# Patient Record
Sex: FEMALE | Race: WHITE | NOT HISPANIC OR LATINO | Employment: OTHER | URBAN - METROPOLITAN AREA
[De-identification: names, ages, dates, MRNs, and addresses within clinical notes are randomized per-mention and may not be internally consistent; named-entity substitution may affect disease eponyms.]

---

## 2017-02-23 ENCOUNTER — HOSPITAL ENCOUNTER (OUTPATIENT)
Dept: LAB | Facility: MEDICAL CENTER | Age: 82
End: 2017-02-23
Attending: NURSE PRACTITIONER
Payer: MEDICARE

## 2017-02-23 LAB
ALBUMIN SERPL BCP-MCNC: 4 G/DL (ref 3.2–4.9)
ALBUMIN/GLOB SERPL: 1.3 G/DL
ALP SERPL-CCNC: 83 U/L (ref 30–99)
ALT SERPL-CCNC: 11 U/L (ref 2–50)
ANION GAP SERPL CALC-SCNC: 8 MMOL/L (ref 0–11.9)
APPEARANCE UR: ABNORMAL
AST SERPL-CCNC: 15 U/L (ref 12–45)
BACTERIA #/AREA URNS HPF: ABNORMAL /HPF
BASOPHILS # BLD AUTO: 0.05 K/UL (ref 0–0.12)
BASOPHILS NFR BLD AUTO: 0.7 % (ref 0–1.8)
BILIRUB SERPL-MCNC: 0.7 MG/DL (ref 0.1–1.5)
BILIRUB UR QL STRIP.AUTO: NEGATIVE
BUN SERPL-MCNC: 31 MG/DL (ref 8–22)
CALCIUM SERPL-MCNC: 10.1 MG/DL (ref 8.5–10.5)
CHLORIDE SERPL-SCNC: 104 MMOL/L (ref 96–112)
CHOLEST SERPL-MCNC: 222 MG/DL (ref 100–199)
CO2 SERPL-SCNC: 28 MMOL/L (ref 20–33)
COLOR UR AUTO: ABNORMAL
CREAT SERPL-MCNC: 1.26 MG/DL (ref 0.5–1.4)
CULTURE IF INDICATED INDCX: YES UA CULTURE
EOSINOPHIL # BLD: 0.29 K/UL (ref 0–0.51)
EOSINOPHIL NFR BLD AUTO: 4.2 % (ref 0–6.9)
EPITHELIAL CELLS 1715: ABNORMAL /HPF
ERYTHROCYTE [DISTWIDTH] IN BLOOD BY AUTOMATED COUNT: 47.5 FL (ref 35.9–50)
GLOBULIN SER CALC-MCNC: 3 G/DL (ref 1.9–3.5)
GLUCOSE SERPL-MCNC: 104 MG/DL (ref 65–99)
GLUCOSE UR STRIP.AUTO-MCNC: NEGATIVE MG/DL
HCT VFR BLD AUTO: 41.2 % (ref 37–47)
HDLC SERPL-MCNC: 37 MG/DL
HGB BLD-MCNC: 13.2 G/DL (ref 12–16)
IMM GRANULOCYTES # BLD AUTO: 0.01 K/UL (ref 0–0.11)
IMM GRANULOCYTES NFR BLD AUTO: 0.1 % (ref 0–0.9)
KETONES UR STRIP.AUTO-MCNC: NEGATIVE MG/DL
LDLC SERPL CALC-MCNC: 133 MG/DL
LEUKOCYTE ESTERASE UR QL STRIP.AUTO: ABNORMAL
LYMPHOCYTES # BLD: 2.51 K/UL (ref 1–4.8)
LYMPHOCYTES NFR BLD AUTO: 36 % (ref 22–41)
MCH RBC QN AUTO: 30.3 PG (ref 27–33)
MCHC RBC AUTO-ENTMCNC: 32 G/DL (ref 33.6–35)
MCV RBC AUTO: 94.5 FL (ref 81.4–97.8)
MICRO URNS: ABNORMAL
MONOCYTES # BLD: 0.69 K/UL (ref 0–0.85)
MONOCYTES NFR BLD AUTO: 9.9 % (ref 0–13.4)
NEUTROPHILS # BLD: 3.42 K/UL (ref 2–7.15)
NEUTROPHILS NFR BLD AUTO: 49.1 % (ref 44–72)
NITRITE UR QL STRIP.AUTO: POSITIVE
NRBC # BLD AUTO: 0 K/UL
NRBC BLD-RTO: 0 /100 WBC
PH UR: 6 [PH]
PLATELET # BLD AUTO: 168 K/UL (ref 164–446)
PMV BLD AUTO: 10.7 FL (ref 9–12.9)
POTASSIUM SERPL-SCNC: 3.9 MMOL/L (ref 3.6–5.5)
PROT SERPL-MCNC: 7 G/DL (ref 6–8.2)
PROT UR QL STRIP: NEGATIVE MG/DL
RBC # BLD AUTO: 4.36 M/UL (ref 4.2–5.4)
RBC #/AREA URNS HPF: ABNORMAL /HPF
RBC UR QL AUTO: ABNORMAL
SODIUM SERPL-SCNC: 140 MMOL/L (ref 135–145)
SP GR UR STRIP.AUTO: 1.01
TRIGL SERPL-MCNC: 261 MG/DL (ref 0–149)
TSH SERPL DL<=0.005 MIU/L-ACNC: 1.93 UIU/ML (ref 0.3–3.7)
WBC # BLD AUTO: 7 K/UL (ref 4.8–10.8)
WBC #/AREA URNS HPF: >150 /HPF

## 2017-02-23 PROCEDURE — 87086 URINE CULTURE/COLONY COUNT: CPT

## 2017-02-23 PROCEDURE — 36415 COLL VENOUS BLD VENIPUNCTURE: CPT

## 2017-02-23 PROCEDURE — 81001 URINALYSIS AUTO W/SCOPE: CPT

## 2017-02-23 PROCEDURE — 87077 CULTURE AEROBIC IDENTIFY: CPT

## 2017-02-23 PROCEDURE — 87186 SC STD MICRODIL/AGAR DIL: CPT

## 2017-02-23 PROCEDURE — 84443 ASSAY THYROID STIM HORMONE: CPT

## 2017-02-23 PROCEDURE — 80061 LIPID PANEL: CPT

## 2017-02-23 PROCEDURE — 80053 COMPREHEN METABOLIC PANEL: CPT

## 2017-02-23 PROCEDURE — 85025 COMPLETE CBC W/AUTO DIFF WBC: CPT

## 2017-02-25 LAB
BACTERIA UR CULT: ABNORMAL
SIGNIFICANT IND 70042: ABNORMAL
SOURCE SOURCE: ABNORMAL

## 2017-06-23 ENCOUNTER — HOSPITAL ENCOUNTER (OUTPATIENT)
Dept: LAB | Facility: MEDICAL CENTER | Age: 82
End: 2017-06-23
Attending: NURSE PRACTITIONER
Payer: MEDICARE

## 2017-06-23 LAB
ALBUMIN SERPL BCP-MCNC: 4 G/DL (ref 3.2–4.9)
ALBUMIN/GLOB SERPL: 1.5 G/DL
ALP SERPL-CCNC: 85 U/L (ref 30–99)
ALT SERPL-CCNC: 17 U/L (ref 2–50)
ANION GAP SERPL CALC-SCNC: 8 MMOL/L (ref 0–11.9)
APPEARANCE UR: CLEAR
AST SERPL-CCNC: 19 U/L (ref 12–45)
BACTERIA #/AREA URNS HPF: ABNORMAL /HPF
BASOPHILS # BLD AUTO: 0.8 % (ref 0–1.8)
BASOPHILS # BLD: 0.06 K/UL (ref 0–0.12)
BILIRUB SERPL-MCNC: 0.6 MG/DL (ref 0.1–1.5)
BILIRUB UR QL STRIP.AUTO: NEGATIVE
BUN SERPL-MCNC: 44 MG/DL (ref 8–22)
CALCIUM SERPL-MCNC: 10.5 MG/DL (ref 8.5–10.5)
CHLORIDE SERPL-SCNC: 107 MMOL/L (ref 96–112)
CHOLEST SERPL-MCNC: 183 MG/DL (ref 100–199)
CO2 SERPL-SCNC: 23 MMOL/L (ref 20–33)
COLOR UR: ABNORMAL
CREAT SERPL-MCNC: 1.22 MG/DL (ref 0.5–1.4)
CULTURE IF INDICATED INDCX: YES UA CULTURE
EOSINOPHIL # BLD AUTO: 0.48 K/UL (ref 0–0.51)
EOSINOPHIL NFR BLD: 6.1 % (ref 0–6.9)
EPI CELLS #/AREA URNS HPF: ABNORMAL /HPF
ERYTHROCYTE [DISTWIDTH] IN BLOOD BY AUTOMATED COUNT: 45.7 FL (ref 35.9–50)
GFR SERPL CREATININE-BSD FRML MDRD: 42 ML/MIN/1.73 M 2
GLOBULIN SER CALC-MCNC: 2.7 G/DL (ref 1.9–3.5)
GLUCOSE SERPL-MCNC: 105 MG/DL (ref 65–99)
GLUCOSE UR STRIP.AUTO-MCNC: NEGATIVE MG/DL
HCT VFR BLD AUTO: 40 % (ref 37–47)
HDLC SERPL-MCNC: 37 MG/DL
HGB BLD-MCNC: 13.1 G/DL (ref 12–16)
IMM GRANULOCYTES # BLD AUTO: 0.02 K/UL (ref 0–0.11)
IMM GRANULOCYTES NFR BLD AUTO: 0.3 % (ref 0–0.9)
KETONES UR STRIP.AUTO-MCNC: NEGATIVE MG/DL
LDLC SERPL CALC-MCNC: 111 MG/DL
LEUKOCYTE ESTERASE UR QL STRIP.AUTO: ABNORMAL
LYMPHOCYTES # BLD AUTO: 2.99 K/UL (ref 1–4.8)
LYMPHOCYTES NFR BLD: 37.9 % (ref 22–41)
MCH RBC QN AUTO: 30.9 PG (ref 27–33)
MCHC RBC AUTO-ENTMCNC: 32.8 G/DL (ref 33.6–35)
MCV RBC AUTO: 94.3 FL (ref 81.4–97.8)
MICRO URNS: ABNORMAL
MONOCYTES # BLD AUTO: 0.91 K/UL (ref 0–0.85)
MONOCYTES NFR BLD AUTO: 11.5 % (ref 0–13.4)
NEUTROPHILS # BLD AUTO: 3.43 K/UL (ref 2–7.15)
NEUTROPHILS NFR BLD: 43.4 % (ref 44–72)
NITRITE UR QL STRIP.AUTO: POSITIVE
NRBC # BLD AUTO: 0 K/UL
NRBC BLD AUTO-RTO: 0 /100 WBC
PH UR STRIP.AUTO: 5.5 [PH]
PLATELET # BLD AUTO: 187 K/UL (ref 164–446)
PMV BLD AUTO: 11.6 FL (ref 9–12.9)
POTASSIUM SERPL-SCNC: 3.4 MMOL/L (ref 3.6–5.5)
PROT SERPL-MCNC: 6.7 G/DL (ref 6–8.2)
PROT UR QL STRIP: NEGATIVE MG/DL
RBC # BLD AUTO: 4.24 M/UL (ref 4.2–5.4)
RBC # URNS HPF: ABNORMAL /HPF
RBC UR QL AUTO: NEGATIVE
SODIUM SERPL-SCNC: 138 MMOL/L (ref 135–145)
SP GR UR STRIP.AUTO: 1.01
TRIGL SERPL-MCNC: 174 MG/DL (ref 0–149)
TSH SERPL DL<=0.005 MIU/L-ACNC: 2 UIU/ML (ref 0.3–3.7)
WBC # BLD AUTO: 7.9 K/UL (ref 4.8–10.8)
WBC #/AREA URNS HPF: ABNORMAL /HPF

## 2017-06-23 PROCEDURE — 36415 COLL VENOUS BLD VENIPUNCTURE: CPT

## 2017-06-23 PROCEDURE — 85025 COMPLETE CBC W/AUTO DIFF WBC: CPT

## 2017-06-23 PROCEDURE — 87186 SC STD MICRODIL/AGAR DIL: CPT

## 2017-06-23 PROCEDURE — 87077 CULTURE AEROBIC IDENTIFY: CPT

## 2017-06-23 PROCEDURE — 87086 URINE CULTURE/COLONY COUNT: CPT

## 2017-06-23 PROCEDURE — 81001 URINALYSIS AUTO W/SCOPE: CPT

## 2017-06-23 PROCEDURE — 80053 COMPREHEN METABOLIC PANEL: CPT

## 2017-06-23 PROCEDURE — 80061 LIPID PANEL: CPT

## 2017-06-23 PROCEDURE — 84443 ASSAY THYROID STIM HORMONE: CPT

## 2017-06-25 LAB
BACTERIA UR CULT: ABNORMAL
SIGNIFICANT IND 70042: ABNORMAL
SOURCE SOURCE: ABNORMAL

## 2017-06-30 ENCOUNTER — HOSPITAL ENCOUNTER (OUTPATIENT)
Dept: LAB | Facility: MEDICAL CENTER | Age: 82
End: 2017-06-30
Attending: DERMATOLOGY
Payer: MEDICARE

## 2017-06-30 LAB
ERYTHROCYTE [SEDIMENTATION RATE] IN BLOOD BY WESTERGREN METHOD: 9 MM/HOUR (ref 0–30)
RHEUMATOID FACT SER IA-ACNC: <10 IU/ML (ref 0–14)

## 2017-06-30 PROCEDURE — 86431 RHEUMATOID FACTOR QUANT: CPT

## 2017-06-30 PROCEDURE — 85652 RBC SED RATE AUTOMATED: CPT

## 2017-06-30 PROCEDURE — 86038 ANTINUCLEAR ANTIBODIES: CPT

## 2017-06-30 PROCEDURE — 36415 COLL VENOUS BLD VENIPUNCTURE: CPT

## 2017-07-02 LAB — NUCLEAR IGG SER QL IA: NORMAL

## 2017-09-05 ENCOUNTER — APPOINTMENT (OUTPATIENT)
Dept: SOCIAL WORK | Facility: CLINIC | Age: 82
End: 2017-09-05
Payer: MEDICARE

## 2017-09-05 PROCEDURE — G0008 ADMIN INFLUENZA VIRUS VAC: HCPCS | Performed by: REGISTERED NURSE

## 2017-09-05 PROCEDURE — 90662 IIV NO PRSV INCREASED AG IM: CPT | Performed by: REGISTERED NURSE

## 2017-12-28 ENCOUNTER — APPOINTMENT (OUTPATIENT)
Dept: RADIOLOGY | Facility: MEDICAL CENTER | Age: 82
DRG: 301 | End: 2017-12-28
Attending: INTERNAL MEDICINE
Payer: MEDICARE

## 2017-12-28 ENCOUNTER — RESOLUTE PROFESSIONAL BILLING HOSPITAL PROF FEE (OUTPATIENT)
Dept: HOSPITALIST | Facility: MEDICAL CENTER | Age: 82
End: 2017-12-28
Payer: MEDICARE

## 2017-12-28 ENCOUNTER — HOSPITAL ENCOUNTER (INPATIENT)
Facility: MEDICAL CENTER | Age: 82
LOS: 1 days | DRG: 301 | End: 2017-12-29
Attending: EMERGENCY MEDICINE | Admitting: INTERNAL MEDICINE
Payer: MEDICARE

## 2017-12-28 DIAGNOSIS — I82.411 ACUTE DEEP VEIN THROMBOSIS (DVT) OF FEMORAL VEIN OF RIGHT LOWER EXTREMITY (HCC): ICD-10-CM

## 2017-12-28 PROBLEM — I82.409 DVT (DEEP VENOUS THROMBOSIS) (HCC): Status: ACTIVE | Noted: 2017-12-28

## 2017-12-28 LAB
ANION GAP SERPL CALC-SCNC: 9 MMOL/L (ref 0–11.9)
BASOPHILS # BLD AUTO: 0.6 % (ref 0–1.8)
BASOPHILS # BLD: 0.05 K/UL (ref 0–0.12)
BUN SERPL-MCNC: 23 MG/DL (ref 8–22)
CALCIUM SERPL-MCNC: 10.6 MG/DL (ref 8.5–10.5)
CHLORIDE SERPL-SCNC: 106 MMOL/L (ref 96–112)
CO2 SERPL-SCNC: 26 MMOL/L (ref 20–33)
CREAT SERPL-MCNC: 1.02 MG/DL (ref 0.5–1.4)
CRP SERPL HS-MCNC: 0.23 MG/DL (ref 0–0.75)
EOSINOPHIL # BLD AUTO: 0.23 K/UL (ref 0–0.51)
EOSINOPHIL NFR BLD: 2.7 % (ref 0–6.9)
ERYTHROCYTE [DISTWIDTH] IN BLOOD BY AUTOMATED COUNT: 44.5 FL (ref 35.9–50)
ERYTHROCYTE [SEDIMENTATION RATE] IN BLOOD BY WESTERGREN METHOD: 14 MM/HOUR (ref 0–30)
GFR SERPL CREATININE-BSD FRML MDRD: 51 ML/MIN/1.73 M 2
GLUCOSE SERPL-MCNC: 97 MG/DL (ref 65–99)
HCT VFR BLD AUTO: 36.8 % (ref 37–47)
HGB BLD-MCNC: 12.5 G/DL (ref 12–16)
IMM GRANULOCYTES # BLD AUTO: 0.03 K/UL (ref 0–0.11)
IMM GRANULOCYTES NFR BLD AUTO: 0.4 % (ref 0–0.9)
LYMPHOCYTES # BLD AUTO: 3.43 K/UL (ref 1–4.8)
LYMPHOCYTES NFR BLD: 40.8 % (ref 22–41)
MCH RBC QN AUTO: 32.4 PG (ref 27–33)
MCHC RBC AUTO-ENTMCNC: 34 G/DL (ref 33.6–35)
MCV RBC AUTO: 95.3 FL (ref 81.4–97.8)
MONOCYTES # BLD AUTO: 0.88 K/UL (ref 0–0.85)
MONOCYTES NFR BLD AUTO: 10.5 % (ref 0–13.4)
NEUTROPHILS # BLD AUTO: 3.78 K/UL (ref 2–7.15)
NEUTROPHILS NFR BLD: 45 % (ref 44–72)
NRBC # BLD AUTO: 0 K/UL
NRBC BLD-RTO: 0 /100 WBC
PLATELET # BLD AUTO: 218 K/UL (ref 164–446)
PMV BLD AUTO: 10.2 FL (ref 9–12.9)
POTASSIUM SERPL-SCNC: 3.5 MMOL/L (ref 3.6–5.5)
RBC # BLD AUTO: 3.86 M/UL (ref 4.2–5.4)
SODIUM SERPL-SCNC: 141 MMOL/L (ref 135–145)
WBC # BLD AUTO: 8.4 K/UL (ref 4.8–10.8)

## 2017-12-28 PROCEDURE — 96375 TX/PRO/DX INJ NEW DRUG ADDON: CPT

## 2017-12-28 PROCEDURE — 96365 THER/PROPH/DIAG IV INF INIT: CPT

## 2017-12-28 PROCEDURE — 86140 C-REACTIVE PROTEIN: CPT

## 2017-12-28 PROCEDURE — 85652 RBC SED RATE AUTOMATED: CPT

## 2017-12-28 PROCEDURE — 71010 DX-CHEST-LIMITED (1 VIEW): CPT

## 2017-12-28 PROCEDURE — 85610 PROTHROMBIN TIME: CPT

## 2017-12-28 PROCEDURE — 94760 N-INVAS EAR/PLS OXIMETRY 1: CPT

## 2017-12-28 PROCEDURE — 99223 1ST HOSP IP/OBS HIGH 75: CPT | Mod: AI | Performed by: INTERNAL MEDICINE

## 2017-12-28 PROCEDURE — 700111 HCHG RX REV CODE 636 W/ 250 OVERRIDE (IP): Performed by: PEDIATRICS

## 2017-12-28 PROCEDURE — 99285 EMERGENCY DEPT VISIT HI MDM: CPT

## 2017-12-28 PROCEDURE — 85730 THROMBOPLASTIN TIME PARTIAL: CPT

## 2017-12-28 PROCEDURE — 770006 HCHG ROOM/CARE - MED/SURG/GYN SEMI*

## 2017-12-28 PROCEDURE — 93971 EXTREMITY STUDY: CPT

## 2017-12-28 PROCEDURE — 85025 COMPLETE CBC W/AUTO DIFF WBC: CPT

## 2017-12-28 PROCEDURE — 80048 BASIC METABOLIC PNL TOTAL CA: CPT

## 2017-12-28 RX ORDER — LEVOTHYROXINE SODIUM 0.03 MG/1
25 TABLET ORAL
Status: DISCONTINUED | OUTPATIENT
Start: 2017-12-29 | End: 2017-12-29 | Stop reason: HOSPADM

## 2017-12-28 RX ORDER — CARVEDILOL 3.12 MG/1
3.12 TABLET ORAL 2 TIMES DAILY WITH MEALS
Status: DISCONTINUED | OUTPATIENT
Start: 2017-12-29 | End: 2017-12-29 | Stop reason: HOSPADM

## 2017-12-28 RX ORDER — POLYETHYLENE GLYCOL 3350 17 G/17G
1 POWDER, FOR SOLUTION ORAL
Status: DISCONTINUED | OUTPATIENT
Start: 2017-12-28 | End: 2017-12-29 | Stop reason: HOSPADM

## 2017-12-28 RX ORDER — AMOXICILLIN 250 MG
2 CAPSULE ORAL 2 TIMES DAILY
Status: DISCONTINUED | OUTPATIENT
Start: 2017-12-28 | End: 2017-12-29 | Stop reason: HOSPADM

## 2017-12-28 RX ORDER — HEPARIN SODIUM 1000 [USP'U]/ML
2200 INJECTION, SOLUTION INTRAVENOUS; SUBCUTANEOUS PRN
Status: DISCONTINUED | OUTPATIENT
Start: 2017-12-28 | End: 2017-12-29

## 2017-12-28 RX ORDER — SODIUM CHLORIDE 9 MG/ML
INJECTION, SOLUTION INTRAVENOUS CONTINUOUS
Status: DISCONTINUED | OUTPATIENT
Start: 2017-12-28 | End: 2017-12-29

## 2017-12-28 RX ORDER — ACETAMINOPHEN 325 MG/1
650 TABLET ORAL EVERY 6 HOURS PRN
Status: DISCONTINUED | OUTPATIENT
Start: 2017-12-28 | End: 2017-12-29 | Stop reason: HOSPADM

## 2017-12-28 RX ORDER — BISACODYL 10 MG
10 SUPPOSITORY, RECTAL RECTAL
Status: DISCONTINUED | OUTPATIENT
Start: 2017-12-28 | End: 2017-12-29 | Stop reason: HOSPADM

## 2017-12-28 RX ORDER — HEPARIN SODIUM 1000 [USP'U]/ML
4000 INJECTION, SOLUTION INTRAVENOUS; SUBCUTANEOUS ONCE
Status: COMPLETED | OUTPATIENT
Start: 2017-12-28 | End: 2017-12-28

## 2017-12-28 RX ADMIN — HEPARIN SODIUM 4000 UNITS: 1000 INJECTION, SOLUTION INTRAVENOUS; SUBCUTANEOUS at 23:07

## 2017-12-28 RX ADMIN — HEPARIN SODIUM 900 UNITS/HR: 5000 INJECTION, SOLUTION INTRAVENOUS at 23:06

## 2017-12-28 ASSESSMENT — PAIN SCALES - GENERAL: PAINLEVEL_OUTOF10: 0

## 2017-12-28 NOTE — ED NOTES
Chief Complaint   Patient presents with   • Leg Swelling     right leg x1 week, reports calf soreness several days prior to noticing swelling.     Ambulatory to triage using cane for above. Hypertensive otherwise VSS. Explained triage process, to waiting room. Asked to inform RN if questions or concerns arise.

## 2017-12-29 ENCOUNTER — APPOINTMENT (OUTPATIENT)
Dept: RADIOLOGY | Facility: MEDICAL CENTER | Age: 82
DRG: 301 | End: 2017-12-29
Attending: INTERNAL MEDICINE
Payer: MEDICARE

## 2017-12-29 VITALS
SYSTOLIC BLOOD PRESSURE: 165 MMHG | TEMPERATURE: 97.1 F | HEART RATE: 87 BPM | DIASTOLIC BLOOD PRESSURE: 70 MMHG | WEIGHT: 137.13 LBS | OXYGEN SATURATION: 95 % | BODY MASS INDEX: 27.64 KG/M2 | HEIGHT: 59 IN | RESPIRATION RATE: 18 BRPM

## 2017-12-29 PROBLEM — E83.52 HYPERCALCEMIA: Status: ACTIVE | Noted: 2017-12-29

## 2017-12-29 PROBLEM — E21.3 HYPERPARATHYROIDISM (HCC): Status: ACTIVE | Noted: 2017-12-29

## 2017-12-29 LAB
APTT PPP: 115.1 SEC (ref 24.7–36)
APTT PPP: 124.5 SEC (ref 24.7–36)
APTT PPP: 29.6 SEC (ref 24.7–36)
CA-I SERPL-SCNC: 1.3 MMOL/L (ref 1.1–1.3)
INR PPP: 1.01 (ref 0.87–1.13)
PROTHROMBIN TIME: 13 SEC (ref 12–14.6)
PTH-INTACT SERPL-MCNC: 90.2 PG/ML (ref 14–72)

## 2017-12-29 PROCEDURE — 83970 ASSAY OF PARATHORMONE: CPT

## 2017-12-29 PROCEDURE — 700105 HCHG RX REV CODE 258: Performed by: INTERNAL MEDICINE

## 2017-12-29 PROCEDURE — 700102 HCHG RX REV CODE 250 W/ 637 OVERRIDE(OP): Performed by: INTERNAL MEDICINE

## 2017-12-29 PROCEDURE — 700111 HCHG RX REV CODE 636 W/ 250 OVERRIDE (IP): Performed by: PEDIATRICS

## 2017-12-29 PROCEDURE — 700117 HCHG RX CONTRAST REV CODE 255: Performed by: HOSPITALIST

## 2017-12-29 PROCEDURE — A9270 NON-COVERED ITEM OR SERVICE: HCPCS | Performed by: INTERNAL MEDICINE

## 2017-12-29 PROCEDURE — 36415 COLL VENOUS BLD VENIPUNCTURE: CPT

## 2017-12-29 PROCEDURE — 71260 CT THORAX DX C+: CPT

## 2017-12-29 PROCEDURE — 85730 THROMBOPLASTIN TIME PARTIAL: CPT | Mod: 91

## 2017-12-29 PROCEDURE — 82330 ASSAY OF CALCIUM: CPT

## 2017-12-29 PROCEDURE — 700102 HCHG RX REV CODE 250 W/ 637 OVERRIDE(OP): Performed by: HOSPITALIST

## 2017-12-29 PROCEDURE — A9270 NON-COVERED ITEM OR SERVICE: HCPCS | Performed by: HOSPITALIST

## 2017-12-29 PROCEDURE — 99239 HOSP IP/OBS DSCHRG MGMT >30: CPT | Performed by: HOSPITALIST

## 2017-12-29 RX ORDER — POTASSIUM CHLORIDE 20 MEQ/1
20 TABLET, EXTENDED RELEASE ORAL DAILY
Status: DISCONTINUED | OUTPATIENT
Start: 2017-12-29 | End: 2017-12-29 | Stop reason: HOSPADM

## 2017-12-29 RX ADMIN — STANDARDIZED SENNA CONCENTRATE AND DOCUSATE SODIUM 2 TABLET: 8.6; 5 TABLET, FILM COATED ORAL at 01:07

## 2017-12-29 RX ADMIN — CARVEDILOL 3.12 MG: 3.12 TABLET, FILM COATED ORAL at 07:59

## 2017-12-29 RX ADMIN — POTASSIUM CHLORIDE 20 MEQ: 1500 TABLET, EXTENDED RELEASE ORAL at 09:30

## 2017-12-29 RX ADMIN — CARVEDILOL 3.12 MG: 3.12 TABLET, FILM COATED ORAL at 16:41

## 2017-12-29 RX ADMIN — IOHEXOL 80 ML: 350 INJECTION, SOLUTION INTRAVENOUS at 15:53

## 2017-12-29 RX ADMIN — SODIUM CHLORIDE: 9 INJECTION, SOLUTION INTRAVENOUS at 01:07

## 2017-12-29 RX ADMIN — LEVOTHYROXINE SODIUM 25 MCG: 25 TABLET ORAL at 05:34

## 2017-12-29 RX ADMIN — RIVAROXABAN 15 MG: 15 TABLET, FILM COATED ORAL at 16:41

## 2017-12-29 RX ADMIN — HEPARIN SODIUM 800 UNITS/HR: 5000 INJECTION, SOLUTION INTRAVENOUS at 12:14

## 2017-12-29 ASSESSMENT — PATIENT HEALTH QUESTIONNAIRE - PHQ9
SUM OF ALL RESPONSES TO PHQ QUESTIONS 1-9: 0
2. FEELING DOWN, DEPRESSED, IRRITABLE, OR HOPELESS: NOT AT ALL
SUM OF ALL RESPONSES TO PHQ9 QUESTIONS 1 AND 2: 0
1. LITTLE INTEREST OR PLEASURE IN DOING THINGS: NOT AT ALL

## 2017-12-29 ASSESSMENT — PAIN SCALES - GENERAL
PAINLEVEL_OUTOF10: 0
PAINLEVEL_OUTOF10: 0

## 2017-12-29 ASSESSMENT — LIFESTYLE VARIABLES
DO YOU DRINK ALCOHOL: NO
EVER_SMOKED: NEVER
ALCOHOL_USE: NO

## 2017-12-29 NOTE — ED NOTES
"Pt ambulated from [facility] using a cane. Pt aox4, breathing even and unlabored, c/o right leg pain x 2 weeks. Swelling to right leg noted, +2 pitting edema to right LE , skin is warm and dry to touch. Pedal and post tib pulses +2 bilaterally. Pt denies weakness or sob since onset of right leg swelling. Pt reports episodes of \"a little bit of dizziness\".   "

## 2017-12-29 NOTE — CARE PLAN
Problem: Safety  Goal: Will remain free from injury    Intervention: Provide assistance with mobility  Fall precaution for high fall risk activated.      Problem: Knowledge Deficit  Goal: Knowledge of disease process/condition, treatment plan, diagnostic tests, and medications will improve    Intervention: Assess knowledge level of disease process/condition, treatment plan, diagnostic tests, and medications  Health teaching given regarding Heparin drip per protocol.

## 2017-12-29 NOTE — DISCHARGE PLANNING
Care Transition Team Assessment    IHD met with pt at bedside. Pt currently lives alone at home, but states that she does not have any local community or family support to rely on. She states that her closest support is her daughter who lives about 30 minutes away from her. She uses a cane and home O2 provided by [employer]. Pt does not have HHC at this time. She will have her daughter provide transportation upon d/c.     Information Source  Orientation : Oriented x 4  Information Given By: Patient  Informant's Name: Regine  Who is responsible for making decisions for patient? : Patient         Elopement Risk  Legal Hold: No  Ambulatory or Self Mobile in Wheelchair: Yes  Disoriented: No  Psychiatric Symptoms: None  History of Wandering: No  Elopement this Admit: No  Vocalizing Wanting to Leave: No  Displays Behaviors, Body Language Wanting to Leave: No-Not at Risk for Elopement    Interdisciplinary Discharge Planning  Does Admitting Nurse Feel This Could be a Complex Discharge?: No  Primary Care Physician: PETE Velazquez  Lives with - Patient's Self Care Capacity: Alone and Able to Care For Self  Support Systems: None  Housing / Facility: [address]  Do You Take your Prescribed Medications Regularly: Yes  Able to Return to Previous ADL's: Yes  Mobility Issues: Yes  Prior Services: None  Patient Expects to be Discharged to:: Home  Assistance Needed: No  Durable Medical Equipment: Not Applicable    Discharge Preparedness  What is your plan after discharge?: Uncertain - pending medical team collaboration  What are your discharge supports?: Child  Prior Functional Level: Ambulatory, Drives Self, Independent with Activities of Daily Living, Independent with Medication Management, Uses Cane  Difficulity with ADLs: Walking  Difficulty with ADLs Comment: uses cane  Difficulity with IADLs: None    Functional Assesment  Prior Functional Level: Ambulatory, Drives Self, Independent with Activities of Daily Living, Independent  with Medication Management, Uses Cane    Finances  Financial Barriers to Discharge: No  Prescription Coverage: Yes ([facility] on [address] )    Vision / Hearing Impairment  Vision Impairment : Yes  Right Eye Vision: Wears Glasses  Left Eye Vision: Wears Glasses  Hearing Impairment : No              Domestic Abuse  Have you ever been the victim of abuse or violence?: No    Psychological Assessment  History of Substance Abuse: None  History of Psychiatric Problems: No  Non-compliant with Treatment: No    Discharge Risks or Barriers  Discharge risks or barriers?: No    Anticipated Discharge Information  Anticipated discharge disposition: Discharge needs currently unknown  Discharge Address: unknown at this time  Discharge Contact Phone Number: [phone]

## 2017-12-29 NOTE — PROGRESS NOTES
Marquita Butcher Fall Risk Assessment:     Last Known Fall: No falls  Mobility: Use of assistive device/requires assist of two people  Medications: Cardiovascular or central nervous system meds  Mental Status/LOC/Awareness: Awake, alert, and oriented to date, place, and person  Toileting Needs: No needs  Volume/Electrolyte Status: Use of IV fluids/tube feeds  Communication/Sensory: Visual (Glasses)/hearing deficit  Behavior: Appropriate behavior  Marquita Butcher Fall Risk Total: 10  Fall Risk Level: LOW RISK    Universal Fall Precautions:  call light/belongings in reach, bed in low position and locked, wheelchairs and assistive devices out of sight, siderails up x 2, use non-slip footwear, adequate lighting, clutter free and spill free environment, educate on level of risk, educate to call for assistance    Fall Risk Level Interventions:   TRIAL (TELE 8, NEURO, MED CHERRY 5) Low Fall Risk Interventions  Place yellow fall risk ID band on patient: completed  Provide patient/family education based on risk assessment: completed  Educate patient/family to call staff for assistance when getting out of bed: completed  Place fall precaution signage outside patient door: completed      Patient Specific Interventions:     Medication: review medications with patient and family  Mental Status/LOC/Awareness: utilize bed/chair fall alarm and reinforce the use of call light  Toileting: instruct patient/family on the use of grab bars  Volume/Electrolyte Status: ensure patient remains hydrated, advance diet as tolerated, monitor abnormal lab values and ensure IV fluids are appropriate  Communication/Sensory: update plan of care on whiteboard  Behavioral: administer medication as ordered  Mobility: dangle prior to standing and provide appropriate assistive device

## 2017-12-29 NOTE — DISCHARGE PLANNING
Update Discharge Planning:  Discussed with IDT :  MD to dc today.   I called Rosana at the [facility] @ [place].   Rajiv is covered and copay is $ 31.50  IMM letter given. Explained to pt, pt signed, copy to pt and to chart.

## 2017-12-29 NOTE — ED PROVIDER NOTES
ED Provider Note    Scribed for Crispin Lacy M.D. by Yves Ragsdale. 12/28/2017, 9:17 PM.    Primary care provider: Silvino Guallpa D.O.  Means of arrival: walk-in  History obtained from: Patient  History limited by: none    CHIEF COMPLAINT  Chief Complaint   Patient presents with   • Leg Swelling     right leg x1 week, reports calf soreness several days prior to noticing swelling.       HPI  Regine Bryant is a 88 y.o. female who presents to the Emergency Department for evaluation of right leg swelling onset 1 month ago. Patient's daughter reports the patient's right leg has been swelling intermittently since onset. Patient explains she first noticed pain to her right calf several days prior to noticing the swelling. Her calf-pain is non-radiating. Patient reports she has taken Advil at home with some relief to her symptoms. She does not note any exacerbating factors. Patient does note that she is easily fatigued upon physical exertion, and explains she has to take frequent breaks when doing work around her house. The patient states she has never experienced leg swelling before. Patient notes she has a toe nail fungus in her right foot, and states that may be attributed to her leg swelling. She confirms a history of hypertension. Patient adds she uses supplemental oxygen at home when she sleeps. She denies recent travel or any history of blood clots. She also denies shortness of breath, chest pain, fevers, nausea, vomiting.     REVIEW OF SYSTEMS  See HPI for further details. All other systems are negative.     C.      PAST MEDICAL HISTORY   has a past medical history of Hypercholesterolemia; Hypertension; Hypertension; and Thyroid disease.    SURGICAL HISTORY   has a past surgical history that includes gyn surgery.    SOCIAL HISTORY  Social History   Substance Use Topics   • Smoking status: Never Smoker   • Smokeless tobacco: Never Used      History   Drug use: Unknown       FAMILY HISTORY  History reviewed.  "No pertinent family history.    CURRENT MEDICATIONS  Reviewed.  See Encounter Summary.     ALLERGIES  No Known Allergies    PHYSICAL EXAM  VITAL SIGNS: BP (!) 190/59   Pulse 89   Temp 37.4 °C (99.4 °F)   Resp 18   Ht 1.499 m (4' 11\")   Wt 62.2 kg (137 lb 2 oz)   SpO2 93%   BMI 27.70 kg/m²   Constitutional: Alert in no apparent distress.  HENT: No signs of trauma, Bilateral external ears normal, Nose normal.   Eyes: Pupils are equal and reactive, Conjunctiva normal, Non-icteric.   Neck: Normal range of motion, No tenderness, Supple, No stridor.   Cardiovascular: Regular rate and rhythm, no murmurs.   Thorax & Lungs: Normal breath sounds, No respiratory distress, No wheezing, No chest tenderness.   Abdomen: Bowel sounds normal, Soft, No tenderness, No masses, No pulsatile masses. No peritoneal signs.  Skin: Warm, Dry, No erythema, No rash.   Extremities: Intact distal pulses, No cyanosis. Unilateral edema to the right leg, greatest to the distal leg and ankle. Overlying erythema and increased warmth. Tender to right posterior calf, again greatest distally. No medial thigh tenderness. Full range of motion of all joints. Distally neurovascularly intact.   Neurologic: Alert , Normal motor function, Normal sensory function, No focal deficits noted.   Psychiatric: Affect normal, Judgment normal, Mood normal.       DIAGNOSTIC STUDIES / PROCEDURES     LABS  Results for orders placed or performed during the hospital encounter of 12/28/17   CBC WITH DIFFERENTIAL   Result Value Ref Range    WBC 8.4 4.8 - 10.8 K/uL    RBC 3.86 (L) 4.20 - 5.40 M/uL    Hemoglobin 12.5 12.0 - 16.0 g/dL    Hematocrit 36.8 (L) 37.0 - 47.0 %    MCV 95.3 81.4 - 97.8 fL    MCH 32.4 27.0 - 33.0 pg    MCHC 34.0 33.6 - 35.0 g/dL    RDW 44.5 35.9 - 50.0 fL    Platelet Count 218 164 - 446 K/uL    MPV 10.2 9.0 - 12.9 fL    Neutrophils-Polys 45.00 44.00 - 72.00 %    Lymphocytes 40.80 22.00 - 41.00 %    Monocytes 10.50 0.00 - 13.40 %    Eosinophils 2.70 " 0.00 - 6.90 %    Basophils 0.60 0.00 - 1.80 %    Immature Granulocytes 0.40 0.00 - 0.90 %    Nucleated RBC 0.00 /100 WBC    Neutrophils (Absolute) 3.78 2.00 - 7.15 K/uL    Lymphs (Absolute) 3.43 1.00 - 4.80 K/uL    Monos (Absolute) 0.88 (H) 0.00 - 0.85 K/uL    Eos (Absolute) 0.23 0.00 - 0.51 K/uL    Baso (Absolute) 0.05 0.00 - 0.12 K/uL    Immature Granulocytes (abs) 0.03 0.00 - 0.11 K/uL    NRBC (Absolute) 0.00 K/uL   BASIC METABOLIC PANEL   Result Value Ref Range    Sodium 141 135 - 145 mmol/L    Potassium 3.5 (L) 3.6 - 5.5 mmol/L    Chloride 106 96 - 112 mmol/L    Co2 26 20 - 33 mmol/L    Glucose 97 65 - 99 mg/dL    Bun 23 (H) 8 - 22 mg/dL    Creatinine 1.02 0.50 - 1.40 mg/dL    Calcium 10.6 (H) 8.5 - 10.5 mg/dL    Anion Gap 9.0 0.0 - 11.9   CRP QUANTITIVE (NON-CARDIAC)   Result Value Ref Range    Stat C-Reactive Protein 0.23 0.00 - 0.75 mg/dL   ESTIMATED GFR   Result Value Ref Range    GFR If African American >60 >60 mL/min/1.73 m 2    GFR If Non  51 (A) >60 mL/min/1.73 m 2      All labs were reviewed by me.      RADIOLOGY  LE VENOUS DUPLEX (Specify in Comments Left, Right Or Bilateral)           The radiologist's interpretation of all radiological studies and images have been reviewed by me.    COURSE & MEDICAL DECISION MAKING  Pertinent Labs & Imaging studies reviewed. (See chart for details)      9:17 PM - Patient seen and examined at bedside. Ordered LE venous duplex, westergren sed rate, CRP, CBC with differential, BMP to evaluate her symptoms. I discussed the plan as above with the patient. She understood and verbalized agreement.      10:22 PM - I discussed the patient's case and the above findings with Dr. Earl (Hospitlaist) who agrees to admit the patient.       10:27 PM - I reevaluated the patient at bedside. The patient is resting comfortably. I updated the patient on her lab and imaging results as above. I also informed the patient of admission plans. She understood and verbalized  agreement.     Decision Making:  This is a 88 y.o. year old female who presents with Right leg pain, swelling, redness and increased warmth. Primary differentials including DVT versus cellulitis. Ultrasound imaging of her choice shows a significant clot burden to the entire right lower extremity. Given the patient's age and need for anticoagulation and do believe that it will be beneficial for the patient to come in under observation for further DVT management and decision making. The patient is understanding today's evaluation, findings and plan.    DISPOSITION:  Patient will be admitted to Dr. Earl in guarded condition.     FINAL IMPRESSION  1. Acute deep vein thrombosis (DVT) of femoral vein of right lower extremity (CMS-[facility])          Yves VILLALBA (Scribe), am scribing for, and in the presence of, Crispin Lacy M.D..    Electronically signed by: Yves Ragsdale (Scribe), 12/28/2017    Crispin VILLALBA M.D. personally performed the services described in this documentation, as scribed by Yves Ragsdale in my presence, and it is both accurate and complete.    The note accurately reflects work and decisions made by me.  Crispin Lacy  12/30/2017  10:44 PM

## 2017-12-29 NOTE — PROGRESS NOTES
"Assumed care of patient at 0700 . Received report from RN. Patient is AOX4 . Assessment complete. Labs reviewed.Patient and RN discussed plan of care. Patient questions answered. Patient needs are met at this time. Bed in lowest and locked position. Upper bed rails up.  Call light is within reach. Hourly rounding in place.  BP (!) 165/70 Comment: RN notified  Pulse 87   Temp 36.2 °C (97.1 °F)   Resp 18   Ht 1.499 m (4' 11\")   Wt 62.2 kg (137 lb 2 oz)   SpO2 95%   Breastfeeding? No   BMI 27.70 kg/m²     "

## 2017-12-29 NOTE — PROGRESS NOTES
New admit from er per sheila, alert and oriented x 4, v/s stable, due med given as ordered, Heparin drip at 900 units/hr at 18ml/hr verified by 2 RN, assisted using the restroom with one person and cane, offered snack, on 02 at 2l/nc at HS per pt, skin checked by 2 RN, call light within reach, bed alarm in placed, needs attended, will continue to monitor.

## 2017-12-29 NOTE — H&P
Hospital Medicine History and Physical    Date of Service  12/28/2017    Chief Complaint  Chief Complaint   Patient presents with   • Leg Swelling     right leg x1 week, reports calf soreness several days prior to noticing swelling.       History of Presenting Illness  88 y.o. female with a past medical history ofHypertension, lung nodule, presented 12/28/2017 with complaint of lower extremity swelling on the right side. Apparently patient said he she noticed right lower extremity pain and started getting swelling for the past week. Patient's pain is local, 4-6/10, intermittent and does not radiate to other location, sharp and with some tingling. Can be controlled by pain meds. In the ER patient was noticed to have confirmed ultrasound DVT study. Patient said likely etiology due to prolonged sitting. However review chart note this patient had lung nodule history. Patient has not been followed up on this issue. Patient said she has colonoscopy a couple years ago. She denies recent travel or any history of blood clots. She also denies shortness of breath, chest pain, fevers, nausea, vomiting. Patient is recommended to be admitted to the hospital for DVT treatment.      Primary Care Physician  Silvino Guallpa D.O.    Consultants  none    Code Status  Code: partial code, patient does not want chest compression, intubation but wants chemical code    Review of Systems  ROS    per HPI otherwise 14 points reviewed 12 systems neg per AMA/CMS criteria.           Past Medical History  Past Medical History:   Diagnosis Date   • Hypercholesterolemia    • Hypertension    • Hypertension    • Thyroid disease        Surgical History  Past Surgical History:   Procedure Laterality Date   • GYN SURGERY      hyst and bladder suspension       Medications  No current facility-administered medications on file prior to encounter.      Current Outpatient Prescriptions on File Prior to Encounter   Medication Sig Dispense Refill   •  "levothyroxine (SYNTHROID) 25 MCG Tab Take 25 mcg by mouth Every morning on an empty stomach.     • carvedilol (COREG) 3.125 MG TABS Take 3.125 mg by mouth 2 times a day, with meals.     • hydrochlorothiazide (HYDRODIURIL) 25 MG TABS Take 25 mg by mouth every day.     • cholecalciferol (VITAMIN D) 400 UNIT CAPS Take 800 Units by mouth every day.     • Calcium Carbonate-Vit D-Min (CALCIUM 1200 PO) Take 1 Tab by mouth every day.     • Glucosamine-Chondroitin (GLUCOSAMINE CHONDR COMPLEX PO) Take 1 Tab by mouth every day.         Family History  History reviewed. No pertinent family history.  reviewed and felt non pertinent to this encounter     Social History  Social History   Substance Use Topics   • Smoking status: Never Smoker   • Smokeless tobacco: Never Used   • Alcohol use Not on file       Allergies  No Known Allergies     Physical Exam  Laboratory   Vitals/ General Appearance:   Weight/BMI: Body mass index is 27.7 kg/m².  Blood pressure (!) 190/59, pulse 89, temperature 37.4 °C (99.4 °F), resp. rate 18, height 1.499 m (4' 11\"), weight 62.2 kg (137 lb 2 oz), SpO2 93 %.   Vitals:    12/28/17 1506 12/28/17 1514   BP: (!) 190/59    Pulse: 89    Resp: 18    Temp: 37.4 °C (99.4 °F)    SpO2: 93%    Weight:  62.2 kg (137 lb 2 oz)   Height: 1.499 m (4' 11\")     Oxygen Therapy:  Pulse Oximetry: 93 %, O2 Delivery: None (Room Air)    Constitutional:  well developed, well nourished, non-toxic, no acute distress  HENMT: Normocephalic, atraumatic, b/l ears normal, nose normal  Eyes:  EOMI, conjunctiva normal, no discharge  Neck: no tracheal deviation, supple  Cardiovascular: normal heart rate, normal rhythm, no murmurs, no rubs or gallops; no cyanosis, clubbing or edema  Lungs: Respiratory effort is normal, normal breath sounds, breath sounds clear to auscultation b/l, no rales, rhonchi or wheezing  Abdomen: soft, non-tender, no guarding or rebound, active BS, no mass  Skin: warm, dry, no erythema, no rash  Neurologic: Alert " and oriented, strength 5/5, no focal deficits, CN II-XII normal  Psychiatric: No anxiety or depression  Lymph node: No lymphadenopathy appreciated in the neck groin and axillary area.   Extremities: R LE edema, pulses b/l         Assessment/Plan  * DVT (deep venous thrombosis) (CMS-HCC)- (present on admission)   Assessment & Plan    New dx  ? Etiology  ? Lung cancer, previous lung mass  R LE DVT confirmed  Iv heparin  Pt prefer NOAC in the future  Need to check with insurance  Repeat CT chest w co  CXR no changes        Lung mass- (present on admission)   Assessment & Plan    No follow up  Repeat CT chest w co  Per chart review patient previously refused biopsy.        Anemia- (present on admission)   Assessment & Plan    Stable  No bleeding        HTN (hypertension)- (present on admission)   Assessment & Plan    Blood pressure stable  Continue home medication              I anticipate this patient will require at least two midnights for appropriate medical management, necessitating inpatient admission.    Prophylaxis: heparin drip    Recent Labs      12/28/17 2122   WBC  8.4   RBC  3.86*   HEMOGLOBIN  12.5   HEMATOCRIT  36.8*   MCV  95.3   MCH  32.4   MCHC  34.0   RDW  44.5   PLATELETCT  218   MPV  10.2     Recent Labs      12/28/17 2122   SODIUM  141   POTASSIUM  3.5*   CHLORIDE  106   CO2  26   GLUCOSE  97   BUN  23*   CREATININE  1.02   CALCIUM  10.6*     Recent Labs      12/28/17 2122   GLUCOSE  97                 Lab Results   Component Value Date    TROPONINI <0.01 09/19/2016       Imaging  DX-CHEST-LIMITED (1 VIEW)   Final Result      1.  No acute cardiopulmonary disease.   2.  No significant change from prior exam.      LE VENOUS DUPLEX (Specify in Comments Left, Right Or Bilateral)   Final Result      CT-CHEST (THORAX) WITH    (Results Pending)          I have discussed patient admission status with  in the ER.    I spent 76 minutes evaluating the patient, reviewing the chart, vitals,  labs and imaging, discussing the case with ED physician, medication reconciliation, placing orders and enacting the plan above.    This dictation was created using voice recognition software. The accuracy of the dictation is limited to the abilities of the software. Although every efforts have been used to decrease the error, I expect there may be some errors of grammar and possibly content.

## 2017-12-29 NOTE — ASSESSMENT & PLAN NOTE
New dx  ? Etiology  ? Lung cancer, previous lung mass  R LE DVT confirmed  Iv heparin  Pt prefer NOAC in the future  Need to check with insurance  Repeat CT chest w co  CXR no changes

## 2017-12-30 NOTE — DISCHARGE SUMMARY
CHIEF COMPLAINT ON ADMISSION  Chief Complaint   Patient presents with   • Leg Swelling     right leg x1 week, reports calf soreness several days prior to noticing swelling.       CODE STATUS  Partial Code    HPI & HOSPITAL COURSE  This is a 88 y.o. female admitted 12/28/17 with right lower leg swelling x 2 weeks found to have extensive DVT on u/s.  She had no respiratory symptoms of SOB or cough.  She is very sedentary and knows that she needs to ambulate on daily basis to prevent future blood clots.  The patient is very much alert with good mentation and decided to proceed with xarelto for DVT treatment as opposed to heparin/coumadin that she was admitted on.  She had a repeat CT thorax showing no change to a 9mm lung nodule.  Her calcium was mildly elevated, PTH elevated at 90.  I have discontinued the patient's calcium and vitamin D supplements.  A repeat calcium and PTH should be checked in 1-2 months.  She was ambulating well with her cane, lives alone, no fall risk based on nursing and my assessment and felt safe for dc to home.  The patient recovered quicker than expected with no change in pulmonary nodule on CT chest.  Home with xarelto.  Therefore, she is discharged in good and stable condition with close outpatient follow-up.    SPECIFIC OUTPATIENT FOLLOW-UP  Follow up [facility] clinic.  Follow up PCP, repeat calcium and PTH levels off calcium and vit D supplements in 2 months.    DISCHARGE PROBLEM LIST  Principal Problem:    DVT (deep venous thrombosis) (CMS-[facility]) POA: Yes  Active Problems:    Lung mass POA: Yes    HTN (hypertension) POA: Yes    Anemia POA: Yes    Hyperparathyroidism (CMS-[facility]) POA: Yes    Hypercalcemia POA: Yes  Resolved Problems:    * No resolved hospital problems. *      FOLLOW UP  No future appointments.  Silvino Guallpa D.O.  [address] [address]  [phone]          [facility], Emergency Dept  [address] [address] [phone]    As  needed, If symptoms worsen      MEDICATIONS ON DISCHARGE   Regine Caceres R   Home Medication Instructions SANDRA:[id number]    Printed on:12/29/17 6222   Medication Information                      carvedilol (COREG) 3.125 MG TABS  Take 3.125 mg by mouth 2 times a day, with meals.             Glucosamine-Chondroitin (GLUCOSAMINE CHONDR COMPLEX PO)  Take 1 Tab by mouth every day.             hydrochlorothiazide (HYDRODIURIL) 25 MG TABS  Take 25 mg by mouth every day.             levothyroxine (SYNTHROID) 25 MCG Tab  Take 25 mcg by mouth Every morning on an empty stomach.             rivaroxaban (XARELTO) 15 MG Tab tablet  Take 1 Tab by mouth 2 Times a Day.             rivaroxaban (XARELTO) 20 MG Tab tablet  Take 1 Tab by mouth every day.                 DIET  Orders Placed This Encounter   Procedures   • Diet Order     Standing Status:   Standing     Number of Occurrences:   1     Order Specific Question:   Diet:     Answer:   Cardiac [6]   • DISCONTINUE DIET TRAY     Standing Status:   Standing     Number of Occurrences:   1       ACTIVITY  As tolerated.  Weight bearing as tolerated      CONSULTATIONS  none    PROCEDURES       Vascular Laboratory   CONCLUSIONS   Extensive RIGHT lower extremity DVT.     Dr. Lacy is aware of the results.     REGINE CACERES     Exam Date:     12/28/2017 21:54    CT chest with contrast:      1. Unchanged 9 mm nodule in the superior left lower lobe since September 2016.    2. No new or suspicious pulmonary nodule.    3. Multiple small enhancing lesions in the liver, incompletely evaluated but grossly similar to prior exam in 2010. Given the long stability, this could relate to flash filling hemangiomas. Correlate with prior abdominal CT or MRI study.   Reading Provider Reading Date   Chacorta Rosado M.D. Dec 29, 2017       LABORATORY  Lab Results   Component Value Date/Time    SODIUM 141 12/28/2017 09:22 PM    POTASSIUM 3.5 (L) 12/28/2017 09:22 PM    CHLORIDE 106 12/28/2017 09:22  PM    CO2 26 12/28/2017 09:22 PM    GLUCOSE 97 12/28/2017 09:22 PM    BUN 23 (H) 12/28/2017 09:22 PM    CREATININE 1.02 12/28/2017 09:22 PM        Lab Results   Component Value Date/Time    WBC 8.4 12/28/2017 09:22 PM    HEMOGLOBIN 12.5 12/28/2017 09:22 PM    HEMATOCRIT 36.8 (L) 12/28/2017 09:22 PM    PLATELETCT 218 12/28/2017 09:22 PM        Total time of the discharge process exceeds 45 minutes

## 2017-12-30 NOTE — DISCHARGE INSTRUCTIONS
Discharge Instructions    Discharged to home by car with relative. Discharged via wheelchair, hospital escort: Yes.  Special equipment needed: Not Applicable    Be sure to schedule a follow-up appointment with your primary care doctor or any specialists as instructed.     Discharge Plan:   Diet Plan: Discussed  Activity Level: Discussed  Confirmed Follow up Appointment: Patient to Call and Schedule Appointment  Confirmed Symptoms Management: Discussed  Medication Reconciliation Updated: Yes  Influenza Vaccine Indication: Not indicated: Previously immunized this influenza season and > 8 years of age    I understand that a diet low in cholesterol, fat, and sodium is recommended for good health. Unless I have been given specific instructions below for another diet, I accept this instruction as my diet prescription.   Other diet: Regular Diet    Special Instructions:   Deep Vein Thrombosis Discharge Instructions    A deep vein thrombosis (DVT) is a blood clot (thrombus) that develops in a deep vein. A DVT is a clot in the deep, larger veins of the leg, arm, or pelvis. These are more dangerous than clots that might form in veins on the surface of the body. Deep vein thrombosis can lead to complications if the clot breaks off and travels in the bloodstream to the lungs.     CAUSES  Blood clots form in a vein for different reasons. Usually several things cause blood clots. They include:   · The flow of blood slows down.   · The inside of the vein is damaged in some way.   · The person has a condition that makes blood clot more easily. These conditions may include:  · Older age (especially over 75 years old).  · Having a history of blood clots.  · Having major or lengthy surgery. Hip surgery is particularly high-risk.   · Breaking a hip or leg.  · Sitting or lying still for a long time.  · Cancer or cancer treatment.  · Having a long, thin tube (catheter) placed inside a vein during a medical procedure.   · Being overweight  (obese).  · Pregnancy and childbirth.  · Medicines with estrogen.  · Smoking.  · Other circulation or heart problems.     SYMPTOMS  When a clot forms, it can either partially or totally block the blood flow in that vein. Symptoms of a DVT can include:  · Swelling of the leg or arm, especially if one side is much worse.  · Warmth and redness of the leg or arm, especially if one side is much worse.   · Pain in an arm or leg. If the clot is in the leg, symptoms may be more noticeable or worse when standing or walking.  If the blood clot travels to the lung, it may cause:  · Shortness of breath.  · Chest pain. The pain may be worsened by deep breaths.   · Coughing up thick mucus (phlegm), possibly flecked with blood.   Anyone with these symptoms should get emergency medical treatment right away. Call your local emergency  Services (911 in [place] if you have these symptoms.     DIAGNOSIS  If a DVT is suspected, your caregiver will take a full medical history. He or she will also perform a physical exam. Tests that also may be required include:   · Studies of the clotting properties of the blood.   · An ultrasound scan.   · X-rays to show the flow of blood when special dye is injected into the veins (venography).   · Studies of your lungs if you have any chest symptoms.     PREVENTION  · Exercise the legs regularly. Take a brisk 30 minute walk every day.   · Maintain a weight that is appropriate for your height.  · Avoid sitting or lying in bed for long periods of time without moving your legs.   · Women, particularly those over the age of 35, should consider the risks and benefits of taking estrogen medicine, including birth control pills.   · Do not smoke, especially if you take estrogen medicines.   · Long-distance travel can increase your risk. You should exercise your legs by walking or pumping the muscles every hour.   · In hospital prevention: Prevention may include medical and non medical measures.      TREATMENT  · The most common treatment for DVT is blood thinning (anticoagulant) medicine, which reduces the blood's tendency to clot. Anticoagulants can stop new blood clots from forming and old ones from growing. They cannot dissolve existing clots. Your body does this by itself over time. Anticoagulants can be given by mouth, by intravenous (IV) access, or by injection. Your caregiver will determine the best program for you.   · Less commonly, clot-dissolving drugs (thrombolytics) are used to dissolve a DVT. They carry a high risk of bleeding, so they are used mainly in severe cases.   · Very rarely, a blood clot in the leg needs to be removed surgically.   · If you are unable to take anticoagulants, your caregiver may arrange for you to have a filter placed in a main vein in your belly (abdomen). This filter prevents clots from traveling to your lungs.     HOME CARE INSTRUCTIONS  Take all medicines prescribed by your caregiver. Follow the directions carefully.   · You will most likely continue taking anticoagulants after you leave the hospital. Your caregiver will advise you on the length of treatment (usually 3 to 5 months, sometimes for life).   · Taking too much or too little of an anticoagulant is dangerous. While taking this type of medicine, you will need to have regular blood tests to be sure the dose is correct. The dose can change for many reasons. It is critically important that you take this medicine exactly as prescribed, and that you have blood tests exactly as directed.   · Many foods can interfere with anticoagulants. These include foods high in vitamin K, such as spinach, kale, broccoli, cabbage, nicol[place] and turnip greens, [place] sprouts, peas, cauliflower, seaweed, parsley, beef and pork liver, green tea, and soybean oil. Your caregiver should discuss limits on these foods with you or you should arrange a visit with a dietician to answer your questions.   · Many medicines can interfere  with anticoagulants. You must tell your caregiver about any and all medicines you take. This includes all vitamins and supplements. Be especially cautious with aspirin and anti-inflammatory medicines. Ask your caregiver before taking these.   · Anticoagulants can have side effects, mostly excessive bruising or bleeding. You will need to hold pressure over cuts for longer than usual. Avoid alcoholic drinks or consume only very small amounts while taking this medicine.    If you are taking an anticoagulant:  · Wear a medical alert bracelet.  · Notify your dentist or other caregivers before procedures.  · Avoid contact sports.    · Ask your caregiver how soon you can go back to normal activities. Not being active can lead to new clots. Ask for a list of what you should and should not do.   · Exercise your lower leg muscles. This is important while traveling.   · You may need to wear compression stockings. These are tight elastic stockings that apply pressure to the lower legs. This can help keep the blood in the legs from clotting.   · If you are a smoker, you should quit.   · Learn as much as you can about DVT.     SEEK MEDICAL CARE IF:  · You have unusual bruising or any bleeding problems.  · The swelling or pain in your affected arm or leg is not gradually improving.   · You anticipate surgery or long-distance travel. You should get specific advice on DVT prevention.   · You discover other family members with blood clots. This may require further testing for inherited diseases or conditions.     SEEK IMMEDIATE MEDICAL CARE IF:  · You develop chest pain.  · You develop severe shortness of breath.  · You begin to cough up bloody mucus or phlegm (sputum).  · You feel dizzy or faint.   · You develop swelling or pain in the leg.  · You have breathing problems after traveling.    MAKE SURE YOU:  · Understand these instructions.  · Will watch your condition.  · Will get help right away if you are not doing well or get  worse.       · Is patient discharged on Warfarin / Coumadin?   No     · Is patient Post Blood Transfusion?  No    Depression / Suicide Risk    As you are discharged from this [facility] facility, it is important to learn how to keep safe from harming yourself.    Recognize the warning signs:  · Abrupt changes in personality, positive or negative- including increase in energy   · Giving away possessions  · Change in eating patterns- significant weight changes-  positive or negative  · Change in sleeping patterns- unable to sleep or sleeping all the time   · Unwillingness or inability to communicate  · Depression  · Unusual sadness, discouragement and loneliness  · Talk of wanting to die  · Neglect of personal appearance   · Rebelliousness- reckless behavior  · Withdrawal from people/activities they love  · Confusion- inability to concentrate     If you or a loved one observes any of these behaviors or has concerns about self-harm, here's what you can do:  · Talk about it- your feelings and reasons for harming yourself  · Remove any means that you might use to hurt yourself (examples: pills, rope, extension cords, firearm)  · Get professional help from the community (Mental Health, Substance Abuse, psychological counseling)  · Do not be alone:Call your Safe Contact- someone whom you trust who will be there for you.  · Call your local CRISIS HOTLINE 045-3121 or 594-512-4145  · Call your local Children's Mobile Crisis Response Team [place] (973) 301-9023 or www.[place]  · Call the toll free National Suicide Prevention Hotlines   · National Suicide Prevention Lifeline 273-504-FWDT (6573)  · National Hope Line Network 800-SUICIDE (973-6102)    Stop vitamin D and calcium supplements since PTH and calcium levels elevated

## 2018-01-02 ENCOUNTER — PATIENT OUTREACH (OUTPATIENT)
Dept: HEALTH INFORMATION MANAGEMENT | Facility: OTHER | Age: 83
End: 2018-01-02

## 2018-01-10 ENCOUNTER — ANTICOAGULATION VISIT (OUTPATIENT)
Dept: VASCULAR LAB | Facility: MEDICAL CENTER | Age: 83
End: 2018-01-10
Attending: FAMILY MEDICINE
Payer: MEDICARE

## 2018-01-10 ENCOUNTER — TELEPHONE (OUTPATIENT)
Dept: VASCULAR LAB | Facility: MEDICAL CENTER | Age: 83
End: 2018-01-10

## 2018-01-10 VITALS — DIASTOLIC BLOOD PRESSURE: 61 MMHG | SYSTOLIC BLOOD PRESSURE: 150 MMHG | HEART RATE: 86 BPM

## 2018-01-10 DIAGNOSIS — I82.4Y9 ACUTE DEEP VEIN THROMBOSIS (DVT) OF PROXIMAL VEIN OF LOWER EXTREMITY, UNSPECIFIED LATERALITY (HCC): ICD-10-CM

## 2018-01-10 LAB — INR PPP: 2.1 (ref 2–3.5)

## 2018-01-10 PROCEDURE — 99213 OFFICE O/P EST LOW 20 MIN: CPT | Performed by: NURSE PRACTITIONER

## 2018-01-10 PROCEDURE — 85610 PROTHROMBIN TIME: CPT

## 2018-01-10 NOTE — PROGRESS NOTES
"Diagnosis: RLE DVT  Drug: Xarelto 15 mg BID  Duration: At least 3 months then reassess    Health Status Since Last Assessment  87 y/o femail with RLE pain & swelling x 2 weeks. US showed \"extensive RIGHT lower extremity DVT.\" Denies ever having blood clots in the past. No recent surgeries, injuries or extended travel. No FH. No HRT use. She has never smoked. Also noted to have 9mm lung nodule. Calcium and vit d supplements on hold. Pt to f/u with PCP regarding this. No known prior malignancies. Does have a somewhat sedentary lifestyle.    She was started on Xarelto 15 mg by mouth twice a day n 12/29/17. She is taking this medication exactly as instructed. Education given to patient and her daughter regarding instructions for taking Xarelto, importance of strict compliance with this medication, drug/drug interactions and signs/symptoms of bleeding or thrombosis. She understands he is not to stop taking this medication without first talking with his doctor. Anticipate patient will transition to Xarelto 20 mg on 1/19/18. She can afford her copay. Already has rx for 20 mg tablets.        Adherence with DOAC Therapy  None  BLEEDING RISK ASSESSMENT NB:    Bleeding Risk Assessmen  None of the following reported:  Severe epistaxis Hemoptysis   Excessive or unusual bruising / hematomas   GIB / melena / BRBPR / hematemesis   Hematuria? Abnormal vaginal bleeding   Concerning daily headache or subdural hematoma symptoms   Decreasing hemoglobin or new anemia   Latest hemoglobin:     Lab Results   Component Value Date/Time    WBC 8.4 12/28/2017 09:22 PM    RBC 3.86 (L) 12/28/2017 09:22 PM    HEMOGLOBIN 12.5 12/28/2017 09:22 PM    HEMATOCRIT 36.8 (L) 12/28/2017 09:22 PM    MCV 95.3 12/28/2017 09:22 PM    MCH 32.4 12/28/2017 09:22 PM    MCHC 34.0 12/28/2017 09:22 PM    MPV 10.2 12/28/2017 09:22 PM    NEUTSPOLYS 45.00 12/28/2017 09:22 PM    LYMPHOCYTES 40.80 12/28/2017 09:22 PM    MONOCYTES 10.50 12/28/2017 09:22 PM    EOSINOPHILS " 2.70 12/28/2017 09:22 PM    BASOPHILS 0.60 12/28/2017 09:22 PM        EtOH overuse - no  Falls, presyncope, syncope, or seizures - no   Uncontrolled hypertension - no  CREATININE CLEARANCE /    Creatinine Clearance/Renal Function  Latest eGFR / creatinine:  Lab Results   Component Value Date/Time    SODIUM 141 12/28/2017 09:22 PM    POTASSIUM 3.5 (L) 12/28/2017 09:22 PM    CHLORIDE 106 12/28/2017 09:22 PM    CO2 26 12/28/2017 09:22 PM    GLUCOSE 97 12/28/2017 09:22 PM    BUN 23 (H) 12/28/2017 09:22 PM    CREATININE 1.02 12/28/2017 09:22 PM      • Is eGFR less than 50ml/min - yes  Actual cr cl 37.4 ml/min  If YES, calculate CrCl (see back)  Any recent dehydrating illness or medications added/changed? i.e. diuretics    Drug Interactions  ASA / other antiplatelets - no  NSAID - no  Other drug interactions (Review med list / OTCs;)  Current Outpatient Prescriptions on File Prior to Visit   Medication Sig Dispense Refill   • rivaroxaban (XARELTO) 15 MG Tab tablet Take 1 Tab by mouth 2 Times a Day. 42 Tab 0   • [START ON 1/20/2018] rivaroxaban (XARELTO) 20 MG Tab tablet Take 1 Tab by mouth every day. 30 Tab 5   • levothyroxine (SYNTHROID) 25 MCG Tab Take 25 mcg by mouth Every morning on an empty stomach.     • carvedilol (COREG) 3.125 MG TABS Take 3.125 mg by mouth 2 times a day, with meals.     • hydrochlorothiazide (HYDRODIURIL) 25 MG TABS Take 25 mg by mouth every day.     • Glucosamine-Chondroitin (GLUCOSAMINE CHONDR COMPLEX PO) Take 1 Tab by mouth every day.       No current facility-administered medications on file prior to visit.        Examination  Blood pressure:150/61  • Elevated BP - sBP not greater than 160 mmHg)  • Symptomatic hypotension - no  Significant gait impairment / imbalance / high risk for falls - no    Final Assessment and Recommendations:  Patient appears stable from the anticoagulation standpoint  Benefits of continued DOAC therapy outweigh risks for this patient  Recommend continue current DOAC  at same dose    - continue taking Xarelto 15 mg by mouth twice daily with food  - do not miss doses  - do not stop this medication unless you receive permission from your doctor  - f/u with PCP regarding lung nodule  - seek medical attention for s/sx of prolonged bleeding or for worsening swelling/pain/redness to any extremity or for SOB  - will need to repeat labs in near future to reassess cr cl  - avoid HRTs    Other Actions:   The rationale for continued DOAC therapy  The potential for minor, major or life-threatening bleeding  Dosing instructions, adherence, risks of non-adherence, handling missed doses  Avoiding OTC ASA & NSAIDs & minimizing EtOH to reduce bleeding risks  Dosing around upcoming procedure / surgery if applicable (see back)    Follow up:  Will follow up with patient in 2 weeks to transition to 20 mg.    Next Appointment: Thursday, January 18, 2018 at 10:15 am.    Lucille GASPAR

## 2018-01-11 LAB — INR BLD: 2.1 (ref 0.9–1.2)

## 2018-01-11 NOTE — TELEPHONE ENCOUNTER
Initial anticoagulation clinic note and most recent d/c summary reviewed.    Patient diagnosed with DVT, first episode, appears unprovoked    Pending further recommendations from PCP, we will continue with 3 months of oral anticoagulation after which time we can reevaluate risks and benefits of extended anticoagulation and/or any further workup with PCP    Given unprovoked nature of clot, if patient tolerates anti-correlation well, ACCP guidelines have a IIb indication for extended anticoagulation    Will defer any indicated, age-appropriate screening for occult malignancy to PCP    Will defer any indicated follow-up for pulmonary nodule to PCP as well    Michael J. Bloch, MD  [facility]    CC:  ASHELY Guallpa

## 2018-01-18 ENCOUNTER — ANTICOAGULATION VISIT (OUTPATIENT)
Dept: VASCULAR LAB | Facility: MEDICAL CENTER | Age: 83
End: 2018-01-18
Attending: FAMILY MEDICINE
Payer: MEDICARE

## 2018-01-18 VITALS — SYSTOLIC BLOOD PRESSURE: 158 MMHG | HEART RATE: 75 BPM | DIASTOLIC BLOOD PRESSURE: 66 MMHG

## 2018-01-18 DIAGNOSIS — I82.401 ACUTE DEEP VEIN THROMBOSIS (DVT) OF RIGHT LOWER EXTREMITY, UNSPECIFIED VEIN (HCC): ICD-10-CM

## 2018-01-18 LAB — INR PPP: 1.5 (ref 2–3.5)

## 2018-01-18 PROCEDURE — 85610 PROTHROMBIN TIME: CPT

## 2018-01-18 PROCEDURE — 99211 OFF/OP EST MAY X REQ PHY/QHP: CPT

## 2018-01-18 NOTE — PROGRESS NOTES
Target end date:tbd     Indication: DVT     Drug: Xarelto         Health Status Since Last Assessment   Patient denies any new relevant medical problems, ED visits or hospitalizations   Patient denies any embolic events (stroke/tia/systemic embolism)    Adherence with DOAC Therapy   Pt has zero missed any doses in the average week    Bleeding Risk Assessment     none Epistaxis   Pt denies any excessive or unusual bleeding/hematomas.  Pt denies any GI bleeds or hematemesis.  Pt denies any concerning daily headache or sub dural hematoma symptoms.     Pt denies any hematuria or abnormal vaginal bleeding.   Latest Hemoglobin 12.5   ETOH overuse never     Creatinine Clearance/Renal Function     Latest ClCr >60ml/min     Drug Interactions   ASA/other antiplatelets none   NSAID none   Other drug interactions none    Examination   Blood Pressure 172/73   Symptomatic hypotension none   Significant gait impairment/imbalance/high risk for falls? Pt uses a cane. Last fall around a year ago.    Final Assessment and Recommendations:   Patient appears stable from the anticoagulation staindpoint.     Benefits of continued DOAC therapy outweigh risks for this patient   Recommend pt continue with current DOAC therapy   (with dose adjustment)     Other Actions:    Follow up:   Will follow up with patient via telephone in 3 months.  I have added this patient to my list for follow up.         Dorothea Alvarado, PharmD

## 2018-01-23 ENCOUNTER — HOSPITAL ENCOUNTER (OUTPATIENT)
Dept: LAB | Facility: MEDICAL CENTER | Age: 83
End: 2018-01-23
Attending: NURSE PRACTITIONER
Payer: MEDICARE

## 2018-01-23 LAB
ALBUMIN SERPL BCP-MCNC: 4 G/DL (ref 3.2–4.9)
ALBUMIN/GLOB SERPL: 1.3 G/DL
ALP SERPL-CCNC: 81 U/L (ref 30–99)
ALT SERPL-CCNC: 16 U/L (ref 2–50)
ANION GAP SERPL CALC-SCNC: 9 MMOL/L (ref 0–11.9)
APPEARANCE UR: CLEAR
AST SERPL-CCNC: 18 U/L (ref 12–45)
BACTERIA #/AREA URNS HPF: ABNORMAL /HPF
BASOPHILS # BLD AUTO: 1 % (ref 0–1.8)
BASOPHILS # BLD: 0.08 K/UL (ref 0–0.12)
BILIRUB SERPL-MCNC: 0.5 MG/DL (ref 0.1–1.5)
BILIRUB UR QL STRIP.AUTO: NEGATIVE
BUN SERPL-MCNC: 37 MG/DL (ref 8–22)
CALCIUM SERPL-MCNC: 10.6 MG/DL (ref 8.5–10.5)
CHLORIDE SERPL-SCNC: 103 MMOL/L (ref 96–112)
CO2 SERPL-SCNC: 28 MMOL/L (ref 20–33)
COLOR UR: YELLOW
CREAT SERPL-MCNC: 1.15 MG/DL (ref 0.5–1.4)
CULTURE IF INDICATED INDCX: YES UA CULTURE
EOSINOPHIL # BLD AUTO: 0.2 K/UL (ref 0–0.51)
EOSINOPHIL NFR BLD: 2.4 % (ref 0–6.9)
EPI CELLS #/AREA URNS HPF: NEGATIVE /HPF
ERYTHROCYTE [DISTWIDTH] IN BLOOD BY AUTOMATED COUNT: 48.5 FL (ref 35.9–50)
GLOBULIN SER CALC-MCNC: 3.2 G/DL (ref 1.9–3.5)
GLUCOSE SERPL-MCNC: 91 MG/DL (ref 65–99)
GLUCOSE UR STRIP.AUTO-MCNC: NEGATIVE MG/DL
HCT VFR BLD AUTO: 41.7 % (ref 37–47)
HGB BLD-MCNC: 13.4 G/DL (ref 12–16)
HYALINE CASTS #/AREA URNS LPF: ABNORMAL /LPF
IMM GRANULOCYTES # BLD AUTO: 0.02 K/UL (ref 0–0.11)
IMM GRANULOCYTES NFR BLD AUTO: 0.2 % (ref 0–0.9)
KETONES UR STRIP.AUTO-MCNC: NEGATIVE MG/DL
LEUKOCYTE ESTERASE UR QL STRIP.AUTO: ABNORMAL
LYMPHOCYTES # BLD AUTO: 3.08 K/UL (ref 1–4.8)
LYMPHOCYTES NFR BLD: 37.2 % (ref 22–41)
MCH RBC QN AUTO: 31.3 PG (ref 27–33)
MCHC RBC AUTO-ENTMCNC: 32.1 G/DL (ref 33.6–35)
MCV RBC AUTO: 97.4 FL (ref 81.4–97.8)
MICRO URNS: ABNORMAL
MONOCYTES # BLD AUTO: 0.86 K/UL (ref 0–0.85)
MONOCYTES NFR BLD AUTO: 10.4 % (ref 0–13.4)
NEUTROPHILS # BLD AUTO: 4.03 K/UL (ref 2–7.15)
NEUTROPHILS NFR BLD: 48.8 % (ref 44–72)
NITRITE UR QL STRIP.AUTO: NEGATIVE
NRBC # BLD AUTO: 0 K/UL
NRBC BLD-RTO: 0 /100 WBC
PH UR STRIP.AUTO: 6.5 [PH]
PLATELET # BLD AUTO: 188 K/UL (ref 164–446)
PMV BLD AUTO: 11 FL (ref 9–12.9)
POTASSIUM SERPL-SCNC: 3.7 MMOL/L (ref 3.6–5.5)
PROT SERPL-MCNC: 7.2 G/DL (ref 6–8.2)
PROT UR QL STRIP: NEGATIVE MG/DL
RBC # BLD AUTO: 4.28 M/UL (ref 4.2–5.4)
RBC # URNS HPF: ABNORMAL /HPF
RBC UR QL AUTO: NEGATIVE
SODIUM SERPL-SCNC: 140 MMOL/L (ref 135–145)
SP GR UR STRIP.AUTO: 1.01
T4 FREE SERPL-MCNC: 0.9 NG/DL (ref 0.53–1.43)
TSH SERPL DL<=0.005 MIU/L-ACNC: 1.4 UIU/ML (ref 0.38–5.33)
UROBILINOGEN UR STRIP.AUTO-MCNC: 0.2 MG/DL
WBC # BLD AUTO: 8.3 K/UL (ref 4.8–10.8)
WBC #/AREA URNS HPF: ABNORMAL /HPF

## 2018-01-23 PROCEDURE — 87186 SC STD MICRODIL/AGAR DIL: CPT

## 2018-01-23 PROCEDURE — 80053 COMPREHEN METABOLIC PANEL: CPT

## 2018-01-23 PROCEDURE — 87077 CULTURE AEROBIC IDENTIFY: CPT

## 2018-01-23 PROCEDURE — 87086 URINE CULTURE/COLONY COUNT: CPT

## 2018-01-23 PROCEDURE — 81001 URINALYSIS AUTO W/SCOPE: CPT

## 2018-01-23 PROCEDURE — 85025 COMPLETE CBC W/AUTO DIFF WBC: CPT

## 2018-01-23 PROCEDURE — 36415 COLL VENOUS BLD VENIPUNCTURE: CPT

## 2018-01-23 PROCEDURE — 84443 ASSAY THYROID STIM HORMONE: CPT

## 2018-01-23 PROCEDURE — 84439 ASSAY OF FREE THYROXINE: CPT

## 2018-01-25 LAB
BACTERIA UR CULT: ABNORMAL
BACTERIA UR CULT: ABNORMAL
SIGNIFICANT IND 70042: ABNORMAL
SITE SITE: ABNORMAL
SOURCE SOURCE: ABNORMAL

## 2018-02-02 ENCOUNTER — PATIENT OUTREACH (OUTPATIENT)
Dept: HEALTH INFORMATION MANAGEMENT | Facility: OTHER | Age: 83
End: 2018-02-02

## 2018-02-02 NOTE — PROGRESS NOTES
Regine Bryant was discharged on 12/29/2017. [facility] patient advocate assisted with multiple discharge needs including 4 appointments, 1 primary care physician appointment, 2 coumadin appointments, 1 laboratory appointment. Of the 4 appointments the patient kept 4. Patient is also scheduled for 1 follow up appointment with coumadin on 4/19/18. [facility] Patient Advocate did not conduct a PPS Screening as the LACE + was at a 22.

## 2018-03-27 ENCOUNTER — HOSPITAL ENCOUNTER (OUTPATIENT)
Dept: LAB | Facility: MEDICAL CENTER | Age: 83
End: 2018-03-27
Attending: NURSE PRACTITIONER
Payer: MEDICARE

## 2018-03-27 LAB
25(OH)D3 SERPL-MCNC: 35 NG/ML (ref 30–100)
ALBUMIN SERPL BCP-MCNC: 4.1 G/DL (ref 3.2–4.9)
ALBUMIN/GLOB SERPL: 1.5 G/DL
ALP SERPL-CCNC: 85 U/L (ref 30–99)
ALT SERPL-CCNC: 17 U/L (ref 2–50)
ANION GAP SERPL CALC-SCNC: 7 MMOL/L (ref 0–11.9)
APPEARANCE UR: CLEAR
AST SERPL-CCNC: 19 U/L (ref 12–45)
BACTERIA #/AREA URNS HPF: NEGATIVE /HPF
BASOPHILS # BLD AUTO: 1.4 % (ref 0–1.8)
BASOPHILS # BLD: 0.09 K/UL (ref 0–0.12)
BILIRUB SERPL-MCNC: 0.7 MG/DL (ref 0.1–1.5)
BILIRUB UR QL STRIP.AUTO: NEGATIVE
BUN SERPL-MCNC: 24 MG/DL (ref 8–22)
CALCIUM SERPL-MCNC: 10.2 MG/DL (ref 8.5–10.5)
CHLORIDE SERPL-SCNC: 104 MMOL/L (ref 96–112)
CHOLEST SERPL-MCNC: 211 MG/DL (ref 100–199)
CO2 SERPL-SCNC: 27 MMOL/L (ref 20–33)
COLOR UR: YELLOW
CREAT SERPL-MCNC: 1.09 MG/DL (ref 0.5–1.4)
CULTURE IF INDICATED INDCX: YES UA CULTURE
EOSINOPHIL # BLD AUTO: 0.19 K/UL (ref 0–0.51)
EOSINOPHIL NFR BLD: 2.9 % (ref 0–6.9)
EPI CELLS #/AREA URNS HPF: NORMAL /HPF
ERYTHROCYTE [DISTWIDTH] IN BLOOD BY AUTOMATED COUNT: 48.5 FL (ref 35.9–50)
GLOBULIN SER CALC-MCNC: 2.7 G/DL (ref 1.9–3.5)
GLUCOSE SERPL-MCNC: 106 MG/DL (ref 65–99)
GLUCOSE UR STRIP.AUTO-MCNC: NEGATIVE MG/DL
HCT VFR BLD AUTO: 40.4 % (ref 37–47)
HDLC SERPL-MCNC: 33 MG/DL
HGB BLD-MCNC: 12.9 G/DL (ref 12–16)
HYALINE CASTS #/AREA URNS LPF: NORMAL /LPF
IMM GRANULOCYTES # BLD AUTO: 0.02 K/UL (ref 0–0.11)
IMM GRANULOCYTES NFR BLD AUTO: 0.3 % (ref 0–0.9)
KETONES UR STRIP.AUTO-MCNC: NEGATIVE MG/DL
LDLC SERPL CALC-MCNC: 124 MG/DL
LEUKOCYTE ESTERASE UR QL STRIP.AUTO: ABNORMAL
LYMPHOCYTES # BLD AUTO: 2.85 K/UL (ref 1–4.8)
LYMPHOCYTES NFR BLD: 42.9 % (ref 22–41)
MCH RBC QN AUTO: 31 PG (ref 27–33)
MCHC RBC AUTO-ENTMCNC: 31.9 G/DL (ref 33.6–35)
MCV RBC AUTO: 97.1 FL (ref 81.4–97.8)
MICRO URNS: ABNORMAL
MONOCYTES # BLD AUTO: 0.74 K/UL (ref 0–0.85)
MONOCYTES NFR BLD AUTO: 11.1 % (ref 0–13.4)
NEUTROPHILS # BLD AUTO: 2.76 K/UL (ref 2–7.15)
NEUTROPHILS NFR BLD: 41.4 % (ref 44–72)
NITRITE UR QL STRIP.AUTO: NEGATIVE
NRBC # BLD AUTO: 0 K/UL
NRBC BLD-RTO: 0 /100 WBC
PH UR STRIP.AUTO: 6.5 [PH]
PLATELET # BLD AUTO: 173 K/UL (ref 164–446)
PMV BLD AUTO: 11.4 FL (ref 9–12.9)
POTASSIUM SERPL-SCNC: 3.9 MMOL/L (ref 3.6–5.5)
PROT SERPL-MCNC: 6.8 G/DL (ref 6–8.2)
PROT UR QL STRIP: NEGATIVE MG/DL
RBC # BLD AUTO: 4.16 M/UL (ref 4.2–5.4)
RBC # URNS HPF: NORMAL /HPF
RBC UR QL AUTO: NEGATIVE
SODIUM SERPL-SCNC: 138 MMOL/L (ref 135–145)
SP GR UR STRIP.AUTO: 1.01
T4 FREE SERPL-MCNC: 0.87 NG/DL (ref 0.53–1.43)
TRIGL SERPL-MCNC: 270 MG/DL (ref 0–149)
TSH SERPL DL<=0.005 MIU/L-ACNC: 1.56 UIU/ML (ref 0.38–5.33)
UROBILINOGEN UR STRIP.AUTO-MCNC: 0.2 MG/DL
WBC # BLD AUTO: 6.7 K/UL (ref 4.8–10.8)
WBC #/AREA URNS HPF: NORMAL /HPF

## 2018-03-27 PROCEDURE — 85025 COMPLETE CBC W/AUTO DIFF WBC: CPT

## 2018-03-27 PROCEDURE — 87086 URINE CULTURE/COLONY COUNT: CPT

## 2018-03-27 PROCEDURE — 84443 ASSAY THYROID STIM HORMONE: CPT

## 2018-03-27 PROCEDURE — 84439 ASSAY OF FREE THYROXINE: CPT

## 2018-03-27 PROCEDURE — 82306 VITAMIN D 25 HYDROXY: CPT

## 2018-03-27 PROCEDURE — 80061 LIPID PANEL: CPT

## 2018-03-27 PROCEDURE — 81001 URINALYSIS AUTO W/SCOPE: CPT

## 2018-03-27 PROCEDURE — 36415 COLL VENOUS BLD VENIPUNCTURE: CPT

## 2018-03-27 PROCEDURE — 80053 COMPREHEN METABOLIC PANEL: CPT

## 2018-03-29 LAB
BACTERIA UR CULT: NORMAL
SIGNIFICANT IND 70042: NORMAL
SITE SITE: NORMAL
SOURCE SOURCE: NORMAL

## 2018-04-19 ENCOUNTER — ANTICOAGULATION VISIT (OUTPATIENT)
Dept: VASCULAR LAB | Facility: MEDICAL CENTER | Age: 83
End: 2018-04-19
Attending: INTERNAL MEDICINE
Payer: MEDICARE

## 2018-04-19 DIAGNOSIS — Z86.718 HISTORY OF DVT (DEEP VEIN THROMBOSIS): ICD-10-CM

## 2018-04-19 PROCEDURE — 99212 OFFICE O/P EST SF 10 MIN: CPT

## 2018-04-19 NOTE — PROGRESS NOTES
Target end date:tbd     Indication: DVT     Drug: Xarelto 20         Health Status Since Last Assessment   Patient denies any new relevant medical problems, ED visits or hospitalizations   Patient denies any embolic events (stroke/tia/systemic embolism)    Adherence with DOAC Therapy   Pt has zero missed any doses in the average week    Bleeding Risk Assessment     none Epistaxis   Pt denies any excessive or unusual bleeding/hematomas.  Pt denies any GI bleeds or hematemesis.  Pt denies any concerning daily headache or sub dural hematoma symptoms.     Pt denies any hematuria or abnormal vaginal bleeding.   Latest Hemoglobin 12.9   ETOH overuse never     Creatinine Clearance/Renal Function     Latest ClCr 55-60 ml/min     Drug Interactions   Platelets: 173   ASA/other antiplatelets none   NSAID none   Other drug interactions none   Verified no anticonvulsant or azole therapy, education provided for future use.     Examination   Blood Pressure 136/68 HR 82   Symptomatic hypotension none   Significant gait impairment/imbalance/high risk for falls? none    Final Assessment and Recommendations:   Patient appears stable from the anticoagulation staindpoint.     Benefits of continued DOAC therapy outweigh risks for this patient   Recommend pt continue with current DOAC therapy     Other Actions: cmp/ cbc hemogram ordered prior to next visit    Follow up:   Will follow up with patient via telephone in 6 months.  I have added this patient to my list for follow up.       Dorothea Alvarado, PharmD

## 2018-04-25 ENCOUNTER — TELEPHONE (OUTPATIENT)
Dept: VASCULAR LAB | Facility: MEDICAL CENTER | Age: 83
End: 2018-04-25

## 2018-04-25 ENCOUNTER — TELEPHONE (OUTPATIENT)
Dept: VASCULAR LAB | Facility: MEDICAL CENTER | Age: 83
End: 2018-04-25
Payer: MEDICARE

## 2018-04-25 NOTE — TELEPHONE ENCOUNTER
Received a call from Faviola Olson's office stating they are uncomfortable with determining LOT. Discussed this with Dorothea and she requested I scheduled a LOT appt w/ Dr. Ramos.

## 2018-04-25 NOTE — TELEPHONE ENCOUNTER
Left voicemail message with Dr. Guallpa's office that this patient has completed 3 months of anticoagulation and is at her decision date.  I offered appointments with our vascular providers, if he is uncomfortable making this LOT decision.  Dorothea Alvarado, JavierD

## 2018-05-04 ENCOUNTER — HOSPITAL ENCOUNTER (EMERGENCY)
Facility: MEDICAL CENTER | Age: 83
End: 2018-05-04
Attending: EMERGENCY MEDICINE
Payer: MEDICARE

## 2018-05-04 ENCOUNTER — APPOINTMENT (OUTPATIENT)
Dept: RADIOLOGY | Facility: MEDICAL CENTER | Age: 83
End: 2018-05-04
Attending: EMERGENCY MEDICINE
Payer: MEDICARE

## 2018-05-04 VITALS
SYSTOLIC BLOOD PRESSURE: 158 MMHG | HEIGHT: 60 IN | WEIGHT: 134.48 LBS | TEMPERATURE: 98.8 F | DIASTOLIC BLOOD PRESSURE: 72 MMHG | OXYGEN SATURATION: 95 % | BODY MASS INDEX: 26.4 KG/M2 | HEART RATE: 80 BPM | RESPIRATION RATE: 18 BRPM

## 2018-05-04 DIAGNOSIS — S09.90XA INJURY OF HEAD, INITIAL ENCOUNTER: ICD-10-CM

## 2018-05-04 PROCEDURE — 99284 EMERGENCY DEPT VISIT MOD MDM: CPT

## 2018-05-04 PROCEDURE — 70450 CT HEAD/BRAIN W/O DYE: CPT

## 2018-05-04 ASSESSMENT — PAIN SCALES - GENERAL
PAINLEVEL_OUTOF10: 4
PAINLEVEL_OUTOF10: 2

## 2018-05-04 NOTE — ED NOTES
The Medication Reconciliation process has been completed by interviewing the patient    Allergies have been reviewed  Antibiotic use in 30 days - none    Home Pharmacy:  [facility] [place]

## 2018-05-04 NOTE — ED NOTES
Pt is accompanied by her daughter.  She presents with a Hx of DVT on Xarelto therapy.  At approximately 0900 AM this morning she experienced a GLF hitting her head without LOC.

## 2018-05-04 NOTE — ED PROVIDER NOTES
ED Provider Note    CHIEF COMPLAINT  Chief Complaint   Patient presents with   • T-5000 FALL   • Head Injury       HPI  Regine Bryant is a 89 y.o. female who presents to the ED secondary to head injury. The patient is on Xarelto, tripped and fell backwards hitting her head on a refrigerator today. She did not pass out, she does have a hematoma on the back of her head, no headache, nausea vomiting, happened at 9:00 this morning. The patient denies any numbness, tingling, weakness, chest pain, shortness of breath, abdominal pains, nausea vomiting.    REVIEW OF SYSTEMS  See HPI for further details. All other systems are negative.     PAST MEDICAL HISTORY  Past Medical History:   Diagnosis Date   • Hypercholesterolemia    • Hypertension    • Hypertension    • Thyroid disease        FAMILY HISTORY  History reviewed. No pertinent family history.    SOCIAL HISTORY  Social History     Social History   • Marital status:      Spouse name: N/A   • Number of children: N/A   • Years of education: N/A     Social History Main Topics   • Smoking status: Never Smoker   • Smokeless tobacco: Never Used   • Alcohol use No   • Drug use: No   • Sexual activity: Not on file     Other Topics Concern   • Not on file     Social History Narrative   • No narrative on file       SURGICAL HISTORY  Past Surgical History:   Procedure Laterality Date   • GYN SURGERY      hyst and bladder suspension       CURRENT MEDICATIONS  Home Medications     Reviewed by Rj Aceves (Pharmacy Tech) on 05/04/18 at 1215  Med List Status: Complete   Medication Last Dose Status   carvedilol (COREG) 3.125 MG TABS 5/4/2018 Active   hydrochlorothiazide (HYDRODIURIL) 25 MG TABS 5/4/2018 Active   levothyroxine (SYNTHROID) 25 MCG Tab 5/4/2018 Active   rivaroxaban (XARELTO) 20 MG Tab tablet 5/3/2018 Active                ALLERGIES  No Known Allergies    PHYSICAL EXAM  VITAL SIGNS: BP (!) 170/69   Pulse 81   Temp 37.1 °C (98.8 °F)   Resp 18   Ht  1.524 m (5') Comment: Stated  Wt 61 kg (134 lb 7.7 oz)   SpO2 94%   BMI 26.26 kg/m²   Constitutional:  Well developed, Well nourished, mild distress, Non-toxic appearance.   HENT: Moderate hematoma occipital area, no laceration, does not boggy  Eyes: PERRLA, EOMI, Conjunctiva normal, No discharge.   Neck: Normal range of motion, No tenderness, Supple, No stridor. No midline C-spine tenderness  Lymphatic: No lymphadenopathy noted.   Cardiovascular: Normal heart rate, Normal rhythm.   Thorax & Lungs: Normal breath sounds, No respiratory distress, No wheezing, No chest tenderness.   Skin: Warm, Dry, No erythema, No rash.   Extremities: Intact distal pulses, No edema, No tenderness.   Neurologic: Alert & oriented x 3, Normal motor function, Normal sensory function, No focal deficits noted.   Psychiatric: Affect normal, Judgment normal, Mood normal.     RADIOLOGY/PROCEDURES  CT-HEAD W/O   Final Result      No noncontrast CT evidence of acute intracranial hemorrhage.      Moderate white matter hypodensity is present.  This is a nonspecific finding which usually is found to represent chronic microvascular disease in patient's of this demographic.  Demyelination, age indeterminant ischemia and gliosis are also common    possibilities.      Age-appropriate cerebral volume loss.            COURSE & MEDICAL DECISION MAKING  Pertinent Labs & Imaging studies reviewed. (See chart for details)  Patient is coming in standard head injury, she is on Xarelto, CT scan is negative, discussed with them return precautions, discussed with them there is a small rebleed rate and so if the patient gets any headache, nausea vomiting to return immediately also if they are concerned to get rechecked tomorrow morning.    FINAL IMPRESSION  1. Injury of head, initial encounter        Patient referred to primary care provider for blood pressure management     This dictation was created using voice recognition software. The accuracy of the  dictation is limited to the abilities of the software. I expect there may be some errors of grammar and possibly content. The nursing notes were reviewed and certain aspects of this information were incorporated into this note.    Electronically signed by: Med Lombardi, 5/4/2018 12:50 PM

## 2018-05-31 ENCOUNTER — OFFICE VISIT (OUTPATIENT)
Dept: VASCULAR LAB | Facility: MEDICAL CENTER | Age: 83
End: 2018-05-31
Attending: FAMILY MEDICINE
Payer: MEDICARE

## 2018-05-31 VITALS
SYSTOLIC BLOOD PRESSURE: 134 MMHG | HEART RATE: 80 BPM | BODY MASS INDEX: 26.9 KG/M2 | DIASTOLIC BLOOD PRESSURE: 50 MMHG | WEIGHT: 137 LBS | HEIGHT: 60 IN

## 2018-05-31 DIAGNOSIS — D69.6 THROMBOCYTOPENIA (HCC): ICD-10-CM

## 2018-05-31 DIAGNOSIS — R91.8 LUNG MASS: ICD-10-CM

## 2018-05-31 DIAGNOSIS — E43 PROTEIN-CALORIE MALNUTRITION, SEVERE (HCC): ICD-10-CM

## 2018-05-31 DIAGNOSIS — I82.401 ACUTE DEEP VEIN THROMBOSIS (DVT) OF RIGHT LOWER EXTREMITY, UNSPECIFIED VEIN (HCC): ICD-10-CM

## 2018-05-31 PROCEDURE — 99203 OFFICE O/P NEW LOW 30 MIN: CPT | Performed by: FAMILY MEDICINE

## 2018-05-31 PROCEDURE — 99212 OFFICE O/P EST SF 10 MIN: CPT | Performed by: FAMILY MEDICINE

## 2018-05-31 ASSESSMENT — ENCOUNTER SYMPTOMS
WEAKNESS: 0
DIARRHEA: 0
DIZZINESS: 0
PALPITATIONS: 0
CHILLS: 0
HEMOPTYSIS: 0
VOMITING: 0
ABDOMINAL PAIN: 0
SHORTNESS OF BREATH: 0
NAUSEA: 0
WHEEZING: 0
TREMORS: 0
FEVER: 0
COUGH: 0
HEADACHES: 0
FOCAL WEAKNESS: 0
SEIZURES: 0
BRUISES/BLEEDS EASILY: 1
MYALGIAS: 0

## 2018-05-31 NOTE — PATIENT INSTRUCTIONS
1) continue xarelto 20mg for 30 days, then stop     2) start aspirin 81mg  - take 2 tablets daily with food and water when you stop the xarelto     3) 1 month after starting aspirin we will arrange for a repeat leg ultrasound and a blood test to determine your risk of recurrent blood clots in the legs.  Late July.     4) Iza, my nurse practitioner, will contact you to set up the appointments.     5) follow-up with me in early August to review everything

## 2018-05-31 NOTE — PROGRESS NOTES
INITIAL VASCULAR ANTI-COAGULATION VISIT  Subjective:   Regine Bryant is a 89 y.o. female who presents today 5/31/2018 for   Chief Complaint   Patient presents with   • New Patient     length of therapy (Xarelto), extensive RLE DVT       HPI:  Referred by Dr. Guallpa for eval for LOT on anticoag after 1st episode of unprovoked DVT.      Admitted 12/28/17 for leg swelling and found to have RLE DVT.  Had preceding 2 weeks with unilateral swelling that progressed.      Reports currently tolerating xarelto w/o major bleeding or minor bleeding.  Reports no calf pain or swelling.  Notices increased RLE varicosities more than in the past.  Mild swelling BLE at night after activity.      Any personal VTE hx? None, no prior CVA/TIA  Any family VTE hx? None reported     UNPROVOKED VS PROVOKED:  Recent surgery ? None   Recent trauma ? occassional hits legs on recliner, no major injuries   Smoker? no  Extended travel? None   Cancer screenings up to date? No cancer. No family hx of breast CA.  Last colonoscopy was 4 years ago and normal.  No other issues with cancer in the past.     Reports prolonged sitting >3-4h while reading.  Remains active and attends to household work.  Manages ADLs and majority of IADLs.  Dtr is living with her.      WOMEN: Any HRT or birth control? None     Recent ED visit on 5/4/18 due to fall and head injury.   Fell over dog and hit head on refrigerator.  Did not hit ground.  No LOC.  Denies weakness.   Had a negative CT scan.  No major HA or feeling dizz.       Past Medical History:   Diagnosis Date   • Hypercholesterolemia    • Hypertension    • Hypertension    • Thyroid disease      Patient Active Problem List    Diagnosis Date Noted   • Lung mass 09/20/2016     Priority: High   • Hypoxia 09/19/2016     Priority: High   • UTI (urinary tract infection) 09/20/2016     Priority: Medium   • Hyperparathyroidism (HCC) 12/29/2017   • Hypercalcemia 12/29/2017   • DVT (deep venous thrombosis) (HCC)  12/28/2017   • HTN (hypertension) 09/20/2016   • Hypothyroid 09/20/2016   • Anemia 09/20/2016   • Thrombocytopenia (HCC) 09/20/2016   • Protein-calorie malnutrition, severe (HCC) 09/20/2016   • DNR (do not resuscitate) 09/20/2016     Past Surgical History:   Procedure Laterality Date   • GYN SURGERY      hyst and bladder suspension     Current Outpatient Prescriptions on File Prior to Visit   Medication Sig Dispense Refill   • rivaroxaban (XARELTO) 20 MG Tab tablet Take 1 Tab by mouth every day. 30 Tab 5   • levothyroxine (SYNTHROID) 25 MCG Tab Take 25 mcg by mouth Every morning on an empty stomach.     • carvedilol (COREG) 3.125 MG TABS Take 3.125 mg by mouth 2 times a day, with meals.     • hydrochlorothiazide (HYDRODIURIL) 25 MG TABS Take 25 mg by mouth every day.       No current facility-administered medications on file prior to visit.      No family history on file.    Social History   Substance Use Topics   • Smoking status: Never Smoker   • Smokeless tobacco: Never Used   • Alcohol use No     DIET AND EXERCISE:  Weight Change: none   Diet: common adult  Exercise: minimal exercise     Review of Systems   Constitutional: Negative for chills, fever and malaise/fatigue.   Respiratory: Negative for cough, hemoptysis, shortness of breath and wheezing.    Cardiovascular: Negative for chest pain, palpitations and leg swelling.   Gastrointestinal: Negative for abdominal pain, diarrhea, nausea and vomiting.   Musculoskeletal: Negative for joint pain and myalgias.   Skin: Negative for itching and rash.   Neurological: Negative for dizziness, tremors, focal weakness, seizures, weakness and headaches.   Endo/Heme/Allergies: Bruises/bleeds easily.      Objective:     Vitals:    05/31/18 1416 05/31/18 1423   BP: 154/48 134/50   Pulse: 81 80   Weight: 62.1 kg (137 lb) 62.1 kg (137 lb)   Height: 1.524 m (5') 1.524 m (5')     BP Readings from Last 4 Encounters:   05/31/18 134/50   05/04/18 158/72   01/18/18 158/66   01/10/18  150/61     Body mass index is 26.76 kg/m².   BMI Readings from Last 4 Encounters:   05/31/18 26.76 kg/m²   05/04/18 26.26 kg/m²   12/28/17 27.70 kg/m²   09/20/16 24.70 kg/m²     Physical Exam   Constitutional: She is oriented to person, place, and time. She appears well-developed and well-nourished. No distress.   HENT:   Head: Normocephalic and atraumatic.   Neck: Normal range of motion. Neck supple.   Cardiovascular: Normal rate, regular rhythm, normal heart sounds and intact distal pulses.  Exam reveals no gallop and no friction rub.    No murmur heard.  Pulmonary/Chest: Effort normal and breath sounds normal.   Musculoskeletal: Normal range of motion. She exhibits no edema or tenderness.   Neurological: She is alert and oriented to person, place, and time.   Skin: Skin is warm and dry. She is not diaphoretic.   Psychiatric: She has a normal mood and affect.     Lab Results   Component Value Date    CHOLSTRLTOT 211 (H) 03/27/2018     (H) 03/27/2018    HDL 33 (A) 03/27/2018    TRIGLYCERIDE 270 (H) 03/27/2018      Lab Results   Component Value Date    PROTHROMBTM 13.0 12/28/2017    INR 1.5 01/18/2018       Lab Results   Component Value Date    HBA1C 5.9 (H) 06/23/2016      Lab Results   Component Value Date    SODIUM 138 03/27/2018    POTASSIUM 3.9 03/27/2018    CHLORIDE 104 03/27/2018    CO2 27 03/27/2018    GLUCOSE 106 (H) 03/27/2018    BUN 24 (H) 03/27/2018    CREATININE 1.09 03/27/2018        Lab Results   Component Value Date    WBC 6.7 03/27/2018    RBC 4.16 (L) 03/27/2018    HEMOGLOBIN 12.9 03/27/2018    HEMATOCRIT 40.4 03/27/2018    MCV 97.1 03/27/2018    MCH 31.0 03/27/2018    MCHC 31.9 (L) 03/27/2018    MPV 11.4 03/27/2018      VASCULAR IMAGING REVIEW:     CTA PE 9/2016  1.  No pulmonary embolus appreciated.  2.  Left lower lobe nodule with possible fine spiculations, appearance raises concern for neoplastic nodule. Referral for FNA biopsy recommended. Alternatively could be further evaluated with  PET CT scan.  3.  Left hilar adenopathy, given pulmonary nodule concerning for neoplasia appearance cannot exclude regional lymph node disease.  4.  Atherosclerosis and atherosclerotic coronary artery disease.    CT THORAX 12/2017  1. Unchanged 9 mm nodule in the superior left lower lobe since September 2016.    2. No new or suspicious pulmonary nodule.    3. Multiple small enhancing lesions in the liver, incompletely evaluated but grossly similar to prior exam in 2010. Given the long stability, this could relate to flash filling hemangiomas. Correlate with prior abdominal CT or MRI study.    CT HEAD 12/2017  No noncontrast CT evidence of acute intracranial hemorrhage.    Moderate white matter hypodensity is present.  This is a nonspecific finding which usually is found to represent chronic microvascular disease in patient's of this demographic.  Demyelination, age indeterminant ischemia and gliosis are also common   possibilities.    Age-appropriate cerebral volume loss.    RLE DUPLEX 12/2017   FINDINGS:   Right lower extremity -   Common femoral vein is noncompressible and this extends distally through  the mid peroneal vein and includes the proximal gastrocs. Mixed echogenic  material is within the vein that may represent acute to subacute  thrombosis.  Some flow visualized. Interstitial fluid consistent with edema is observed below the knee.      Flow was evaluated in the contralateral common femoral vein and normal  venous flow dynamics including spontaneous flow, respiratory phasic  variation and augmentation were demonstrated.     Medical Decision Making:  Today's Assessment / Status / Plan:     1. Acute deep vein thrombosis (DVT) of right lower extremity, unspecified vein (HCC)     2. Thrombocytopenia (HCC)     3. Protein-calorie malnutrition, severe (HCC)     4. Lung mass       Indication for anticoagulation: extensive RLE DVT   Anti-Platelet/Anti-Coagulant Tx: yes    Anti-Coagulation Plan:  Hx of  unprovoked extensive RLE DVT.  Though high risk for recurrence off anticoag, pt is considered very high bleed risk in light of hx of falls and recent head trauma.  Recommended to complete total of 6mo xarelto, then transition to ASA 162mg daily.  She is aware of the inherent risk of recurrent DVT with this plan but prefers to have decrease bleed risk based upon her hx.    - stop xarelto in 1 month, start ASA 162mg daily   - check D-dimer and repeat RLE duplex at 1mo after cessation of xarelto for risk strat evaluations to help with further decision-making    Smoking:continued complete avoidance of all tobacco products      Physical Activity: walking as tolerated with support device, avoid high impact activities and falls    Weight Management and Nutrition:exercise counseling and nutrition counseling    Instructed to follow-up with PCP for remainder of adult medical needs: yes  We will partner with other provider in the management of established vascular disease and cardiometabolic risk factors    Studies to Be Obtained:  RLE duplex prior to next appt   Labs to Be Obtained: D-dimer prior to next appt     Follow up in: 2 months    Booker Ramos M.D.    CC:   Silvino Guallpa D.O.  Pcp Pt States None

## 2018-06-04 ENCOUNTER — TELEPHONE (OUTPATIENT)
Dept: VASCULAR LAB | Facility: MEDICAL CENTER | Age: 83
End: 2018-06-04

## 2018-06-04 DIAGNOSIS — I82.401 DEEP VEIN THROMBOSIS (DVT) OF RIGHT LOWER EXTREMITY, UNSPECIFIED CHRONICITY, UNSPECIFIED VEIN (HCC): ICD-10-CM

## 2018-07-25 ENCOUNTER — HOSPITAL ENCOUNTER (OUTPATIENT)
Dept: LAB | Facility: MEDICAL CENTER | Age: 83
End: 2018-07-25
Attending: NURSE PRACTITIONER
Payer: MEDICARE

## 2018-07-25 DIAGNOSIS — I82.401 DEEP VEIN THROMBOSIS (DVT) OF RIGHT LOWER EXTREMITY, UNSPECIFIED CHRONICITY, UNSPECIFIED VEIN (HCC): ICD-10-CM

## 2018-07-25 LAB — DEPRECATED D DIMER PPP IA-ACNC: 287 NG/ML(D-DU)

## 2018-07-25 PROCEDURE — 85379 FIBRIN DEGRADATION QUANT: CPT

## 2018-07-25 PROCEDURE — 36415 COLL VENOUS BLD VENIPUNCTURE: CPT

## 2018-07-30 ENCOUNTER — HOSPITAL ENCOUNTER (OUTPATIENT)
Dept: RADIOLOGY | Facility: MEDICAL CENTER | Age: 83
End: 2018-07-30
Attending: NURSE PRACTITIONER
Payer: MEDICARE

## 2018-07-30 DIAGNOSIS — I82.401 DEEP VEIN THROMBOSIS (DVT) OF RIGHT LOWER EXTREMITY, UNSPECIFIED CHRONICITY, UNSPECIFIED VEIN (HCC): ICD-10-CM

## 2018-07-30 PROCEDURE — 93971 EXTREMITY STUDY: CPT

## 2018-08-03 ENCOUNTER — OFFICE VISIT (OUTPATIENT)
Dept: VASCULAR LAB | Facility: MEDICAL CENTER | Age: 83
End: 2018-08-03
Attending: INTERNAL MEDICINE
Payer: MEDICARE

## 2018-08-03 VITALS
BODY MASS INDEX: 26.9 KG/M2 | HEIGHT: 60 IN | WEIGHT: 137 LBS | HEART RATE: 76 BPM | DIASTOLIC BLOOD PRESSURE: 50 MMHG | SYSTOLIC BLOOD PRESSURE: 122 MMHG

## 2018-08-03 DIAGNOSIS — I10 ESSENTIAL HYPERTENSION: ICD-10-CM

## 2018-08-03 DIAGNOSIS — D69.6 THROMBOCYTOPENIA (HCC): ICD-10-CM

## 2018-08-03 DIAGNOSIS — I82.401 ACUTE DEEP VEIN THROMBOSIS (DVT) OF RIGHT LOWER EXTREMITY, UNSPECIFIED VEIN (HCC): ICD-10-CM

## 2018-08-03 PROCEDURE — 99214 OFFICE O/P EST MOD 30 MIN: CPT | Performed by: FAMILY MEDICINE

## 2018-08-03 PROCEDURE — 99212 OFFICE O/P EST SF 10 MIN: CPT | Performed by: FAMILY MEDICINE

## 2018-08-03 RX ORDER — ASPIRIN 81 MG/1
81 TABLET, CHEWABLE ORAL DAILY
Status: ON HOLD | COMMUNITY
End: 2024-01-01

## 2018-08-03 ASSESSMENT — ENCOUNTER SYMPTOMS
NAUSEA: 0
TREMORS: 0
CHILLS: 0
FEVER: 0
POLYDIPSIA: 1
ABDOMINAL PAIN: 0
FOCAL WEAKNESS: 0
VOMITING: 0
WEAKNESS: 0
HEMOPTYSIS: 0
PALPITATIONS: 0
HEADACHES: 0
COUGH: 0
SEIZURES: 0
DIZZINESS: 0
BRUISES/BLEEDS EASILY: 1
SHORTNESS OF BREATH: 0
DIARRHEA: 0
WHEEZING: 0
MYALGIAS: 0

## 2018-08-03 NOTE — PATIENT INSTRUCTIONS
1) your ultrasound was normal - no signs of clot     2) continue aspirin 162mg daily (2 tablets)     3) seek prompt medical attention if you notice swelling in the leg or acute pain, as this could indicate another blood clot     4) follow-up witj your PCP for your ongoing care, we are available for further consultation - [phone]  (Dr. Ramos)

## 2018-08-03 NOTE — PROGRESS NOTES
FOLLOW-UP VASCULAR ANTI-COAGULATION VISIT  Subjective:   Regine Bryant is a 89 y.o. female who presents today 8/3/18 for   Chief Complaint   Patient presents with   • Follow-Up     Acute deep vein thrombosis (DVT) of right lower extremity, unspecified vein (HCC)   Referred by Dr. Guallpa for eval for LOT on anticoag after 1st episode of unprovoked DVT.      HPI:    After d/c of Xarelto, started ASA 162mg daily.      Reports currently tolerating xarelto w/o major bleeding or minor bleeding.  Reports no calf pain or swelling.  Notices increased RLE varicosities more than in the past.  Mild swelling BLE at night after activity.   Had repeat RLE duplex completed.     HTN:  Stable, concerns, tolerating meds    Low plats:  Continue to monitor and having intermittent easy bleeding/bruising.  Last lab = 173, appears to be stable and resolved over time.   Tolerating ASA without issues.     Pertinent pmhx:   Admitted 12/28/17 for leg swelling and found to have RLE DVT.  Had preceding 2 weeks with unilateral swelling that progressed.      Any personal VTE hx? None, no prior CVA/TIA  Any family VTE hx? None reported     UNPROVOKED VS PROVOKED:  Recent surgery ? None   Recent trauma ? occassional hits legs on recliner, no major injuries   Smoker? no  Extended travel? None   Cancer screenings up to date? No cancer. No family hx of breast CA.  Last colonoscopy was 4 years ago and normal.  No other issues with cancer in the past.     Reports prolonged sitting >3-4h while reading.  Remains active and attends to household work.  Manages ADLs and majority of IADLs.  Dtr is living with her.      WOMEN: Any HRT or birth control? None     Recent ED visit on 5/4/18 due to fall and head injury.   Fell over dog and hit head on refrigerator.  Did not hit ground.  No LOC.  Denies weakness.   Had a negative CT scan.  No major HA or feeling dizz.     Current Outpatient Prescriptions on File Prior to Visit   Medication Sig Dispense Refill    • levothyroxine (SYNTHROID) 25 MCG Tab Take 25 mcg by mouth Every morning on an empty stomach.     • carvedilol (COREG) 3.125 MG TABS Take 3.125 mg by mouth 2 times a day, with meals.     • hydrochlorothiazide (HYDRODIURIL) 25 MG TABS Take 25 mg by mouth every day.     • rivaroxaban (XARELTO) 20 MG Tab tablet Take 1 Tab by mouth every day. 30 Tab 5     No current facility-administered medications on file prior to visit.        Social History   Substance Use Topics   • Smoking status: Never Smoker   • Smokeless tobacco: Never Used   • Alcohol use No     DIET AND EXERCISE:  Weight Change: none   Diet: common adult  Exercise: minimal exercise     Review of Systems   Constitutional: Negative for chills, fever and malaise/fatigue.   Respiratory: Negative for cough, hemoptysis, shortness of breath and wheezing.    Cardiovascular: Negative for chest pain, palpitations and leg swelling.   Gastrointestinal: Negative for abdominal pain, diarrhea, nausea and vomiting.   Musculoskeletal: Negative for joint pain and myalgias.   Skin: Negative for itching and rash.   Neurological: Negative for dizziness, tremors, focal weakness, seizures, weakness and headaches.   Endo/Heme/Allergies: Positive for polydipsia. Bruises/bleeds easily.      Objective:     Vitals:    08/03/18 1350 08/03/18 1359   BP: 141/52 122/50   Pulse: 78 76   Weight: 62.1 kg (137 lb) 62.1 kg (137 lb)   Height: 1.524 m (5') 1.524 m (5')     BP Readings from Last 4 Encounters:   08/03/18 122/50   05/31/18 134/50   05/04/18 158/72   01/18/18 158/66     Body mass index is 26.76 kg/m².   BMI Readings from Last 4 Encounters:   08/03/18 26.76 kg/m²   05/31/18 26.76 kg/m²   05/04/18 26.26 kg/m²   12/28/17 27.70 kg/m²     Physical Exam   Constitutional: She is oriented to person, place, and time. She appears well-developed and well-nourished. No distress.   HENT:   Head: Normocephalic and atraumatic.   Neck: Normal range of motion. Neck supple.   Cardiovascular: Normal  rate, regular rhythm, normal heart sounds and intact distal pulses.  Exam reveals no gallop and no friction rub.    No murmur heard.  Pulmonary/Chest: Effort normal and breath sounds normal.   Musculoskeletal: Normal range of motion. She exhibits no edema or tenderness.   Neurological: She is alert and oriented to person, place, and time.   Skin: Skin is warm and dry. She is not diaphoretic.   Psychiatric: She has a normal mood and affect.     Lab Results   Component Value Date    CHOLSTRLTOT 211 (H) 03/27/2018     (H) 03/27/2018    HDL 33 (A) 03/27/2018    TRIGLYCERIDE 270 (H) 03/27/2018      Lab Results   Component Value Date    PROTHROMBTM 13.0 12/28/2017    INR 1.5 01/18/2018           Lab Results   Component Value Date    SODIUM 138 03/27/2018    POTASSIUM 3.9 03/27/2018    CHLORIDE 104 03/27/2018    CO2 27 03/27/2018    GLUCOSE 106 (H) 03/27/2018    BUN 24 (H) 03/27/2018    CREATININE 1.09 03/27/2018        Lab Results   Component Value Date    WBC 6.7 03/27/2018    RBC 4.16 (L) 03/27/2018    HEMOGLOBIN 12.9 03/27/2018    HEMATOCRIT 40.4 03/27/2018    MCV 97.1 03/27/2018    MCH 31.0 03/27/2018    MCHC 31.9 (L) 03/27/2018    MPV 11.4 03/27/2018 7/25/18:  D-dimer mild high = 278,     VASCULAR IMAGING REVIEW:     CTA PE 9/2016  1.  No pulmonary embolus appreciated.  2.  Left lower lobe nodule with possible fine spiculations, appearance raises concern for neoplastic nodule. Referral for FNA biopsy recommended. Alternatively could be further evaluated with PET CT scan.  3.  Left hilar adenopathy, given pulmonary nodule concerning for neoplasia appearance cannot exclude regional lymph node disease.  4.  Atherosclerosis and atherosclerotic coronary artery disease.    CT THORAX 12/2017  1. Unchanged 9 mm nodule in the superior left lower lobe since September 2016.  2. No new or suspicious pulmonary nodule.  3. Multiple small enhancing lesions in the liver, incompletely evaluated but grossly similar to  prior exam in 2010. Given the long stability, this could relate to flash filling hemangiomas. Correlate with prior abdominal CT or MRI study.    CT HEAD 12/2017  No noncontrast CT evidence of acute intracranial hemorrhage.    Moderate white matter hypodensity is present.  This is a nonspecific finding which usually is found to represent chronic microvascular disease in patient's of this demographic.  Demyelination, age indeterminant ischemia and gliosis are also common   possibilities.    Age-appropriate cerebral volume loss.    RLE DUPLEX 12/2017   FINDINGS:   Right lower extremity -   Common femoral vein is noncompressible and this extends distally through  the mid peroneal vein and includes the proximal gastrocs. Mixed echogenic  material is within the vein that may represent acute to subacute  thrombosis.  Some flow visualized. Interstitial fluid consistent with edema is observed below the knee.      Flow was evaluated in the contralateral common femoral vein and normal  venous flow dynamics including spontaneous flow, respiratory phasic  variation and augmentation were demonstrated.     RLE DUPLEX 7/30/18  Normal right lower extremity superficial and deep venous examination.     Medical Decision Making:  Today's Assessment / Status / Plan:     1. Acute deep vein thrombosis (DVT) of right lower extremity, unspecified vein (HCC)      resolved    2. Essential hypertension     3. Thrombocytopenia (HCC)       Indication for anticoagulation: extensive RLE DVT   Anti-Platelet/Anti-Coagulant Tx: yes    Anti-Coagulation Plan:  Hx of unprovoked extensive RLE DVT.  Though high risk for recurrence off anticoag, pt is considered very high bleed risk in light of hx of falls and recent head trauma.  Recommended to complete total of 6mo xarelto, then transition to ASA 162mg daily.  She is aware of the inherent risk of recurrent DVT with this plan but prefers to have decrease bleed risk based upon her hx.  Repeat duplex of  anticoag is normal with very mildly elevated D-dimer.  Pt understands that off xarelto she has a mild risk of recurrence of DVT and has been counseled on signs/symptoms of DVT.    - continue ASA 162mg daily indefinitely    Smoking:continued complete avoidance of all tobacco products      Physical Activity: walking as tolerated with support device, avoid high impact activities and falls    Weight Management and Nutrition:exercise counseling and nutrition counseling    Instructed to follow-up with PCP for remainder of adult medical needs: yes  We will partner with other provider in the management of established vascular disease and cardiometabolic risk factors    Studies to Be Obtained:  none  Labs to Be Obtained: none    Follow up in: prn, defer ongoing care to PCP, we will be available for ongoing consultation as needed     Booker Ramos M.D.    CC:   Silvino Guallpa D.O.  Pcp Pt States None

## 2018-09-04 PROCEDURE — 90662 IIV NO PRSV INCREASED AG IM: CPT | Performed by: REGISTERED NURSE

## 2018-09-04 PROCEDURE — G0008 ADMIN INFLUENZA VIRUS VAC: HCPCS | Performed by: REGISTERED NURSE

## 2018-09-10 ENCOUNTER — IMMUNIZATION (OUTPATIENT)
Dept: SOCIAL WORK | Facility: CLINIC | Age: 83
End: 2018-09-10
Payer: MEDICARE

## 2018-09-10 DIAGNOSIS — Z23 NEED FOR VACCINATION: ICD-10-CM

## 2018-09-20 ENCOUNTER — TELEPHONE (OUTPATIENT)
Dept: VASCULAR LAB | Facility: MEDICAL CENTER | Age: 83
End: 2018-09-20

## 2018-09-20 DIAGNOSIS — I82.409 DEEP VEIN THROMBOSIS (DVT) OF LOWER EXTREMITY, UNSPECIFIED CHRONICITY, UNSPECIFIED LATERALITY, UNSPECIFIED VEIN (HCC): ICD-10-CM

## 2018-09-25 ENCOUNTER — TELEPHONE (OUTPATIENT)
Dept: VASCULAR LAB | Facility: MEDICAL CENTER | Age: 83
End: 2018-09-25

## 2018-10-11 ENCOUNTER — HOSPITAL ENCOUNTER (OUTPATIENT)
Dept: LAB | Facility: MEDICAL CENTER | Age: 83
End: 2018-10-11
Attending: NURSE PRACTITIONER
Payer: MEDICARE

## 2018-10-11 DIAGNOSIS — I82.409 DEEP VEIN THROMBOSIS (DVT) OF LOWER EXTREMITY, UNSPECIFIED CHRONICITY, UNSPECIFIED LATERALITY, UNSPECIFIED VEIN (HCC): ICD-10-CM

## 2018-10-11 LAB
ANION GAP SERPL CALC-SCNC: 9 MMOL/L (ref 0–11.9)
BASOPHILS # BLD AUTO: 0.9 % (ref 0–1.8)
BASOPHILS # BLD: 0.07 K/UL (ref 0–0.12)
BUN SERPL-MCNC: 32 MG/DL (ref 8–22)
CALCIUM SERPL-MCNC: 10.6 MG/DL (ref 8.5–10.5)
CHLORIDE SERPL-SCNC: 104 MMOL/L (ref 96–112)
CO2 SERPL-SCNC: 28 MMOL/L (ref 20–33)
CREAT SERPL-MCNC: 1.11 MG/DL (ref 0.5–1.4)
EOSINOPHIL # BLD AUTO: 0.22 K/UL (ref 0–0.51)
EOSINOPHIL NFR BLD: 2.7 % (ref 0–6.9)
ERYTHROCYTE [DISTWIDTH] IN BLOOD BY AUTOMATED COUNT: 49.7 FL (ref 35.9–50)
GLUCOSE SERPL-MCNC: 116 MG/DL (ref 65–99)
HCT VFR BLD AUTO: 40.3 % (ref 37–47)
HGB BLD-MCNC: 12.7 G/DL (ref 12–16)
IMM GRANULOCYTES # BLD AUTO: 0.03 K/UL (ref 0–0.11)
IMM GRANULOCYTES NFR BLD AUTO: 0.4 % (ref 0–0.9)
LYMPHOCYTES # BLD AUTO: 2.97 K/UL (ref 1–4.8)
LYMPHOCYTES NFR BLD: 36.8 % (ref 22–41)
MCH RBC QN AUTO: 30.4 PG (ref 27–33)
MCHC RBC AUTO-ENTMCNC: 31.5 G/DL (ref 33.6–35)
MCV RBC AUTO: 96.4 FL (ref 81.4–97.8)
MONOCYTES # BLD AUTO: 0.95 K/UL (ref 0–0.85)
MONOCYTES NFR BLD AUTO: 11.8 % (ref 0–13.4)
NEUTROPHILS # BLD AUTO: 3.82 K/UL (ref 2–7.15)
NEUTROPHILS NFR BLD: 47.4 % (ref 44–72)
NRBC # BLD AUTO: 0 K/UL
NRBC BLD-RTO: 0 /100 WBC
POTASSIUM SERPL-SCNC: 3.4 MMOL/L (ref 3.6–5.5)
RBC # BLD AUTO: 4.18 M/UL (ref 4.2–5.4)
SODIUM SERPL-SCNC: 141 MMOL/L (ref 135–145)
WBC # BLD AUTO: 8.1 K/UL (ref 4.8–10.8)

## 2018-10-11 PROCEDURE — 80048 BASIC METABOLIC PNL TOTAL CA: CPT

## 2018-10-11 PROCEDURE — 85048 AUTOMATED LEUKOCYTE COUNT: CPT

## 2018-10-11 PROCEDURE — 85041 AUTOMATED RBC COUNT: CPT

## 2018-10-11 PROCEDURE — 85014 HEMATOCRIT: CPT

## 2018-10-11 PROCEDURE — 85018 HEMOGLOBIN: CPT

## 2018-10-11 PROCEDURE — 36415 COLL VENOUS BLD VENIPUNCTURE: CPT

## 2018-10-18 ENCOUNTER — HOSPITAL ENCOUNTER (OUTPATIENT)
Dept: LAB | Facility: MEDICAL CENTER | Age: 83
End: 2018-10-18
Attending: NURSE PRACTITIONER
Payer: MEDICARE

## 2018-10-18 LAB
25(OH)D3 SERPL-MCNC: 18 NG/ML (ref 30–100)
ALBUMIN SERPL BCP-MCNC: 4.2 G/DL (ref 3.2–4.9)
ALBUMIN/GLOB SERPL: 1.4 G/DL
ALP SERPL-CCNC: 96 U/L (ref 30–99)
ALT SERPL-CCNC: 16 U/L (ref 2–50)
ANION GAP SERPL CALC-SCNC: 8 MMOL/L (ref 0–11.9)
APPEARANCE UR: CLEAR
AST SERPL-CCNC: 20 U/L (ref 12–45)
BACTERIA #/AREA URNS HPF: ABNORMAL /HPF
BASOPHILS # BLD AUTO: 0.7 % (ref 0–1.8)
BASOPHILS # BLD: 0.05 K/UL (ref 0–0.12)
BILIRUB SERPL-MCNC: 0.6 MG/DL (ref 0.1–1.5)
BILIRUB UR QL STRIP.AUTO: NEGATIVE
BUN SERPL-MCNC: 31 MG/DL (ref 8–22)
CALCIUM SERPL-MCNC: 10.9 MG/DL (ref 8.5–10.5)
CHLORIDE SERPL-SCNC: 106 MMOL/L (ref 96–112)
CHOLEST SERPL-MCNC: 219 MG/DL (ref 100–199)
CO2 SERPL-SCNC: 27 MMOL/L (ref 20–33)
COLOR UR: YELLOW
CREAT SERPL-MCNC: 1.08 MG/DL (ref 0.5–1.4)
EOSINOPHIL # BLD AUTO: 0.26 K/UL (ref 0–0.51)
EOSINOPHIL NFR BLD: 3.5 % (ref 0–6.9)
EPI CELLS #/AREA URNS HPF: ABNORMAL /HPF
ERYTHROCYTE [DISTWIDTH] IN BLOOD BY AUTOMATED COUNT: 50 FL (ref 35.9–50)
FASTING STATUS PATIENT QL REPORTED: NORMAL
GLOBULIN SER CALC-MCNC: 2.9 G/DL (ref 1.9–3.5)
GLUCOSE SERPL-MCNC: 107 MG/DL (ref 65–99)
GLUCOSE UR STRIP.AUTO-MCNC: NEGATIVE MG/DL
HCT VFR BLD AUTO: 42.1 % (ref 37–47)
HDLC SERPL-MCNC: 40 MG/DL
HGB BLD-MCNC: 13.3 G/DL (ref 12–16)
HYALINE CASTS #/AREA URNS LPF: ABNORMAL /LPF
IMM GRANULOCYTES # BLD AUTO: 0.02 K/UL (ref 0–0.11)
IMM GRANULOCYTES NFR BLD AUTO: 0.3 % (ref 0–0.9)
KETONES UR STRIP.AUTO-MCNC: NEGATIVE MG/DL
LDLC SERPL CALC-MCNC: 135 MG/DL
LEUKOCYTE ESTERASE UR QL STRIP.AUTO: ABNORMAL
LYMPHOCYTES # BLD AUTO: 3.29 K/UL (ref 1–4.8)
LYMPHOCYTES NFR BLD: 44.5 % (ref 22–41)
MCH RBC QN AUTO: 31 PG (ref 27–33)
MCHC RBC AUTO-ENTMCNC: 31.6 G/DL (ref 33.6–35)
MCV RBC AUTO: 98.1 FL (ref 81.4–97.8)
MICRO URNS: ABNORMAL
MONOCYTES # BLD AUTO: 0.73 K/UL (ref 0–0.85)
MONOCYTES NFR BLD AUTO: 9.9 % (ref 0–13.4)
NEUTROPHILS # BLD AUTO: 3.05 K/UL (ref 2–7.15)
NEUTROPHILS NFR BLD: 41.1 % (ref 44–72)
NITRITE UR QL STRIP.AUTO: NEGATIVE
NRBC # BLD AUTO: 0 K/UL
NRBC BLD-RTO: 0 /100 WBC
PH UR STRIP.AUTO: 6.5 [PH]
PLATELET # BLD AUTO: 178 K/UL (ref 164–446)
PMV BLD AUTO: 11.6 FL (ref 9–12.9)
POTASSIUM SERPL-SCNC: 4.2 MMOL/L (ref 3.6–5.5)
PROT SERPL-MCNC: 7.1 G/DL (ref 6–8.2)
PROT UR QL STRIP: NEGATIVE MG/DL
RBC # BLD AUTO: 4.29 M/UL (ref 4.2–5.4)
RBC # URNS HPF: ABNORMAL /HPF
RBC UR QL AUTO: NEGATIVE
SODIUM SERPL-SCNC: 141 MMOL/L (ref 135–145)
SP GR UR STRIP.AUTO: 1.02
TRIGL SERPL-MCNC: 221 MG/DL (ref 0–149)
TSH SERPL DL<=0.005 MIU/L-ACNC: 2.06 UIU/ML (ref 0.38–5.33)
UROBILINOGEN UR STRIP.AUTO-MCNC: 0.2 MG/DL
WBC # BLD AUTO: 7.4 K/UL (ref 4.8–10.8)
WBC #/AREA URNS HPF: ABNORMAL /HPF

## 2018-10-18 PROCEDURE — 87086 URINE CULTURE/COLONY COUNT: CPT

## 2018-10-18 PROCEDURE — 81001 URINALYSIS AUTO W/SCOPE: CPT

## 2018-10-18 PROCEDURE — 85025 COMPLETE CBC W/AUTO DIFF WBC: CPT

## 2018-10-18 PROCEDURE — 36415 COLL VENOUS BLD VENIPUNCTURE: CPT

## 2018-10-18 PROCEDURE — 80053 COMPREHEN METABOLIC PANEL: CPT

## 2018-10-18 PROCEDURE — 84443 ASSAY THYROID STIM HORMONE: CPT

## 2018-10-18 PROCEDURE — 80061 LIPID PANEL: CPT

## 2018-10-18 PROCEDURE — 82306 VITAMIN D 25 HYDROXY: CPT

## 2018-10-20 LAB
BACTERIA UR CULT: NORMAL
SIGNIFICANT IND 70042: NORMAL
SITE SITE: NORMAL
SOURCE SOURCE: NORMAL

## 2018-10-24 ENCOUNTER — ANTICOAGULATION VISIT (OUTPATIENT)
Dept: VASCULAR LAB | Facility: MEDICAL CENTER | Age: 83
End: 2018-10-24
Attending: INTERNAL MEDICINE
Payer: MEDICARE

## 2018-10-24 VITALS — HEART RATE: 70 BPM | SYSTOLIC BLOOD PRESSURE: 140 MMHG | DIASTOLIC BLOOD PRESSURE: 67 MMHG

## 2018-10-24 DIAGNOSIS — Z86.718 HISTORY OF DVT (DEEP VEIN THROMBOSIS): ICD-10-CM

## 2018-10-24 PROCEDURE — 99212 OFFICE O/P EST SF 10 MIN: CPT

## 2018-10-24 NOTE — PROGRESS NOTES
Target end date:Pt is now on ASA with monitoring for DVT.      Indication: PMH of DVT, unprovoked.      Drug:  mg daily     CHADsVASC = na    Health Status Since Last Assessment   Patient denies any new relevant medical problems, ED visits or hospitalizations   Patient denies any embolic events (stroke/tia/systemic embolism)    Adherence with DOAC Therapy   Pt has NOT missed any doses in the average week    Bleeding Risk Assessment     Denies Epistaxis   Pt denies any excessive or unusual bleeding/hematomas.  Pt denies any GI bleeds or hematemesis.  Pt denies any concerning daily headache or sub dural hematoma symptoms.     Pt denies any hematuria or abnormal vaginal bleeding.   Latest Hemoglobin 13.3   ETOH overuse no mention.      Creatinine Clearance/Renal Function     Latest ClCr n/a     Drug Interactions   Platelets: 178   ASA/other antiplatelets none   NSAID none   Other drug interactions no       Examination   Blood Pressure see vitals.    Symptomatic hypotension no mention.   Significant gait impairment/imbalance/high risk for falls? Age, uses a cane.     Final Assessment and Recommendations:   Patient appears stable from the anticoagulation staindpoint.     Continue 162 mg ASA.      Other Actions: continue to have careful surveillance of DVT or VTE.     Follow up:   Will follow up with patient via clinic in 6 months.     Booker Willson, PharmD  CC Dr Ramos

## 2018-10-24 NOTE — Clinical Note
Tanvi Ramos,   I saw this pt of yours who is now off of OAC and only on 162 mg ASA.  Would you like our clinic to keep having appointments with her or would you approve to discharge her from our clinic?    Thanks  Booker

## 2018-10-25 ENCOUNTER — ANTICOAGULATION MONITORING (OUTPATIENT)
Dept: VASCULAR LAB | Facility: MEDICAL CENTER | Age: 83
End: 2018-10-25

## 2018-10-26 NOTE — PROGRESS NOTES
[facility]    Pt is no longer on OAC.  Counseled pt on how to perform active surveillance for DVT and to alert the clinic for any new symptoms or procedures in the future.  Discharged from [facility]     Booker Willson, JavierD

## 2019-01-14 ENCOUNTER — HOSPITAL ENCOUNTER (OUTPATIENT)
Dept: LAB | Facility: MEDICAL CENTER | Age: 84
End: 2019-01-14
Attending: NURSE PRACTITIONER
Payer: MEDICARE

## 2019-01-14 LAB
ALBUMIN SERPL BCP-MCNC: 4.4 G/DL (ref 3.2–4.9)
ALBUMIN/GLOB SERPL: 1.5 G/DL
ALP SERPL-CCNC: 104 U/L (ref 30–99)
ALT SERPL-CCNC: 23 U/L (ref 2–50)
ANION GAP SERPL CALC-SCNC: 6 MMOL/L (ref 0–11.9)
AST SERPL-CCNC: 22 U/L (ref 12–45)
BILIRUB SERPL-MCNC: 0.4 MG/DL (ref 0.1–1.5)
BUN SERPL-MCNC: 39 MG/DL (ref 8–22)
CALCIUM SERPL-MCNC: 10.7 MG/DL (ref 8.5–10.5)
CHLORIDE SERPL-SCNC: 103 MMOL/L (ref 96–112)
CO2 SERPL-SCNC: 28 MMOL/L (ref 20–33)
CREAT SERPL-MCNC: 1.11 MG/DL (ref 0.5–1.4)
FASTING STATUS PATIENT QL REPORTED: NORMAL
GLOBULIN SER CALC-MCNC: 3 G/DL (ref 1.9–3.5)
GLUCOSE SERPL-MCNC: 113 MG/DL (ref 65–99)
POTASSIUM SERPL-SCNC: 3.9 MMOL/L (ref 3.6–5.5)
PROT SERPL-MCNC: 7.4 G/DL (ref 6–8.2)
SODIUM SERPL-SCNC: 137 MMOL/L (ref 135–145)

## 2019-01-14 PROCEDURE — 36415 COLL VENOUS BLD VENIPUNCTURE: CPT

## 2019-01-14 PROCEDURE — 80053 COMPREHEN METABOLIC PANEL: CPT

## 2019-09-03 ENCOUNTER — IMMUNIZATION (OUTPATIENT)
Dept: SOCIAL WORK | Facility: CLINIC | Age: 84
End: 2019-09-03
Payer: MEDICARE

## 2019-09-03 DIAGNOSIS — Z23 NEED FOR VACCINATION: ICD-10-CM

## 2019-09-03 PROCEDURE — G0008 ADMIN INFLUENZA VIRUS VAC: HCPCS | Performed by: REGISTERED NURSE

## 2019-09-03 PROCEDURE — 90662 IIV NO PRSV INCREASED AG IM: CPT | Performed by: REGISTERED NURSE

## 2019-09-24 ENCOUNTER — HOSPITAL ENCOUNTER (OUTPATIENT)
Dept: LAB | Facility: MEDICAL CENTER | Age: 84
End: 2019-09-24
Attending: NURSE PRACTITIONER
Payer: MEDICARE

## 2019-09-24 LAB
25(OH)D3 SERPL-MCNC: 38 NG/ML (ref 30–100)
ALBUMIN SERPL BCP-MCNC: 4.2 G/DL (ref 3.2–4.9)
ALBUMIN/GLOB SERPL: 1.6 G/DL
ALP SERPL-CCNC: 84 U/L (ref 30–99)
ALT SERPL-CCNC: 26 U/L (ref 2–50)
ANION GAP SERPL CALC-SCNC: 7 MMOL/L (ref 0–11.9)
APPEARANCE UR: ABNORMAL
AST SERPL-CCNC: 26 U/L (ref 12–45)
BACTERIA #/AREA URNS HPF: ABNORMAL /HPF
BASOPHILS # BLD AUTO: 0.8 % (ref 0–1.8)
BASOPHILS # BLD: 0.06 K/UL (ref 0–0.12)
BILIRUB SERPL-MCNC: 0.6 MG/DL (ref 0.1–1.5)
BILIRUB UR QL STRIP.AUTO: NEGATIVE
BUN SERPL-MCNC: 39 MG/DL (ref 8–22)
CALCIUM SERPL-MCNC: 10.6 MG/DL (ref 8.5–10.5)
CHLORIDE SERPL-SCNC: 106 MMOL/L (ref 96–112)
CHOLEST SERPL-MCNC: 202 MG/DL (ref 100–199)
CO2 SERPL-SCNC: 27 MMOL/L (ref 20–33)
COLOR UR: YELLOW
CREAT SERPL-MCNC: 1.09 MG/DL (ref 0.5–1.4)
CREAT UR-MCNC: 50.8 MG/DL
EOSINOPHIL # BLD AUTO: 0.34 K/UL (ref 0–0.51)
EOSINOPHIL NFR BLD: 4.4 % (ref 0–6.9)
EPI CELLS #/AREA URNS HPF: ABNORMAL /HPF
ERYTHROCYTE [DISTWIDTH] IN BLOOD BY AUTOMATED COUNT: 50.1 FL (ref 35.9–50)
EST. AVERAGE GLUCOSE BLD GHB EST-MCNC: 126 MG/DL
GLOBULIN SER CALC-MCNC: 2.6 G/DL (ref 1.9–3.5)
GLUCOSE SERPL-MCNC: 107 MG/DL (ref 65–99)
GLUCOSE UR STRIP.AUTO-MCNC: NEGATIVE MG/DL
HBA1C MFR BLD: 6 % (ref 0–5.6)
HCT VFR BLD AUTO: 40 % (ref 37–47)
HDLC SERPL-MCNC: 37 MG/DL
HGB BLD-MCNC: 12.7 G/DL (ref 12–16)
HYALINE CASTS #/AREA URNS LPF: ABNORMAL /LPF
IMM GRANULOCYTES # BLD AUTO: 0.02 K/UL (ref 0–0.11)
IMM GRANULOCYTES NFR BLD AUTO: 0.3 % (ref 0–0.9)
KETONES UR STRIP.AUTO-MCNC: NEGATIVE MG/DL
LDLC SERPL CALC-MCNC: 110 MG/DL
LEUKOCYTE ESTERASE UR QL STRIP.AUTO: ABNORMAL
LYMPHOCYTES # BLD AUTO: 3.41 K/UL (ref 1–4.8)
LYMPHOCYTES NFR BLD: 43.7 % (ref 22–41)
MCH RBC QN AUTO: 31.8 PG (ref 27–33)
MCHC RBC AUTO-ENTMCNC: 31.8 G/DL (ref 33.6–35)
MCV RBC AUTO: 100 FL (ref 81.4–97.8)
MICRO URNS: ABNORMAL
MICROALBUMIN UR-MCNC: 1.4 MG/DL
MICROALBUMIN/CREAT UR: 28 MG/G (ref 0–30)
MONOCYTES # BLD AUTO: 0.79 K/UL (ref 0–0.85)
MONOCYTES NFR BLD AUTO: 10.1 % (ref 0–13.4)
NEUTROPHILS # BLD AUTO: 3.18 K/UL (ref 2–7.15)
NEUTROPHILS NFR BLD: 40.7 % (ref 44–72)
NITRITE UR QL STRIP.AUTO: NEGATIVE
NRBC # BLD AUTO: 0 K/UL
NRBC BLD-RTO: 0 /100 WBC
PH UR STRIP.AUTO: 5.5 [PH] (ref 5–8)
PLATELET # BLD AUTO: 171 K/UL (ref 164–446)
PMV BLD AUTO: 11.7 FL (ref 9–12.9)
POTASSIUM SERPL-SCNC: 3.7 MMOL/L (ref 3.6–5.5)
PROT SERPL-MCNC: 6.8 G/DL (ref 6–8.2)
PROT UR QL STRIP: NEGATIVE MG/DL
RBC # BLD AUTO: 4 M/UL (ref 4.2–5.4)
RBC # URNS HPF: ABNORMAL /HPF
RBC UR QL AUTO: NEGATIVE
SODIUM SERPL-SCNC: 140 MMOL/L (ref 135–145)
SP GR UR STRIP.AUTO: 1.01
TRIGL SERPL-MCNC: 277 MG/DL (ref 0–149)
TSH SERPL DL<=0.005 MIU/L-ACNC: 2 UIU/ML (ref 0.38–5.33)
UROBILINOGEN UR STRIP.AUTO-MCNC: 0.2 MG/DL
WBC # BLD AUTO: 7.8 K/UL (ref 4.8–10.8)
WBC #/AREA URNS HPF: ABNORMAL /HPF

## 2019-09-24 PROCEDURE — 87086 URINE CULTURE/COLONY COUNT: CPT

## 2019-09-24 PROCEDURE — 80061 LIPID PANEL: CPT

## 2019-09-24 PROCEDURE — 84443 ASSAY THYROID STIM HORMONE: CPT

## 2019-09-24 PROCEDURE — 36415 COLL VENOUS BLD VENIPUNCTURE: CPT

## 2019-09-24 PROCEDURE — 80053 COMPREHEN METABOLIC PANEL: CPT

## 2019-09-24 PROCEDURE — 81001 URINALYSIS AUTO W/SCOPE: CPT

## 2019-09-24 PROCEDURE — 85025 COMPLETE CBC W/AUTO DIFF WBC: CPT

## 2019-09-24 PROCEDURE — 82306 VITAMIN D 25 HYDROXY: CPT

## 2019-09-24 PROCEDURE — 82043 UR ALBUMIN QUANTITATIVE: CPT

## 2019-09-24 PROCEDURE — 83036 HEMOGLOBIN GLYCOSYLATED A1C: CPT

## 2019-09-24 PROCEDURE — 82570 ASSAY OF URINE CREATININE: CPT

## 2020-01-21 ENCOUNTER — HOSPITAL ENCOUNTER (OUTPATIENT)
Facility: MEDICAL CENTER | Age: 85
End: 2020-01-21
Attending: NURSE PRACTITIONER
Payer: MEDICARE

## 2020-01-21 LAB
APPEARANCE UR: CLEAR
BACTERIA #/AREA URNS HPF: ABNORMAL /HPF
BILIRUB UR QL STRIP.AUTO: NEGATIVE
COLOR UR: YELLOW
EPI CELLS #/AREA URNS HPF: ABNORMAL /HPF
GLUCOSE UR STRIP.AUTO-MCNC: NEGATIVE MG/DL
HYALINE CASTS #/AREA URNS LPF: ABNORMAL /LPF
KETONES UR STRIP.AUTO-MCNC: NEGATIVE MG/DL
LEUKOCYTE ESTERASE UR QL STRIP.AUTO: ABNORMAL
MICRO URNS: ABNORMAL
NITRITE UR QL STRIP.AUTO: NEGATIVE
PH UR STRIP.AUTO: 6 [PH] (ref 5–8)
PROT UR QL STRIP: NEGATIVE MG/DL
RBC # URNS HPF: ABNORMAL /HPF
RBC UR QL AUTO: NEGATIVE
SP GR UR STRIP.AUTO: 1.01
UROBILINOGEN UR STRIP.AUTO-MCNC: 0.2 MG/DL
WBC #/AREA URNS HPF: ABNORMAL /HPF

## 2020-01-21 PROCEDURE — 81001 URINALYSIS AUTO W/SCOPE: CPT

## 2020-01-21 PROCEDURE — 87086 URINE CULTURE/COLONY COUNT: CPT

## 2020-02-13 ENCOUNTER — HOSPITAL ENCOUNTER (OUTPATIENT)
Facility: MEDICAL CENTER | Age: 85
End: 2020-02-13
Attending: NURSE PRACTITIONER
Payer: MEDICARE

## 2020-02-13 LAB
APPEARANCE UR: CLEAR
BACTERIA #/AREA URNS HPF: ABNORMAL /HPF
BILIRUB UR QL STRIP.AUTO: NEGATIVE
COLOR UR: YELLOW
EPI CELLS #/AREA URNS HPF: ABNORMAL /HPF
GLUCOSE UR STRIP.AUTO-MCNC: NEGATIVE MG/DL
HYALINE CASTS #/AREA URNS LPF: ABNORMAL /LPF
KETONES UR STRIP.AUTO-MCNC: NEGATIVE MG/DL
LEUKOCYTE ESTERASE UR QL STRIP.AUTO: ABNORMAL
MICRO URNS: ABNORMAL
NITRITE UR QL STRIP.AUTO: NEGATIVE
PH UR STRIP.AUTO: 5.5 [PH] (ref 5–8)
PROT UR QL STRIP: NEGATIVE MG/DL
RBC # URNS HPF: ABNORMAL /HPF
RBC UR QL AUTO: NEGATIVE
SP GR UR STRIP.AUTO: 1.01
UROBILINOGEN UR STRIP.AUTO-MCNC: 0.2 MG/DL
WBC #/AREA URNS HPF: ABNORMAL /HPF

## 2020-02-13 PROCEDURE — 81001 URINALYSIS AUTO W/SCOPE: CPT

## 2021-01-06 ENCOUNTER — HOSPITAL ENCOUNTER (OUTPATIENT)
Dept: LAB | Facility: MEDICAL CENTER | Age: 86
End: 2021-01-06
Attending: FAMILY MEDICINE
Payer: MEDICARE

## 2021-01-06 LAB
25(OH)D3 SERPL-MCNC: 40 NG/ML (ref 30–100)
ALBUMIN SERPL BCP-MCNC: 4.4 G/DL (ref 3.2–4.9)
ALBUMIN/GLOB SERPL: 1.5 G/DL
ALP SERPL-CCNC: 99 U/L (ref 30–99)
ALT SERPL-CCNC: 22 U/L (ref 2–50)
ANION GAP SERPL CALC-SCNC: 11 MMOL/L (ref 7–16)
APPEARANCE UR: CLEAR
AST SERPL-CCNC: 24 U/L (ref 12–45)
BACTERIA #/AREA URNS HPF: NEGATIVE /HPF
BASOPHILS # BLD AUTO: 0.7 % (ref 0–1.8)
BASOPHILS # BLD: 0.06 K/UL (ref 0–0.12)
BILIRUB SERPL-MCNC: 0.5 MG/DL (ref 0.1–1.5)
BILIRUB UR QL STRIP.AUTO: NEGATIVE
BUN SERPL-MCNC: 32 MG/DL (ref 8–22)
CALCIUM SERPL-MCNC: 10.9 MG/DL (ref 8.5–10.5)
CHLORIDE SERPL-SCNC: 103 MMOL/L (ref 96–112)
CHOLEST SERPL-MCNC: 242 MG/DL (ref 100–199)
CO2 SERPL-SCNC: 24 MMOL/L (ref 20–33)
COLOR UR: YELLOW
CREAT SERPL-MCNC: 1.03 MG/DL (ref 0.5–1.4)
EOSINOPHIL # BLD AUTO: 0.31 K/UL (ref 0–0.51)
EOSINOPHIL NFR BLD: 3.5 % (ref 0–6.9)
EPI CELLS #/AREA URNS HPF: NEGATIVE /HPF
ERYTHROCYTE [DISTWIDTH] IN BLOOD BY AUTOMATED COUNT: 48.5 FL (ref 35.9–50)
FASTING STATUS PATIENT QL REPORTED: NORMAL
GLOBULIN SER CALC-MCNC: 2.9 G/DL (ref 1.9–3.5)
GLUCOSE SERPL-MCNC: 105 MG/DL (ref 65–99)
GLUCOSE UR STRIP.AUTO-MCNC: NEGATIVE MG/DL
HCT VFR BLD AUTO: 42.7 % (ref 37–47)
HDLC SERPL-MCNC: 43 MG/DL
HGB BLD-MCNC: 13.3 G/DL (ref 12–16)
HYALINE CASTS #/AREA URNS LPF: NORMAL /LPF
IMM GRANULOCYTES # BLD AUTO: 0.03 K/UL (ref 0–0.11)
IMM GRANULOCYTES NFR BLD AUTO: 0.3 % (ref 0–0.9)
KETONES UR STRIP.AUTO-MCNC: NEGATIVE MG/DL
LDLC SERPL CALC-MCNC: 154 MG/DL
LEUKOCYTE ESTERASE UR QL STRIP.AUTO: ABNORMAL
LYMPHOCYTES # BLD AUTO: 3.35 K/UL (ref 1–4.8)
LYMPHOCYTES NFR BLD: 37.5 % (ref 22–41)
MCH RBC QN AUTO: 30.6 PG (ref 27–33)
MCHC RBC AUTO-ENTMCNC: 31.1 G/DL (ref 33.6–35)
MCV RBC AUTO: 98.2 FL (ref 81.4–97.8)
MICRO URNS: ABNORMAL
MONOCYTES # BLD AUTO: 0.81 K/UL (ref 0–0.85)
MONOCYTES NFR BLD AUTO: 9.1 % (ref 0–13.4)
NEUTROPHILS # BLD AUTO: 4.37 K/UL (ref 2–7.15)
NEUTROPHILS NFR BLD: 48.9 % (ref 44–72)
NITRITE UR QL STRIP.AUTO: NEGATIVE
NRBC # BLD AUTO: 0 K/UL
NRBC BLD-RTO: 0 /100 WBC
PH UR STRIP.AUTO: 5.5 [PH] (ref 5–8)
PLATELET # BLD AUTO: 177 K/UL (ref 164–446)
PMV BLD AUTO: 11.6 FL (ref 9–12.9)
POTASSIUM SERPL-SCNC: 4.1 MMOL/L (ref 3.6–5.5)
PROT SERPL-MCNC: 7.3 G/DL (ref 6–8.2)
PROT UR QL STRIP: NEGATIVE MG/DL
RBC # BLD AUTO: 4.35 M/UL (ref 4.2–5.4)
RBC # URNS HPF: NORMAL /HPF
RBC UR QL AUTO: NEGATIVE
SODIUM SERPL-SCNC: 138 MMOL/L (ref 135–145)
SP GR UR STRIP.AUTO: 1.01
TRIGL SERPL-MCNC: 225 MG/DL (ref 0–149)
TSH SERPL DL<=0.005 MIU/L-ACNC: 1.97 UIU/ML (ref 0.38–5.33)
UROBILINOGEN UR STRIP.AUTO-MCNC: 0.2 MG/DL
WBC # BLD AUTO: 8.9 K/UL (ref 4.8–10.8)
WBC #/AREA URNS HPF: NORMAL /HPF

## 2021-01-06 PROCEDURE — 36415 COLL VENOUS BLD VENIPUNCTURE: CPT

## 2021-01-06 PROCEDURE — 85025 COMPLETE CBC W/AUTO DIFF WBC: CPT

## 2021-01-06 PROCEDURE — 82306 VITAMIN D 25 HYDROXY: CPT

## 2021-01-06 PROCEDURE — 84443 ASSAY THYROID STIM HORMONE: CPT

## 2021-01-06 PROCEDURE — 83036 HEMOGLOBIN GLYCOSYLATED A1C: CPT

## 2021-01-06 PROCEDURE — 80053 COMPREHEN METABOLIC PANEL: CPT

## 2021-01-06 PROCEDURE — 81001 URINALYSIS AUTO W/SCOPE: CPT

## 2021-01-06 PROCEDURE — 80061 LIPID PANEL: CPT

## 2021-01-07 LAB
EST. AVERAGE GLUCOSE BLD GHB EST-MCNC: 123 MG/DL
HBA1C MFR BLD: 5.9 % (ref 0–5.6)

## 2021-01-08 DIAGNOSIS — Z23 NEED FOR VACCINATION: ICD-10-CM

## 2021-01-22 ENCOUNTER — IMMUNIZATION (OUTPATIENT)
Dept: FAMILY PLANNING/WOMEN'S HEALTH CLINIC | Facility: IMMUNIZATION CENTER | Age: 86
End: 2021-01-22
Attending: INTERNAL MEDICINE
Payer: MEDICARE

## 2021-01-22 DIAGNOSIS — Z23 ENCOUNTER FOR VACCINATION: Primary | ICD-10-CM

## 2021-01-22 DIAGNOSIS — Z23 NEED FOR VACCINATION: ICD-10-CM

## 2021-01-23 PROCEDURE — 0001A PFIZER SARS-COV-2 VACCINE: CPT

## 2021-01-23 PROCEDURE — 91300 PFIZER SARS-COV-2 VACCINE: CPT

## 2021-02-13 ENCOUNTER — IMMUNIZATION (OUTPATIENT)
Dept: FAMILY PLANNING/WOMEN'S HEALTH CLINIC | Facility: IMMUNIZATION CENTER | Age: 86
End: 2021-02-13
Attending: INTERNAL MEDICINE
Payer: MEDICARE

## 2021-02-13 DIAGNOSIS — Z23 ENCOUNTER FOR VACCINATION: Primary | ICD-10-CM

## 2021-02-13 PROCEDURE — 91300 PFIZER SARS-COV-2 VACCINE: CPT

## 2021-02-13 PROCEDURE — 0002A PFIZER SARS-COV-2 VACCINE: CPT

## 2021-06-17 ENCOUNTER — PATIENT MESSAGE (OUTPATIENT)
Dept: HEALTH INFORMATION MANAGEMENT | Facility: OTHER | Age: 86
End: 2021-06-17

## 2021-08-27 ENCOUNTER — TELEPHONE (OUTPATIENT)
Dept: SCHEDULING | Facility: IMAGING CENTER | Age: 86
End: 2021-08-27

## 2021-08-30 PROBLEM — J96.10 CHRONIC RESPIRATORY FAILURE (HCC): Status: ACTIVE | Noted: 2021-08-30

## 2021-09-01 PROBLEM — E78.5 DYSLIPIDEMIA: Status: ACTIVE | Noted: 2021-09-01

## 2021-09-01 PROBLEM — N18.2 CHRONIC KIDNEY DISEASE, STAGE II (MILD): Status: ACTIVE | Noted: 2021-09-01

## 2021-09-07 PROBLEM — E88.810 METABOLIC SYNDROME: Status: ACTIVE | Noted: 2021-09-07

## 2021-09-22 ENCOUNTER — TELEPHONE (OUTPATIENT)
Dept: MEDICAL GROUP | Facility: PHYSICIAN GROUP | Age: 86
End: 2021-09-22

## 2021-09-22 NOTE — PROGRESS NOTES
NEW PATIENT VISIT PRE-VISIT PLANNING    1.  EpicCare Patient is checked in Patient Demographics?Yes    2.  Immunizations were updated in Epic using Reconcile Outside Information activity? Yes         3.  Is this appointment scheduled as a Hospital Follow-Up? No    4.  Patient is due for the following Health Maintenance Topics:   Health Maintenance Due   Topic Date Due   • COLORECTAL CANCER SCREENING  Never done   • IMM DTaP/Tdap/Td Vaccine (1 - Tdap) Never done   • PAP SMEAR  Never done   • IMM ZOSTER VACCINES (1 of 2) Never done   • MAMMOGRAM  03/08/2017   • IMM PNEUMOCOCCAL VACCINE: 65+ Years (2 of 2 - PPSV23) 09/06/2017   • BONE DENSITY  03/08/2021   • IMM INFLUENZA (1) 09/01/2021         5.  Reviewed/Updated the following with patient:       •   Preferred Pharmacy? Yes       •   Preferred Lab? Yes       •   Preferred Communication? Yes       •   Allergies? Yes       •   Medications? YES. Was Abstract Encounter opened and chart updated? YES Pt will bring a list of her medications       •   Social History? No       •   Family History (document living status of immediate family members and if + hx of  cancer, diabetes, hypertension, hyperlipidemia, heart attack, stroke) No    6.  Updated Care Team?       •   DME Company (gait device, O2, CPAP, etc.) NO       •   Other Specialists (eye doctor, derm, GYN, cardiology, endo, etc): NO    7.  AHA (Puls8) form printed for Provider? Yes

## 2021-09-22 NOTE — TELEPHONE ENCOUNTER
NEW PATIENT VISIT PRE-VISIT PLANNING    1.  EpicCare Patient is checked in Patient Demographics?Yes    2.  Immunizations were updated in Epic using Reconcile Outside Information activity? Yes         3.  Is this appointment scheduled as a Hospital Follow-Up? No    4.  Patient is due for the following Health Maintenance Topics:   Health Maintenance Due   Topic Date Due   • COLORECTAL CANCER SCREENING  Never done   • IMM DTaP/Tdap/Td Vaccine (1 - Tdap) Never done   • PAP SMEAR  Never done   • IMM ZOSTER VACCINES (1 of 2) Never done   • MAMMOGRAM  03/08/2017   • IMM PNEUMOCOCCAL VACCINE: 65+ Years (2 of 2 - PPSV23) 09/06/2017   • BONE DENSITY  03/08/2021   • IMM INFLUENZA (1) 09/01/2021         5.  Reviewed/Updated the following with patient:       •   Preferred Pharmacy? Yes       •   Preferred Lab? Yes       •   Preferred Communication? Yes       •   Allergies? Yes       •   Medications? YES. Was Abstract Encounter opened and chart updated? YES       •   Social History? No       •   Family History (document living status of immediate family members and if + hx of  cancer, diabetes, hypertension, hyperlipidemia, heart attack, stroke) No    6.  Updated Care Team?       •   DME Company (gait device, O2, CPAP, etc.) NO       •   Other Specialists (eye doctor, derm, GYN, cardiology, endo, etc): NO    7.  AHA (Puls8) form printed for Provider? Yes

## 2021-09-23 ENCOUNTER — OFFICE VISIT (OUTPATIENT)
Dept: MEDICAL GROUP | Facility: PHYSICIAN GROUP | Age: 86
End: 2021-09-23
Payer: MEDICARE

## 2021-09-23 ENCOUNTER — HOSPITAL ENCOUNTER (OUTPATIENT)
Facility: MEDICAL CENTER | Age: 86
End: 2021-09-23
Attending: FAMILY MEDICINE
Payer: MEDICARE

## 2021-09-23 VITALS
DIASTOLIC BLOOD PRESSURE: 64 MMHG | RESPIRATION RATE: 18 BRPM | TEMPERATURE: 98.3 F | SYSTOLIC BLOOD PRESSURE: 110 MMHG | BODY MASS INDEX: 29.1 KG/M2 | HEIGHT: 57 IN | OXYGEN SATURATION: 96 % | WEIGHT: 134.9 LBS | HEART RATE: 81 BPM

## 2021-09-23 DIAGNOSIS — N18.31 STAGE 3A CHRONIC KIDNEY DISEASE: ICD-10-CM

## 2021-09-23 DIAGNOSIS — E83.52 HYPERCALCEMIA: ICD-10-CM

## 2021-09-23 DIAGNOSIS — Z66 DO NOT RESUSCITATE STATUS: ICD-10-CM

## 2021-09-23 DIAGNOSIS — Z78.0 ASYMPTOMATIC MENOPAUSAL STATE: ICD-10-CM

## 2021-09-23 DIAGNOSIS — Z91.81 HISTORY OF FALL: ICD-10-CM

## 2021-09-23 DIAGNOSIS — K76.9 LIVER LESION: ICD-10-CM

## 2021-09-23 DIAGNOSIS — R73.03 PREDIABETES: ICD-10-CM

## 2021-09-23 DIAGNOSIS — Z86.718 HISTORY OF DVT (DEEP VEIN THROMBOSIS): ICD-10-CM

## 2021-09-23 DIAGNOSIS — E03.9 HYPOTHYROIDISM, UNSPECIFIED TYPE: ICD-10-CM

## 2021-09-23 DIAGNOSIS — S69.90XA HAND INJURY: ICD-10-CM

## 2021-09-23 DIAGNOSIS — R26.9 GAIT DISORDER: ICD-10-CM

## 2021-09-23 DIAGNOSIS — Z23 NEED FOR VACCINATION: ICD-10-CM

## 2021-09-23 PROBLEM — E21.3 HYPERPARATHYROIDISM (HCC): Status: RESOLVED | Noted: 2017-12-29 | Resolved: 2021-09-23

## 2021-09-23 PROBLEM — J96.10 CHRONIC RESPIRATORY FAILURE (HCC): Status: RESOLVED | Noted: 2021-08-30 | Resolved: 2021-09-23

## 2021-09-23 PROBLEM — I82.409 DVT (DEEP VENOUS THROMBOSIS) (HCC): Status: RESOLVED | Noted: 2017-12-28 | Resolved: 2021-09-23

## 2021-09-23 PROBLEM — N18.2 CHRONIC KIDNEY DISEASE, STAGE II (MILD): Status: RESOLVED | Noted: 2021-09-01 | Resolved: 2021-09-23

## 2021-09-23 PROCEDURE — 84443 ASSAY THYROID STIM HORMONE: CPT

## 2021-09-23 PROCEDURE — 90472 IMMUNIZATION ADMIN EACH ADD: CPT | Performed by: FAMILY MEDICINE

## 2021-09-23 PROCEDURE — 80053 COMPREHEN METABOLIC PANEL: CPT

## 2021-09-23 PROCEDURE — 90715 TDAP VACCINE 7 YRS/> IM: CPT | Performed by: FAMILY MEDICINE

## 2021-09-23 PROCEDURE — 82306 VITAMIN D 25 HYDROXY: CPT

## 2021-09-23 PROCEDURE — 83036 HEMOGLOBIN GLYCOSYLATED A1C: CPT

## 2021-09-23 PROCEDURE — G0009 ADMIN PNEUMOCOCCAL VACCINE: HCPCS | Performed by: FAMILY MEDICINE

## 2021-09-23 PROCEDURE — G0008 ADMIN INFLUENZA VIRUS VAC: HCPCS | Performed by: FAMILY MEDICINE

## 2021-09-23 PROCEDURE — 85025 COMPLETE CBC W/AUTO DIFF WBC: CPT

## 2021-09-23 PROCEDURE — 90732 PPSV23 VACC 2 YRS+ SUBQ/IM: CPT | Performed by: FAMILY MEDICINE

## 2021-09-23 PROCEDURE — 99203 OFFICE O/P NEW LOW 30 MIN: CPT | Mod: 25 | Performed by: FAMILY MEDICINE

## 2021-09-23 PROCEDURE — 90662 IIV NO PRSV INCREASED AG IM: CPT | Performed by: FAMILY MEDICINE

## 2021-09-23 RX ORDER — HYDROCHLOROTHIAZIDE 25 MG/1
25 TABLET ORAL DAILY
Qty: 100 TABLET | Refills: 3 | Status: SHIPPED | OUTPATIENT
Start: 2021-09-23 | End: 2022-10-17

## 2021-09-23 RX ORDER — CARVEDILOL 3.12 MG/1
3.12 TABLET ORAL 2 TIMES DAILY WITH MEALS
Qty: 200 TABLET | Refills: 3 | Status: SHIPPED | OUTPATIENT
Start: 2021-09-23 | End: 2022-11-04

## 2021-09-23 RX ORDER — LEVOTHYROXINE SODIUM 0.03 MG/1
25 TABLET ORAL
Qty: 100 TABLET | Refills: 3 | Status: SHIPPED | OUTPATIENT
Start: 2021-09-23 | End: 2022-12-08 | Stop reason: SDUPTHER

## 2021-09-23 ASSESSMENT — FIBROSIS 4 INDEX: FIB4 SCORE: 2.66

## 2021-09-23 NOTE — PROGRESS NOTES
Pt was seated, confirmed pt name and , procedure explained to pt, venipuncture performed in LAC using aseptic technique, 2 gold, 2 lav. tubes collected, gauze placed with pressure on venipuncture site until hemostasis observed, site clean and dry, no redness or swelling observed, bandage placed, pt tolerated well and voiced no concerns.

## 2021-09-23 NOTE — PROGRESS NOTES
Subjective:     CC:    Chief Complaint   Patient presents with   • Establish Care     [facility] previous PCP,        HISTORY OF THE PRESENT ILLNESS: Patient is a 89 y.o. female. This pleasant patient is here today to establish care. Her prior PCP was not at [facility], last seen around 1/21, at [facility] (that was her 1st appt and she didn't like him).  Lives alone, independent in all IADLs - even brings in her own groceries.  GFR 50 1/21 1/21 a1c 5.9, fbs 105.   1/21 , hdl 43, tc 242, trig 225 - has red meat at least 3x/wk.   Has children in [place], 70yo son and 67yo dtr.  Uses oxygen at night bc of WEST  Problem   History of Fall    Last fall in 7/21 when she was pulling down a vine in her yard.     Liver Lesion    CT chest '17: 3. Multiple small enhancing lesions in the liver, incompletely evaluated but grossly similar to prior exam in 2010. Given the long stability, this could relate to flash filling hemangiomas. Correlate with prior abdominal CT or MRI study.  09/23/21 we agreed to not follow those lesions as she has no GI c/o.      History of Dvt (Deep Vein Thrombosis)    Took xarelto, stays on 162mg asa since then     Gait Disorder    Uses cane     Stage 3a Chronic Kidney Disease (Hcc)    Stable, continue current plan of care       DNR (do not resuscitate)    D/w me 09/23/21 - needs POLST form signed. Will do.     Chronic Kidney Disease, Stage II (Mild) (Resolved)   Bmi 28.0-28.9,Adult (Resolved)   Chronic Respiratory Failure (Hcc) (Resolved)   Hyperparathyroidism (HCC) (Resolved)   Dvt (Deep Venous Thrombosis) (Hcc) (Resolved)   Uti (Urinary Tract Infection) (Resolved)   Thrombocytopenia (HCC) (Resolved)   Protein-calorie malnutrition, severe (HCC) (Resolved)   Hypoxia (Resolved)       Allergies: Patient has no known allergies.    Current Outpatient Medications Ordered in Epic   Medication Sig Dispense Refill   • levothyroxine (SYNTHROID) 25 MCG Tab Take 1 Tablet by mouth every  "morning on an empty stomach. 100 Tablet 3   • carvedilol (COREG) 3.125 MG Tab Take 1 Tablet by mouth 2 times a day with meals. 200 Tablet 3   • hydroCHLOROthiazide (HYDRODIURIL) 25 MG Tab Take 1 Tablet by mouth every day. 100 Tablet 3   • Ascorbic Acid (VITAMIN C) 1000 MG Tab Take 1,000 mg by mouth every day.     • Cyanocobalamin (VITAMIN B-12) 1000 MCG Tab Take 1,000 mcg by mouth every day.     • Misc Natural Products (GLUCOSAMINE CHOND CMP ADVANCED PO) Take 1 Tablet by mouth every day.     • Multiple Vitamins-Minerals (CENTRUM SILVER PO) Take 1 Tablet by mouth every day.     • aspirin (ASA) 81 MG Chew Tab chewable tablet Take 162 mg by mouth every day.     • Home Care Oxygen Inhale 2 L/min at bedtime as needed for Shortness of Breath.       No current [facility]-ordered facility-administered medications on file.       Past Medical History:   Diagnosis Date   • Hypercholesterolemia    • Hypertension    • Hypertension    • Thyroid disease        Past Surgical History:   Procedure Laterality Date   • ABDOMINAL HYSTERECTOMY TOTAL     • GYN SURGERY      hyst and bladder suspension       Social History     Tobacco Use   • Smoking status: Never Smoker   • Smokeless tobacco: Never Used   Vaping Use   • Vaping Use: Never used   Substance Use Topics   • Alcohol use: No   • Drug use: No       Social History     Social History Narrative   • Not on file       Family History   Problem Relation Age of Onset   • Hypertension Brother        Health Maintenance: Completed    ROS:   See HPI      Objective:     Exam:   /64 (BP Location: Left arm, Patient Position: Sitting, BP Cuff Size: Adult)   Pulse 81   Temp 36.8 °C (98.3 °F) (Temporal)   Resp 18   Ht 1.448 m (4' 9\")   Wt 61.2 kg (134 lb 14.4 oz)   SpO2 96%   BMI 29.19 kg/m²   Body mass index is 29.19 kg/m².  Wt Readings from Last 4 Encounters:   09/23/21 61.2 kg (134 lb 14.4 oz)   08/30/21 61.5 kg (135 lb 8 oz)   08/03/18 62.1 kg (137 lb)   05/31/18 62.1 kg (137 lb) "     General: Normal appearing. No distress. Appropriately groomed.  HEENT: Normocephalic. Eyes conjunctiva clear lids without ptosis, pupils equal   Neck: Supple without JVD or bruit. Thyroid is not enlarged.   Pulmonary: Clear to ausculation.  Normal effort. No rales, ronchi, or wheezing.  Cardiovascular: Regular rate and rhythm without murmur. Carotid and radial pulses are intact and equal bilaterally.  Abdomen: Soft, nontender, nondistended. Normal bowel sounds. Liver and spleen are not palpable  Neurologic: Grossly nonfocal  Skin: Warm and dry.  No obvious lesions.  Musculoskeletal: uses cane. No extremity cyanosis, clubbing, or edema.  Psych: Normal mood and affect. Alert and oriented x3. Judgment and insight is normal.  Uses cane    Assessment & Plan:   89 y.o. female with the following -    1. Hand injury  - carvedilol (COREG) 3.125 MG Tab; Take 1 Tablet by mouth 2 times a day with meals.  Dispense: 200 Tablet; Refill: 3  - DS-BONE DENSITY STUDY (DEXA); Future    2. Asymptomatic menopausal state  - DS-BONE DENSITY STUDY (DEXA); Future    3. History of fall  - VITAMIN D,25 HYDROXY; Future    4. Liver lesion    5. DNR (do not resuscitate)    6. History of DVT (deep vein thrombosis)    7. Hypercalcemia  - CBC WITH DIFFERENTIAL; Future  - Comp Metabolic Panel; Future  - PTH INTACT (PTH ONLY); Future    8. Need for vaccination  - INFLUENZA VACCINE, HIGH DOSE (65+ ONLY)  - TDAP VACCINE =>6YO IM  - Pneumoccocal Polysaccharide Vaccine 23-Valent =>3yo SQ/IM    9. Prediabetes  - HEMOGLOBIN A1C; Future    10. Hypothyroidism, unspecified type  - TSH; Future    11. Asymptomatic menopausal state   - DS-BONE DENSITY STUDY (DEXA); Future    12. Gait disorder    13. Stage 3a chronic kidney disease (HCC)    Other orders  - levothyroxine (SYNTHROID) 25 MCG Tab; Take 1 Tablet by mouth every morning on an empty stomach.  Dispense: 100 Tablet; Refill: 3  - hydroCHLOROthiazide (HYDRODIURIL) 25 MG Tab; Take 1 Tablet by mouth every  day.  Dispense: 100 Tablet; Refill: 3       Return in about 2 weeks (around 10/7/2021) for labs.    Please note that this dictation was created using voice recognition software. I have made every reasonable attempt to correct obvious errors, but I expect that there are errors of grammar and possibly content that I did not discover before finalizing the note.

## 2021-09-23 NOTE — PROGRESS NOTES
Annual Health Assessment Questions:    1.  Are you currently engaging in any exercise or physical activity? No    2.  How would you describe your mood or emotional well-being today? good    3.  Have you had any falls in the last year? Yes 7/2021    4.  Have you noticed any problems with your balance or had difficulty walking? Yes    5.  In the last six months have you experienced any leakage of urine? Yes    6. DPA/Advanced Directive: Patient has Advanced Directive on file.

## 2021-09-23 NOTE — LETTER
[facility]  Lolly Ball M.D.  [address]  [place] [address]  Fax: [phone]   Authorization for Release/Disclosure of   Protected Health Information   Name: SHAHZAD CACERES : 3/20/1929 SSN: [id number]   Address: [address] [place] [address]  [place] [address] Phone:    [phone] (home)    I authorize the entity listed below to release/disclose the PHI below to:   [facility]/Lolly Ball M.D. and Lolly Ball M.D.   Provider or Entity Name:  [facility]   Address   [facility]  [address] [place] [address] [address]  [place] [address] Phone:  [phone]    Fax:  [phone]   Reason for request: continuity of care   Information to be released:    [  ] LAST COLONOSCOPY,  including any PATH REPORT and follow-up  [  ] LAST FIT/COLOGUARD RESULT [  ] LAST DEXA  [  ] LAST MAMMOGRAM  [  ] LAST PAP  [  ] LAST LABS [  ] RETINA EXAM REPORT  [  ] IMMUNIZATION RECORDS  [  ] Release all info      [  ] Check here and initial the line next to each item to release ALL health information INCLUDING  _____ Care and treatment for drug and / or alcohol abuse  _____ HIV testing, infection status, or AIDS  _____ Genetic Testing    DATES OF SERVICE OR TIME PERIOD TO BE DISCLOSED: _____________  I understand and acknowledge that:  * This Authorization may be revoked at any time by you in writing, except if your health information has already been used or disclosed.  * Your health information that will be used or disclosed as a result of you signing this authorization could be re-disclosed by the recipient. If this occurs, your re-disclosed health information may no longer be protected by State or Federal laws.  * You may refuse to sign this Authorization. Your refusal will not affect your ability to obtain treatment.  * This Authorization becomes effective upon signing and will  on (date) __________.      If no date is indicated, this Authorization will  one (1) year from the signature  date.    Name: Regine VELAZQUEZ Bryant    Signature:   Date:     9/23/2021       PLEASE FAX REQUESTED RECORDS BACK TO: [phone]

## 2021-09-24 DIAGNOSIS — R73.03 PREDIABETES: ICD-10-CM

## 2021-09-24 LAB
25(OH)D3 SERPL-MCNC: 36 NG/ML (ref 30–100)
ALBUMIN SERPL BCP-MCNC: 4.2 G/DL (ref 3.2–4.9)
ALBUMIN/GLOB SERPL: 1.6 G/DL
ALP SERPL-CCNC: 109 U/L (ref 30–99)
ALT SERPL-CCNC: 16 U/L (ref 2–50)
ANION GAP SERPL CALC-SCNC: 14 MMOL/L (ref 7–16)
AST SERPL-CCNC: 19 U/L (ref 12–45)
BASOPHILS # BLD AUTO: 1.1 % (ref 0–1.8)
BASOPHILS # BLD: 0.08 K/UL (ref 0–0.12)
BILIRUB SERPL-MCNC: 0.3 MG/DL (ref 0.1–1.5)
BUN SERPL-MCNC: 45 MG/DL (ref 8–22)
CALCIUM SERPL-MCNC: 10.9 MG/DL (ref 8.5–10.5)
CHLORIDE SERPL-SCNC: 105 MMOL/L (ref 96–112)
CO2 SERPL-SCNC: 23 MMOL/L (ref 20–33)
CREAT SERPL-MCNC: 1.04 MG/DL (ref 0.5–1.4)
EOSINOPHIL # BLD AUTO: 0.2 K/UL (ref 0–0.51)
EOSINOPHIL NFR BLD: 2.6 % (ref 0–6.9)
ERYTHROCYTE [DISTWIDTH] IN BLOOD BY AUTOMATED COUNT: 52.3 FL (ref 35.9–50)
EST. AVERAGE GLUCOSE BLD GHB EST-MCNC: 123 MG/DL
GLOBULIN SER CALC-MCNC: 2.7 G/DL (ref 1.9–3.5)
GLUCOSE SERPL-MCNC: 109 MG/DL (ref 65–99)
HBA1C MFR BLD: 5.9 % (ref 4–5.6)
HCT VFR BLD AUTO: 42.4 % (ref 37–47)
HGB BLD-MCNC: 13.2 G/DL (ref 12–16)
IMM GRANULOCYTES # BLD AUTO: 0.03 K/UL (ref 0–0.11)
IMM GRANULOCYTES NFR BLD AUTO: 0.4 % (ref 0–0.9)
LYMPHOCYTES # BLD AUTO: 3.05 K/UL (ref 1–4.8)
LYMPHOCYTES NFR BLD: 40.4 % (ref 22–41)
MCH RBC QN AUTO: 31.7 PG (ref 27–33)
MCHC RBC AUTO-ENTMCNC: 31.1 G/DL (ref 33.6–35)
MCV RBC AUTO: 101.9 FL (ref 81.4–97.8)
MONOCYTES # BLD AUTO: 0.73 K/UL (ref 0–0.85)
MONOCYTES NFR BLD AUTO: 9.7 % (ref 0–13.4)
NEUTROPHILS # BLD AUTO: 3.46 K/UL (ref 2–7.15)
NEUTROPHILS NFR BLD: 45.8 % (ref 44–72)
NRBC # BLD AUTO: 0 K/UL
NRBC BLD-RTO: 0 /100 WBC
PLATELET # BLD AUTO: 175 K/UL (ref 164–446)
PMV BLD AUTO: 11.3 FL (ref 9–12.9)
POTASSIUM SERPL-SCNC: 4.2 MMOL/L (ref 3.6–5.5)
PROT SERPL-MCNC: 6.9 G/DL (ref 6–8.2)
RBC # BLD AUTO: 4.16 M/UL (ref 4.2–5.4)
SODIUM SERPL-SCNC: 142 MMOL/L (ref 135–145)
TSH SERPL DL<=0.005 MIU/L-ACNC: 1.78 UIU/ML (ref 0.38–5.33)
WBC # BLD AUTO: 7.6 K/UL (ref 4.8–10.8)

## 2021-10-20 PROBLEM — N18.31 CHRONIC KIDNEY DISEASE, STAGE 3A: Status: RESOLVED | Noted: 2021-09-01 | Resolved: 2021-09-23

## 2021-11-03 ENCOUNTER — HOME HEALTH ADMISSION (OUTPATIENT)
Dept: HOME HEALTH SERVICES | Facility: HOME HEALTHCARE | Age: 86
End: 2021-11-03
Payer: MEDICARE

## 2021-11-03 ENCOUNTER — OFFICE VISIT (OUTPATIENT)
Dept: MEDICAL GROUP | Facility: PHYSICIAN GROUP | Age: 86
End: 2021-11-03
Payer: MEDICARE

## 2021-11-03 ENCOUNTER — NON-PROVIDER VISIT (OUTPATIENT)
Dept: MEDICAL GROUP | Facility: PHYSICIAN GROUP | Age: 86
End: 2021-11-03
Payer: MEDICARE

## 2021-11-03 ENCOUNTER — APPOINTMENT (OUTPATIENT)
Dept: MEDICAL GROUP | Facility: PHYSICIAN GROUP | Age: 86
End: 2021-11-03
Payer: MEDICARE

## 2021-11-03 ENCOUNTER — HOSPITAL ENCOUNTER (OUTPATIENT)
Facility: MEDICAL CENTER | Age: 86
End: 2021-11-03
Attending: FAMILY MEDICINE
Payer: MEDICARE

## 2021-11-03 VITALS
OXYGEN SATURATION: 95 % | HEART RATE: 72 BPM | RESPIRATION RATE: 16 BRPM | HEIGHT: 57 IN | BODY MASS INDEX: 28.89 KG/M2 | TEMPERATURE: 97.6 F | WEIGHT: 133.9 LBS | SYSTOLIC BLOOD PRESSURE: 138 MMHG | DIASTOLIC BLOOD PRESSURE: 62 MMHG

## 2021-11-03 DIAGNOSIS — E83.52 HYPERCALCEMIA: ICD-10-CM

## 2021-11-03 DIAGNOSIS — R54 FRAILTY: ICD-10-CM

## 2021-11-03 DIAGNOSIS — R91.8 LUNG MASS: ICD-10-CM

## 2021-11-03 DIAGNOSIS — R26.9 GAIT DISORDER: ICD-10-CM

## 2021-11-03 DIAGNOSIS — Z86.718 HISTORY OF DVT (DEEP VEIN THROMBOSIS): ICD-10-CM

## 2021-11-03 DIAGNOSIS — N18.31 STAGE 3A CHRONIC KIDNEY DISEASE: ICD-10-CM

## 2021-11-03 PROBLEM — G62.9 PERIPHERAL NEUROPATHY: Status: ACTIVE | Noted: 2021-11-03

## 2021-11-03 PROCEDURE — 99213 OFFICE O/P EST LOW 20 MIN: CPT | Performed by: FAMILY MEDICINE

## 2021-11-03 PROCEDURE — 36415 COLL VENOUS BLD VENIPUNCTURE: CPT | Performed by: FAMILY MEDICINE

## 2021-11-03 PROCEDURE — 83970 ASSAY OF PARATHORMONE: CPT

## 2021-11-03 ASSESSMENT — FIBROSIS 4 INDEX: FIB4 SCORE: 2.5

## 2021-11-03 NOTE — PROGRESS NOTES
Subjective:     CC:   Chief Complaint   Patient presents with   • Nerve Pain     2 years          HPI:   Regine presents today with f/u labs, she first established with me 2 w ago. Her routine labs showed a slightly elevated calcium, I obtained a PTH/Ca today. She does not take calcium supplements.   Has children in [place], 70yo son and 67yo dtr.  Uses oxygen at night bc of WEST  Last did PT 2-3y ago - 8/30/21 + frailty on MAWV with [facility]. She does drive some but would prefer to try  PT.   Problem   Peripheral Neuropathy    occn gets leg cramps for the past few years. She also feels a different sensation under her toes like she has a sock between her toes and foot. It is constant, she hasn't tried anything for it.     History of Dvt (Deep Vein Thrombosis)    Took xarelto, stays on 162mg asa since then.     Stage 3a Chronic Kidney Disease (Hcc)    Stable, continue current plan of care, 9/21 GFR 50, avoids NSAIDS.        Lung Mass    The patient states that 4 years ago when she was hospitalized for a UTI she was noted to have a lesion in her left lung.  She has not had any follow-up.  Her records were reviewed and the patient did have a CT scan of her lungs with contrast. She has a 9 mm nodule in her left upper lung and a 3 mm nodule in her right lower lung, It was done 12/29/17.There was no change noted from prior exams in 2016. 11/03/21 we discussed her options and she agreed to no further f/u.          Current Outpatient Medications Ordered in Epic   Medication Sig Dispense Refill   • levothyroxine (SYNTHROID) 25 MCG Tab Take 1 Tablet by mouth every morning on an empty stomach. 100 Tablet 3   • carvedilol (COREG) 3.125 MG Tab Take 1 Tablet by mouth 2 times a day with meals. 200 Tablet 3   • hydroCHLOROthiazide (HYDRODIURIL) 25 MG Tab Take 1 Tablet by mouth every day. 100 Tablet 3   • Home Care Oxygen Inhale 2 L/min at bedtime as needed for Shortness of Breath.     • Ascorbic Acid (VITAMIN C) 1000 MG Tab Take 1,000 mg  "by mouth every day.     • Cyanocobalamin (VITAMIN B-12) 1000 MCG Tab Take 1,000 mcg by mouth every day.     • Misc Natural Products (GLUCOSAMINE CHOND CMP ADVANCED PO) Take 1 Tablet by mouth every day.     • Multiple Vitamins-Minerals (CENTRUM SILVER PO) Take 1 Tablet by mouth every day.     • aspirin (ASA) 81 MG Chew Tab chewable tablet Take 162 mg by mouth every day.       No current [facility]-ordered facility-administered medications on file.       Past Medical History:   Diagnosis Date   • Hypercholesterolemia    • Hypertension    • Hypertension    • Thyroid disease        Social History     Tobacco Use   • Smoking status: Never Smoker   • Smokeless tobacco: Never Used   Vaping Use   • Vaping Use: Never used   Substance Use Topics   • Alcohol use: No   • Drug use: No       Allergies:  Patient has no known allergies.    Health Maintenance: Completed    ROS:  Per HPI      Objective:       Exam:  /62 (BP Location: Left arm, Patient Position: Sitting, BP Cuff Size: Adult)   Pulse 72   Temp 36.4 °C (97.6 °F) (Temporal)   Resp 16   Ht 1.448 m (4' 9\")   Wt 60.7 kg (133 lb 14.4 oz)   SpO2 95%   BMI 28.98 kg/m²   Body mass index is 28.98 kg/m².  Wt Readings from Last 4 Encounters:   11/03/21 60.7 kg (133 lb 14.4 oz)   09/23/21 61.2 kg (134 lb 14.4 oz)   08/30/21 61.5 kg (135 lb 8 oz)   08/03/18 62.1 kg (137 lb)       Gen: Alert and oriented, No apparent distress. Appropriately groomed.  Neck: Neck is supple without lymphadenopathy.No thyromegaly.   Lungs: Normal effort  Ext: No clubbing, cyanosis, edema.  Skin: No rash noted.  Thickened toenails, 1+ bilat pedal pulses, no venous insufficiency.  Monofilament testing with a 10 gram force: sensation intact: intact bilaterally  Visual Inspection: Feet without maceration, ulcers, fissures.  Pedal pulses: intact bilaterally        Labs:   Results for orders placed or performed during the hospital encounter of 09/23/21   TSH   Result Value Ref Range    TSH 1.780 0.380 - " 5.330 uIU/mL   VITAMIN D,25 HYDROXY   Result Value Ref Range    25-Hydroxy   Vitamin D 25 36 30 - 100 ng/mL   Comp Metabolic Panel   Result Value Ref Range    Sodium 142 135 - 145 mmol/L    Potassium 4.2 3.6 - 5.5 mmol/L    Chloride 105 96 - 112 mmol/L    Co2 23 20 - 33 mmol/L    Anion Gap 14.0 7.0 - 16.0    Glucose 109 (H) 65 - 99 mg/dL    Bun 45 (H) 8 - 22 mg/dL    Creatinine 1.04 0.50 - 1.40 mg/dL    Calcium 10.9 (H) 8.5 - 10.5 mg/dL    AST(SGOT) 19 12 - 45 U/L    ALT(SGPT) 16 2 - 50 U/L    Alkaline Phosphatase 109 (H) 30 - 99 U/L    Total Bilirubin 0.3 0.1 - 1.5 mg/dL    Albumin 4.2 3.2 - 4.9 g/dL    Total Protein 6.9 6.0 - 8.2 g/dL    Globulin 2.7 1.9 - 3.5 g/dL    A-G Ratio 1.6 g/dL   CBC WITH DIFFERENTIAL   Result Value Ref Range    WBC 7.6 4.8 - 10.8 K/uL    RBC 4.16 (L) 4.20 - 5.40 M/uL    Hemoglobin 13.2 12.0 - 16.0 g/dL    Hematocrit 42.4 37.0 - 47.0 %    .9 (H) 81.4 - 97.8 fL    MCH 31.7 27.0 - 33.0 pg    MCHC 31.1 (L) 33.6 - 35.0 g/dL    RDW 52.3 (H) 35.9 - 50.0 fL    Platelet Count 175 164 - 446 K/uL    MPV 11.3 9.0 - 12.9 fL    Neutrophils-Polys 45.80 44.00 - 72.00 %    Lymphocytes 40.40 22.00 - 41.00 %    Monocytes 9.70 0.00 - 13.40 %    Eosinophils 2.60 0.00 - 6.90 %    Basophils 1.10 0.00 - 1.80 %    Immature Granulocytes 0.40 0.00 - 0.90 %    Nucleated RBC 0.00 /100 WBC    Neutrophils (Absolute) 3.46 2.00 - 7.15 K/uL    Lymphs (Absolute) 3.05 1.00 - 4.80 K/uL    Monos (Absolute) 0.73 0.00 - 0.85 K/uL    Eos (Absolute) 0.20 0.00 - 0.51 K/uL    Baso (Absolute) 0.08 0.00 - 0.12 K/uL    Immature Granulocytes (abs) 0.03 0.00 - 0.11 K/uL    NRBC (Absolute) 0.00 K/uL   ESTIMATED GFR   Result Value Ref Range    GFR If African American 60 >60 mL/min/1.73 m 2    GFR If Non African American 50 (A) >60 mL/min/1.73 m 2         Assessment & Plan:     89 y.o. female with the following -     1. Hypercalcemia    2. History of DVT (deep vein thrombosis)    3. Stage 3a chronic kidney disease (HCC)    4. Lung  mass    5. Gait disorder  - Referral to Home Health    6. Frailty  - Referral to Home Health      Return in about 6 months (around 5/3/2022) for med review.    Please note that this dictation was created using voice recognition software. I have made every reasonable attempt to correct obvious errors, but I expect that there are errors of grammar and possibly content that I did not discover before finalizing the note.

## 2021-11-03 NOTE — PATIENT INSTRUCTIONS
You could add OTC vitamin D 1000 IU daily from now on.    Your kidney function is just a little low, be careful to avoid regular use of OTC anti-inflammatories like advil.    You can take OTC magnesium (400mg/d) for leg cramps and a B complex vitamin for the toe symptoms. You can also take tylenol 500mg twice daily and try OTC topical capsaicin cream.

## 2021-11-04 LAB — PTH-INTACT SERPL-MCNC: 81.6 PG/ML (ref 14–72)

## 2021-11-05 ENCOUNTER — HOME CARE VISIT (OUTPATIENT)
Dept: HOME HEALTH SERVICES | Facility: HOME HEALTHCARE | Age: 86
End: 2021-11-05
Payer: MEDICARE

## 2021-11-05 ENCOUNTER — DOCUMENTATION (OUTPATIENT)
Dept: MEDICAL GROUP | Facility: PHYSICIAN GROUP | Age: 86
End: 2021-11-05

## 2021-11-05 VITALS
RESPIRATION RATE: 18 BRPM | OXYGEN SATURATION: 93 % | SYSTOLIC BLOOD PRESSURE: 158 MMHG | WEIGHT: 137.25 LBS | DIASTOLIC BLOOD PRESSURE: 62 MMHG | HEART RATE: 68 BPM | TEMPERATURE: 97.6 F | BODY MASS INDEX: 29.7 KG/M2

## 2021-11-05 PROCEDURE — G0151 HHCP-SERV OF PT,EA 15 MIN: HCPCS

## 2021-11-05 PROCEDURE — 665001 SOC-HOME HEALTH

## 2021-11-05 SDOH — ECONOMIC STABILITY: HOUSING INSECURITY
HOME SAFETY: ON EACH LEVEL OF THE HOME. PATIENT DOES HAVE A FIRE ESCAPE PLAN DEVELOPED. PATIENT DOES NOT HAVE FLAMMABLE MATERIALS PRESENT IN THE HOME PRESENTING A FIRE HAZARD. NO EVIDENCE FOUND OF SMOKING MATERIALS PRESENT IN THE HOME.  OXYGEN DOSE RANGE: 2 L/MIN

## 2021-11-05 SDOH — ECONOMIC STABILITY: HOUSING INSECURITY: EVIDENCE OF SMOKING MATERIAL: 0

## 2021-11-05 SDOH — ECONOMIC STABILITY: HOUSING INSECURITY: HOME SAFETY: AT NIGHT  RESPIRATORY ROUTE VIA: NASAL CANNULA   OXYGEN SUPPLIER: PREFERRED HOMECARE

## 2021-11-05 SDOH — ECONOMIC STABILITY: FOOD INSECURITY: SNACKS PER DAY: 1

## 2021-11-05 SDOH — ECONOMIC STABILITY: HOUSING INSECURITY
HOME SAFETY: OXYGEN SAFETY RISK ASSESSMENT PERFORMED. PATIENT PT WAS GIVEN A NO SMOKING SIGN AND PROVIDED EDUCATION ABOUT WHY IT IS IMPORTANT TO PLACE ONE. PATIENT DOES HAVE A WORKING FIRE EXTINGUISHER PRESENT IN THE HOME. SMOKE ALARMS ARE PRESENT AND FUNCTIONAL

## 2021-11-05 SDOH — ECONOMIC STABILITY: FOOD INSECURITY: MEALS PER DAY: 3

## 2021-11-05 ASSESSMENT — ACTIVITIES OF DAILY LIVING (ADL)
PHYSICAL TRANSFERS ASSESSED: 1
AMBULATION ASSISTANCE ON FLAT SURFACES: 1
AMBULATION ASSISTANCE: 1
CURRENT_FUNCTION: SUPERVISION
CURRENT_FUNCTION: STAND BY ASSIST
OASIS_M1830: 05
TRANSPORTATION COMMENTS: PT REQUIRES ASSIST AND ASSISTIVE DEVICE TO LEAVE HOME; FALL RISK
AMBULATION ASSISTANCE: STAND BY ASSIST

## 2021-11-05 ASSESSMENT — ENCOUNTER SYMPTOMS
PERSON REPORTING PAIN: PATIENT
HIGHEST PAIN SEVERITY IN PAST 24 HOURS: 7/10
SUBJECTIVE PAIN PROGRESSION: UNCHANGED
LOWEST PAIN SEVERITY IN PAST 24 HOURS: 0/10
STOOL FREQUENCY: LESS THAN DAILY
CONSTIPATION: 1
CHANGE IN APPETITE: UNCHANGED
PAIN: 1
SHORTNESS OF BREATH: T
APPETITE LEVEL: GOOD
LAST BOWEL MOVEMENT: 66052
NAUSEA: DENIES
VOMITING: DENIES
DEBILITATING PAIN: 1
PAIN SEVERITY GOAL: 0/10
HYPERTENSION: 1

## 2021-11-05 ASSESSMENT — PATIENT HEALTH QUESTIONNAIRE - PHQ9
CLINICAL INTERPRETATION OF PHQ2 SCORE: 0
1. LITTLE INTEREST OR PLEASURE IN DOING THINGS: 00
2. FEELING DOWN, DEPRESSED, IRRITABLE, OR HOPELESS: 00

## 2021-11-05 ASSESSMENT — FIBROSIS 4 INDEX: FIB4 SCORE: 2.5

## 2021-11-05 NOTE — PROGRESS NOTES
Medication chart review for [facility] services    PCP:  Lolly Ball M.D.  [address] [facility] [address]  [place] [address]  Fax: [phone]    Current medication list     Current Outpatient Medications:   •  acetaminophen, 500-1,000 mg, Oral, Q6HRS PRN  •  levothyroxine, 25 mcg, Oral, AM ES  •  carvedilol, 3.125 mg, Oral, BID WITH MEALS  •  hydroCHLOROthiazide, 25 mg, Oral, DAILY  •  Home Care Oxygen, 2 L/min, Inhalation, HS PRN  •  Vitamin C, 1,000 mg, Oral, DAILY  •  vitamin B-12, 1,000 mcg, Oral, DAILY  •  Misc Natural Products (GLUCOSAMINE CHOND CMP ADVANCED PO), 1 Tablet, Oral, DAILY  •  Multiple Vitamins-Minerals (CENTRUM SILVER PO), 1 Tablet, Oral, DAILY  •  aspirin, 162 mg, Oral, DAILY    No Known Allergies    Labs     Lab Results   Component Value Date/Time    SODIUM 142 09/23/2021 11:30 AM    POTASSIUM 4.2 09/23/2021 11:30 AM    CHLORIDE 105 09/23/2021 11:30 AM    CO2 23 09/23/2021 11:30 AM    GLUCOSE 109 (H) 09/23/2021 11:30 AM    BUN 45 (H) 09/23/2021 11:30 AM    CREATININE 1.04 09/23/2021 11:30 AM     Lab Results   Component Value Date/Time    ALKPHOSPHAT 109 (H) 09/23/2021 11:30 AM    ASTSGOT 19 09/23/2021 11:30 AM    ALTSGPT 16 09/23/2021 11:30 AM    TBILIRUBIN 0.3 09/23/2021 11:30 AM    INR 1.5 01/18/2018 12:00 AM    ALBUMIN 4.2 09/23/2021 11:30 AM        Assessment and Plan:   • Received referral from [facility]. Medications reviewed.       Benitez Chisholm, PharmD, MS, BCACP, LCC  [facility]  Phone [phone] fax [phone]    This note was created using voice recognition software (Dragon). The accuracy of the dictation is limited by the abilities of the software. I have reviewed the note prior to signing, however some errors in grammar and context are still possible. If you have any questions related to this note please do not hesitate to contact our office.

## 2021-11-05 NOTE — Clinical Note
Primary dx/Skilled need: Gait disorder, Htn, history of DVT, peripheral neuropathy, Stage 3a CKD  PT: 1w1, 2w4  Zip code: [address]  Disciplines ordered: physical therapy; adding medical social worker  Insurance & authorization: [facility]  Certification period: 11/5/2021 - 1/3/2022  Special considerations: none

## 2021-11-08 ENCOUNTER — HOME CARE VISIT (OUTPATIENT)
Dept: HOME HEALTH SERVICES | Facility: HOME HEALTHCARE | Age: 86
End: 2021-11-08
Payer: MEDICARE

## 2021-11-08 VITALS
DIASTOLIC BLOOD PRESSURE: 52 MMHG | OXYGEN SATURATION: 94 % | RESPIRATION RATE: 18 BRPM | HEART RATE: 79 BPM | TEMPERATURE: 98.4 F | SYSTOLIC BLOOD PRESSURE: 138 MMHG

## 2021-11-08 PROCEDURE — G0157 HHC PT ASSISTANT EA 15: HCPCS | Mod: CQ

## 2021-11-08 ASSESSMENT — ENCOUNTER SYMPTOMS
PAIN: 1
SUBJECTIVE PAIN PROGRESSION: UNCHANGED
PERSON REPORTING PAIN: PATIENT
LOWEST PAIN SEVERITY IN PAST 24 HOURS: 0/10
HIGHEST PAIN SEVERITY IN PAST 24 HOURS: 6/10
PAIN LOCATION: GENERALIZED
PAIN SEVERITY GOAL: 0/10

## 2021-11-08 NOTE — CASE COMMUNICATION
Medications reviewed with patient. The patient has received instruction on special precautions for all high risk medications and has been instructed on how and when to report problems that may occur.

## 2021-11-09 ENCOUNTER — HOME CARE VISIT (OUTPATIENT)
Dept: HOME HEALTH SERVICES | Facility: HOME HEALTHCARE | Age: 86
End: 2021-11-09
Payer: MEDICARE

## 2021-11-09 VITALS
SYSTOLIC BLOOD PRESSURE: 140 MMHG | TEMPERATURE: 97.6 F | RESPIRATION RATE: 18 BRPM | DIASTOLIC BLOOD PRESSURE: 60 MMHG | HEART RATE: 69 BPM | OXYGEN SATURATION: 93 %

## 2021-11-09 PROCEDURE — G0157 HHC PT ASSISTANT EA 15: HCPCS | Mod: CQ

## 2021-11-09 ASSESSMENT — ENCOUNTER SYMPTOMS
PAIN SEVERITY GOAL: 1/10
PERSON REPORTING PAIN: PATIENT
SUBJECTIVE PAIN PROGRESSION: GRADUALLY IMPROVING
HIGHEST PAIN SEVERITY IN PAST 24 HOURS: 6/10
PAIN: 1
PAIN LOCATION: BACK
LOWEST PAIN SEVERITY IN PAST 24 HOURS: 4/10

## 2021-11-09 NOTE — CASE COMMUNICATION
Regine is very slow pace with amb but very eager to do what she can. She fatigues with each 1 min task and requires freq rest break. She has harder tme unloading for weight shifting on L side. Will monitor for soreness tomorrow after today's treatment Will cont with POC to increase her funcitonal mob and ind to prevent further decline in IND. S/B James Dotson PT

## 2021-11-10 ENCOUNTER — HOME CARE VISIT (OUTPATIENT)
Dept: HOME HEALTH SERVICES | Facility: HOME HEALTHCARE | Age: 86
End: 2021-11-10
Payer: MEDICARE

## 2021-11-10 NOTE — CASE COMMUNICATION
noted  ----- Message -----  From: Mervat Fishman, PT  Sent: 11/5/2021  12:55 PM PST  To: Meera Giles R.N.      Primary dx/Skilled need: Gait disorder, Htn, history of DVT, peripheral neuropathy, Stage 3a CKD  PT: 1w1, 2w4  Zip code: [address]  Disciplines ordered: physical therapy; adding medical social worker  Insurance & authorization: intermediate Plus  Certification period: 11/5/2021 - 1/3/2022  Special considerations: none

## 2021-11-10 NOTE — CASE COMMUNICATION
I agree with changes.  ----- Message -----  From: Evelyn Christy R.N.  Sent: 11/10/2021  11:58 AM PST  To: Mervat Fishman PT      Quality Review for 11.5.21 SOC OASIS performed on by FLORECITA Christy RN on 11.10, 2021:      Edits completed by FLORECITA Christy RN:  1. Changed  to 3 per pain assessment form reporting chronic pain and pain affecting physical activity and pain 7/10 in the last 24 hrs  2. Added fall risk to safety measures  3. Condon ed  to 4, pt wears 02 at NOC   4.  Updated F2F data

## 2021-11-10 NOTE — CASE COMMUNICATION
Regine tends to muscle guard with amb and tends to be slower pace She reports that she had no noted soreness with last therapy session. Progressed to HEP with handout given She expresses understanding K taped to L quadratus and gluteals for pain control Will monitor for progress. Will cont with POC to increase her functional mob and ind to prevent further decline in IND. S/B James Dotson PT

## 2021-11-10 NOTE — CASE COMMUNICATION
Quality Review for 11.5.21 SOC OASIS performed on by FLORECITA Christy RN on 11.10, 2021:      Edits completed by FLORECITA Christy RN:  1. Changed  to 3 per pain assessment form reporting chronic pain and pain affecting physical activity and pain 7/10 in the last 24 hrs  2. Added fall risk to safety measures  3. Changed  to 4, pt wears 02 at NOC   4.  Updated F2F data

## 2021-11-11 PROCEDURE — G0180 MD CERTIFICATION HHA PATIENT: HCPCS | Performed by: FAMILY MEDICINE

## 2021-11-15 ENCOUNTER — HOME CARE VISIT (OUTPATIENT)
Dept: HOME HEALTH SERVICES | Facility: HOME HEALTHCARE | Age: 86
End: 2021-11-15
Payer: MEDICARE

## 2021-11-15 VITALS
OXYGEN SATURATION: 92 % | SYSTOLIC BLOOD PRESSURE: 110 MMHG | TEMPERATURE: 97.3 F | RESPIRATION RATE: 18 BRPM | DIASTOLIC BLOOD PRESSURE: 56 MMHG | HEART RATE: 78 BPM

## 2021-11-15 PROCEDURE — G0157 HHC PT ASSISTANT EA 15: HCPCS | Mod: CQ

## 2021-11-15 PROCEDURE — G0155 HHCP-SVS OF CSW,EA 15 MIN: HCPCS

## 2021-11-15 ASSESSMENT — ENCOUNTER SYMPTOMS
HIGHEST PAIN SEVERITY IN PAST 24 HOURS: 3/10
PAIN: 1
SUBJECTIVE PAIN PROGRESSION: GRADUALLY IMPROVING
PAIN LOCATION: BACK
PERSON REPORTING PAIN: PATIENT
LOWEST PAIN SEVERITY IN PAST 24 HOURS: 3/10
PAIN SEVERITY GOAL: 1/10

## 2021-11-16 NOTE — CASE COMMUNICATION
Regine did very well after last session with K taping to her back and reports decrease pain. Cont with K tape usage today. She is cont to progress with BLE strength but cont to have noted weakness to LLE more with weight shifting. She is reporting a carry over with HEP Will cont with POC to increase her functional mob and ind to prevent further decline in IND. S/B James Dotson PT

## 2021-11-17 ENCOUNTER — HOME CARE VISIT (OUTPATIENT)
Dept: HOME HEALTH SERVICES | Facility: HOME HEALTHCARE | Age: 86
End: 2021-11-17
Payer: MEDICARE

## 2021-11-17 VITALS
OXYGEN SATURATION: 96 % | SYSTOLIC BLOOD PRESSURE: 138 MMHG | DIASTOLIC BLOOD PRESSURE: 60 MMHG | HEART RATE: 82 BPM | TEMPERATURE: 97.4 F | RESPIRATION RATE: 18 BRPM

## 2021-11-17 PROCEDURE — G0157 HHC PT ASSISTANT EA 15: HCPCS | Mod: CQ

## 2021-11-18 ASSESSMENT — ENCOUNTER SYMPTOMS
PAIN SEVERITY GOAL: 3/10
PERSON REPORTING PAIN: PATIENT
LOWEST PAIN SEVERITY IN PAST 24 HOURS: 4/10
PAIN LOCATION: BACK
HIGHEST PAIN SEVERITY IN PAST 24 HOURS: 5/10
PAIN: 1
SUBJECTIVE PAIN PROGRESSION: GRADUALLY IMPROVING

## 2021-11-18 NOTE — CASE COMMUNICATION
Regine is cont to progress and it appears that her stride is improving as she doing better with weight shifting on LLE as she is slowly increasing with strength He pain is better as she is responding well to K taping for pain Control Will cont with POC to increase her functional mob and ind to prevent further decline in IND. S/B James Dotson PT

## 2021-11-19 ENCOUNTER — HOME CARE VISIT (OUTPATIENT)
Dept: HOME HEALTH SERVICES | Facility: HOME HEALTHCARE | Age: 86
End: 2021-11-19
Payer: MEDICARE

## 2021-11-22 ENCOUNTER — HOME CARE VISIT (OUTPATIENT)
Dept: HOME HEALTH SERVICES | Facility: HOME HEALTHCARE | Age: 86
End: 2021-11-22
Payer: MEDICARE

## 2021-11-22 VITALS
RESPIRATION RATE: 18 BRPM | DIASTOLIC BLOOD PRESSURE: 60 MMHG | OXYGEN SATURATION: 93 % | TEMPERATURE: 98.3 F | SYSTOLIC BLOOD PRESSURE: 130 MMHG | HEART RATE: 77 BPM

## 2021-11-22 PROCEDURE — G0157 HHC PT ASSISTANT EA 15: HCPCS | Mod: CQ

## 2021-11-22 ASSESSMENT — ENCOUNTER SYMPTOMS
HIGHEST PAIN SEVERITY IN PAST 24 HOURS: 5/10
LOWEST PAIN SEVERITY IN PAST 24 HOURS: 3/10
SUBJECTIVE PAIN PROGRESSION: GRADUALLY IMPROVING
PAIN LOCATION: BACK
PAIN: 1
PERSON REPORTING PAIN: PATIENT
PAIN SEVERITY GOAL: 1/10

## 2021-11-23 ENCOUNTER — HOME CARE VISIT (OUTPATIENT)
Dept: HOME HEALTH SERVICES | Facility: HOME HEALTHCARE | Age: 86
End: 2021-11-23
Payer: MEDICARE

## 2021-11-23 VITALS
DIASTOLIC BLOOD PRESSURE: 60 MMHG | RESPIRATION RATE: 18 BRPM | OXYGEN SATURATION: 94 % | HEART RATE: 76 BPM | SYSTOLIC BLOOD PRESSURE: 138 MMHG | TEMPERATURE: 97.8 F

## 2021-11-23 PROCEDURE — G0155 HHCP-SVS OF CSW,EA 15 MIN: HCPCS

## 2021-11-23 PROCEDURE — G0157 HHC PT ASSISTANT EA 15: HCPCS | Mod: CQ

## 2021-11-23 ASSESSMENT — ENCOUNTER SYMPTOMS
SUBJECTIVE PAIN PROGRESSION: UNCHANGED
PAIN: 1
PAIN SEVERITY GOAL: 0/10
HIGHEST PAIN SEVERITY IN PAST 24 HOURS: 5/10
PAIN LOCATION: BACK
LOWEST PAIN SEVERITY IN PAST 24 HOURS: 3/10
PERSON REPORTING PAIN: PATIENT

## 2021-11-23 NOTE — CASE COMMUNICATION
Regine had some increase noted fatigue today She stokes report that she hasn't had a carry over with HEP this week but did have to go to the store and it was alot for her. She did very well with all task today and did have increase stride with forward/backward amb, Educated her again on HEP Will cont with POC to increase her funcional mob and ind to prevent further decline in IND S/B James Dotson PT

## 2021-11-24 NOTE — CASE COMMUNICATION
Regine is cont to progress and has increased with her amb. She cont to have noted noted trunk tighntess. Cont to K tape to L quadratus and gluteal for pain. Educated her with OP PT services as she does report that she is able to drive. She would benefit from OP PT services for increase her moblity and address her pain needs. She has been able to improve with her stride and if she cont with BLE she will cont to progress with bal PT to f /u on next visit Will cont with POC to increase her functional mob and ind to prevent further decline in IND. S/B James Dotson PT

## 2021-11-30 ENCOUNTER — HOME CARE VISIT (OUTPATIENT)
Dept: HOME HEALTH SERVICES | Facility: HOME HEALTHCARE | Age: 86
End: 2021-11-30
Payer: MEDICARE

## 2021-11-30 VITALS
HEART RATE: 81 BPM | DIASTOLIC BLOOD PRESSURE: 81 MMHG | SYSTOLIC BLOOD PRESSURE: 136 MMHG | TEMPERATURE: 97.7 F | OXYGEN SATURATION: 95 % | RESPIRATION RATE: 16 BRPM

## 2021-11-30 PROCEDURE — G0151 HHCP-SERV OF PT,EA 15 MIN: HCPCS

## 2021-11-30 ASSESSMENT — ENCOUNTER SYMPTOMS
LOWEST PAIN SEVERITY IN PAST 24 HOURS: 4/10
PAIN SEVERITY GOAL: 2/10
PERSON REPORTING PAIN: PATIENT
PAIN: 1
HIGHEST PAIN SEVERITY IN PAST 24 HOURS: 5/10
SUBJECTIVE PAIN PROGRESSION: UNCHANGED
PAIN LOCATION: BACK

## 2021-12-02 ENCOUNTER — HOME CARE VISIT (OUTPATIENT)
Dept: HOME HEALTH SERVICES | Facility: HOME HEALTHCARE | Age: 86
End: 2021-12-02
Payer: MEDICARE

## 2021-12-02 VITALS
DIASTOLIC BLOOD PRESSURE: 60 MMHG | HEART RATE: 67 BPM | TEMPERATURE: 97.6 F | SYSTOLIC BLOOD PRESSURE: 138 MMHG | OXYGEN SATURATION: 93 % | RESPIRATION RATE: 16 BRPM

## 2021-12-02 PROCEDURE — G0151 HHCP-SERV OF PT,EA 15 MIN: HCPCS

## 2021-12-02 ASSESSMENT — ENCOUNTER SYMPTOMS
PAIN: 1
PAIN LOCATION - EXACERBATING FACTORS: INACTIVITY
LOWEST PAIN SEVERITY IN PAST 24 HOURS: 5/10
PAIN LOCATION - PAIN DURATION: VARIES
PAIN LOCATION - PAIN FREQUENCY: CONSTANT
PAIN LOCATION - PAIN QUALITY: ACHY
PAIN LOCATION: BACK
PERSON REPORTING PAIN: PATIENT
SUBJECTIVE PAIN PROGRESSION: UNCHANGED
PAIN LOCATION - PAIN SEVERITY: 5/10
PAIN SEVERITY GOAL: 2/10
PAIN LOCATION - RELIEVING FACTORS: TRUNK FLEXION
HIGHEST PAIN SEVERITY IN PAST 24 HOURS: 5/10

## 2021-12-02 ASSESSMENT — PATIENT HEALTH QUESTIONNAIRE - PHQ9: CLINICAL INTERPRETATION OF PHQ2 SCORE: 0

## 2021-12-02 ASSESSMENT — ACTIVITIES OF DAILY LIVING (ADL)
AMBULATION_DISTANCE/DURATION_TOLERATED: >300 FEET
HOME_HEALTH_OASIS: 00
AMBULATION ASSISTANCE ON FLAT SURFACES: 1
OASIS_M1830: 00

## 2021-12-02 NOTE — CASE COMMUNICATION
Quality Review for 12/2/21 OASIS by WILMAN Cotton, VINCE on  December 2, 2021    Edits completed by WILMAN Cotton RN:  1.  C, E, F are NA per the care plan

## 2021-12-02 NOTE — CASE COMMUNICATION
I agree with these changes.    ----- Message -----  From: Danisha Cotton R.N.  Sent: 12/2/2021  12:27 PM PST  To: James Dotson, PT         Quality Review for 12/2/21 OASIS by WILMAN Cotton, RN on  December 2, 2021    Edits completed by WILMAN Cotton RN:  1.  C, E, F are NA per the care plan

## 2021-12-02 NOTE — Clinical Note
OASIS dc complete 12/2/21 with home health goals met. Pt will be transitioning to outpatient PT at [facility] next week.

## 2021-12-03 NOTE — CASE COMMUNICATION
CM noted. Thank you.   ----- Message -----  From: James Dotson, PT  Sent: 12/2/2021  12:00 PM PST  To: MAYRA Crabtree dc complete 12/2/21 with home health goals met. Pt will be transitioning to outpatient PT at [facility] next week.

## 2022-05-04 ENCOUNTER — OFFICE VISIT (OUTPATIENT)
Dept: MEDICAL GROUP | Facility: PHYSICIAN GROUP | Age: 87
End: 2022-05-04
Payer: MEDICARE

## 2022-05-04 ENCOUNTER — HOME HEALTH ADMISSION (OUTPATIENT)
Dept: HOME HEALTH SERVICES | Facility: HOME HEALTHCARE | Age: 87
End: 2022-05-04
Payer: MEDICARE

## 2022-05-04 VITALS
WEIGHT: 133.7 LBS | OXYGEN SATURATION: 92 % | TEMPERATURE: 97.3 F | HEIGHT: 57 IN | RESPIRATION RATE: 16 BRPM | SYSTOLIC BLOOD PRESSURE: 122 MMHG | BODY MASS INDEX: 28.85 KG/M2 | DIASTOLIC BLOOD PRESSURE: 60 MMHG | HEART RATE: 82 BPM

## 2022-05-04 DIAGNOSIS — G89.29 CHRONIC RIGHT SHOULDER PAIN: ICD-10-CM

## 2022-05-04 DIAGNOSIS — M25.511 CHRONIC RIGHT SHOULDER PAIN: ICD-10-CM

## 2022-05-04 DIAGNOSIS — R91.8 LUNG MASS: ICD-10-CM

## 2022-05-04 DIAGNOSIS — Z86.718 HISTORY OF DVT (DEEP VEIN THROMBOSIS): ICD-10-CM

## 2022-05-04 DIAGNOSIS — R26.9 GAIT DISORDER: ICD-10-CM

## 2022-05-04 DIAGNOSIS — E21.3 HYPERPARATHYROIDISM (HCC): ICD-10-CM

## 2022-05-04 DIAGNOSIS — E83.52 HYPERCALCEMIA: ICD-10-CM

## 2022-05-04 DIAGNOSIS — R73.03 PREDIABETES: ICD-10-CM

## 2022-05-04 DIAGNOSIS — K76.9 LIVER LESION: ICD-10-CM

## 2022-05-04 DIAGNOSIS — E03.9 HYPOTHYROIDISM, UNSPECIFIED TYPE: ICD-10-CM

## 2022-05-04 DIAGNOSIS — R79.9 ABNORMAL FINDING OF BLOOD CHEMISTRY, UNSPECIFIED: ICD-10-CM

## 2022-05-04 DIAGNOSIS — N18.31 STAGE 3A CHRONIC KIDNEY DISEASE: ICD-10-CM

## 2022-05-04 PROCEDURE — 99213 OFFICE O/P EST LOW 20 MIN: CPT | Performed by: FAMILY MEDICINE

## 2022-05-04 RX ORDER — PREDNISOLONE ACETATE 10 MG/ML
SUSPENSION/ DROPS OPHTHALMIC
COMMUNITY
Start: 2022-02-28 | End: 2022-05-04

## 2022-05-04 RX ORDER — CIPROFLOXACIN HYDROCHLORIDE 3.5 MG/ML
SOLUTION/ DROPS TOPICAL
COMMUNITY
Start: 2022-02-28 | End: 2022-05-04

## 2022-05-04 ASSESSMENT — FIBROSIS 4 INDEX: FIB4 SCORE: 2.52

## 2022-05-04 ASSESSMENT — PATIENT HEALTH QUESTIONNAIRE - PHQ9: CLINICAL INTERPRETATION OF PHQ2 SCORE: 0

## 2022-05-04 NOTE — PROGRESS NOTES
"Subjective:     CC:   Chief Complaint   Patient presents with   • Follow-Up   • Medication Management   • Fall     A couple months ago, fell on side, no imaging, did not go to ER    • Shoulder Pain     R Shoulder, limited ROM, hurts to lift, feels \"out of joint\"         HPI:   Regine presents today with routine follow-up from her last appointment in November, at that time I referred her to home health for physical therapy to address her gait disorder and frailty.  She lives alone and has 2 adult children who live nearby. She doesn't have a fall alert. Has looked into a fall alert but is $35/m and can't afford that.   Several months ago she fell on her R shoulder in her kitchen, since then has had mild-mod pain in her R shoulder most days  The fall was prompted by turning her head and moving in a new direction, felt a little dizzy and fell.   Her daughters have told her she doesn't drink enough water as well.     Problem   Liver Lesion    CT chest '17: 3. Multiple small enhancing lesions in the liver, incompletely evaluated but grossly similar to prior exam in 2010. Given the long stability, this could relate to flash filling hemangiomas. Correlate with prior abdominal CT or MRI study.  09/23/21 we agreed to not follow those lesions as she has no GI c/o     History of Dvt (Deep Vein Thrombosis)    Took xarelto, stays on 162mg asa since then     Gait Disorder    Uses cane.     Stage 3a Chronic Kidney Disease (Hcc)    Stable, continue current plan of care, 9/21 GFR 50, avoids NSAIDS       Hyperparathyroidism (Hcc)    She has had borderline PTH (81.6, 90.2), continue to follow     Lung Mass    The patient states that 4 years ago when she was hospitalized for a UTI she was noted to have a lesion in her left lung.  She has not had any follow-up.  Her records were reviewed and the patient did have a CT scan of her lungs with contrast. She has a 9 mm nodule in her left upper lung and a 3 mm nodule in her right lower lung, " "It was done 12/29/17.There was no change noted from prior exams in 2016. 11/03/21 we discussed her options and she agreed to no further f/u          Current Outpatient Medications Ordered in Epic   Medication Sig Dispense Refill   • acetaminophen (TYLENOL) 500 MG Tab Take 500-1,000 mg by mouth every 6 hours as needed for Mild Pain or Moderate Pain.     • levothyroxine (SYNTHROID) 25 MCG Tab Take 1 Tablet by mouth every morning on an empty stomach. 100 Tablet 3   • carvedilol (COREG) 3.125 MG Tab Take 1 Tablet by mouth 2 times a day with meals. 200 Tablet 3   • hydroCHLOROthiazide (HYDRODIURIL) 25 MG Tab Take 1 Tablet by mouth every day. 100 Tablet 3   • Home Care Oxygen Inhale 2 L/min at bedtime. Oxygen dose range: 2 L/min  Respiratory route via: Nasal Cannula   Oxygen supplier: [facility]         • Ascorbic Acid (VITAMIN C) 1000 MG Tab Take 1,000 mg by mouth every day.     • Cyanocobalamin (VITAMIN B-12) 1000 MCG Tab Take 1,000 mcg by mouth every day.     • Misc Natural Products (GLUCOSAMINE CHOND CMP ADVANCED PO) Take 1 Tablet by mouth every day.     • Multiple Vitamins-Minerals (CENTRUM SILVER PO) Take 1 Tablet by mouth every day.     • aspirin (ASA) 81 MG Chew Tab chewable tablet Take 162 mg by mouth every day.       No current [facility]-ordered facility-administered medications on file.       Past Medical History:   Diagnosis Date   • Hypercholesterolemia    • Hypertension    • Hypertension    • Thyroid disease        Social History     Tobacco Use   • Smoking status: Never Smoker   • Smokeless tobacco: Never Used   Vaping Use   • Vaping Use: Never used   Substance Use Topics   • Alcohol use: No   • Drug use: No       Allergies:  Patient has no known allergies.    Health Maintenance: Completed    ROS:  Per HPI      Objective:       Exam:  /60 (BP Location: Right arm, Patient Position: Sitting, BP Cuff Size: Adult)   Pulse 82   Temp 36.3 °C (97.3 °F) (Temporal)   Resp 16   Ht 1.448 m (4' 9\")   Wt " 60.6 kg (133 lb 11.2 oz)   SpO2 92%   BMI 28.93 kg/m²   Body mass index is 28.93 kg/m².  Wt Readings from Last 4 Encounters:   05/04/22 60.6 kg (133 lb 11.2 oz)   11/05/21 62.3 kg (137 lb 4 oz)   11/03/21 60.7 kg (133 lb 14.4 oz)   09/23/21 61.2 kg (134 lb 14.4 oz)       Gen: Alert and oriented, No apparent distress. Appropriately groomed.  Neck: Neck is supple without lymphadenopathy.No thyromegaly.   Lungs: Normal effort, CTA bilaterally, no wheezes, rhonchi, or rales.  CV: Regular rate and rhythm. No murmurs, rubs, or gallops.  ABD:  Soft, nontender, nondistended, NABSx4, no HSM or RBT or guarding or masses.  Ext: No clubbing, cyanosis, edema.  Skin: No rash noted.  L shoulder wnl, R shoulder wnl sensation and , FROM but pain at AC joint with ext and int rotation    Labs: due in fall    Assessment & Plan:     89 y.o. female with the following -   Try PT, see ortho for injn shoulder  Labs in fall and f/u then  Better hydration, keep phone in hip bag at all times  Needs 2nd booster COVID  1. Chronic right shoulder pain  - Referral to Orthopedics  - Referral to Home Health    2. History of DVT (deep vein thrombosis)    3. Gait disorder    4. Lung mass    5. Liver lesion    6. Prediabetes  - CBC WITH DIFFERENTIAL; Future  - Comp Metabolic Panel; Future  - Lipid Profile; Future  - HEMOGLOBIN A1C; Future    7. Hypothyroidism, unspecified type  - TSH; Future    8. Abnormal finding of blood chemistry, unspecified   - Lipid Profile; Future    9. Stage 3a chronic kidney disease (HCC)    10. Hypercalcemia  - VITAMIN D,25 HYDROXY; Future  - PTH INTACT (PTH ONLY); Future    11. Hyperparathyroidism (HCC)          Return in about 5 months (around 10/4/2022) for labs.    Please note that this dictation was created using voice recognition software. I have made every reasonable attempt to correct obvious errors, but I expect that there are errors of grammar and possibly content that I did not discover before finalizing the  note.

## 2022-05-04 NOTE — PATIENT INSTRUCTIONS
Set out a jug of water and drink it before dinner    Keep your phone on you at all times. Find your hip bag and wear it    See ortho, hopefully you can get a shoulder injection. And try home PT.

## 2022-05-10 ENCOUNTER — HOME CARE VISIT (OUTPATIENT)
Dept: HOME HEALTH SERVICES | Facility: HOME HEALTHCARE | Age: 87
End: 2022-05-10
Payer: MEDICARE

## 2022-05-10 VITALS
RESPIRATION RATE: 16 BRPM | HEART RATE: 71 BPM | TEMPERATURE: 98.3 F | OXYGEN SATURATION: 93 % | DIASTOLIC BLOOD PRESSURE: 60 MMHG | SYSTOLIC BLOOD PRESSURE: 128 MMHG

## 2022-05-10 PROCEDURE — 665001 SOC-HOME HEALTH

## 2022-05-10 PROCEDURE — G0493 RN CARE EA 15 MIN HH/HOSPICE: HCPCS

## 2022-05-10 ASSESSMENT — PATIENT HEALTH QUESTIONNAIRE - PHQ9
1. LITTLE INTEREST OR PLEASURE IN DOING THINGS: 00
2. FEELING DOWN, DEPRESSED, IRRITABLE, OR HOPELESS: 00
CLINICAL INTERPRETATION OF PHQ2 SCORE: 0

## 2022-05-10 ASSESSMENT — ENCOUNTER SYMPTOMS
PAIN LOCATION - PAIN SEVERITY: 6/10
HIGHEST PAIN SEVERITY IN PAST 24 HOURS: 6/10
PAIN LOCATION - EXACERBATING FACTORS: MOVEMENT
NAUSEA: DENIES
PAIN SEVERITY GOAL: 3/10
LOWEST PAIN SEVERITY IN PAST 24 HOURS: 6/10
PAIN LOCATION - PAIN FREQUENCY: CONSTANT
VOMITING: DENIES
SHORTNESS OF BREATH: 1
DYSPNEA ACTIVITY LEVEL: AFTER AMBULATING 10 - 20 FT
PAIN LOCATION - PAIN DURATION: DAILY
PAIN LOCATION: RIGHT SHOULDER
SUBJECTIVE PAIN PROGRESSION: UNCHANGED
PAIN LOCATION - PAIN QUALITY: ACHY
PAIN: 1
PAIN LOCATION - RELIEVING FACTORS: HEAT, TYLENOL

## 2022-05-12 ENCOUNTER — HOME CARE VISIT (OUTPATIENT)
Dept: HOME HEALTH SERVICES | Facility: HOME HEALTHCARE | Age: 87
End: 2022-05-12
Payer: MEDICARE

## 2022-05-12 ENCOUNTER — DOCUMENTATION (OUTPATIENT)
Dept: MEDICAL GROUP | Facility: PHYSICIAN GROUP | Age: 87
End: 2022-05-12
Payer: MEDICARE

## 2022-05-12 VITALS
OXYGEN SATURATION: 96 % | TEMPERATURE: 98.6 F | HEART RATE: 84 BPM | DIASTOLIC BLOOD PRESSURE: 52 MMHG | SYSTOLIC BLOOD PRESSURE: 118 MMHG

## 2022-05-12 PROCEDURE — G0151 HHCP-SERV OF PT,EA 15 MIN: HCPCS

## 2022-05-12 ASSESSMENT — ENCOUNTER SYMPTOMS
PAIN LOCATION - EXACERBATING FACTORS: REACHING
PAIN LOCATION - PAIN DURATION: 3 MONTHS
PAIN LOCATION - PAIN QUALITY: DULL
HIGHEST PAIN SEVERITY IN PAST 24 HOURS: 6/10
PAIN LOCATION - PAIN FREQUENCY: FREQUENT
PAIN LOCATION: RIGHT SHOULDER
LOWEST PAIN SEVERITY IN PAST 24 HOURS: 3/10
PAIN LOCATION - PAIN SEVERITY: 6/10

## 2022-05-12 NOTE — PROGRESS NOTES
Medication chart review for [facility] services        Current medication list     Current Outpatient Medications:   •  Magnesium, 1 Each, Oral, DAILY AT 1800  •  acetaminophen, 500-1,000 mg, Oral, Q6HRS PRN  •  levothyroxine, 25 mcg, Oral, AM ES  •  carvedilol, 3.125 mg, Oral, BID WITH MEALS  •  hydroCHLOROthiazide, 25 mg, Oral, DAILY  •  Home Care Oxygen, 2 L/min, Inhalation, QHS  •  Vitamin C, 1,000 mg, Oral, DAILY  •  vitamin B-12, 1,000 mcg, Oral, DAILY  •  Misc Natural Products (GLUCOSAMINE CHOND CMP ADVANCED PO), 1 Tablet, Oral, DAILY  •  Multiple Vitamins-Minerals (CENTRUM SILVER PO), 1 Tablet, Oral, DAILY  •  aspirin, 162 mg, Oral, DAILY    Pt recently discharged from:   no        Labs     Lab Results   Component Value Date/Time    SODIUM 142 09/23/2021 11:30 AM    POTASSIUM 4.2 09/23/2021 11:30 AM    CHLORIDE 105 09/23/2021 11:30 AM    CO2 23 09/23/2021 11:30 AM    GLUCOSE 109 (H) 09/23/2021 11:30 AM    BUN 45 (H) 09/23/2021 11:30 AM    CREATININE 1.04 09/23/2021 11:30 AM     Lab Results   Component Value Date/Time    ALKPHOSPHAT 109 (H) 09/23/2021 11:30 AM    ASTSGOT 19 09/23/2021 11:30 AM    ALTSGPT 16 09/23/2021 11:30 AM    TBILIRUBIN 0.3 09/23/2021 11:30 AM    INR 1.5 01/18/2018 12:00 AM    ALBUMIN 4.2 09/23/2021 11:30 AM        Assessment and Plan:   • Received referral from [facility]. Medications reviewed, and compared with discharge summary if available.        CC   Lolly Ball M.D.  [address]  [place] [address]  Fax: [phone]    Benitez Chisholm, PharmD, MS, BCACP, [facility]  Phone [phone] fax [phone]    This note was created using voice recognition software (Dragon). The accuracy of the dictation is limited by the abilities of the software. I have reviewed the note prior to signing, however some errors in grammar and context are still possible. If you have any questions related to this note please do not hesitate to  contact our office.

## 2022-05-13 ENCOUNTER — HOME CARE VISIT (OUTPATIENT)
Dept: HOME HEALTH SERVICES | Facility: HOME HEALTHCARE | Age: 87
End: 2022-05-13
Payer: MEDICARE

## 2022-05-13 SDOH — ECONOMIC STABILITY: HOUSING INSECURITY
HOME SAFETY: PATIENT HAS STEPS WITHIN HOME; SUNKEN ROOMS; WHEN WALKING INTO BEDROOM, PATIENT WOULD BENEFIT FROM GRAB BAR FOR ONE STEP INTO BEDROOM  P.T. ALSO RECOMMENDED GRAB BARS IN SHOWER

## 2022-05-13 ASSESSMENT — ENCOUNTER SYMPTOMS
MUSCLE WEAKNESS: 1
LIMITED RANGE OF MOTION: 1
DEBILITATING PAIN: 1
PAIN SEVERITY GOAL: 0/10
SUBJECTIVE PAIN PROGRESSION: WAXING AND WANING

## 2022-05-13 ASSESSMENT — ACTIVITIES OF DAILY LIVING (ADL)
AMBULATION ASSISTANCE: 1
AMBULATION ASSISTANCE: STAND BY ASSIST
AMBULATION ASSISTANCE ON FLAT SURFACES: 1
AMBULATION ASSISTANCE: CONTACT GUARD ASSIST
PHYSICAL TRANSFERS ASSESSED: 1
AMBULATION_DISTANCE/DURATION_TOLERATED: 60 FT X 2
CURRENT_FUNCTION: STAND BY ASSIST

## 2022-05-17 ENCOUNTER — HOME CARE VISIT (OUTPATIENT)
Dept: HOME HEALTH SERVICES | Facility: HOME HEALTHCARE | Age: 87
End: 2022-05-17
Payer: MEDICARE

## 2022-05-17 PROCEDURE — G0152 HHCP-SERV OF OT,EA 15 MIN: HCPCS

## 2022-05-17 PROCEDURE — G0151 HHCP-SERV OF PT,EA 15 MIN: HCPCS

## 2022-05-17 ASSESSMENT — ENCOUNTER SYMPTOMS
SUBJECTIVE PAIN PROGRESSION: WAXING AND WANING
PAIN LOCATION - PAIN FREQUENCY: FREQUENT
PERSON REPORTING PAIN: PATIENT
LOWEST PAIN SEVERITY IN PAST 24 HOURS: 3/10
PAIN: 1
PAIN LOCATION - RELIEVING FACTORS: REST
PAIN LOCATION - EXACERBATING FACTORS: MOVEMENT
HIGHEST PAIN SEVERITY IN PAST 24 HOURS: 6/10
PAIN LOCATION - PAIN DURATION: CHRONIC
PAIN LOCATION - PAIN QUALITY: DULL, ACHY
PAIN LOCATION - PAIN SEVERITY: 5/10
PAIN LOCATION: RIGHT SHOULDER
PAIN SEVERITY GOAL: 0/10

## 2022-05-17 NOTE — Clinical Note
Noted.  ----- Message -----  From: Mervat Fishman, PT  Sent: 5/17/2022   2:20 PM PDT  To: Thomas Wilson MS,OTR/L, Lolly Ball M.D., *      Dr. Ball,  Patient called [facility] regarding referral and they are stating that they have not received referral.      Thank you,  CHAR VicenteT

## 2022-05-17 NOTE — Clinical Note
Dr. Ball,  Patient called [facility] regarding referral and they are stating that they have not received referral.      Thank you,  CHAR VicenteT

## 2022-05-18 ENCOUNTER — HOME CARE VISIT (OUTPATIENT)
Dept: HOME HEALTH SERVICES | Facility: HOME HEALTHCARE | Age: 87
End: 2022-05-18
Payer: MEDICARE

## 2022-05-18 ASSESSMENT — ACTIVITIES OF DAILY LIVING (ADL)
TRANSPORTATION COMMENTS: PT REQUIRES ASSIST AND ASSISTIVE DEVICE TO LEAVE HOME; FALL RISK
OASIS_M1830: 05
AMBULATION_DISTANCE/DURATION_TOLERATED: 30 FT X 2
AMBULATION ASSISTANCE ON FLAT SURFACES: 1

## 2022-05-18 ASSESSMENT — ENCOUNTER SYMPTOMS: DEBILITATING PAIN: 1

## 2022-05-18 NOTE — CASE COMMUNICATION
Quality Review for 5.10.22 SOC OASIS performed on by FLORECITA Christy RN on 5.18.2022:    Edits completed by FLORECITA Christy RN:  1. Sent clinical summary as an order  2. Changed  to 4 per Cg note  3. Changed  to 4 per resp tab pt wears 02 only at NOC  5. Changed  to 3 per narrative pt needs mod assist to ambulate. Changed  to 5, pt would need showering DME to safely perform. Changed  to 2 per [id number] Response of 4  6. Change d  to 10  7. Added fall risk to safety measures  8. Updated F2F data  9. Added 02 to the care plan

## 2022-05-19 VITALS
SYSTOLIC BLOOD PRESSURE: 122 MMHG | DIASTOLIC BLOOD PRESSURE: 60 MMHG | HEART RATE: 74 BPM | TEMPERATURE: 98.1 F | OXYGEN SATURATION: 93 % | RESPIRATION RATE: 16 BRPM

## 2022-05-19 ASSESSMENT — ACTIVITIES OF DAILY LIVING (ADL)
TOILETING: INDEPENDENT
DRESSING_LB_CURRENT_FUNCTION: INDEPENDENT
ORAL_CARE_CURRENT_FUNCTION: INDEPENDENT
DRESSING_UB_CURRENT_FUNCTION: INDEPENDENT
PHYSICAL TRANSFERS ASSESSED: 1
BATHING ASSESSED: 1
ORAL_CARE_ASSESSED: 1
BATHING_CURRENT_FUNCTION: INDEPENDENT
FEEDING ASSESSED: 1
CURRENT_FUNCTION: SUPERVISION
GROOMING ASSESSED: 1
GROOMING_CURRENT_FUNCTION: INDEPENDENT
TOILETING: 1
FEEDING: INDEPENDENT

## 2022-05-19 ASSESSMENT — ENCOUNTER SYMPTOMS
PAIN LOCATION - PAIN FREQUENCY: FREQUENT
LOWEST PAIN SEVERITY IN PAST 24 HOURS: 0/10
PAIN LOCATION - PAIN SEVERITY: 5/10
HIGHEST PAIN SEVERITY IN PAST 24 HOURS: 7/10
PAIN SEVERITY GOAL: 0/10
DEBILITATING PAIN: 1
PERSON REPORTING PAIN: PATIENT
PAIN LOCATION: RIGHT SHOULDER
SUBJECTIVE PAIN PROGRESSION: UNCHANGED
PAIN: 1

## 2022-05-19 NOTE — CASE COMMUNICATION
I agree with this  ----- Message -----  From: Evelyn Christy R.N.  Sent: 5/18/2022   3:11 PM PDT  To: Belkys Segal R.N.      Quality Review for 5.10.22 SOC OASIS performed on by FLORECITA Christy RN on 5.18.2022:    Edits completed by FLORECITA Christy RN:  1. Sent clinical summary as an order  2. Changed  to 4 per Cg note  3. Changed  to 4 per resp tab pt wears 02 only at NOC  5. Changed  to 3 per narrative pt needs mod assist  to ambulate. Changed  to 5, pt would need showering DME to safely perform. Changed  to 2 per [id number] Response of 4  6. Changed  to 10  7. Added fall risk to safety measures  8. Updated F2F data  9. Added 02 to the care plan

## 2022-05-19 NOTE — CASE COMMUNICATION
Regine called back today and informed me that she has scheduled her appointment for Monday on the 23rd

## 2022-05-20 ENCOUNTER — HOME CARE VISIT (OUTPATIENT)
Dept: HOME HEALTH SERVICES | Facility: HOME HEALTHCARE | Age: 87
End: 2022-05-20
Payer: MEDICARE

## 2022-05-20 PROCEDURE — G0180 MD CERTIFICATION HHA PATIENT: HCPCS | Performed by: FAMILY MEDICINE

## 2022-05-20 PROCEDURE — G0151 HHCP-SERV OF PT,EA 15 MIN: HCPCS

## 2022-05-22 VITALS
DIASTOLIC BLOOD PRESSURE: 60 MMHG | OXYGEN SATURATION: 98 % | SYSTOLIC BLOOD PRESSURE: 132 MMHG | RESPIRATION RATE: 16 BRPM | HEART RATE: 70 BPM | TEMPERATURE: 97.6 F

## 2022-05-22 ASSESSMENT — ENCOUNTER SYMPTOMS
PERSON REPORTING PAIN: PATIENT
PAIN LOCATION - EXACERBATING FACTORS: MOVING SHOULDER
PAIN LOCATION: RIGHT SHOULDER
SUBJECTIVE PAIN PROGRESSION: WAXING AND WANING
PAIN LOCATION - PAIN QUALITY: ACHY
PAIN LOCATION - PAIN DURATION: CHRONIC
PAIN LOCATION - RELIEVING FACTORS: RESTING
PAIN LOCATION - PAIN FREQUENCY: INTERMITTENT
PAIN LOCATION - PAIN SEVERITY: 4/10
HIGHEST PAIN SEVERITY IN PAST 24 HOURS: 5/10
LOWEST PAIN SEVERITY IN PAST 24 HOURS: 1/10
PAIN: 1
PAIN SEVERITY GOAL: 1/10

## 2022-05-22 ASSESSMENT — ACTIVITIES OF DAILY LIVING (ADL)
AMBULATION_DISTANCE/DURATION_TOLERATED: 25 FT X 2
AMBULATION ASSISTANCE ON FLAT SURFACES: 1
TRANSPORTATION COMMENTS: PT REQUIRES ASSIST AND ASSISTIVE DEVICE TO LEAVE HOME; FALL RISK

## 2022-05-23 ENCOUNTER — HOME CARE VISIT (OUTPATIENT)
Dept: HOME HEALTH SERVICES | Facility: HOME HEALTHCARE | Age: 87
End: 2022-05-23
Payer: MEDICARE

## 2022-05-24 ENCOUNTER — HOME CARE VISIT (OUTPATIENT)
Dept: HOME HEALTH SERVICES | Facility: HOME HEALTHCARE | Age: 87
End: 2022-05-24
Payer: MEDICARE

## 2022-05-24 PROCEDURE — G0152 HHCP-SERV OF OT,EA 15 MIN: HCPCS

## 2022-05-25 ENCOUNTER — HOME CARE VISIT (OUTPATIENT)
Dept: HOME HEALTH SERVICES | Facility: HOME HEALTHCARE | Age: 87
End: 2022-05-25
Payer: MEDICARE

## 2022-05-25 VITALS
DIASTOLIC BLOOD PRESSURE: 68 MMHG | HEART RATE: 69 BPM | TEMPERATURE: 97.5 F | OXYGEN SATURATION: 95 % | SYSTOLIC BLOOD PRESSURE: 122 MMHG | RESPIRATION RATE: 16 BRPM

## 2022-05-25 PROCEDURE — G0151 HHCP-SERV OF PT,EA 15 MIN: HCPCS

## 2022-05-25 ASSESSMENT — ENCOUNTER SYMPTOMS
PAIN LOCATION - EXACERBATING FACTORS: STANDING, WALKING
HIGHEST PAIN SEVERITY IN PAST 24 HOURS: 5/10
PERSON REPORTING PAIN: PATIENT
PAIN: 1
PAIN LOCATION - PAIN QUALITY: ACHING
PAIN LOCATION - PAIN SEVERITY: 4/10
PAIN LOCATION: RIGHT SHOULDER
LOWEST PAIN SEVERITY IN PAST 24 HOURS: 0/10
PAIN LOCATION - PAIN FREQUENCY: INTERMITTENT
SUBJECTIVE PAIN PROGRESSION: WAXING AND WANING
PAIN LOCATION - PAIN DURATION: DAILY
PAIN SEVERITY GOAL: 0/10
PAIN LOCATION - RELIEVING FACTORS: PAIN MEDS, REST

## 2022-05-26 ENCOUNTER — HOME CARE VISIT (OUTPATIENT)
Dept: HOME HEALTH SERVICES | Facility: HOME HEALTHCARE | Age: 87
End: 2022-05-26
Payer: MEDICARE

## 2022-05-26 VITALS
DIASTOLIC BLOOD PRESSURE: 64 MMHG | TEMPERATURE: 98.5 F | OXYGEN SATURATION: 95 % | HEART RATE: 79 BPM | SYSTOLIC BLOOD PRESSURE: 120 MMHG | RESPIRATION RATE: 16 BRPM

## 2022-05-26 PROCEDURE — G0155 HHCP-SVS OF CSW,EA 15 MIN: HCPCS

## 2022-05-26 ASSESSMENT — ENCOUNTER SYMPTOMS
PERSON REPORTING PAIN: PATIENT
DEBILITATING PAIN: 1
LOWEST PAIN SEVERITY IN PAST 24 HOURS: 6/10
PAIN LOCATION - PAIN FREQUENCY: FREQUENT
PAIN: 1
PAIN LOCATION: RIGHT SHOULDER
PAIN LOCATION - PAIN SEVERITY: 4/10
PAIN SEVERITY GOAL: 0/10
HIGHEST PAIN SEVERITY IN PAST 24 HOURS: 1/10
SUBJECTIVE PAIN PROGRESSION: UNCHANGED

## 2022-05-30 ENCOUNTER — HOME CARE VISIT (OUTPATIENT)
Dept: HOME HEALTH SERVICES | Facility: HOME HEALTHCARE | Age: 87
End: 2022-05-30
Payer: MEDICARE

## 2022-05-30 VITALS
RESPIRATION RATE: 16 BRPM | SYSTOLIC BLOOD PRESSURE: 130 MMHG | DIASTOLIC BLOOD PRESSURE: 78 MMHG | HEART RATE: 77 BPM | OXYGEN SATURATION: 95 % | TEMPERATURE: 97.4 F

## 2022-05-30 PROCEDURE — G0151 HHCP-SERV OF PT,EA 15 MIN: HCPCS

## 2022-05-30 ASSESSMENT — ENCOUNTER SYMPTOMS
PAIN LOCATION - RELIEVING FACTORS: PAIN MEDS, REST
PERSON REPORTING PAIN: PATIENT
PAIN LOCATION: RIGHT SHOULDER
PAIN LOCATION - PAIN QUALITY: ACHING
PAIN LOCATION - PAIN SEVERITY: 4/10
PAIN: 1
PAIN LOCATION - PAIN DURATION: DAILY
PAIN LOCATION - EXACERBATING FACTORS: MOVING WRONG
PAIN LOCATION - PAIN FREQUENCY: INTERMITTENT

## 2022-05-30 ASSESSMENT — GAIT ASSESSMENTS
PATH SCORE: 1
INITIATION OF GAIT IMMEDIATELY AFTER GO: 1 - NO HESITANCY
TRUNK SCORE: 1
WALKING STANCE: 0 - HEELS APART
GAIT SCORE: 9
TRUNK: 1 - NO SWAY BUT FLEXION OF KNEES OR BACK OR SPREADS ARMS WHILE WALKING
PATH: 1 - MILD/MODERATE DEVIATION OR USES WALKING AID
STEP CONTINUITY: 1 - STEPS APPEAR CONTINUOUS
BALANCE AND GAIT SCORE: 19
STEP SYMMETRY: 1 - RIGHT AND LEFT STEP LENGTH APPEAR EQUAL

## 2022-05-30 ASSESSMENT — BALANCE ASSESSMENTS
NUDGED: 1 - STAGGERS, GRABS, CATCHES SELF
ATTEMPTS TO ARISE: 2 - ABLE TO RISE, ONE ATTEMPT
ARISES: 1 - ABLE, USES ARMS TO HELP
SITTING DOWN: 1 - USES ARMS OR NOT SMOOTH MOTION
TURNING 360 DEGREES STEPS: 1 - CONTINUOUS STEPS
IMMEDIATE STANDING BALANCE FIRST 5 SECONDS: 1 - STEADY BUT USES WALKER OR OTHER SUPPORT
ARISING SCORE: 1
EYES CLOSED AT MAXIMUM POSITION NUDGED: 1 - STEADY
NUDGED SCORE: 1
BALANCE SCORE: 10
SITTING BALANCE: 1 - STEADY, SAFE
STANDING BALANCE: 1 - STEADY BUT WIDE STANCE AND USES CANE OR OTHER SUPPORT

## 2022-05-31 ENCOUNTER — HOME CARE VISIT (OUTPATIENT)
Dept: HOME HEALTH SERVICES | Facility: HOME HEALTHCARE | Age: 87
End: 2022-05-31
Payer: MEDICARE

## 2022-05-31 PROCEDURE — G0152 HHCP-SERV OF OT,EA 15 MIN: HCPCS

## 2022-06-01 ENCOUNTER — HOME CARE VISIT (OUTPATIENT)
Dept: HOME HEALTH SERVICES | Facility: HOME HEALTHCARE | Age: 87
End: 2022-06-01
Payer: MEDICARE

## 2022-06-02 ENCOUNTER — HOME CARE VISIT (OUTPATIENT)
Dept: HOME HEALTH SERVICES | Facility: HOME HEALTHCARE | Age: 87
End: 2022-06-02
Payer: MEDICARE

## 2022-06-02 VITALS
HEART RATE: 69 BPM | RESPIRATION RATE: 16 BRPM | OXYGEN SATURATION: 96 % | DIASTOLIC BLOOD PRESSURE: 60 MMHG | SYSTOLIC BLOOD PRESSURE: 124 MMHG | TEMPERATURE: 97.8 F

## 2022-06-02 PROCEDURE — G0155 HHCP-SVS OF CSW,EA 15 MIN: HCPCS

## 2022-06-02 ASSESSMENT — ENCOUNTER SYMPTOMS
PAIN: 1
PAIN LOCATION - PAIN SEVERITY: 4/10
PAIN LOCATION: RIGHT SHOULDER
PERSON REPORTING PAIN: PATIENT
LOWEST PAIN SEVERITY IN PAST 24 HOURS: 0/10
PAIN LOCATION - PAIN FREQUENCY: FREQUENT
SUBJECTIVE PAIN PROGRESSION: UNCHANGED
HIGHEST PAIN SEVERITY IN PAST 24 HOURS: 6/10
PAIN SEVERITY GOAL: 0/10

## 2022-06-03 ENCOUNTER — HOME CARE VISIT (OUTPATIENT)
Dept: HOME HEALTH SERVICES | Facility: HOME HEALTHCARE | Age: 87
End: 2022-06-03
Payer: MEDICARE

## 2022-06-03 PROCEDURE — G0152 HHCP-SERV OF OT,EA 15 MIN: HCPCS

## 2022-06-04 VITALS
DIASTOLIC BLOOD PRESSURE: 62 MMHG | SYSTOLIC BLOOD PRESSURE: 128 MMHG | HEART RATE: 74 BPM | OXYGEN SATURATION: 94 % | RESPIRATION RATE: 16 BRPM | TEMPERATURE: 98.1 F

## 2022-06-04 ASSESSMENT — ENCOUNTER SYMPTOMS
PAIN SEVERITY GOAL: 0/10
PAIN: 1
PAIN LOCATION - PAIN SEVERITY: 4/10
PAIN LOCATION: RIGHT SHOULDER
PAIN LOCATION - PAIN FREQUENCY: FREQUENT
SUBJECTIVE PAIN PROGRESSION: UNCHANGED
LOWEST PAIN SEVERITY IN PAST 24 HOURS: 0/10
PERSON REPORTING PAIN: PATIENT
HIGHEST PAIN SEVERITY IN PAST 24 HOURS: 6/10

## 2022-06-06 ENCOUNTER — HOME CARE VISIT (OUTPATIENT)
Dept: HOME HEALTH SERVICES | Facility: HOME HEALTHCARE | Age: 87
End: 2022-06-06
Payer: MEDICARE

## 2022-06-06 ASSESSMENT — ACTIVITIES OF DAILY LIVING (ADL)
OASIS_M1830: 01
HOME_HEALTH_OASIS: 00

## 2022-06-07 ENCOUNTER — HOME CARE VISIT (OUTPATIENT)
Dept: HOME HEALTH SERVICES | Facility: HOME HEALTHCARE | Age: 87
End: 2022-06-07
Payer: MEDICARE

## 2022-06-10 ENCOUNTER — HOME CARE VISIT (OUTPATIENT)
Dept: HOME HEALTH SERVICES | Facility: HOME HEALTHCARE | Age: 87
End: 2022-06-10
Payer: MEDICARE

## 2022-06-10 NOTE — CASE COMMUNICATION
Quality Review Completed for DC OASIS by RICARDO Gamble RN on 6/10/2022:  Edits completed by RICARDO Gamble RN:  1. [id number] is CG assists per   2.  is yes for med education per care plan

## 2022-08-17 PROBLEM — Z99.81 CHRONIC RESPIRATORY FAILURE WITH HYPOXIA, ON HOME OXYGEN THERAPY (HCC): Status: ACTIVE | Noted: 2021-08-30

## 2022-08-17 PROBLEM — J96.11 CHRONIC RESPIRATORY FAILURE WITH HYPOXIA, ON HOME OXYGEN THERAPY (HCC): Status: ACTIVE | Noted: 2021-08-30

## 2022-08-17 PROBLEM — R06.09 DYSPNEA ON EXERTION: Status: ACTIVE | Noted: 2022-08-17

## 2022-09-28 ENCOUNTER — DOCUMENTATION (OUTPATIENT)
Dept: HEALTH INFORMATION MANAGEMENT | Facility: OTHER | Age: 87
End: 2022-09-28
Payer: MEDICARE

## 2022-10-12 ENCOUNTER — HOSPITAL ENCOUNTER (OUTPATIENT)
Dept: LAB | Facility: MEDICAL CENTER | Age: 87
End: 2022-10-12
Attending: FAMILY MEDICINE
Payer: MEDICARE

## 2022-10-12 DIAGNOSIS — R73.03 PREDIABETES: ICD-10-CM

## 2022-10-12 DIAGNOSIS — E83.52 HYPERCALCEMIA: ICD-10-CM

## 2022-10-12 DIAGNOSIS — E03.9 HYPOTHYROIDISM, UNSPECIFIED TYPE: ICD-10-CM

## 2022-10-12 DIAGNOSIS — R79.9 ABNORMAL FINDING OF BLOOD CHEMISTRY, UNSPECIFIED: ICD-10-CM

## 2022-10-12 LAB
25(OH)D3 SERPL-MCNC: 48 NG/ML (ref 30–100)
ALBUMIN SERPL BCP-MCNC: 4.4 G/DL (ref 3.2–4.9)
ALBUMIN/GLOB SERPL: 1.8 G/DL
ALP SERPL-CCNC: 95 U/L (ref 30–99)
ALT SERPL-CCNC: 20 U/L (ref 2–50)
ANION GAP SERPL CALC-SCNC: 10 MMOL/L (ref 7–16)
AST SERPL-CCNC: 21 U/L (ref 12–45)
BASOPHILS # BLD AUTO: 0.6 % (ref 0–1.8)
BASOPHILS # BLD: 0.05 K/UL (ref 0–0.12)
BILIRUB SERPL-MCNC: 0.4 MG/DL (ref 0.1–1.5)
BUN SERPL-MCNC: 36 MG/DL (ref 8–22)
CALCIUM SERPL-MCNC: 10.4 MG/DL (ref 8.5–10.5)
CHLORIDE SERPL-SCNC: 103 MMOL/L (ref 96–112)
CHOLEST SERPL-MCNC: 222 MG/DL (ref 100–199)
CO2 SERPL-SCNC: 26 MMOL/L (ref 20–33)
CREAT SERPL-MCNC: 1.02 MG/DL (ref 0.5–1.4)
EOSINOPHIL # BLD AUTO: 0.24 K/UL (ref 0–0.51)
EOSINOPHIL NFR BLD: 3 % (ref 0–6.9)
ERYTHROCYTE [DISTWIDTH] IN BLOOD BY AUTOMATED COUNT: 47.9 FL (ref 35.9–50)
EST. AVERAGE GLUCOSE BLD GHB EST-MCNC: 114 MG/DL
FASTING STATUS PATIENT QL REPORTED: NORMAL
GFR SERPLBLD CREATININE-BSD FMLA CKD-EPI: 51 ML/MIN/1.73 M 2
GLOBULIN SER CALC-MCNC: 2.4 G/DL (ref 1.9–3.5)
GLUCOSE SERPL-MCNC: 109 MG/DL (ref 65–99)
HBA1C MFR BLD: 5.6 % (ref 4–5.6)
HCT VFR BLD AUTO: 39.7 % (ref 37–47)
HDLC SERPL-MCNC: 40 MG/DL
HGB BLD-MCNC: 12.9 G/DL (ref 12–16)
IMM GRANULOCYTES # BLD AUTO: 0.04 K/UL (ref 0–0.11)
IMM GRANULOCYTES NFR BLD AUTO: 0.5 % (ref 0–0.9)
LDLC SERPL CALC-MCNC: 133 MG/DL
LYMPHOCYTES # BLD AUTO: 3.37 K/UL (ref 1–4.8)
LYMPHOCYTES NFR BLD: 41.8 % (ref 22–41)
MCH RBC QN AUTO: 31.7 PG (ref 27–33)
MCHC RBC AUTO-ENTMCNC: 32.5 G/DL (ref 33.6–35)
MCV RBC AUTO: 97.5 FL (ref 81.4–97.8)
MONOCYTES # BLD AUTO: 0.8 K/UL (ref 0–0.85)
MONOCYTES NFR BLD AUTO: 9.9 % (ref 0–13.4)
NEUTROPHILS # BLD AUTO: 3.56 K/UL (ref 2–7.15)
NEUTROPHILS NFR BLD: 44.2 % (ref 44–72)
NRBC # BLD AUTO: 0 K/UL
NRBC BLD-RTO: 0 /100 WBC
PLATELET # BLD AUTO: 187 K/UL (ref 164–446)
PMV BLD AUTO: 10.4 FL (ref 9–12.9)
POTASSIUM SERPL-SCNC: 4.3 MMOL/L (ref 3.6–5.5)
PROT SERPL-MCNC: 6.8 G/DL (ref 6–8.2)
PTH-INTACT SERPL-MCNC: 159 PG/ML (ref 14–72)
RBC # BLD AUTO: 4.07 M/UL (ref 4.2–5.4)
SODIUM SERPL-SCNC: 139 MMOL/L (ref 135–145)
TRIGL SERPL-MCNC: 247 MG/DL (ref 0–149)
TSH SERPL DL<=0.005 MIU/L-ACNC: 3.01 UIU/ML (ref 0.38–5.33)
WBC # BLD AUTO: 8.1 K/UL (ref 4.8–10.8)

## 2022-10-12 PROCEDURE — 83970 ASSAY OF PARATHORMONE: CPT

## 2022-10-12 PROCEDURE — 84443 ASSAY THYROID STIM HORMONE: CPT

## 2022-10-12 PROCEDURE — 80053 COMPREHEN METABOLIC PANEL: CPT

## 2022-10-12 PROCEDURE — 80061 LIPID PANEL: CPT

## 2022-10-12 PROCEDURE — 85025 COMPLETE CBC W/AUTO DIFF WBC: CPT

## 2022-10-12 PROCEDURE — 83036 HEMOGLOBIN GLYCOSYLATED A1C: CPT

## 2022-10-12 PROCEDURE — 36415 COLL VENOUS BLD VENIPUNCTURE: CPT

## 2022-10-12 PROCEDURE — 82306 VITAMIN D 25 HYDROXY: CPT

## 2022-10-17 ENCOUNTER — OFFICE VISIT (OUTPATIENT)
Dept: MEDICAL GROUP | Facility: PHYSICIAN GROUP | Age: 87
End: 2022-10-17
Payer: MEDICARE

## 2022-10-17 VITALS
RESPIRATION RATE: 12 BRPM | BODY MASS INDEX: 29.34 KG/M2 | DIASTOLIC BLOOD PRESSURE: 56 MMHG | HEIGHT: 57 IN | WEIGHT: 136 LBS | HEART RATE: 68 BPM | OXYGEN SATURATION: 94 % | SYSTOLIC BLOOD PRESSURE: 126 MMHG | TEMPERATURE: 97.6 F

## 2022-10-17 DIAGNOSIS — J96.11 CHRONIC RESPIRATORY FAILURE WITH HYPOXIA, ON HOME OXYGEN THERAPY (HCC): ICD-10-CM

## 2022-10-17 DIAGNOSIS — G60.9 IDIOPATHIC PERIPHERAL NEUROPATHY: ICD-10-CM

## 2022-10-17 DIAGNOSIS — M25.511 CHRONIC RIGHT SHOULDER PAIN: ICD-10-CM

## 2022-10-17 DIAGNOSIS — R54 FRAILTY: ICD-10-CM

## 2022-10-17 DIAGNOSIS — N18.31 STAGE 3A CHRONIC KIDNEY DISEASE: ICD-10-CM

## 2022-10-17 DIAGNOSIS — I10 PRIMARY HYPERTENSION: ICD-10-CM

## 2022-10-17 DIAGNOSIS — Z99.81 CHRONIC RESPIRATORY FAILURE WITH HYPOXIA, ON HOME OXYGEN THERAPY (HCC): ICD-10-CM

## 2022-10-17 DIAGNOSIS — G89.29 CHRONIC RIGHT SHOULDER PAIN: ICD-10-CM

## 2022-10-17 DIAGNOSIS — E21.3 HYPERPARATHYROIDISM (HCC): ICD-10-CM

## 2022-10-17 PROCEDURE — 99215 OFFICE O/P EST HI 40 MIN: CPT | Performed by: FAMILY MEDICINE

## 2022-10-17 RX ORDER — HYDROCHLOROTHIAZIDE 25 MG/1
12.5 TABLET ORAL DAILY
Qty: 100 TABLET | Refills: 3 | Status: SHIPPED | OUTPATIENT
Start: 2022-10-17 | End: 2022-12-08 | Stop reason: SDUPTHER

## 2022-10-17 ASSESSMENT — FIBROSIS 4 INDEX: FIB4 SCORE: 2.34

## 2022-10-17 NOTE — ASSESSMENT & PLAN NOTE
Chronic, fairly stable and continues to avoid NSAIDs however her most recent GFR remained slightly low at 51.  We did cut back her hydrochlorothiazide to 12.5 mg daily and she will monitor her home blood pressures

## 2022-10-17 NOTE — ASSESSMENT & PLAN NOTE
Chronic, stable on carvedilol 3.125 mg twice daily and hydrochlorothiazide, previously she was on 25 mg of hydrochlorothiazide daily however after her lab review she does have decreased kidney function so we will cut this in half to 12.5 mg daily and she will monitor her home blood pressures and let me know if they elevate above 140/90 and we can readjust her medications.

## 2022-10-17 NOTE — ASSESSMENT & PLAN NOTE
She has had borderline PTH (81.6, 90.2), however her most recent parathyroid lab this month was up to 159, calcium and vitamin D were within normal range.  We will continue to monitor.

## 2022-10-17 NOTE — PROGRESS NOTES
Subjective:     CC:   Chief Complaint   Patient presents with    Establish Care     New patient  was  patient  Walks with cane   right hip and leg get tremors/shaking   lower back problems  Flu shot given early September by [employer]  Not in [facility] yet        HISTORY OF THE PRESENT ILLNESS: Patient is a 89 y.o. female. This pleasant patient is here today to establish care and discuss current medical conditions. Her prior PCP was Dr. Ball.  States that she has restless legs at night and she has to get up out  of  bed to help stop this. She is wanting to try leg and back pain relief tablets that are holistic, she also uses cream that is helpful with with. She has tried Magnesium orally which has not been helpful.  States if she is sitting/reading or lying in bed  at its worst, of walking she does okay with it.  She currently does not want to try any prescription medication management for this however will let me know if her restless leg and peripheral neuropathy worsens and we could try medication management.    Peripheral neuropathy  Chronic, has peripheral  Neuropathy in feet bilaterally however she does not want to try medication management for this at this time.  She continues to use capsaicin cream over-the-counter.     Chronic respiratory failure with hypoxia, on home oxygen therapy (HCC)  Chronic, taking 2 L at bedtime.     Hyperparathyroidism ([facility])  She has had borderline PTH (81.6, 90.2), however her most recent parathyroid lab this month was up to 159, calcium and vitamin D were within normal range.  We will continue to monitor.    HTN (hypertension)  Chronic, stable on carvedilol 3.125 mg twice daily and hydrochlorothiazide, previously she was on 25 mg of hydrochlorothiazide daily however after her lab review she does have decreased kidney function so we will cut this in half to 12.5 mg daily and she will monitor her home blood pressures and let me know if they elevate above 140/90 and we  "can readjust her medications.    Stage 3a chronic kidney disease (HCC)  Chronic, fairly stable and continues to avoid NSAIDs however her most recent GFR remained slightly low at 51.  We did cut back her hydrochlorothiazide to 12.5 mg daily and she will monitor her home blood pressures      Current Outpatient Medications Ordered in Epic   Medication Sig Dispense Refill    hydroCHLOROthiazide (HYDRODIURIL) 25 MG Tab Take 0.5 Tablets by mouth every day. 100 Tablet 3    Magnesium 500 MG Cap Take 1 Each by mouth every day at 6 PM.      acetaminophen (TYLENOL) 500 MG Tab Take 500-1,000 mg by mouth every 6 hours as needed for Mild Pain or Moderate Pain.      levothyroxine (SYNTHROID) 25 MCG Tab Take 1 Tablet by mouth every morning on an empty stomach. 100 Tablet 3    carvedilol (COREG) 3.125 MG Tab Take 1 Tablet by mouth 2 times a day with meals. 200 Tablet 3    Home Care Oxygen Inhale 2 L/min at bedtime. Oxygen dose range: 2 L/min  Respiratory route via: Nasal Cannula   Oxygen supplier: [facility]          Ascorbic Acid (VITAMIN C) 1000 MG Tab Take 1,000 mg by mouth every day.      Cyanocobalamin (VITAMIN B-12) 1000 MCG Tab Take 1,000 mcg by mouth every day.      Misc Natural Products (GLUCOSAMINE CHOND CMP ADVANCED PO) Take 1 Tablet by mouth every day.      Multiple Vitamins-Minerals (CENTRUM SILVER PO) Take 1 Tablet by mouth every day.      aspirin (ASA) 81 MG Chew Tab chewable tablet Take 162 mg by mouth every day.       No current [facility]-ordered facility-administered medications on file.       Health Maintenance: Completed          Objective:       Exam: /56 (BP Location: Right arm, Patient Position: Sitting, BP Cuff Size: Adult)   Pulse 68   Temp 36.4 °C (97.6 °F) (Temporal)   Resp 12   Ht 1.448 m (4' 9\")   Wt 61.7 kg (136 lb)   SpO2 94%  Body mass index is 29.43 kg/m².    Physical Exam  Vitals reviewed.   Constitutional:       General: She is not in acute distress.     Appearance: Normal " appearance.   Eyes:      Conjunctiva/sclera: Conjunctivae normal.      Pupils: Pupils are equal, round, and reactive to light.   Cardiovascular:      Rate and Rhythm: Normal rate and regular rhythm.      Pulses: Normal pulses.      Heart sounds: Normal heart sounds. No murmur heard.  Pulmonary:      Effort: Pulmonary effort is normal. No respiratory distress.      Breath sounds: No stridor. No wheezing or rales.   Chest:      Chest wall: No tenderness.   Abdominal:      General: Bowel sounds are normal.   Musculoskeletal:      Right lower leg: No edema.      Left lower leg: No edema.   Skin:     General: Skin is warm.   Neurological:      Mental Status: She is alert and oriented to person, place, and time.   Psychiatric:         Mood and Affect: Mood normal.         Behavior: Behavior normal.        A chaperone was offered to the patient during today's exam. Patient declined chaperone.        Assessment & Plan:   89 y.o. female with the following -    1. Idiopathic peripheral neuropathy  Chronic, stable.  Discussed if she would like to try medication management for this in the future please let me know.  2. Chronic respiratory failure with hypoxia, on home oxygen therapy (HCC)  Chronic, Stable on 2 L nightly  3. Hyperparathyroidism (HCC)  Chronic, asymptomatic at this time however her PTH was elevated to 159 and she was borderline previously.  We will recheck in 3 months.  - PTH INTACT (PTH ONLY); Future  - Comp Metabolic Panel; Future    4. Chronic right shoulder pain  She was seen at [facility], x-rays did not show any acute abnormality however she was told she had a rotator cuff tear and she did do home health physical therapy and was given a corticosteroid injection.  She feels that the injection was not helpful and she would like to try outpatient physical therapy, on examination she does have limited strength and range of motion in her right shoulder, cannot raise her arm above her head and cross body  movements, extension and flexion painful.  - Referral to Physical Therapy    5. Frailty  Chronic, she is requesting a wheelchair for when she is out of the stores or needs to go to an appointment has to walk walk a long ways that she gets very fatigued and feels tired afterwards.  Currently walks with a 4 pronged cane and denies any recent falls.  - URINALYSIS,CULTURE IF INDICATED; Future    6. Primary hypertension  Chronic, stable on current medication regimen, did adjust her hydrochlorothiazide down to 12.5 mg today and will check blood pressures at home regularly and report back if there elevated above 140/90.  7. Stage 3a chronic kidney disease (HCC)  Chronic, stable  Other orders  - hydroCHLOROthiazide (HYDRODIURIL) 25 MG Tab; Take 0.5 Tablets by mouth every day.  Dispense: 100 Tablet; Refill: 3     I spent a total of 50 minutes with record review, exam, communication with the patient, communication with other providers, and documentation of this encounter.    Return in about 3 months (around 1/17/2023) for F/U labs.    Please note that this dictation was created using voice recognition software. I have made every reasonable attempt to correct obvious errors, but I expect that there are errors of grammar and possibly content that I did not discover before finalizing the note.

## 2022-10-17 NOTE — ASSESSMENT & PLAN NOTE
Chronic, has peripheral  Neuropathy in feet bilaterally however she does not want to try medication management for this at this time.  She continues to use capsaicin cream over-the-counter.

## 2022-11-03 DIAGNOSIS — S69.90XA HAND INJURY: ICD-10-CM

## 2022-11-04 RX ORDER — CARVEDILOL 3.12 MG/1
TABLET ORAL
Qty: 200 TABLET | Refills: 3 | Status: SHIPPED | OUTPATIENT
Start: 2022-11-04 | End: 2022-12-08 | Stop reason: SDUPTHER

## 2022-12-02 ENCOUNTER — HOSPITAL ENCOUNTER (OUTPATIENT)
Dept: LAB | Facility: MEDICAL CENTER | Age: 87
End: 2022-12-02
Attending: FAMILY MEDICINE
Payer: MEDICARE

## 2022-12-02 DIAGNOSIS — E21.3 HYPERPARATHYROIDISM (HCC): ICD-10-CM

## 2022-12-02 DIAGNOSIS — R54 FRAILTY: ICD-10-CM

## 2022-12-02 LAB
ALBUMIN SERPL BCP-MCNC: 4.5 G/DL (ref 3.2–4.9)
ALBUMIN/GLOB SERPL: 1.7 G/DL
ALP SERPL-CCNC: 101 U/L (ref 30–99)
ALT SERPL-CCNC: 22 U/L (ref 2–50)
ANION GAP SERPL CALC-SCNC: 10 MMOL/L (ref 7–16)
APPEARANCE UR: CLEAR
AST SERPL-CCNC: 22 U/L (ref 12–45)
BACTERIA #/AREA URNS HPF: NEGATIVE /HPF
BILIRUB SERPL-MCNC: 0.3 MG/DL (ref 0.1–1.5)
BILIRUB UR QL STRIP.AUTO: NEGATIVE
BUN SERPL-MCNC: 43 MG/DL (ref 8–22)
CALCIUM SERPL-MCNC: 10.3 MG/DL (ref 8.5–10.5)
CHLORIDE SERPL-SCNC: 105 MMOL/L (ref 96–112)
CO2 SERPL-SCNC: 26 MMOL/L (ref 20–33)
COLOR UR: YELLOW
CREAT SERPL-MCNC: 1.01 MG/DL (ref 0.5–1.4)
EPI CELLS #/AREA URNS HPF: ABNORMAL /HPF
GFR SERPLBLD CREATININE-BSD FMLA CKD-EPI: 52 ML/MIN/1.73 M 2
GLOBULIN SER CALC-MCNC: 2.6 G/DL (ref 1.9–3.5)
GLUCOSE SERPL-MCNC: 107 MG/DL (ref 65–99)
GLUCOSE UR STRIP.AUTO-MCNC: NEGATIVE MG/DL
KETONES UR STRIP.AUTO-MCNC: NEGATIVE MG/DL
LEUKOCYTE ESTERASE UR QL STRIP.AUTO: ABNORMAL
MICRO URNS: ABNORMAL
NITRITE UR QL STRIP.AUTO: NEGATIVE
PH UR STRIP.AUTO: 6 [PH] (ref 5–8)
POTASSIUM SERPL-SCNC: 3.6 MMOL/L (ref 3.6–5.5)
PROT SERPL-MCNC: 7.1 G/DL (ref 6–8.2)
PROT UR QL STRIP: NEGATIVE MG/DL
PTH-INTACT SERPL-MCNC: 151 PG/ML (ref 14–72)
RBC # URNS HPF: ABNORMAL /HPF
RBC UR QL AUTO: NEGATIVE
RENAL EPI CELLS #/AREA URNS HPF: ABNORMAL /HPF
SODIUM SERPL-SCNC: 141 MMOL/L (ref 135–145)
SP GR UR STRIP.AUTO: 1.02
UROBILINOGEN UR STRIP.AUTO-MCNC: 0.2 MG/DL
WBC #/AREA URNS HPF: ABNORMAL /HPF

## 2022-12-02 PROCEDURE — 36415 COLL VENOUS BLD VENIPUNCTURE: CPT

## 2022-12-02 PROCEDURE — 83970 ASSAY OF PARATHORMONE: CPT

## 2022-12-02 PROCEDURE — 81001 URINALYSIS AUTO W/SCOPE: CPT

## 2022-12-02 PROCEDURE — 80053 COMPREHEN METABOLIC PANEL: CPT

## 2022-12-08 ENCOUNTER — OFFICE VISIT (OUTPATIENT)
Dept: MEDICAL GROUP | Age: 87
End: 2022-12-08
Payer: MEDICARE

## 2022-12-08 VITALS
SYSTOLIC BLOOD PRESSURE: 126 MMHG | TEMPERATURE: 97.6 F | HEIGHT: 57 IN | OXYGEN SATURATION: 94 % | DIASTOLIC BLOOD PRESSURE: 74 MMHG | HEART RATE: 77 BPM | BODY MASS INDEX: 29.86 KG/M2 | WEIGHT: 138.4 LBS

## 2022-12-08 DIAGNOSIS — G60.9 HEREDITARY PERIPHERAL NEUROPATHY: ICD-10-CM

## 2022-12-08 DIAGNOSIS — I10 PRIMARY HYPERTENSION: ICD-10-CM

## 2022-12-08 DIAGNOSIS — N39.0 URINARY TRACT INFECTION WITHOUT HEMATURIA, SITE UNSPECIFIED: ICD-10-CM

## 2022-12-08 DIAGNOSIS — Z00.00 ENCOUNTER FOR MEDICAL EXAMINATION TO ESTABLISH CARE: ICD-10-CM

## 2022-12-08 PROCEDURE — 99214 OFFICE O/P EST MOD 30 MIN: CPT | Performed by: FAMILY MEDICINE

## 2022-12-08 RX ORDER — LEVOTHYROXINE SODIUM 0.03 MG/1
25 TABLET ORAL
Qty: 100 TABLET | Refills: 3 | Status: ON HOLD | OUTPATIENT
Start: 2022-12-08 | End: 2024-01-01

## 2022-12-08 RX ORDER — HYDROCHLOROTHIAZIDE 25 MG/1
12.5 TABLET ORAL DAILY
Qty: 100 TABLET | Refills: 3 | Status: SHIPPED | OUTPATIENT
Start: 2022-12-08 | End: 2022-12-21 | Stop reason: SDUPTHER

## 2022-12-08 RX ORDER — CARVEDILOL 3.12 MG/1
3.12 TABLET ORAL 2 TIMES DAILY WITH MEALS
Qty: 200 TABLET | Refills: 3 | Status: ON HOLD | OUTPATIENT
Start: 2022-12-08 | End: 2024-01-01

## 2022-12-08 RX ORDER — ERGOCALCIFEROL 1.25 MG/1
CAPSULE ORAL
COMMUNITY
End: 2023-01-01

## 2022-12-08 RX ORDER — NITROFURANTOIN 25; 75 MG/1; MG/1
100 CAPSULE ORAL 2 TIMES DAILY
Qty: 20 CAPSULE | Refills: 0 | Status: SHIPPED | OUTPATIENT
Start: 2022-12-08 | End: 2022-12-18

## 2022-12-08 ASSESSMENT — FIBROSIS 4 INDEX: FIB4 SCORE: 2.33

## 2022-12-08 NOTE — PROGRESS NOTES
This medical record contains text that has been entered with the assistance of computer voice recognition and dictation software.  Therefore, it may contain unintended errors in text, spelling, punctuation, or grammar      Chief Complaint   Patient presents with    Establish Care         Regine Bryant is a 89 y.o. female here evaluation and management of: establish care      HPI:           1. Encounter for medical examination to establish care  Regine is a very pleasant 89-year-old female who presents to clinic to establish care.  She has a significant past medical history of hyperparathyroidism, hypothyroidism, peripheral neuropathy, history of DVT hyperlipidemia.      Today the patient denied any fevers, chills, headaches, nausea, vomiting, changes in vision, shortness of breath, back pain, chest pain, nausea or vomiting, change in taste or smell, new rashes, joint pain, blood in stool dark tarry stool.      Past medical history see above and below    Family History of Cancer-- none    Family History of early CAD (female for the age of 65, or a male before the age of 55 )---none      Social History        Occupation----retired retail     Exercise---not too much     Colonoscopy---UTD    Pets---none, always had a dog, lives alone  Children live in [place] 2 boys and 1 girl    Favorite sports team--- [employer]        2. Hereditary peripheral neuropathy  The patient states she has a history of neuropathy.  She states she sometimes gets numbness on her toes bilaterally.  She does not take anything for this and this is not causing dysfunction.    3. Primary hypertension  Hydrochlorothiazide 12.5 mg p.o. daily  Carvedilol 3.125 mg p.o. twice daily      Overall the patient has been compliant with his medical regimen, denies any adverse side effects such as cough, no syncope, no presyncope, no excessive fatigue.  The patient denies any chest pain today no headaches.      4. Urinary tract infection without  hematuria, site unspecified  The patient had a urinalysis which revealed UTI but she remains asymptomatic.  However she states in the past she was asymptomatic and she ended up going to the hospital.    Urinalysis from December 2, 2022   Latest Reference Range & Units 12/02/22 09:09   Color  Yellow   Character  Clear   Specific Gravity <1.035  1.025   Ph 5.0 - 8.0  6.0   Glucose Negative mg/dL Negative   Ketones Negative mg/dL Negative   Bilirubin Negative  Negative   Occult Blood Negative  Negative   Protein Negative mg/dL Negative   Nitrite Negative  Negative   Leukocyte Esterase Negative  Small !   Urobilinogen, Urine Negative  0.2   Micro Urine Req  Microscopic   WBC /hpf 5-10 !   RBC /hpf 0-2   Epithelial Cells /hpf Many !   Epithelial Cells Renal /hpf Rare   Bacteria None /hpf Negative   !: Data is abnormal    Current medicines (including changes today)  Current Outpatient Medications   Medication Sig Dispense Refill    vitamin D2, Ergocalciferol, (DRISDOL) 1.25 MG (55797 UT) Cap capsule Take  by mouth every 7 days.      carvedilol (COREG) 3.125 MG Tab Take 1 Tablet by mouth 2 times a day with meals. 200 Tablet 3    hydroCHLOROthiazide (HYDRODIURIL) 25 MG Tab Take 0.5 Tablets by mouth every day. 100 Tablet 3    levothyroxine (SYNTHROID) 25 MCG Tab Take 1 Tablet by mouth every morning on an empty stomach. 100 Tablet 3    nitrofurantoin (MACROBID) 100 MG Cap Take 1 Capsule by mouth 2 times a day for 10 days. 20 Capsule 0    Magnesium 500 MG Cap Take 1 Each by mouth every day at 6 PM.      acetaminophen (TYLENOL) 500 MG Tab Take 500-1,000 mg by mouth every 6 hours as needed for Mild Pain or Moderate Pain.      Home Care Oxygen Inhale 2 L/min at bedtime. Oxygen dose range: 2 L/min  Respiratory route via: Nasal Cannula   Oxygen supplier: [facility]          Ascorbic Acid (VITAMIN C) 1000 MG Tab Take 1,000 mg by mouth every day.      Cyanocobalamin (VITAMIN B-12) 1000 MCG Tab Take 1,000 mcg by mouth every  "day.      Misc Natural Products (GLUCOSAMINE CHOND CMP ADVANCED PO) Take 1 Tablet by mouth every day.      Multiple Vitamins-Minerals (CENTRUM SILVER PO) Take 1 Tablet by mouth every day.      aspirin (ASA) 81 MG Chew Tab chewable tablet Take 162 mg by mouth every day.       No current facility-administered medications for this visit.     She  has a past medical history of Hypercholesterolemia, Hypertension, Hypertension, and Thyroid disease.  She  has a past surgical history that includes gyn surgery and abdominal hysterectomy total.  Social History     Tobacco Use    Smoking status: Never    Smokeless tobacco: Never   Vaping Use    Vaping Use: Never used   Substance Use Topics    Alcohol use: No    Drug use: No     Social History     Social History Narrative    , lives alone. Retired, worked in an [employer]     Has 3 children, live near by and see's them fairly often.      Family History   Problem Relation Age of Onset    Hypertension Brother      Family Status   Relation Name Status    Mo      Fa      Bro  Alive         ROS    The pertinent  ROS findings can be seen in the HPI above.     All other systems reviewed and are negative     Objective:     /74   Pulse 77   Temp 36.4 °C (97.6 °F) (Temporal)   Ht 1.448 m (4' 9\")   Wt 62.8 kg (138 lb 6.4 oz)   SpO2 94%  Body mass index is 29.95 kg/m².      Physical Exam:    Constitutional: Alert, no distress.  Skin: No suspicious lesions  Eye: Equal, round and reactive, conjunctiva clear, lids normal.  ENMT: Lips without lesions, good dentition, oropharynx clear.  Neck: Trachea midline, no masses, no thyromegaly. No cervical or supraclavicular lymphadenopathy.  Respiratory: Unlabored respiratory effort, lungs clear to auscultation, no wheezes, no ronchi.  Cardiovascular: Normal S1, S2, no murmur, no edema  Abdomen: Soft, non-tender, no masses, no hepatosplenomegaly.        Assessment and Plan:   The following treatment plan was " discussed      1. Encounter for medical examination to establish care  Care has been established  We need  updated l abs to establish a clinical profile      Age-appropriate preventive services recommended by USPTF guidelines and ACIP were discussed today.        The decision to initiate low-dose aspirin use for the primary prevention of CVD and CRC in adults aged 60 to 69 years who have a 10% or greater 10-year CVD risk should be an individual one        Requested any outside Medical records to be sent to us  Denies intimate partner viloence  Discussed seat belt safety    2. Hereditary peripheral neuropathy    Obtain a baseline B12 and folate      - VITAMIN B12; Future  - FOLATE; Future    3. Primary hypertension    Patient has been stable with current management  We will make no changes for now    - carvedilol (COREG) 3.125 MG Tab; Take 1 Tablet by mouth 2 times a day with meals.  Dispense: 200 Tablet; Refill: 3  - hydroCHLOROthiazide (HYDRODIURIL) 25 MG Tab; Take 0.5 Tablets by mouth every day.  Dispense: 100 Tablet; Refill: 3    4. Urinary tract infection without hematuria, site unspecified  - nitrofurantoin (MACROBID) 100 MG Cap; Take 1 Capsule by mouth 2 times a day for 10 days.  Dispense: 20 Capsule; Refill: 0    Other orders  - vitamin D2, Ergocalciferol, (DRISDOL) 1.25 MG (80647 UT) Cap capsule; Take  by mouth every 7 days.  - levothyroxine (SYNTHROID) 25 MCG Tab; Take 1 Tablet by mouth every morning on an empty stomach.  Dispense: 100 Tablet; Refill: 3             Instructed to Follow up in clinic or ER for worsening symptoms, difficulty breathing, lack of expected recovery, or should new symptoms or problems arise.    Followup: Return in about 3 months (around 3/8/2023) for Reevaluation.

## 2022-12-21 ENCOUNTER — TELEPHONE (OUTPATIENT)
Dept: MEDICAL GROUP | Age: 87
End: 2022-12-21
Payer: MEDICARE

## 2022-12-21 DIAGNOSIS — I10 PRIMARY HYPERTENSION: ICD-10-CM

## 2022-12-21 RX ORDER — HYDROCHLOROTHIAZIDE 25 MG/1
25 TABLET ORAL DAILY
Qty: 100 TABLET | Refills: 3 | Status: SHIPPED | OUTPATIENT
Start: 2022-12-21 | End: 2023-01-01

## 2022-12-21 NOTE — TELEPHONE ENCOUNTER
Fax Received From:   [facility]/pharmacy #[id number] - [place] - [address] [place]  [address]  [address]  [place] [address]  Phone: [phone] Fax: [phone]      Message: Patient's daughter states that the patient is taking 1 full tablet of hydroCHLOROthiazide (HYDRODIURIL) 25 MG Tab. Can you please reorder with these directions if appropriate.

## 2023-01-01 ENCOUNTER — PATIENT OUTREACH (OUTPATIENT)
Dept: HEALTH INFORMATION MANAGEMENT | Facility: OTHER | Age: 88
End: 2023-01-01
Payer: COMMERCIAL

## 2023-01-01 ENCOUNTER — OFFICE VISIT (OUTPATIENT)
Dept: MEDICAL GROUP | Facility: IMAGING CENTER | Age: 88
End: 2023-01-01
Payer: MEDICARE

## 2023-01-01 ENCOUNTER — HOME CARE VISIT (OUTPATIENT)
Dept: HOME HEALTH SERVICES | Facility: HOME HEALTHCARE | Age: 88
End: 2023-01-01
Payer: MEDICARE

## 2023-01-01 ENCOUNTER — HOME HEALTH ADMISSION (OUTPATIENT)
Dept: HOME HEALTH SERVICES | Facility: HOME HEALTHCARE | Age: 88
End: 2023-01-01
Payer: MEDICARE

## 2023-01-01 ENCOUNTER — HOSPITAL ENCOUNTER (OUTPATIENT)
Facility: MEDICAL CENTER | Age: 88
End: 2024-01-02
Attending: EMERGENCY MEDICINE | Admitting: HOSPITALIST
Payer: MEDICARE

## 2023-01-01 ENCOUNTER — OFFICE VISIT (OUTPATIENT)
Dept: MEDICAL GROUP | Age: 88
End: 2023-01-01
Payer: MEDICARE

## 2023-01-01 ENCOUNTER — OFFICE VISIT (OUTPATIENT)
Dept: CARDIOLOGY | Facility: MEDICAL CENTER | Age: 88
End: 2023-01-01
Attending: INTERNAL MEDICINE
Payer: MEDICARE

## 2023-01-01 ENCOUNTER — APPOINTMENT (OUTPATIENT)
Dept: RADIOLOGY | Facility: MEDICAL CENTER | Age: 88
End: 2023-01-01
Attending: EMERGENCY MEDICINE
Payer: MEDICARE

## 2023-01-01 ENCOUNTER — TELEPHONE (OUTPATIENT)
Dept: HEALTH INFORMATION MANAGEMENT | Facility: OTHER | Age: 88
End: 2023-01-01
Payer: COMMERCIAL

## 2023-01-01 ENCOUNTER — HOSPITAL ENCOUNTER (OUTPATIENT)
Dept: LAB | Facility: MEDICAL CENTER | Age: 88
End: 2023-10-25
Attending: FAMILY MEDICINE
Payer: MEDICARE

## 2023-01-01 ENCOUNTER — HOSPITAL ENCOUNTER (OUTPATIENT)
Dept: CARDIOLOGY | Facility: MEDICAL CENTER | Age: 88
End: 2023-04-26
Attending: STUDENT IN AN ORGANIZED HEALTH CARE EDUCATION/TRAINING PROGRAM
Payer: MEDICARE

## 2023-01-01 ENCOUNTER — TELEPHONE (OUTPATIENT)
Dept: CARDIOLOGY | Facility: MEDICAL CENTER | Age: 88
End: 2023-01-01
Payer: COMMERCIAL

## 2023-01-01 ENCOUNTER — HOSPITAL ENCOUNTER (OUTPATIENT)
Dept: LAB | Facility: MEDICAL CENTER | Age: 88
End: 2023-11-20
Attending: PHYSICIAN ASSISTANT
Payer: MEDICARE

## 2023-01-01 ENCOUNTER — APPOINTMENT (OUTPATIENT)
Dept: CARDIOLOGY | Facility: MEDICAL CENTER | Age: 88
End: 2023-01-01
Payer: MEDICARE

## 2023-01-01 ENCOUNTER — HOSPITAL ENCOUNTER (OUTPATIENT)
Dept: LAB | Facility: MEDICAL CENTER | Age: 88
End: 2023-05-22
Attending: STUDENT IN AN ORGANIZED HEALTH CARE EDUCATION/TRAINING PROGRAM
Payer: MEDICARE

## 2023-01-01 ENCOUNTER — TELEPHONE (OUTPATIENT)
Dept: HEALTH INFORMATION MANAGEMENT | Facility: OTHER | Age: 88
End: 2023-01-01

## 2023-01-01 ENCOUNTER — DOCUMENTATION (OUTPATIENT)
Dept: MEDICAL GROUP | Facility: PHYSICIAN GROUP | Age: 88
End: 2023-01-01
Payer: COMMERCIAL

## 2023-01-01 ENCOUNTER — OFFICE VISIT (OUTPATIENT)
Dept: URGENT CARE | Facility: CLINIC | Age: 88
End: 2023-01-01
Payer: MEDICARE

## 2023-01-01 ENCOUNTER — DOCUMENTATION (OUTPATIENT)
Dept: HEALTH INFORMATION MANAGEMENT | Facility: OTHER | Age: 88
End: 2023-01-01
Payer: COMMERCIAL

## 2023-01-01 ENCOUNTER — PATIENT OUTREACH (OUTPATIENT)
Dept: HEALTH INFORMATION MANAGEMENT | Facility: OTHER | Age: 88
End: 2023-01-01

## 2023-01-01 ENCOUNTER — OFFICE VISIT (OUTPATIENT)
Dept: DERMATOLOGY | Facility: IMAGING CENTER | Age: 88
End: 2023-01-01
Payer: MEDICARE

## 2023-01-01 ENCOUNTER — OFFICE VISIT (OUTPATIENT)
Dept: CARDIOLOGY | Facility: MEDICAL CENTER | Age: 88
End: 2023-01-01
Attending: FAMILY MEDICINE
Payer: MEDICARE

## 2023-01-01 ENCOUNTER — TELEPHONE (OUTPATIENT)
Dept: MEDICAL GROUP | Age: 88
End: 2023-01-01

## 2023-01-01 ENCOUNTER — HOSPITAL ENCOUNTER (OUTPATIENT)
Dept: RADIOLOGY | Facility: MEDICAL CENTER | Age: 88
End: 2023-07-05
Attending: STUDENT IN AN ORGANIZED HEALTH CARE EDUCATION/TRAINING PROGRAM
Payer: MEDICARE

## 2023-01-01 VITALS
OXYGEN SATURATION: 91 % | WEIGHT: 136 LBS | HEIGHT: 57 IN | HEART RATE: 78 BPM | SYSTOLIC BLOOD PRESSURE: 142 MMHG | DIASTOLIC BLOOD PRESSURE: 56 MMHG | BODY MASS INDEX: 29.34 KG/M2 | TEMPERATURE: 97.8 F

## 2023-01-01 VITALS
WEIGHT: 139 LBS | BODY MASS INDEX: 29.99 KG/M2 | DIASTOLIC BLOOD PRESSURE: 60 MMHG | RESPIRATION RATE: 14 BRPM | HEIGHT: 57 IN | SYSTOLIC BLOOD PRESSURE: 130 MMHG | HEART RATE: 85 BPM | OXYGEN SATURATION: 92 %

## 2023-01-01 VITALS
DIASTOLIC BLOOD PRESSURE: 56 MMHG | HEIGHT: 57 IN | SYSTOLIC BLOOD PRESSURE: 134 MMHG | BODY MASS INDEX: 29.04 KG/M2 | OXYGEN SATURATION: 98 % | HEART RATE: 77 BPM | RESPIRATION RATE: 14 BRPM | TEMPERATURE: 98.4 F | WEIGHT: 134.6 LBS

## 2023-01-01 VITALS
WEIGHT: 131 LBS | OXYGEN SATURATION: 94 % | SYSTOLIC BLOOD PRESSURE: 110 MMHG | BODY MASS INDEX: 28.26 KG/M2 | DIASTOLIC BLOOD PRESSURE: 68 MMHG | HEIGHT: 57 IN | RESPIRATION RATE: 16 BRPM | HEART RATE: 80 BPM | TEMPERATURE: 98 F

## 2023-01-01 VITALS
HEIGHT: 57 IN | DIASTOLIC BLOOD PRESSURE: 66 MMHG | SYSTOLIC BLOOD PRESSURE: 142 MMHG | BODY MASS INDEX: 29.17 KG/M2 | OXYGEN SATURATION: 96 % | WEIGHT: 135.2 LBS | RESPIRATION RATE: 14 BRPM | HEART RATE: 76 BPM | TEMPERATURE: 97.8 F

## 2023-01-01 VITALS
DIASTOLIC BLOOD PRESSURE: 62 MMHG | RESPIRATION RATE: 18 BRPM | SYSTOLIC BLOOD PRESSURE: 138 MMHG | TEMPERATURE: 99.1 F | OXYGEN SATURATION: 99 % | HEART RATE: 77 BPM

## 2023-01-01 VITALS
OXYGEN SATURATION: 96 % | RESPIRATION RATE: 18 BRPM | HEART RATE: 63 BPM | SYSTOLIC BLOOD PRESSURE: 132 MMHG | DIASTOLIC BLOOD PRESSURE: 60 MMHG | TEMPERATURE: 98 F

## 2023-01-01 VITALS
TEMPERATURE: 98 F | HEART RATE: 63 BPM | SYSTOLIC BLOOD PRESSURE: 132 MMHG | RESPIRATION RATE: 18 BRPM | OXYGEN SATURATION: 96 % | DIASTOLIC BLOOD PRESSURE: 60 MMHG

## 2023-01-01 VITALS
RESPIRATION RATE: 18 BRPM | SYSTOLIC BLOOD PRESSURE: 132 MMHG | HEART RATE: 84 BPM | HEIGHT: 57 IN | TEMPERATURE: 97.9 F | WEIGHT: 130 LBS | DIASTOLIC BLOOD PRESSURE: 82 MMHG | OXYGEN SATURATION: 98 % | BODY MASS INDEX: 28.05 KG/M2

## 2023-01-01 VITALS
RESPIRATION RATE: 16 BRPM | OXYGEN SATURATION: 94 % | HEART RATE: 86 BPM | DIASTOLIC BLOOD PRESSURE: 80 MMHG | SYSTOLIC BLOOD PRESSURE: 110 MMHG | TEMPERATURE: 100.5 F

## 2023-01-01 VITALS
HEART RATE: 82 BPM | TEMPERATURE: 97.8 F | OXYGEN SATURATION: 95 % | SYSTOLIC BLOOD PRESSURE: 120 MMHG | RESPIRATION RATE: 20 BRPM | DIASTOLIC BLOOD PRESSURE: 60 MMHG

## 2023-01-01 VITALS
BODY MASS INDEX: 30.85 KG/M2 | SYSTOLIC BLOOD PRESSURE: 142 MMHG | RESPIRATION RATE: 14 BRPM | WEIGHT: 143 LBS | DIASTOLIC BLOOD PRESSURE: 60 MMHG | HEIGHT: 57 IN | OXYGEN SATURATION: 93 % | HEART RATE: 63 BPM

## 2023-01-01 VITALS
SYSTOLIC BLOOD PRESSURE: 140 MMHG | OXYGEN SATURATION: 96 % | DIASTOLIC BLOOD PRESSURE: 70 MMHG | HEART RATE: 68 BPM | WEIGHT: 130.4 LBS | RESPIRATION RATE: 16 BRPM | TEMPERATURE: 98.2 F | HEIGHT: 57 IN | BODY MASS INDEX: 28.13 KG/M2

## 2023-01-01 VITALS
DIASTOLIC BLOOD PRESSURE: 60 MMHG | RESPIRATION RATE: 20 BRPM | HEART RATE: 80 BPM | SYSTOLIC BLOOD PRESSURE: 138 MMHG | TEMPERATURE: 98 F | OXYGEN SATURATION: 98 %

## 2023-01-01 VITALS
SYSTOLIC BLOOD PRESSURE: 158 MMHG | TEMPERATURE: 98.7 F | DIASTOLIC BLOOD PRESSURE: 60 MMHG | RESPIRATION RATE: 20 BRPM | OXYGEN SATURATION: 97 % | HEART RATE: 74 BPM

## 2023-01-01 DIAGNOSIS — L72.0 MILIA: ICD-10-CM

## 2023-01-01 DIAGNOSIS — M54.2 CERVICAL PAIN: ICD-10-CM

## 2023-01-01 DIAGNOSIS — E21.3 HYPERPARATHYROIDISM (HCC): ICD-10-CM

## 2023-01-01 DIAGNOSIS — M79.7 FIBROMYALGIA AFFECTING MULTIPLE SITES: ICD-10-CM

## 2023-01-01 DIAGNOSIS — I10 PRIMARY HYPERTENSION: ICD-10-CM

## 2023-01-01 DIAGNOSIS — M54.2 CHRONIC NECK AND BACK PAIN: ICD-10-CM

## 2023-01-01 DIAGNOSIS — M25.511 CHRONIC PAIN OF BOTH SHOULDERS: ICD-10-CM

## 2023-01-01 DIAGNOSIS — I10 ESSENTIAL HYPERTENSION: ICD-10-CM

## 2023-01-01 DIAGNOSIS — Z78.9 DIFFICULTY WITH ACTIVITIES OF DAILY LIVING: ICD-10-CM

## 2023-01-01 DIAGNOSIS — G89.29 CHRONIC NECK AND BACK PAIN: ICD-10-CM

## 2023-01-01 DIAGNOSIS — M79.10 MYALGIA: ICD-10-CM

## 2023-01-01 DIAGNOSIS — J96.11 CHRONIC RESPIRATORY FAILURE WITH HYPOXIA, ON HOME OXYGEN THERAPY (HCC): ICD-10-CM

## 2023-01-01 DIAGNOSIS — R05.1 ACUTE COUGH: ICD-10-CM

## 2023-01-01 DIAGNOSIS — R53.81 PHYSICAL DECONDITIONING: ICD-10-CM

## 2023-01-01 DIAGNOSIS — M54.9 CHRONIC NECK AND BACK PAIN: ICD-10-CM

## 2023-01-01 DIAGNOSIS — I20.9 ANGINA PECTORIS, UNSPECIFIED (HCC): ICD-10-CM

## 2023-01-01 DIAGNOSIS — R29.6 RECURRENT FALLS: ICD-10-CM

## 2023-01-01 DIAGNOSIS — J30.0 VASOMOTOR RHINITIS: ICD-10-CM

## 2023-01-01 DIAGNOSIS — Z99.81 ON SUPPLEMENTAL OXYGEN THERAPY: ICD-10-CM

## 2023-01-01 DIAGNOSIS — E83.52 HYPERCALCEMIA: ICD-10-CM

## 2023-01-01 DIAGNOSIS — R94.31 NONSPECIFIC ABNORMAL ELECTROCARDIOGRAM (ECG) (EKG): ICD-10-CM

## 2023-01-01 DIAGNOSIS — Z78.9 STATIN INTOLERANCE: ICD-10-CM

## 2023-01-01 DIAGNOSIS — I50.9 CHRONIC CONGESTIVE HEART FAILURE, UNSPECIFIED HEART FAILURE TYPE (HCC): ICD-10-CM

## 2023-01-01 DIAGNOSIS — L81.4 LENTIGINES: ICD-10-CM

## 2023-01-01 DIAGNOSIS — E03.9 HYPOTHYROIDISM, UNSPECIFIED TYPE: ICD-10-CM

## 2023-01-01 DIAGNOSIS — N18.31 STAGE 3A CHRONIC KIDNEY DISEASE: ICD-10-CM

## 2023-01-01 DIAGNOSIS — R06.09 DYSPNEA ON EXERTION: ICD-10-CM

## 2023-01-01 DIAGNOSIS — M54.50 CHRONIC LOW BACK PAIN, UNSPECIFIED BACK PAIN LATERALITY, UNSPECIFIED WHETHER SCIATICA PRESENT: ICD-10-CM

## 2023-01-01 DIAGNOSIS — Z99.81 CHRONIC RESPIRATORY FAILURE WITH HYPOXIA, ON HOME OXYGEN THERAPY (HCC): ICD-10-CM

## 2023-01-01 DIAGNOSIS — R26.89 BALANCE PROBLEM: ICD-10-CM

## 2023-01-01 DIAGNOSIS — M25.511 CHRONIC RIGHT SHOULDER PAIN: ICD-10-CM

## 2023-01-01 DIAGNOSIS — E78.5 DYSLIPIDEMIA: ICD-10-CM

## 2023-01-01 DIAGNOSIS — M48.00 CENTRAL STENOSIS OF SPINAL CANAL: ICD-10-CM

## 2023-01-01 DIAGNOSIS — G60.9 IDIOPATHIC PERIPHERAL NEUROPATHY: ICD-10-CM

## 2023-01-01 DIAGNOSIS — G89.29 CHRONIC RIGHT SHOULDER PAIN: ICD-10-CM

## 2023-01-01 DIAGNOSIS — M25.551 RIGHT HIP PAIN: ICD-10-CM

## 2023-01-01 DIAGNOSIS — G89.29 CHRONIC PAIN OF BOTH SHOULDERS: ICD-10-CM

## 2023-01-01 DIAGNOSIS — M25.512 CHRONIC PAIN OF BOTH SHOULDERS: ICD-10-CM

## 2023-01-01 DIAGNOSIS — L57.0 ACTINIC KERATOSIS: ICD-10-CM

## 2023-01-01 DIAGNOSIS — U07.1 COVID-19: ICD-10-CM

## 2023-01-01 DIAGNOSIS — R53.1 GENERALIZED WEAKNESS: ICD-10-CM

## 2023-01-01 DIAGNOSIS — Z78.0 POSTMENOPAUSAL: ICD-10-CM

## 2023-01-01 DIAGNOSIS — G89.29 CHRONIC LOW BACK PAIN, UNSPECIFIED BACK PAIN LATERALITY, UNSPECIFIED WHETHER SCIATICA PRESENT: ICD-10-CM

## 2023-01-01 DIAGNOSIS — L57.0 AK (ACTINIC KERATOSIS): ICD-10-CM

## 2023-01-01 DIAGNOSIS — M85.80 OSTEOPENIA, UNSPECIFIED LOCATION: ICD-10-CM

## 2023-01-01 LAB
25(OH)D3 SERPL-MCNC: 44 NG/ML (ref 30–100)
ALBUMIN SERPL BCP-MCNC: 4 G/DL (ref 3.2–4.9)
ALBUMIN SERPL BCP-MCNC: 4.4 G/DL (ref 3.2–4.9)
ALBUMIN/GLOB SERPL: 1.6 G/DL
ALBUMIN/GLOB SERPL: 1.6 G/DL
ALP SERPL-CCNC: 110 U/L (ref 30–99)
ALP SERPL-CCNC: 94 U/L (ref 30–99)
ALT SERPL-CCNC: 16 U/L (ref 2–50)
ALT SERPL-CCNC: 19 U/L (ref 2–50)
ANION GAP SERPL CALC-SCNC: 10 MMOL/L (ref 7–16)
ANION GAP SERPL CALC-SCNC: 14 MMOL/L (ref 7–16)
ANION GAP SERPL CALC-SCNC: 8 MMOL/L (ref 7–16)
APPEARANCE UR: NORMAL
AST SERPL-CCNC: 21 U/L (ref 12–45)
AST SERPL-CCNC: 22 U/L (ref 12–45)
BASOPHILS # BLD AUTO: 0.4 % (ref 0–1.8)
BASOPHILS # BLD AUTO: 0.9 % (ref 0–1.8)
BASOPHILS # BLD: 0.05 K/UL (ref 0–0.12)
BASOPHILS # BLD: 0.07 K/UL (ref 0–0.12)
BILIRUB SERPL-MCNC: 0.3 MG/DL (ref 0.1–1.5)
BILIRUB SERPL-MCNC: 0.4 MG/DL (ref 0.1–1.5)
BILIRUB UR STRIP-MCNC: NORMAL MG/DL
BUN SERPL-MCNC: 27 MG/DL (ref 8–22)
BUN SERPL-MCNC: 30 MG/DL (ref 8–22)
BUN SERPL-MCNC: 39 MG/DL (ref 8–22)
CALCIUM ALBUM COR SERPL-MCNC: 10.2 MG/DL (ref 8.5–10.5)
CALCIUM ALBUM COR SERPL-MCNC: 10.5 MG/DL (ref 8.5–10.5)
CALCIUM SERPL-MCNC: 10.2 MG/DL (ref 8.5–10.5)
CALCIUM SERPL-MCNC: 10.3 MG/DL (ref 8.5–10.5)
CALCIUM SERPL-MCNC: 10.8 MG/DL (ref 8.5–10.5)
CHLORIDE SERPL-SCNC: 103 MMOL/L (ref 96–112)
CHLORIDE SERPL-SCNC: 107 MMOL/L (ref 96–112)
CHLORIDE SERPL-SCNC: 109 MMOL/L (ref 96–112)
CHOLEST SERPL-MCNC: 194 MG/DL (ref 100–199)
CHOLEST SERPL-MCNC: 206 MG/DL (ref 100–199)
CO2 SERPL-SCNC: 21 MMOL/L (ref 20–33)
CO2 SERPL-SCNC: 24 MMOL/L (ref 20–33)
CO2 SERPL-SCNC: 26 MMOL/L (ref 20–33)
COLOR UR AUTO: NORMAL
CREAT SERPL-MCNC: 0.92 MG/DL (ref 0.5–1.4)
CREAT SERPL-MCNC: 1.03 MG/DL (ref 0.5–1.4)
CREAT SERPL-MCNC: 1.04 MG/DL (ref 0.5–1.4)
EKG IMPRESSION: NORMAL
EKG IMPRESSION: NORMAL
EOSINOPHIL # BLD AUTO: 0.17 K/UL (ref 0–0.51)
EOSINOPHIL # BLD AUTO: 0.27 K/UL (ref 0–0.51)
EOSINOPHIL NFR BLD: 1.5 % (ref 0–6.9)
EOSINOPHIL NFR BLD: 3.4 % (ref 0–6.9)
ERYTHROCYTE [DISTWIDTH] IN BLOOD BY AUTOMATED COUNT: 48.4 FL (ref 35.9–50)
ERYTHROCYTE [DISTWIDTH] IN BLOOD BY AUTOMATED COUNT: 48.5 FL (ref 35.9–50)
EST. AVERAGE GLUCOSE BLD GHB EST-MCNC: 123 MG/DL
GFR SERPLBLD CREATININE-BSD FMLA CKD-EPI: 50 ML/MIN/1.73 M 2
GFR SERPLBLD CREATININE-BSD FMLA CKD-EPI: 50 ML/MIN/1.73 M 2
GFR SERPLBLD CREATININE-BSD FMLA CKD-EPI: 57 ML/MIN/1.73 M 2
GLOBULIN SER CALC-MCNC: 2.5 G/DL (ref 1.9–3.5)
GLOBULIN SER CALC-MCNC: 2.8 G/DL (ref 1.9–3.5)
GLUCOSE SERPL-MCNC: 109 MG/DL (ref 65–99)
GLUCOSE SERPL-MCNC: 120 MG/DL (ref 65–99)
GLUCOSE SERPL-MCNC: 98 MG/DL (ref 65–99)
GLUCOSE UR STRIP.AUTO-MCNC: NORMAL MG/DL
HBA1C MFR BLD: 5.9 % (ref 4–5.6)
HCT VFR BLD AUTO: 35.5 % (ref 37–47)
HCT VFR BLD AUTO: 40.4 % (ref 37–47)
HDLC SERPL-MCNC: 38 MG/DL
HDLC SERPL-MCNC: 40 MG/DL
HGB BLD-MCNC: 12 G/DL (ref 12–16)
HGB BLD-MCNC: 12.8 G/DL (ref 12–16)
IMM GRANULOCYTES # BLD AUTO: 0.03 K/UL (ref 0–0.11)
IMM GRANULOCYTES # BLD AUTO: 0.03 K/UL (ref 0–0.11)
IMM GRANULOCYTES NFR BLD AUTO: 0.3 % (ref 0–0.9)
IMM GRANULOCYTES NFR BLD AUTO: 0.4 % (ref 0–0.9)
KETONES UR STRIP.AUTO-MCNC: NORMAL MG/DL
LDLC SERPL CALC-MCNC: 117 MG/DL
LDLC SERPL CALC-MCNC: 124 MG/DL
LEUKOCYTE ESTERASE UR QL STRIP.AUTO: NORMAL
LV EJECT FRACT MOD 2C 99903: 46.7
LV EJECT FRACT MOD 4C 99902: 46.38
LV EJECT FRACT MOD BP 99901: 24.13
LYMPHOCYTES # BLD AUTO: 2.77 K/UL (ref 1–4.8)
LYMPHOCYTES # BLD AUTO: 2.95 K/UL (ref 1–4.8)
LYMPHOCYTES NFR BLD: 24.8 % (ref 22–41)
LYMPHOCYTES NFR BLD: 37.6 % (ref 22–41)
MAGNESIUM SERPL-MCNC: 2.2 MG/DL (ref 1.5–2.5)
MCH RBC QN AUTO: 31 PG (ref 27–33)
MCH RBC QN AUTO: 31.7 PG (ref 27–33)
MCHC RBC AUTO-ENTMCNC: 31.7 G/DL (ref 32.2–35.5)
MCHC RBC AUTO-ENTMCNC: 33.8 G/DL (ref 32.2–35.5)
MCV RBC AUTO: 93.9 FL (ref 81.4–97.8)
MCV RBC AUTO: 97.8 FL (ref 81.4–97.8)
MONOCYTES # BLD AUTO: 0.78 K/UL (ref 0–0.85)
MONOCYTES # BLD AUTO: 0.83 K/UL (ref 0–0.85)
MONOCYTES NFR BLD AUTO: 10.6 % (ref 0–13.4)
MONOCYTES NFR BLD AUTO: 7 % (ref 0–13.4)
NEUTROPHILS # BLD AUTO: 3.7 K/UL (ref 1.82–7.42)
NEUTROPHILS # BLD AUTO: 7.35 K/UL (ref 1.82–7.42)
NEUTROPHILS NFR BLD: 47.1 % (ref 44–72)
NEUTROPHILS NFR BLD: 66 % (ref 44–72)
NITRITE UR QL STRIP.AUTO: NORMAL
NRBC # BLD AUTO: 0 K/UL
NRBC # BLD AUTO: 0 K/UL
NRBC BLD-RTO: 0 /100 WBC (ref 0–0.2)
NRBC BLD-RTO: 0 /100 WBC (ref 0–0.2)
PH UR STRIP.AUTO: 5 [PH] (ref 5–8)
PHOSPHATE SERPL-MCNC: 2.6 MG/DL (ref 2.5–4.5)
PLATELET # BLD AUTO: 170 K/UL (ref 164–446)
PLATELET # BLD AUTO: 174 K/UL (ref 164–446)
PMV BLD AUTO: 10.3 FL (ref 9–12.9)
PMV BLD AUTO: 11 FL (ref 9–12.9)
POTASSIUM SERPL-SCNC: 4 MMOL/L (ref 3.6–5.5)
POTASSIUM SERPL-SCNC: 4.4 MMOL/L (ref 3.6–5.5)
POTASSIUM SERPL-SCNC: 4.6 MMOL/L (ref 3.6–5.5)
PROT SERPL-MCNC: 6.5 G/DL (ref 6–8.2)
PROT SERPL-MCNC: 7.2 G/DL (ref 6–8.2)
PROT UR QL STRIP: NORMAL MG/DL
PTH-INTACT SERPL-MCNC: 78.1 PG/ML (ref 14–72)
RBC # BLD AUTO: 3.78 M/UL (ref 4.2–5.4)
RBC # BLD AUTO: 4.13 M/UL (ref 4.2–5.4)
RBC UR QL AUTO: NORMAL
SODIUM SERPL-SCNC: 138 MMOL/L (ref 135–145)
SODIUM SERPL-SCNC: 141 MMOL/L (ref 135–145)
SODIUM SERPL-SCNC: 143 MMOL/L (ref 135–145)
SP GR UR STRIP.AUTO: 1.01
T3FREE SERPL-MCNC: 2.98 PG/ML (ref 2–4.4)
TRIGL SERPL-MCNC: 193 MG/DL (ref 0–149)
TRIGL SERPL-MCNC: 209 MG/DL (ref 0–149)
TSH SERPL DL<=0.005 MIU/L-ACNC: 2.52 UIU/ML (ref 0.38–5.33)
UROBILINOGEN UR STRIP-MCNC: 0.2 MG/DL
WBC # BLD AUTO: 11.2 K/UL (ref 4.8–10.8)
WBC # BLD AUTO: 7.9 K/UL (ref 4.8–10.8)

## 2023-01-01 PROCEDURE — G0378 HOSPITAL OBSERVATION PER HR: HCPCS

## 2023-01-01 PROCEDURE — G0152 HHCP-SERV OF OT,EA 15 MIN: HCPCS

## 2023-01-01 PROCEDURE — 3077F SYST BP >= 140 MM HG: CPT | Performed by: INTERNAL MEDICINE

## 2023-01-01 PROCEDURE — 17003 DESTRUCT PREMALG LES 2-14: CPT | Performed by: FAMILY MEDICINE

## 2023-01-01 PROCEDURE — 81002 URINALYSIS NONAUTO W/O SCOPE: CPT

## 2023-01-01 PROCEDURE — 700117 HCHG RX CONTRAST REV CODE 255: Performed by: EMERGENCY MEDICINE

## 2023-01-01 PROCEDURE — 1126F AMNT PAIN NOTED NONE PRSNT: CPT | Performed by: PHYSICIAN ASSISTANT

## 2023-01-01 PROCEDURE — 99213 OFFICE O/P EST LOW 20 MIN: CPT | Mod: 25 | Performed by: NURSE PRACTITIONER

## 2023-01-01 PROCEDURE — 93306 TTE W/DOPPLER COMPLETE: CPT | Mod: 26 | Performed by: STUDENT IN AN ORGANIZED HEALTH CARE EDUCATION/TRAINING PROGRAM

## 2023-01-01 PROCEDURE — 83036 HEMOGLOBIN GLYCOSYLATED A1C: CPT

## 2023-01-01 PROCEDURE — 99214 OFFICE O/P EST MOD 30 MIN: CPT | Performed by: PHYSICIAN ASSISTANT

## 2023-01-01 PROCEDURE — G0151 HHCP-SERV OF PT,EA 15 MIN: HCPCS

## 2023-01-01 PROCEDURE — 73502 X-RAY EXAM HIP UNI 2-3 VIEWS: CPT | Mod: RT

## 2023-01-01 PROCEDURE — 99214 OFFICE O/P EST MOD 30 MIN: CPT | Performed by: FAMILY MEDICINE

## 2023-01-01 PROCEDURE — 80061 LIPID PANEL: CPT

## 2023-01-01 PROCEDURE — 78431 MYOCRD IMG PET RST&STRS CT: CPT | Mod: 26 | Performed by: STUDENT IN AN ORGANIZED HEALTH CARE EDUCATION/TRAINING PROGRAM

## 2023-01-01 PROCEDURE — 36415 COLL VENOUS BLD VENIPUNCTURE: CPT

## 2023-01-01 PROCEDURE — 700111 HCHG RX REV CODE 636 W/ 250 OVERRIDE (IP): Mod: JZ | Performed by: EMERGENCY MEDICINE

## 2023-01-01 PROCEDURE — 99204 OFFICE O/P NEW MOD 45 MIN: CPT | Mod: 25 | Performed by: STUDENT IN AN ORGANIZED HEALTH CARE EDUCATION/TRAINING PROGRAM

## 2023-01-01 PROCEDURE — 99214 OFFICE O/P EST MOD 30 MIN: CPT

## 2023-01-01 PROCEDURE — 96374 THER/PROPH/DIAG INJ IV PUSH: CPT | Mod: XU

## 2023-01-01 PROCEDURE — 700102 HCHG RX REV CODE 250 W/ 637 OVERRIDE(OP): Performed by: HOSPITALIST

## 2023-01-01 PROCEDURE — A9270 NON-COVERED ITEM OR SERVICE: HCPCS | Performed by: HOSPITALIST

## 2023-01-01 PROCEDURE — 93306 TTE W/DOPPLER COMPLETE: CPT

## 2023-01-01 PROCEDURE — 3077F SYST BP >= 140 MM HG: CPT

## 2023-01-01 PROCEDURE — 85025 COMPLETE CBC W/AUTO DIFF WBC: CPT

## 2023-01-01 PROCEDURE — 17003 DESTRUCT PREMALG LES 2-14: CPT | Performed by: NURSE PRACTITIONER

## 2023-01-01 PROCEDURE — G0495 RN CARE TRAIN/EDU IN HH: HCPCS

## 2023-01-01 PROCEDURE — 99213 OFFICE O/P EST LOW 20 MIN: CPT

## 2023-01-01 PROCEDURE — G0155 HHCP-SVS OF CSW,EA 15 MIN: HCPCS

## 2023-01-01 PROCEDURE — 70498 CT ANGIOGRAPHY NECK: CPT

## 2023-01-01 PROCEDURE — 78431 MYOCRD IMG PET RST&STRS CT: CPT

## 2023-01-01 PROCEDURE — 99214 OFFICE O/P EST MOD 30 MIN: CPT | Mod: 25 | Performed by: FAMILY MEDICINE

## 2023-01-01 PROCEDURE — 72125 CT NECK SPINE W/O DYE: CPT

## 2023-01-01 PROCEDURE — 96376 TX/PRO/DX INJ SAME DRUG ADON: CPT | Mod: XU

## 2023-01-01 PROCEDURE — 3078F DIAST BP <80 MM HG: CPT

## 2023-01-01 PROCEDURE — 83735 ASSAY OF MAGNESIUM: CPT

## 2023-01-01 PROCEDURE — 93005 ELECTROCARDIOGRAM TRACING: CPT

## 2023-01-01 PROCEDURE — 665001 SOC-HOME HEALTH

## 2023-01-01 PROCEDURE — 72125 CT NECK SPINE W/O DYE: CPT | Performed by: RADIOLOGY

## 2023-01-01 PROCEDURE — 73502 X-RAY EXAM HIP UNI 2-3 VIEWS: CPT | Mod: RT | Performed by: RADIOLOGY

## 2023-01-01 PROCEDURE — G0299 HHS/HOSPICE OF RN EA 15 MIN: HCPCS

## 2023-01-01 PROCEDURE — 80053 COMPREHEN METABOLIC PANEL: CPT

## 2023-01-01 PROCEDURE — 82306 VITAMIN D 25 HYDROXY: CPT

## 2023-01-01 PROCEDURE — 84481 FREE ASSAY (FT-3): CPT

## 2023-01-01 PROCEDURE — 3078F DIAST BP <80 MM HG: CPT | Performed by: INTERNAL MEDICINE

## 2023-01-01 PROCEDURE — 80048 BASIC METABOLIC PNL TOTAL CA: CPT

## 2023-01-01 PROCEDURE — 83970 ASSAY OF PARATHORMONE: CPT

## 2023-01-01 PROCEDURE — 3074F SYST BP LT 130 MM HG: CPT

## 2023-01-01 PROCEDURE — 700102 HCHG RX REV CODE 250 W/ 637 OVERRIDE(OP): Performed by: EMERGENCY MEDICINE

## 2023-01-01 PROCEDURE — 84443 ASSAY THYROID STIM HORMONE: CPT

## 2023-01-01 PROCEDURE — 99285 EMERGENCY DEPT VISIT HI MDM: CPT

## 2023-01-01 PROCEDURE — 93018 CV STRESS TEST I&R ONLY: CPT | Performed by: STUDENT IN AN ORGANIZED HEALTH CARE EDUCATION/TRAINING PROGRAM

## 2023-01-01 PROCEDURE — 3075F SYST BP GE 130 - 139MM HG: CPT

## 2023-01-01 PROCEDURE — G0180 MD CERTIFICATION HHA PATIENT: HCPCS

## 2023-01-01 PROCEDURE — 99213 OFFICE O/P EST LOW 20 MIN: CPT | Performed by: INTERNAL MEDICINE

## 2023-01-01 PROCEDURE — A9270 NON-COVERED ITEM OR SERVICE: HCPCS | Performed by: EMERGENCY MEDICINE

## 2023-01-01 PROCEDURE — 3077F SYST BP >= 140 MM HG: CPT | Performed by: FAMILY MEDICINE

## 2023-01-01 PROCEDURE — 3079F DIAST BP 80-89 MM HG: CPT

## 2023-01-01 PROCEDURE — 17000 DESTRUCT PREMALG LESION: CPT | Performed by: NURSE PRACTITIONER

## 2023-01-01 PROCEDURE — 99214 OFFICE O/P EST MOD 30 MIN: CPT | Performed by: INTERNAL MEDICINE

## 2023-01-01 PROCEDURE — 84100 ASSAY OF PHOSPHORUS: CPT

## 2023-01-01 PROCEDURE — 99215 OFFICE O/P EST HI 40 MIN: CPT

## 2023-01-01 PROCEDURE — 70496 CT ANGIOGRAPHY HEAD: CPT

## 2023-01-01 PROCEDURE — 99497 ADVNCD CARE PLAN 30 MIN: CPT | Mod: 25 | Performed by: HOSPITALIST

## 2023-01-01 PROCEDURE — 17000 DESTRUCT PREMALG LESION: CPT | Performed by: FAMILY MEDICINE

## 2023-01-01 PROCEDURE — 99223 1ST HOSP IP/OBS HIGH 75: CPT | Mod: AI | Performed by: HOSPITALIST

## 2023-01-01 PROCEDURE — 93000 ELECTROCARDIOGRAM COMPLETE: CPT | Performed by: STUDENT IN AN ORGANIZED HEALTH CARE EDUCATION/TRAINING PROGRAM

## 2023-01-01 PROCEDURE — 3078F DIAST BP <80 MM HG: CPT | Performed by: PHYSICIAN ASSISTANT

## 2023-01-01 PROCEDURE — 3074F SYST BP LT 130 MM HG: CPT | Performed by: PHYSICIAN ASSISTANT

## 2023-01-01 PROCEDURE — 3078F DIAST BP <80 MM HG: CPT | Performed by: FAMILY MEDICINE

## 2023-01-01 RX ORDER — ONDANSETRON 2 MG/ML
4 INJECTION INTRAMUSCULAR; INTRAVENOUS EVERY 4 HOURS PRN
Status: DISCONTINUED | OUTPATIENT
Start: 2023-01-01 | End: 2024-01-01 | Stop reason: HOSPADM

## 2023-01-01 RX ORDER — CHOLECALCIFEROL (VITAMIN D3) 50 MCG
TABLET ORAL DAILY
COMMUNITY
End: 2023-01-01

## 2023-01-01 RX ORDER — HYDROCHLOROTHIAZIDE 25 MG/1
25 TABLET ORAL DAILY
Qty: 100 TABLET | Refills: 3 | Status: CANCELLED | OUTPATIENT
Start: 2023-01-01

## 2023-01-01 RX ORDER — KETOROLAC TROMETHAMINE 30 MG/ML
15 INJECTION, SOLUTION INTRAMUSCULAR; INTRAVENOUS EVERY 6 HOURS PRN
Status: ACTIVE | OUTPATIENT
Start: 2023-01-01 | End: 2024-01-01

## 2023-01-01 RX ORDER — LEVOTHYROXINE SODIUM 0.03 MG/1
25 TABLET ORAL
Status: DISCONTINUED | OUTPATIENT
Start: 2024-01-01 | End: 2024-01-01 | Stop reason: HOSPADM

## 2023-01-01 RX ORDER — IPRATROPIUM BROMIDE 42 UG/1
2 SPRAY, METERED NASAL 4 TIMES DAILY
Qty: 45 ML | Refills: 1 | Status: SHIPPED | OUTPATIENT
Start: 2023-01-01 | End: 2023-01-01

## 2023-01-01 RX ORDER — ACETAMINOPHEN 325 MG/1
325 TABLET ORAL 3 TIMES DAILY PRN
COMMUNITY
Start: 2023-01-01 | End: 2023-01-01

## 2023-01-01 RX ORDER — GABAPENTIN 100 MG/1
CAPSULE ORAL
Qty: 100 CAPSULE | Refills: 0 | Status: SHIPPED | OUTPATIENT
Start: 2023-01-01 | End: 2023-01-01

## 2023-01-01 RX ORDER — POLYETHYLENE GLYCOL 3350 17 G/17G
1 POWDER, FOR SOLUTION ORAL
Status: DISCONTINUED | OUTPATIENT
Start: 2023-01-01 | End: 2024-01-01 | Stop reason: HOSPADM

## 2023-01-01 RX ORDER — FUROSEMIDE 20 MG/1
20 TABLET ORAL 2 TIMES DAILY
Status: DISCONTINUED | OUTPATIENT
Start: 2023-01-01 | End: 2024-01-01 | Stop reason: HOSPADM

## 2023-01-01 RX ORDER — OXYCODONE HYDROCHLORIDE 5 MG/1
5 TABLET ORAL
Status: DISCONTINUED | OUTPATIENT
Start: 2023-01-01 | End: 2024-01-01

## 2023-01-01 RX ORDER — AMOXICILLIN 250 MG
2 CAPSULE ORAL 2 TIMES DAILY
Status: DISCONTINUED | OUTPATIENT
Start: 2023-01-01 | End: 2024-01-01 | Stop reason: HOSPADM

## 2023-01-01 RX ORDER — ACETAMINOPHEN 325 MG/1
650 TABLET ORAL EVERY 6 HOURS PRN
Status: DISCONTINUED | OUTPATIENT
Start: 2023-01-01 | End: 2024-01-01

## 2023-01-01 RX ORDER — IPRATROPIUM BROMIDE 42 UG/1
2 SPRAY, METERED NASAL 4 TIMES DAILY
Qty: 15 ML | Refills: 0 | Status: SHIPPED | OUTPATIENT
Start: 2023-01-01 | End: 2023-01-01

## 2023-01-01 RX ORDER — ONDANSETRON 4 MG/1
4 TABLET, ORALLY DISINTEGRATING ORAL EVERY 4 HOURS PRN
Status: DISCONTINUED | OUTPATIENT
Start: 2023-01-01 | End: 2024-01-01 | Stop reason: HOSPADM

## 2023-01-01 RX ORDER — ASPIRIN 81 MG/1
81 TABLET, CHEWABLE ORAL DAILY
Status: DISCONTINUED | OUTPATIENT
Start: 2024-01-01 | End: 2024-01-01 | Stop reason: HOSPADM

## 2023-01-01 RX ORDER — HYDROMORPHONE HYDROCHLORIDE 1 MG/ML
0.25 INJECTION, SOLUTION INTRAMUSCULAR; INTRAVENOUS; SUBCUTANEOUS
Status: DISCONTINUED | OUTPATIENT
Start: 2023-01-01 | End: 2024-01-01

## 2023-01-01 RX ORDER — CARVEDILOL 6.25 MG/1
3.12 TABLET ORAL 2 TIMES DAILY WITH MEALS
Status: DISCONTINUED | OUTPATIENT
Start: 2023-01-01 | End: 2024-01-01

## 2023-01-01 RX ORDER — GABAPENTIN 100 MG/1
100 CAPSULE ORAL ONCE
Status: COMPLETED | OUTPATIENT
Start: 2023-01-01 | End: 2023-01-01

## 2023-01-01 RX ORDER — OXYCODONE HYDROCHLORIDE 5 MG/1
2.5 TABLET ORAL
Status: DISCONTINUED | OUTPATIENT
Start: 2023-01-01 | End: 2024-01-01

## 2023-01-01 RX ORDER — MULTIVIT WITH MINERALS/LUTEIN
1 TABLET ORAL DAILY
Status: ON HOLD | COMMUNITY
End: 2024-01-01

## 2023-01-01 RX ORDER — BISACODYL 10 MG
10 SUPPOSITORY, RECTAL RECTAL
Status: DISCONTINUED | OUTPATIENT
Start: 2023-01-01 | End: 2024-01-01 | Stop reason: HOSPADM

## 2023-01-01 RX ORDER — FUROSEMIDE 20 MG/1
20 TABLET ORAL 2 TIMES DAILY
Qty: 60 TABLET | Refills: 11 | Status: ON HOLD | OUTPATIENT
Start: 2023-01-01 | End: 2024-01-01

## 2023-01-01 RX ORDER — ENOXAPARIN SODIUM 100 MG/ML
40 INJECTION SUBCUTANEOUS DAILY
Status: DISCONTINUED | OUTPATIENT
Start: 2024-01-01 | End: 2024-01-01 | Stop reason: HOSPADM

## 2023-01-01 RX ADMIN — IOHEXOL 80 ML: 350 INJECTION, SOLUTION INTRAVENOUS at 13:00

## 2023-01-01 RX ADMIN — FENTANYL CITRATE 50 MCG: 50 INJECTION, SOLUTION INTRAMUSCULAR; INTRAVENOUS at 15:55

## 2023-01-01 RX ADMIN — FENTANYL CITRATE 50 MCG: 50 INJECTION, SOLUTION INTRAMUSCULAR; INTRAVENOUS at 12:35

## 2023-01-01 RX ADMIN — CARVEDILOL 3.12 MG: 6.25 TABLET, FILM COATED ORAL at 19:20

## 2023-01-01 RX ADMIN — OXYCODONE 5 MG: 5 TABLET ORAL at 23:49

## 2023-01-01 RX ADMIN — GABAPENTIN 100 MG: 100 CAPSULE ORAL at 15:07

## 2023-01-01 RX ADMIN — FUROSEMIDE 20 MG: 40 TABLET ORAL at 19:48

## 2023-01-01 RX ADMIN — OXYCODONE 5 MG: 5 TABLET ORAL at 19:46

## 2023-01-01 SDOH — SOCIAL STABILITY: SOCIAL NETWORK: HOW OFTEN DO YOU GET TOGETHER WITH FRIENDS OR RELATIVES?: THREE TIMES A WEEK

## 2023-01-01 SDOH — ECONOMIC STABILITY: INCOME INSECURITY: IN THE LAST 12 MONTHS, WAS THERE A TIME WHEN YOU WERE NOT ABLE TO PAY THE MORTGAGE OR RENT ON TIME?: NO

## 2023-01-01 SDOH — ECONOMIC STABILITY: HOUSING INSECURITY

## 2023-01-01 SDOH — ECONOMIC STABILITY: HOUSING INSECURITY: EVIDENCE OF SMOKING MATERIAL: 0

## 2023-01-01 SDOH — ECONOMIC STABILITY: HOUSING INSECURITY: IN THE LAST 12 MONTHS, HOW MANY PLACES HAVE YOU LIVED?: 1

## 2023-01-01 SDOH — HEALTH STABILITY: PHYSICAL HEALTH: ON AVERAGE, HOW MANY MINUTES DO YOU ENGAGE IN EXERCISE AT THIS LEVEL?: 0 MIN

## 2023-01-01 SDOH — ECONOMIC STABILITY: FOOD INSECURITY: WITHIN THE PAST 12 MONTHS, YOU WORRIED THAT YOUR FOOD WOULD RUN OUT BEFORE YOU GOT MONEY TO BUY MORE.: NEVER TRUE

## 2023-01-01 SDOH — SOCIAL STABILITY: SOCIAL NETWORK
IN A TYPICAL WEEK, HOW MANY TIMES DO YOU TALK ON THE PHONE WITH FAMILY, FRIENDS, OR NEIGHBORS?: MORE THAN THREE TIMES A WEEK

## 2023-01-01 SDOH — HEALTH STABILITY: MENTAL HEALTH: HOW OFTEN DO YOU HAVE A DRINK CONTAINING ALCOHOL?: NEVER

## 2023-01-01 SDOH — ECONOMIC STABILITY: INCOME INSECURITY: HOW HARD IS IT FOR YOU TO PAY FOR THE VERY BASICS LIKE FOOD, HOUSING, MEDICAL CARE, AND HEATING?: HARD

## 2023-01-01 SDOH — HEALTH STABILITY: PHYSICAL HEALTH: ON AVERAGE, HOW MANY DAYS PER WEEK DO YOU ENGAGE IN MODERATE TO STRENUOUS EXERCISE (LIKE A BRISK WALK)?: 0 DAYS

## 2023-01-01 SDOH — ECONOMIC STABILITY: TRANSPORTATION INSECURITY
IN THE PAST 12 MONTHS, HAS LACK OF TRANSPORTATION KEPT YOU FROM MEETINGS, WORK, OR FROM GETTING THINGS NEEDED FOR DAILY LIVING?: YES

## 2023-01-01 SDOH — ECONOMIC STABILITY: FOOD INSECURITY: WITHIN THE PAST 12 MONTHS, THE FOOD YOU BOUGHT JUST DIDN'T LAST AND YOU DIDN'T HAVE MONEY TO GET MORE.: NEVER TRUE

## 2023-01-01 SDOH — ECONOMIC STABILITY: INCOME INSECURITY: HOW HARD IS IT FOR YOU TO PAY FOR THE VERY BASICS LIKE FOOD, HOUSING, MEDICAL CARE, AND HEATING?: NOT HARD AT ALL

## 2023-01-01 SDOH — ECONOMIC STABILITY: HOUSING INSECURITY
IN THE LAST 12 MONTHS, WAS THERE A TIME WHEN YOU DID NOT HAVE A STEADY PLACE TO SLEEP OR SLEPT IN A SHELTER (INCLUDING NOW)?: NO

## 2023-01-01 SDOH — HEALTH STABILITY: MENTAL HEALTH
STRESS IS WHEN SOMEONE FEELS TENSE, NERVOUS, ANXIOUS, OR CAN'T SLEEP AT NIGHT BECAUSE THEIR MIND IS TROUBLED. HOW STRESSED ARE YOU?: NOT AT ALL

## 2023-01-01 SDOH — SOCIAL STABILITY: SOCIAL NETWORK: HOW OFTEN DO YOU GET TOGETHER WITH FRIENDS OR RELATIVES?: MORE THAN THREE TIMES A WEEK

## 2023-01-01 SDOH — SOCIAL STABILITY: SOCIAL NETWORK: IN A TYPICAL WEEK, HOW MANY TIMES DO YOU TALK ON THE PHONE WITH FAMILY, FRIENDS, OR NEIGHBORS?: THREE TIMES A WEEK

## 2023-01-01 SDOH — ECONOMIC STABILITY: TRANSPORTATION INSECURITY
IN THE PAST 12 MONTHS, HAS THE LACK OF TRANSPORTATION KEPT YOU FROM MEDICAL APPOINTMENTS OR FROM GETTING MEDICATIONS?: YES

## 2023-01-01 SDOH — HEALTH STABILITY: MENTAL HEALTH: HOW OFTEN DO YOU HAVE 6 OR MORE DRINKS ON ONE OCCASION?: NEVER

## 2023-01-01 SDOH — ECONOMIC STABILITY: HOUSING INSECURITY: HOME SAFETY: O ALSO PROVIDES TRANSPORTATION TO APPOINTMENTS.

## 2023-01-01 SDOH — HEALTH STABILITY: MENTAL HEALTH: HOW MANY STANDARD DRINKS CONTAINING ALCOHOL DO YOU HAVE ON A TYPICAL DAY?: PATIENT DOES NOT DRINK

## 2023-01-01 SDOH — ECONOMIC STABILITY: HOUSING INSECURITY: HOME SAFETY: PT LIVES ALONE AND HAS FAMILY THAT COMES BY TO SEE HER OR CALL DAILY

## 2023-01-01 ASSESSMENT — BALANCE ASSESSMENTS
IMMEDIATE STANDING BALANCE FIRST 5 SECONDS: 1 - STEADY BUT USES WALKER OR OTHER SUPPORT
TURNING 360 DEGREES STEPS: 0 - DISCONTINUOUS STEPS
STANDING BALANCE: 1 - STEADY BUT WIDE STANCE AND USES CANE OR OTHER SUPPORT
SITTING BALANCE: 1 - STEADY, SAFE
EYES CLOSED AT MAXIMUM POSITION NUDGED: 0 - UNSTEADY
SITTING DOWN: 1 - USES ARMS OR NOT SMOOTH MOTION
NUDGED: 2 - STEADY
ARISES: 1 - ABLE, USES ARMS TO HELP
BALANCE SCORE: 9
ARISING SCORE: 1
ATTEMPTS TO ARISE: 1 - ABLE, REQUIRES MORE THAN ONE ATTEMPT
NUDGED SCORE: 2

## 2023-01-01 ASSESSMENT — ENCOUNTER SYMPTOMS
EYE DISCHARGE: 0
LIMITED RANGE OF MOTION: 1
ARTHRALGIAS: 1
PERSON REPORTING PAIN: PATIENT
PAIN LOCATION - PAIN DURATION: CHRONIC
PALPITATIONS: 0
WHEEZING: 0
LIMITED RANGE OF MOTION: 1
ORTHOPNEA: 0
WEAKNESS: 0
PAIN LOCATION - PAIN FREQUENCY: INTERMITTENT
PAIN LOCATION - PAIN DURATION: ALL THE TIME
PAIN LOCATION - PAIN FREQUENCY: CONSTANT
DYSPNEA ON EXERTION: 1
MYALGIAS: 0
NIGHT SWEATS: 0
SHORTNESS OF BREATH: 0
STOOL FREQUENCY: DAILY
FEVER: 0
NERVOUS/ANXIOUS: 0
NAUSEA: DENIES
FEVER: 1
SHORTNESS OF BREATH: 1
HIGHEST PAIN SEVERITY IN PAST 24 HOURS: 8/10
PAIN LOCATION - EXACERBATING FACTORS: PHYSICAL MOVEMENT
VOMITING: 0
MUSCLE WEAKNESS: 1
BOWEL PATTERN NORMAL: 1
PAIN: 1
BRUISES/BLEEDS EASILY: 0
IRREGULAR HEARTBEAT: 0
FATIGUES EASILY: 1
PAIN SEVERITY GOAL: 5/10
COUGH: 0
SHORTNESS OF BREATH: 0
AGITATION: 1
PAIN LOCATION - PAIN SEVERITY: 6/10
DYSPNEA ON EXERTION: 1
SYNCOPE: 0
SORE THROAT: 1
ABDOMINAL PAIN: 0
DIZZINESS: 0
BOWEL PATTERN NORMAL: 1
VOMITING: 0
LAST BOWEL MOVEMENT: 66827
DYSPNEA ACTIVITY LEVEL: AFTER AMBULATING LESS THAN 10 FT
PAIN LOCATION - EXACERBATING FACTORS: MOVEMENT
FOCAL WEAKNESS: 0
NAUSEA: NO
COUGH: 0
CHILLS: 1
PAIN SEVERITY GOAL: 0/10
COUGH: 1
SHORTNESS OF BREATH: 1
LOWEST PAIN SEVERITY IN PAST 24 HOURS: 0/10
PND: 0
NEAR-SYNCOPE: 0
PAIN: 1
LOWEST PAIN SEVERITY IN PAST 24 HOURS: 7/10
STOOL FREQUENCY: LESS THAN DAILY
SUBJECTIVE PAIN PROGRESSION: UNCHANGED
FLANK PAIN: 0
PERSON REPORTING PAIN: PATIENT
HIGHEST PAIN SEVERITY IN PAST 24 HOURS: 8/10
LAST BOWEL MOVEMENT: 66823
SUBJECTIVE PAIN PROGRESSION: UNCHANGED
PAIN LOCATION - PAIN QUALITY: ACHE
VOMITING: DENIES
VOMITING: NO
ABDOMINAL PAIN: 0
PAIN LOCATION - PAIN SEVERITY: 7/10
FOCAL WEAKNESS: 0
NAUSEA: 0
TINGLING: 1
FEVER: 0
MUSCLE WEAKNESS: 1
STRIDOR: 0
PAIN LOCATION - RELIEVING FACTORS: REST
EYE REDNESS: 0
DIARRHEA: 0
DYSPNEA ACTIVITY LEVEL: AFTER AMBULATING MORE THAN 20 FT
CHILLS: 0

## 2023-01-01 ASSESSMENT — FIBROSIS 4 INDEX
FIB4 SCORE: 2.84
FIB4 SCORE: 2.36
FIB4 SCORE: 2.33
FIB4 SCORE: 2.84
FIB4 SCORE: 2.84
FIB4 SCORE: 2.36
FIB4 SCORE: 2.84
FIB4 SCORE: 2.73
FIB4 SCORE: 2.84

## 2023-01-01 ASSESSMENT — GAIT ASSESSMENTS
PATH: 1 - MILD/MODERATE DEVIATION OR USES WALKING AID
WALKING STANCE: 0 - HEELS APART
STEP SYMMETRY: 1 - RIGHT AND LEFT STEP LENGTH APPEAR EQUAL
TRUNK SCORE: 0
TRUNK: 0 - MARKED SWAY OR USES WALKING AID
STEP CONTINUITY: 0 - STOPPING OR DISCONTINUITY BETWEEN STEPS
GAIT SCORE: 7
BALANCE AND GAIT SCORE: 16
INITIATION OF GAIT IMMEDIATELY AFTER GO: 1 - NO HESITANCY
PATH SCORE: 1

## 2023-01-01 ASSESSMENT — ACTIVITIES OF DAILY LIVING (ADL)
AMBULATION ASSISTANCE: ONE PERSON
CURRENT_FUNCTION: ONE PERSON
DRESSING_LB_CURRENT_FUNCTION: MINIMUM ASSIST
CURRENT_FUNCTION: STAND BY ASSIST
AMBULATION ASSISTANCE: 1
HAIR_CARE_ASSESSED: 1
GROOMING_COMMENTS: SEE OT EVAL FOR MORE DETAILS.
TOILETING: STAND BY ASSIST
AMBULATION ASSISTANCE: STAND BY ASSIST
SHOPPING_REQUIRES_ASSISTANCE: 1
DRESSING_UB_CURRENT_FUNCTION: MINIMUM ASSIST
FEEDING_COMMENTS: SEE OT EVAL FOR MORE DETAILS.
GROOMING ASSESSED: 1
AMBULATION ASSISTANCE ON FLAT SURFACES: 1
GROOMING_CURRENT_FUNCTION: MINIMUM ASSIST
TOILETING: 1
PHYSICAL TRANSFERS ASSESSED: 1
BATHING ASSESSED: 1
PHYSICAL_TRANSFERS_DEVICES: QC
ADLS_COMMENTS: SEE OT EVAL FOR MORE DETAILS.
OASIS_M1830: 05
AMBULATION ASSISTANCE: CONTACT GUARD ASSIST

## 2023-01-01 ASSESSMENT — PATIENT HEALTH QUESTIONNAIRE - PHQ9: CLINICAL INTERPRETATION OF PHQ2 SCORE: 0

## 2023-01-01 ASSESSMENT — LIFESTYLE VARIABLES
AUDIT-C TOTAL SCORE: 0
SKIP TO QUESTIONS 9-10: 1

## 2023-01-01 ASSESSMENT — PAIN DESCRIPTION - PAIN TYPE: TYPE: ACUTE PAIN

## 2023-01-01 ASSESSMENT — PAIN SCALES - GENERAL: PAINLEVEL: NO PAIN

## 2023-01-24 ENCOUNTER — HOSPITAL ENCOUNTER (OUTPATIENT)
Dept: LAB | Facility: MEDICAL CENTER | Age: 88
End: 2023-01-24
Attending: FAMILY MEDICINE
Payer: MEDICARE

## 2023-01-24 DIAGNOSIS — G60.9 HEREDITARY PERIPHERAL NEUROPATHY: ICD-10-CM

## 2023-01-24 LAB — VIT B12 SERPL-MCNC: 1091 PG/ML (ref 211–911)

## 2023-01-24 PROCEDURE — 82746 ASSAY OF FOLIC ACID SERUM: CPT

## 2023-01-24 PROCEDURE — 82607 VITAMIN B-12: CPT

## 2023-01-24 PROCEDURE — 36415 COLL VENOUS BLD VENIPUNCTURE: CPT

## 2023-01-25 LAB — FOLATE SERPL-MCNC: >40 NG/ML

## 2023-03-08 PROBLEM — L57.0 AK (ACTINIC KERATOSIS): Status: ACTIVE | Noted: 2023-01-01

## 2023-03-08 NOTE — PROGRESS NOTES
This medical record contains text that has been entered with the assistance of computer voice recognition and dictation software.  Therefore, it may contain unintended errors in text, spelling, punctuation, or grammar      Chief Complaint   Patient presents with    Follow-Up     Follow up lab work    Hip Pain     Right sided hip pain          Regine Bryant is a 89 y.o. female here evaluation and management of: labs, hip pain      HPI:     HCC Gap Form    Diagnosis to address: N18.31 - Stage 3a chronic kidney disease (HCC)  Assessment and plan: Chronic, stable. Continue with current defined treatment plan: . Follow-up at least annually.  Diagnosis: E21.3 - Hyperparathyroidism (HCC)  Assessment and plan: Chronic, stable. Continue with current defined treatment plan: . Follow-up at least annually.  Diagnosis: J96.11, Z99.81 - Chronic respiratory failure with hypoxia, on home oxygen therapy (HCC)  Assessment and plan: Chronic, stable, as based on today's assessment and impact on other conditions evaluated today. Continue with current treatment plan:  Follow-up with specialist as directed, but at least annually.  Last edited 03/08/23 13:24 PST by Sreekanth Garcia M.D.           1. Right hip pain  NEW UNDIAGNOSED PROBLEM    Regine is a very pleasant 89-year-old female who presents to clinic with a chief plaint of having right hip pain for the past 6 months or so.  She states is just always is there worse when she is pushing on it or sitting on something.  It is usually localized to the right posterior hip location.  The pain does not radiate.  It improves with rest and heat.    2. Dyspnea on exertion  Patient states that her shortness of breath on walking continues to worsen she has noticed that over the past 2 years.  She is able to lay down flat on her back and sleep although she personally on her side.  She denies any nocturnal cough.  Her last echocardiogram was on September 20, 2016 which revealed a normal  ejection fraction grade 1 diastolic dysfunction.    Echocardiogram on September 20, 2016  Echocardiography Laboratory     CONCLUSIONS  Normal left ventricular size, thickness, systolic function.  Left ventricular ejection fraction is visually estimated to be 70%.  Grade I diastolic dysfunction.  Mild mitral regurgitation.  Mild tricuspid regurgitation.  Estimated right ventricular systolic pressure  is 40 mmHg.  Normal inferior vena cava size and inspiratory collapse.     SHAHZAD CACERES  Exam Date:         09/20/2016                      14:02  Exam Location:     Inpatient  Priority:          Routine     Ordering Physician:        FELTON BERRY  Referring Physician:       JAMAL Lundberg  Sonographer:               Lucia Machuca, [facility]         3. AK (actinic keratosis)  Patient has a couple of red spots on her nose that been there for 2 years and worsening.  She is very concerned about them.        Current medicines (including changes today)  Current Outpatient Medications   Medication Sig Dispense Refill    Acetaminophen (TYLENOL) 325 MG Cap Take 1 tab po tid prn pain 120 Capsule 0    hydroCHLOROthiazide (HYDRODIURIL) 25 MG Tab Take 1 Tablet by mouth every day. 100 Tablet 3    vitamin D2, Ergocalciferol, (DRISDOL) 1.25 MG (49884 UT) Cap capsule Take  by mouth every 7 days.      carvedilol (COREG) 3.125 MG Tab Take 1 Tablet by mouth 2 times a day with meals. 200 Tablet 3    levothyroxine (SYNTHROID) 25 MCG Tab Take 1 Tablet by mouth every morning on an empty stomach. 100 Tablet 3    Magnesium 500 MG Cap Take 1 Each by mouth every day at 6 PM.      acetaminophen (TYLENOL) 500 MG Tab Take 500-1,000 mg by mouth every 6 hours as needed for Mild Pain or Moderate Pain.      Home Care Oxygen Inhale 2 L/min at bedtime. Oxygen dose range: 2 L/min  Respiratory route via: Nasal Cannula   Oxygen supplier: [facility]          Ascorbic Acid (VITAMIN C) 1000 MG Tab Take 1,000 mg by mouth  "every day.      Cyanocobalamin (VITAMIN B-12) 1000 MCG Tab Take 1,000 mcg by mouth every day.      Misc Natural Products (GLUCOSAMINE CHOND CMP ADVANCED PO) Take 1 Tablet by mouth every day.      Multiple Vitamins-Minerals (CENTRUM SILVER PO) Take 1 Tablet by mouth every day.      aspirin (ASA) 81 MG Chew Tab chewable tablet Take 162 mg by mouth every day.       No current facility-administered medications for this visit.     She  has a past medical history of Hypercholesterolemia, Hypertension, Hypertension, and Thyroid disease.  She  has a past surgical history that includes gyn surgery and abdominal hysterectomy total.  Social History     Tobacco Use    Smoking status: Never    Smokeless tobacco: Never   Vaping Use    Vaping Use: Never used   Substance Use Topics    Alcohol use: No    Drug use: No     Social History     Social History Narrative    , lives alone. Retired, worked in an [employer]     Has 3 children, live near by and see's them fairly often.      Family History   Problem Relation Age of Onset    Hypertension Brother      Family Status   Relation Name Status    Mo      Fa      Bro  Alive         ROS    The pertinent  ROS findings can be seen in the HPI above.     All other systems reviewed and are negative     Objective:     BP (!) 142/56 (BP Location: Left arm, Patient Position: Sitting, BP Cuff Size: Adult)   Pulse 78   Temp 36.6 °C (97.8 °F) (Temporal)   Ht 1.448 m (4' 9\")   Wt 61.7 kg (136 lb)   SpO2 91%  Body mass index is 29.43 kg/m².      Physical Exam:    Constitutional: Alert, no distress.  Skin:     SKIN EXAM          AK  3 lesions on nose with evidence of of tender, pink, scaly with solar damage present , spotty hyperpigmentation, scattered telangiectasias, and  xerosis      PROCEDURE: CRYOTHERAPY  Discussed risks and benefits of cryotherapy including but not limited to scarring, hyperpigmentation, hypopigmentation, hypertrophic scarring, keiloid scarring, " incomplete or no resolution of lesions treated,pain, undesirable cosemetic result, blistering, potential need for additional treatment including more invasive treatment. Patient expresses understanding and verbally acknowledges risks and consent to treatment. 2  applications of cryotherapy with 3 second freeze thaw cycle was applied to the above lesions.  Patient tolerated procedure well. There were no complications. Aftercare instructions given.   Eye: Equal, round and reactive, conjunctiva clear, lids normal.  ENMT: Lips without lesions, good dentition, oropharynx clear.  Neck: Trachea midline, no masses, no thyromegaly. No cervical or supraclavicular lymphadenopathy.  Respiratory: Unlabored respiratory effort, lungs clear to auscultation, no wheezes, no ronchi.  Cardiovascular: Normal S1, S2, no murmur, no edema  Abdomen: Soft, non-tender, no masses, no hepatosplenomegaly.  HIP EXAM:  Tender to touch over sacrum and right trochanter  NO asymmetry of the pelvis, thighs, and knees  NO Swelling, heat, and overlying erythema   NO atrophy  Normal range of motion of the hip  NO muscle spasm  NO arthritis include pain at night, bone pain distant from the joint  Can jump on one leg with no pain right and left side  Normal FADIR, normal FADER        Assessment and Plan:   The following treatment plan was discussed    All recent labs and provider notes reviewed    1. Right hip pain    No NSAIDs given her history of renal insufficiency  Tylenol for pain  We will proceed with plain film of the hips    - DX-HIP-BILATERAL-WITH PELVIS-3/4 VIEWS; Future  - Acetaminophen (TYLENOL) 325 MG Cap; Take 1 tab po tid prn pain  Dispense: 120 Capsule; Refill: 0    2. Dyspnea on exertion  - REFERRAL TO CARDIOLOGY    3. AK (actinic keratosis)    Patient tolerated procedure well  There were no adverse events  Patient was given post procedure precautions     - Referral to Dermatology    4. Stage 3a chronic kidney disease (HCC)    Continue bp  control  Avoid Nsaids and all nephrotoxins  Renally dose all medications when indicated        Instructed to Follow up in clinic or ER for worsening symptoms, difficulty breathing, lack of expected recovery, or should new symptoms or problems arise.    Followup: Return in about 6 months (around 9/8/2023) for Reevaluation, labs.

## 2023-03-08 NOTE — PROGRESS NOTES
Met w/ pt following PCP visit, per PCP request. Pt identified transportation and potential housekeeping needs. Pt does not have any current healthcare or medication concerns or needs. She may, however, need assistance with coordinating appointments. Identified pt for general care management program. Completed related SDOH screening questions. Discussed pt with Mary WEAVER. Also, provided pt with our contact information and hours.

## 2023-03-13 NOTE — PROGRESS NOTES
CHW contacted pt to assist with resources for transportation, senior , and homemaker services. Pt receives meals on wheels and stated that she does not need other resources for food. CHW gave pt the information for [facility] transportation services. Pt stated that she utilizes [facility] public transportation. Pt reports that she struggles with cleaning her home. CHW will follow up with pt with resources for senior /homemaker services.

## 2023-03-17 NOTE — PROGRESS NOTES
CHW called pt to give her resources for a senior . CHW gave pt the contact information for [employer] and [employer]iors. CHW also gave pt the contact information to [employer]  so she is able to utilize the transportation services. Pt has the contact information to call CHW as needed.

## 2023-04-10 NOTE — TELEPHONE ENCOUNTER
Spoke with patient in regards to her appointment with . Patient stated that she had seen a cardiologist before but does not remember doctors name or location. Confirmed date and time with patient.

## 2023-04-12 NOTE — PROGRESS NOTES
CHW contacted pt for monthly outreach. Pt states that she has not yet utilized the resources provided to her for a senior  or [employer] transportation. Pt states that her daughter is currently helping her with transportation and there are no barriers to calling the senior  resources. CHW offered to assist with scheduling transportation if she is unable to get a ride. Pt states that she will reach out if anything changes and needs a ride. CHW will follow up with pt next week to ensure she has transportation to her upcoming appts. Pt does not have any other barriers at this time.

## 2023-04-19 NOTE — PROGRESS NOTES
CHW followed up with pt to ensure she had transportation to her upcoming appt with cardiology. Pt states that her daughter will be taking her. CHW encouraged pt to reach out if she needs transportation to her other appts and CHW will assist her schedule through [employer].

## 2023-04-24 NOTE — PROGRESS NOTES
Cardiology Initial Consultation Note    Date of note:    4/25/2023    Primary Care Provider: Sreekanth Garcia M.D.  Referring Provider: Sreekanth Garcia M.D.     Patient Name: Regine Bryant   YOB: 1929  MRN:              [id number]    Chief Complaint: Dyspnea on exertion    History of Present Illness: Ms. Regine Bryant is a 89 y.o. female whose current medical problems include hypertension, hypothyroidism, dyslipidemia, statin intolerance, chronic respiratory failure on nocturnal supplemental oxygen, history of DVT, and CKD stage 3a who is here for cardiac consultation for dyspnea on exertion.    The patient was recently seen by PCP on 3/8/2020.  During the visit, the patient reports worsening dyspnea on exertion.  The patient was referred to cardiology clinic for further work-up.    The patient presents to discuss symptoms.  The patient presents with her daughter.  The patient reports that she started having dyspnea on exertion for the last 10 months or so now.  The patient can barely do anything before getting short of breath.  She denies any chest pain.  She denies any orthopnea, PND, or leg swelling.  No palpitations.  No syncope or presyncopal episodes.      Cardiovascular Risk Factors:  1. Smoking status: Never smoker  2. Type II Diabetes Mellitus: None  Lab Results   Component Value Date/Time    HBA1C 5.6 10/12/2022 08:24 AM    HBA1C 5.9 (H) 09/23/2021 11:31 AM     3. Hypertension: On medication  4. Dyslipidemia: Statin intolerant, diet controlled  Cholesterol,Tot   Date Value Ref Range Status   10/12/2022 222 (H) 100 - 199 mg/dL Final     LDL   Date Value Ref Range Status   10/12/2022 133 (H) <100 mg/dL Final     HDL   Date Value Ref Range Status   10/12/2022 40 >=40 mg/dL Final     Triglycerides   Date Value Ref Range Status   10/12/2022 247 (H) 0 - 149 mg/dL Final     5. Family history of early Coronary Artery Disease in a first degree relative (Male less than 55 years of  age; Female less than 65 years of age): Uncles with MI in 50s  6.  Obesity and/or Metabolic Syndrome: Body mass index is 30.08 kg/m².  7. Sedentary lifestyle: Sedentary    Review of Systems   Constitutional: Negative for fever, malaise/fatigue and night sweats.   Cardiovascular:  Positive for dyspnea on exertion. Negative for chest pain, irregular heartbeat, leg swelling, near-syncope, orthopnea, palpitations, paroxysmal nocturnal dyspnea and syncope.   Respiratory:  Negative for cough, shortness of breath and wheezing.    Gastrointestinal:  Negative for abdominal pain, diarrhea, nausea and vomiting.   Neurological:  Negative for dizziness, focal weakness and weakness.       All other systems reviewed and are negative.         Current Outpatient Medications   Medication Sig Dispense Refill    Acetaminophen (TYLENOL) 325 MG Cap Take 1 tab po tid prn pain 120 Capsule 0    hydroCHLOROthiazide (HYDRODIURIL) 25 MG Tab Take 1 Tablet by mouth every day. 100 Tablet 3    vitamin D2, Ergocalciferol, (DRISDOL) 1.25 MG (85389 UT) Cap capsule Take  by mouth every 7 days.      carvedilol (COREG) 3.125 MG Tab Take 1 Tablet by mouth 2 times a day with meals. 200 Tablet 3    levothyroxine (SYNTHROID) 25 MCG Tab Take 1 Tablet by mouth every morning on an empty stomach. 100 Tablet 3    Magnesium 500 MG Cap Take 1 Each by mouth every day at 6 PM.      acetaminophen (TYLENOL) 500 MG Tab Take 500-1,000 mg by mouth every 6 hours as needed for Mild Pain or Moderate Pain.      Home Care Oxygen Inhale 2 L/min at bedtime. Oxygen dose range: 2 L/min  Respiratory route via: Nasal Cannula   Oxygen supplier: [facility]          Ascorbic Acid (VITAMIN C) 1000 MG Tab Take 1,000 mg by mouth every day.      Cyanocobalamin (VITAMIN B-12) 1000 MCG Tab Take 1,000 mcg by mouth every day.      Misc Natural Products (GLUCOSAMINE CHOND CMP ADVANCED PO) Take 1 Tablet by mouth every day.      Multiple Vitamins-Minerals (CENTRUM SILVER PO) Take 1  "Tablet by mouth every day.      aspirin (ASA) 81 MG Chew Tab chewable tablet Take 162 mg by mouth every day.      acetaminophen (TYLENOL) 325 MG Tab Take 325 mg by mouth 3 times a day as needed. as needed for pain       No current facility-administered medications for this visit.         Allergies   Allergen Reactions    Atorvastatin          Physical Exam:  Ambulatory Vitals  /60 (BP Location: Left arm, Patient Position: Sitting, BP Cuff Size: Adult)   Pulse 85   Resp 14   Ht 1.448 m (4' 9\")   Wt 63 kg (139 lb)   SpO2 92%    Oxygen Therapy:  Pulse Oximetry: 92 %  BP Readings from Last 4 Encounters:   04/25/23 130/60   03/08/23 (!) 142/56   12/08/22 126/74   10/17/22 126/56       Weight/BMI: Body mass index is 30.08 kg/m².  Wt Readings from Last 4 Encounters:   04/25/23 63 kg (139 lb)   03/08/23 61.7 kg (136 lb)   12/08/22 62.8 kg (138 lb 6.4 oz)   10/17/22 61.7 kg (136 lb)       General: Well appearing and in no apparent distress  Eyes: nl conjunctiva, no icteric sclera  ENT: wearing a mask, normal external appearance of ears  Neck: no visible JVP,  no carotid bruits  Lungs: normal respiratory effort, CTAB  Heart: RRR, no murmurs, no rubs or gallops,  no edema bilateral lower extremities. No LV/RV heave on cardiac palpatation. + bilateral radial pulses.  + bilateral dp pulses.   Abdomen: soft, non tender, non distended, no masses, normal bowel sounds.  No HSM.  Extremities/MSK: no clubbing, no cyanosis  Neurological: No focal sensory deficits  Psychiatric: Appropriate affect, A/O x 3, intact judgement and insight  Skin: Warm extremities      Lab Data Review:  Lab Results   Component Value Date/Time    CHOLSTRLTOT 222 (H) 10/12/2022 08:24 AM     (H) 10/12/2022 08:24 AM    HDL 40 10/12/2022 08:24 AM    TRIGLYCERIDE 247 (H) 10/12/2022 08:24 AM       Lab Results   Component Value Date/Time    SODIUM 141 12/02/2022 09:12 AM    POTASSIUM 3.6 12/02/2022 09:12 AM    CHLORIDE 105 12/02/2022 09:12 AM    CO2 " 26 12/02/2022 09:12 AM    GLUCOSE 107 (H) 12/02/2022 09:12 AM    BUN 43 (H) 12/02/2022 09:12 AM    CREATININE 1.01 12/02/2022 09:12 AM     Lab Results   Component Value Date/Time    ALKPHOSPHAT 101 (H) 12/02/2022 09:12 AM    ASTSGOT 22 12/02/2022 09:12 AM    ALTSGPT 22 12/02/2022 09:12 AM    TBILIRUBIN 0.3 12/02/2022 09:12 AM      Lab Results   Component Value Date/Time    WBC 8.1 10/12/2022 08:24 AM     Lab Results   Component Value Date/Time    HBA1C 5.6 10/12/2022 08:24 AM    HBA1C 5.9 (H) 09/23/2021 11:31 AM         Cardiac Imaging and Procedures Review:    EKG dated 4/25/2023: My personal interpretation is sinus rhythm, prolonged PA interval, poor R wave progression    Echo dated 9/20/2016:   CONCLUSIONS  Normal left ventricular size, thickness, systolic function.  Left ventricular ejection fraction is visually estimated to be 70%.  Grade I diastolic dysfunction.  Mild mitral regurgitation.  Mild tricuspid regurgitation.  Estimated right ventricular systolic pressure  is 40 mmHg.  Normal inferior vena cava size and inspiratory collapse.         Assessment & Plan     1. Dyspnea on exertion  EKG    EC-ECHOCARDIOGRAM COMPLETE W/O CONT    [id number] PET W/CT ATTENUATION      2. Essential hypertension  EKG    EC-ECHOCARDIOGRAM COMPLETE W/O CONT    [id number] PET W/CT ATTENUATION      3. Angina pectoris, unspecified (HCC)  EKG    EC-ECHOCARDIOGRAM COMPLETE W/O CONT    [id number] PET W/CT ATTENUATION      4. Dyslipidemia  Lipid Profile      5. Statin intolerance  Lipid Profile      6. On supplemental oxygen therapy        7. Nonspecific abnormal electrocardiogram (ECG) (EKG)  EC-ECHOCARDIOGRAM COMPLETE W/O CONT    [id number] PET W/CT ATTENUATION            Shared Medical Decision Making:    Dyspnea on exertion  Angina equivalent  Abnormal EKG  -Patient with known personal cardiovascular risk factors.  Will obtain cardiac PET to assess for any prior MI or reversible ischemia given symptoms and  risk factors.  Patient unable to walk, will plan for chemical stress test with cardiac PET.  -Obtain echocardiogram to assess LVEF and any structural abnormalities given symptoms and abnormal EKG    Hypertension  BP controlled this visit  -Continue carvedilol 3.125 mg twice daily and hydrochlorothiazide 25 mg daily    Dyslipidemia  Statin intolerance  -Discussed starting ezetimibe.  Patient would like to work on diet first.  -Repeat lipid panel prior to next visit.  If repeat lipid elevated and/or abnormal stress test, will need to initiate ezetimibe and also refer to lipid clinic for PCSK9 inhibitors.    On nocturnal supplemental oxygen  -Continue follow-up with PCP    All of the patient's excellent questions were answered to the best of my knowledge and to her satisfaction.  It was a pleasure seeing Ms. Regine Bryant in my clinic today. Return in about 1 month (around 5/25/2023). Patient is aware to call the cardiology clinic with any questions or concerns.      William Ralph MD  [facility]  [facility], [address]  [address] [address]  Phone: [phone]  Fax: [phone]

## 2023-04-25 PROBLEM — I20.9 ANGINA PECTORIS, UNSPECIFIED (HCC): Status: ACTIVE | Noted: 2023-01-01

## 2023-05-03 NOTE — PROGRESS NOTES
DERMATOLOGY NOTE  NEW VISIT       Chief complaint: Establish Care and Skin Lesion     Pcp froze a lesion on nose 03/2023 and it's slightly better       History of skin cancer: No  History of precancers/actinic keratoses: Yes, Details: LN2 on nose  History of biopsies:Yes, Details: unsure of details  History of blistering/severe sunburns:Yes, Details: in youth  Family history of skin cancer:No  Family history of atypical moles:No    Allergies   Allergen Reactions    Atorvastatin         MEDICATIONS:  Medications relevant to specialty reviewed.     REVIEW OF SYSTEMS:   Positive for skin (see HPI)  Negative for fevers and chills       EXAM:  There were no vitals taken for this visit.  Constitutional: Well-developed, well-nourished, and in no distress.     A focused skin exam was performed including the affected areas of the face. Notable findings on exam today listed below and/or in assessment/plan.     1-2mm hypopigmented/white cystic papules on face  -sun exposed skin of face with scattered clinically benign light brown reticulated macules all of which were morphologically similar and none of which were suspicious for skin cancer today on exam  Ill-defined erythematous gritty/scaly papules over the nose      IMPRESSION / PLAN:    1. Actinic keratosis  - NMSC education/counseling   CRYOTHERAPY:  Risks (including, but not limited to: skin discoloration, redness, blister, blood blister, recurrence, need for further treatment, infection, scar) and benefits of cryotherapy discussed. Patient verbally agreed to proceed with treatment. 1 cryotherapy freeze thaw cycles of 10 seconds were applied to 2 lesions on nose with cryac. Patient tolerated procedure well. Aftercare instructions given--no specific care needed unless irritated during healing process, can apply Vaseline with small band-aid if needed.        2. Lentigines  - Benign-appearing nature of lesions discussed during exam.   - Advised to continue to monitor for any  return to clinic for new or concerning changes.      3. Milia  - Benign-appearing nature of lesions discussed during exam.   - Advised to continue to monitor for any return to clinic for new or concerning changes.        Discussed risks associated with LN2, Patient verbalized understanding and agrees with plan regarding the above          Please note that this dictation was created using voice recognition software. I have made every reasonable attempt to correct obvious errors, but I expect that there are errors of grammar and possibly content that I did not discover before finalizing the note.      Return to clinic in: Return for PRN. and as needed for any new or changing skin lesions.

## 2023-05-11 NOTE — PROGRESS NOTES
CHW contacted pt to complete monthly outreach. Pt states that she is doing well. CHW provided the contact information to SCP for transportation assistance. CHW completed the SDOH. Pt states that she gets exercise by walking in the house and would like to start walking up and down her driveway. Pt states that her balance is not the best. CHW encouraged pt to use cane/walker for help with balance. Pt has the contact information to reach CHW as needed.

## 2023-05-22 NOTE — TELEPHONE ENCOUNTER
Spoke with patient in regards to her fasting labs patient will have labs done prior to her appointment. Patient also stated that she had to CXL her  imaging test patient could not complete test. Confirmed date and time with patient.

## 2023-05-24 NOTE — TELEPHONE ENCOUNTER
Phone Number Called: [phone]    Call outcome: Spoke to patient regarding message below.    Message: Called to inform patient of HK recommendations.     Patient reports that she is having trouble lifting her arms above her head and canceled previous appointment.     RN advised patient call Imaging at [phone] and ask if test can be modified. Patient to call and call back and let us know if she is able to complete PET scan.      No further questions at this time.       ----- Message from William Ralph M.D. sent at 5/24/2023  9:12 AM PDT -----  Her cholesterol is elevated.  She is also supposed to get cardiac PET before next visit because of her symptoms.  Looks like the test still had not been scheduled yet.  ----- Message -----  From: Ignacia Min R.N.  Sent: 5/23/2023   3:44 PM PDT  To: William Ralph M.D.    F/u 5/30 AM

## 2023-05-24 NOTE — TELEPHONE ENCOUNTER
HK    Caller: Regine Bryant  calling inquiring if appointment scheduled for 5/30 should be canceled since she is not doing the test. Please call back to advise.    Callback Number: [phone]    Thank You    Rosy SOL

## 2023-06-01 NOTE — PROGRESS NOTES
Subjective:     CHIEF COMPLAINT  Chief Complaint   Patient presents with    Cough     X 3 days, cough, sore throat, body aches.  Home test positive for covid       HPI  Regine Bryant is a very pleasant 89 y.o. female who presents with a positive COVID test from this morning.  She reports that she started feeling ill 2 days ago with a sore throat.  Yesterday, she started coughing and having fatigue and general malaise.  She went to a wedding on Sunday where multiple people fell ill with COVID.  Additionally, she has had a fever.  She has not taken any Tylenol or ibuprofen today.  She is requesting antiviral medication.  She reports that she is still using her oxygen at night as needed.    REVIEW OF SYSTEMS  Review of Systems   Constitutional:  Positive for chills, fever and malaise/fatigue.   HENT:  Positive for sore throat.    Respiratory:  Positive for cough.    Cardiovascular:  Negative for chest pain.       PAST MEDICAL HISTORY  Patient Active Problem List    Diagnosis Date Noted    Angina pectoris, unspecified (HCC) 04/25/2023    AK (actinic keratosis) 03/08/2023    Dyspnea on exertion 08/17/2022    Peripheral neuropathy 11/03/2021    History of fall 09/23/2021    Liver lesion 09/23/2021    History of DVT (deep vein thrombosis) 09/23/2021    Gait disorder 09/23/2021    Stage 3a chronic kidney disease (HCC) 09/23/2021    Dyslipidemia 09/01/2021    BMI 29.0-29.9,adult 08/30/2021    Chronic respiratory failure with hypoxia, on home oxygen therapy (HCC) 08/30/2021    Hyperparathyroidism (HCC) 12/29/2017    Hypercalcemia 12/29/2017    HTN (hypertension) 09/20/2016    Hypothyroid 09/20/2016    Lung mass 09/20/2016    Anemia 09/20/2016    DNR (do not resuscitate) 09/20/2016       SURGICAL HISTORY   has a past surgical history that includes gyn surgery and abdominal hysterectomy total.    ALLERGIES  Allergies   Allergen Reactions    Atorvastatin        CURRENT MEDICATIONS  Home Medications       Reviewed by  Leticia Win, Med Ass't (Medical Assistant) on 06/01/23 at 1427  Med List Status: <None>     Medication Last Dose Status   Acetaminophen (TYLENOL) 325 MG Cap  Active   acetaminophen (TYLENOL) 325 MG Tab  Active   acetaminophen (TYLENOL) 500 MG Tab  Active   Ascorbic Acid (VITAMIN C) 1000 MG Tab Taking Active   aspirin (ASA) 81 MG Chew Tab chewable tablet Taking Active   carvedilol (COREG) 3.125 MG Tab Taking Active   Cyanocobalamin (VITAMIN B-12) 1000 MCG Tab Taking Active   Home Care Oxygen Taking Active   hydroCHLOROthiazide (HYDRODIURIL) 25 MG Tab Taking Active   levothyroxine (SYNTHROID) 25 MCG Tab Taking Active   Magnesium 500 MG Cap Taking Active   Misc Natural Products (GLUCOSAMINE CHOND CMP ADVANCED PO) Taking Active   Multiple Vitamins-Minerals (CENTRUM SILVER PO) Taking Active   vitamin D2, Ergocalciferol, (DRISDOL) 1.25 MG (60140 UT) Cap capsule Taking Active                    SOCIAL HISTORY  Social History     Tobacco Use    Smoking status: Never    Smokeless tobacco: Never   Vaping Use    Vaping Use: Never used   Substance and Sexual Activity    Alcohol use: No    Drug use: No    Sexual activity: Not on file       FAMILY HISTORY  Family History   Problem Relation Age of Onset    Hypertension Brother           Objective:     VITAL SIGNS: /80 (BP Location: Left arm, Patient Position: Sitting, BP Cuff Size: Adult)   Pulse 86   Temp (!) 38.1 °C (100.5 °F) (Temporal)   Resp 16   SpO2 94%     PHYSICAL EXAM  Physical Exam  Vitals reviewed.   Constitutional:       General: She is not in acute distress.     Appearance: Normal appearance. She is ill-appearing. She is not toxic-appearing.   HENT:      Head: Normocephalic and atraumatic.      Mouth/Throat:      Mouth: Mucous membranes are moist.   Eyes:      Conjunctiva/sclera: Conjunctivae normal.   Cardiovascular:      Rate and Rhythm: Normal rate and regular rhythm.      Heart sounds: Normal heart sounds.   Pulmonary:      Effort: Pulmonary effort  is normal. No respiratory distress.      Breath sounds: Normal breath sounds. No wheezing, rhonchi or rales.   Skin:     General: Skin is warm and dry.      Capillary Refill: Capillary refill takes less than 2 seconds.   Neurological:      General: No focal deficit present.      Mental Status: She is alert and oriented to person, place, and time.   Psychiatric:         Mood and Affect: Mood normal.         Assessment/Plan:     1. COVID-19  - molnupiravir 200 MG capsule; Take 4 Capsules by mouth 2 times a day for 5 days. CAUTION: DO NOT USE IF PREGNANT OR PLANNING TO BECOME PREGNANT  Dispense: 40 Capsule; Refill: 0  -Tylenol as needed for fever  -Continue home oxygen as previously prescribed  -Continue to maintain adequate hydration  -Return to clinic or ER if symptoms worsen or fail to resolve  -Continue current medications as previously prescribed    MDM/Comments:  I have prepared for this visit by personally reviewing the patient's prevous medical records and labs including: GFR, Creatinine, CMP, CBC. Patient has stable vital signs, although she is febrile due to infection with COVID-19, and is non-toxic appearing.  Patient had a positive COVID test at home, no need for Cepheid COVID testing in clinic at this time.  Discussed supportive care with hydration, rest, Tylenol as needed. Oxygen levels are appropriate at this time. Patient demonstrated understanding of treatment plan at this time and will RTC or be seen at the ER if symptoms worsen or fail to resolve.       Differential diagnosis, natural history, supportive care, and indications for immediate follow-up discussed. All questions answered. Patient agrees with the plan of care.    Follow-up as needed if symptoms worsen or fail to improve to PCP, Urgent care or Emergency Room.    I have personally reviewed prior external notes and test results pertinent to today's visit.  I have independently reviewed and interpreted all diagnostics ordered during this  urgent care acute visit.   Discussed management options (risks,benefits, and alternatives to treatment). Pt expresses understanding and the treatment plan was agreed upon. Questions were encouraged and answered to pt's satisfaction.    Please note that this dictation was created using voice recognition software. I have made a reasonable attempt to correct obvious errors, but I expect that there are errors of grammar and possibly content that I did not discover before finalizing the note.

## 2023-06-08 NOTE — PROGRESS NOTES
Community Health Worker Follow-Up    Reason for outreach: Monthly Outreach     CHW Interventions: Discussed how pt is feeling since she tested positive for COVID on 6/1/23. Pt states that she is feeling much better and has tested negative. Discussed if pt has implemented more exercising and pt confirmed that she is using her cane to help her walk more up and down her driveway.     Specific Resources Provided:  Housing/Shelter: NA  Transportation: NA  Food: NA  Financial: NA  Social Supports: NA  Other: NA    Plan: Pt to continue with exercising and using her cane/walker to help her with balance. Pt did not report any other barriers to food, housing, or transportation at this time. Pt still has the contact information for SCP assistant if and when she needs transportation. CHW will follow up with pt one last time, next month.

## 2023-07-27 NOTE — PROGRESS NOTES
Community Health Worker Follow-Up    Reason for outreach: Last monthly outreach to complete program.     CHW Interventions: Pt states that she is doing fine and does not have any barriers to food, housing, or transportation.     Specific Resources Provided:  Housing/Shelter: NA  Transportation: NA  Food: NA  Financial: NA  Social Supports: NA  Other: NA    Plan: CHW informed pt that this will be the last monthly outreach but encouraged pt to reach out if her circumstances change and should she need anything. Pt has the contact information to call in as needed.

## 2023-09-08 PROBLEM — G89.29 CHRONIC RIGHT SHOULDER PAIN: Status: ACTIVE | Noted: 2023-01-01

## 2023-09-08 PROBLEM — M79.10 MYALGIA: Status: ACTIVE | Noted: 2023-01-01

## 2023-09-08 PROBLEM — M25.511 CHRONIC RIGHT SHOULDER PAIN: Status: ACTIVE | Noted: 2023-01-01

## 2023-09-08 PROBLEM — R53.81 PHYSICAL DECONDITIONING: Status: ACTIVE | Noted: 2023-01-01

## 2023-09-08 NOTE — PROGRESS NOTES
This medical record contains text that has been entered with the assistance of computer voice recognition and dictation software.  Therefore, it may contain unintended errors in text, spelling, punctuation, or grammar      Chief Complaint   Patient presents with    Body Aches     Shoulders, legs, neck, ongoing issue    Follow-Up         Regine Bryant is a 89 y.o. female here evaluation and management of: Right shoulder pain muscle aches      HPI:     HCC Gap Form    Diagnosis to address: E21.3 - Hyperparathyroidism (HCC)  Assessment and plan: Chronic, stable. Continue with current defined treatment plan: . Follow-up at least annually.  Last edited 09/08/23 13:21 PDT by Sreekanth Garcia M.D.           1. Myalgia      2. Physical deconditioning  The patient states that she has been having some muscle aches in the shoulder of the neck all over her body.  She states this been going on for a year or so.  She is not very active, she mainly sits in her recliner and reads books daily.  He denies any shortness of breath, no fevers chills night sweats.    3. Chronic right shoulder pain  Patient states she was seen at [facility] for her chronic right shoulder pain multiple x-rays were done she states.  She also received a steroid injection however she still cannot raise her right hand above her head.  She has been using Tylenol but she still gets pain    Current medicines (including changes today)  Current Outpatient Medications   Medication Sig Dispense Refill    gabapentin (NEURONTIN) 100 MG Cap Take 1 cap po bid prn pain 100 Capsule 0    Acetaminophen (TYLENOL) 325 MG Cap Take 1 tab po tid prn pain 120 Capsule 0    hydroCHLOROthiazide (HYDRODIURIL) 25 MG Tab Take 1 Tablet by mouth every day. 100 Tablet 3    vitamin D2, Ergocalciferol, (DRISDOL) 1.25 MG (20193 UT) Cap capsule Take  by mouth every 7 days.      carvedilol (COREG) 3.125 MG Tab Take 1 Tablet by mouth 2 times a day with meals. 200 Tablet 3     levothyroxine (SYNTHROID) 25 MCG Tab Take 1 Tablet by mouth every morning on an empty stomach. 100 Tablet 3    Magnesium 500 MG Cap Take 1 Each by mouth every day at 6 PM.      acetaminophen (TYLENOL) 500 MG Tab Take 500-1,000 mg by mouth every 6 hours as needed for Mild Pain or Moderate Pain.      Home Care Oxygen Inhale 2 L/min at bedtime. Oxygen dose range: 2 L/min  Respiratory route via: Nasal Cannula   Oxygen supplier: [facility]          Ascorbic Acid (VITAMIN C) 1000 MG Tab Take 1,000 mg by mouth every day.      Cyanocobalamin (VITAMIN B-12) 1000 MCG Tab Take 1,000 mcg by mouth every day.      Misc Natural Products (GLUCOSAMINE CHOND CMP ADVANCED PO) Take 1 Tablet by mouth every day.      Multiple Vitamins-Minerals (CENTRUM SILVER PO) Take 1 Tablet by mouth every day.      aspirin (ASA) 81 MG Chew Tab chewable tablet Take 162 mg by mouth every day.       No current facility-administered medications for this visit.     She  has a past medical history of Hypercholesterolemia, Hypertension, Hypertension, and Thyroid disease.  She  has a past surgical history that includes gyn surgery and abdominal hysterectomy total.  Social History     Tobacco Use    Smoking status: Never    Smokeless tobacco: Never   Vaping Use    Vaping Use: Never used   Substance Use Topics    Alcohol use: No    Drug use: No     Social History     Social History Narrative    , lives alone. Retired, worked in an [employer]     Has 3 children, live near by and see's them fairly often.      Family History   Problem Relation Age of Onset    Hypertension Brother      Family Status   Relation Name Status    Mo      Fa      Bro  Alive         ROS    The pertinent  ROS findings can be seen in the HPI above.     All other systems reviewed and are negative     Objective:     BP (!) 140/70 (BP Location: Left arm, Patient Position: Sitting, BP Cuff Size: Adult)   Pulse 68   Temp 36.8 °C (98.2 °F)   Resp 16   Ht 1.448  "m (4' 9\")   Wt 59.1 kg (130 lb 6.4 oz)   SpO2 96%  Body mass index is 28.22 kg/m².      Physical Exam:    Constitutional: Alert, no distress.  Skin: No suspicious lesions  Eye: Equal, round and reactive, conjunctiva clear, lids normal.  ENMT: Lips without lesions, good dentition, oropharynx clear.  Neck: Trachea midline, no masses, no thyromegaly. No cervical or supraclavicular lymphadenopathy.  Respiratory: Unlabored respiratory effort, lungs clear to auscultation, no wheezes, no ronchi.  Cardiovascular: Normal S1, S2, no murmur, no edema  Abdomen: Soft, non-tender, no masses, no hepatosplenomegaly.  Shoulder-- RIGHT--  Cannot perform and felt a stop sign on the right, cannot complete exam due to pain         Assessment and Plan:   The following treatment plan was discussed    All recent labs and provider notes reviewed    1. Myalgia    Obtain routine labs, magnesium not covered for her, recommended taking 400 mg of magnesium over-the-counter daily    We will also refer her to physical therapy for deconditioning  Gabapentin for pain    - CBC WITH DIFFERENTIAL; Future  - Comp Metabolic Panel; Future  - Lipid Profile; Future  - TSH+FREE T4  - T3 FREE; Future  - VITAMIN D,25 HYDROXY (DEFICIENCY); Future    2. Physical deconditioning  - Referral to Physical Therapy    3. Chronic right shoulder pain  She will benefit from physical therapy  Tylenol plus add gabapentin for pain       - Referral to Physical Therapy  - gabapentin (NEURONTIN) 100 MG Cap; Take 1 cap po bid prn pain  Dispense: 100 Capsule; Refill: 0     Instructed to Follow up in clinic or ER for worsening symptoms, difficulty breathing, lack of expected recovery, or should new symptoms or problems arise.    Followup: Return in about 6 months (around 3/8/2024) for Reevaluation, labs.               "

## 2023-11-14 PROBLEM — R05.1 ACUTE COUGH: Status: ACTIVE | Noted: 2023-01-01

## 2023-11-14 PROBLEM — J30.0 VASOMOTOR RHINITIS: Status: ACTIVE | Noted: 2023-01-01

## 2023-11-15 NOTE — PATIENT INSTRUCTIONS
It was a pleasure meeting with you today at [facility]!    Your medical history/records and medications were reviewed today.     UPDATE on MyChart Results: If you have blood work, and/or imaging studies, or any other test or procedure completed, you will have access to results as soon as they become available in MyChart. Recently, these results will be available for review at the same time that your provider is able to see results!    This will likely mean you will see a result before your provider has had a chance to review and discuss with you.  Some results or care notes may be hard to understand and may be serious in nature.    We look at every result and your provider will contact you to explain what they mean and discuss appropriate next steps. Please allow for at least 72 business hours for chart and result review.     We prefer that you wait for your care team to contact you with your results.  Often, your provider will discuss your results with you at your next appointment. We look forward to continuing to partner with you in your care.    Please review my practice information below:    If you have any prescription refill requests, please send them via [facility] or discuss with your provider at the start of your office visits. Please allow 3-5 business days for lab and testing review and you will be contacted via [facility] with those results, or if advised to make a follow up appointment regarding those results, then please do so.     Once resulted, your lab/test/imaging results will show up automatically in your MyChart. Please wait for my interpretation and recommendations prior to viewing your results to avoid any unnecessary confusion or misinterpretation. I will address all of the lab values that I interpret as abnormal and message you accordingly on your MyChart. I will always send you a message about your results even if they are normal. If you do not hear back from me within 5-7 business  days after completing your tests, then please send me a message on [facility] so I can obtain your results (especially if you went to an outside lab or imaging center - [facility], [facility], etc).     If you have any additional questions or concerns beyond my interpretation of your results, please make an appointment with me to discuss in further detail.    Please only use the [facility] messaging system for questions regarding your most recent appointment or if advised to use otherwise (glucose or blood pressure reporting).     If you have any new problems or concerns, you must make an appointment to discuss. This includes any referral requests, lab requests (unless advised to notify me for pre-appt labs), medication side effects, or request for medication adjustments.     Please arrive 15 minutes prior to your appointment time to complete your check-in and intake with the medical assistant.      Thank you,    Claudia Lee PA-C ([place])  Physician Assistant Certified  [facility]    -----------------------------------------------------------------    Attn: Patients of [facility]:    In an effort to continue to provide excellent and efficient care to our patients, it is vital that we continue to use our resources appropriately. With that, this is a reminder that [facility] is used for prescription refill requests, test results, virtual visits, and chart review only.     Any new questions, concerns/conditions, lab/imaging requests, medication adjustments, new prescriptions, or referral requests do require an appointment (virtually or in person), unless discussed otherwise at your most recent appointment.     Thank you for your understanding,    [facility]

## 2023-11-15 NOTE — PROGRESS NOTES
Subjective:     CC:   Chief Complaint   Patient presents with    Cough    Otalgia     Left ear pain. Some relief        HPI:   Regine presents today with her daughter-in-law to discuss:    Acute cough  Pt admits to cold like symptoms x 3 weeks. Took cordicin with some relief. Her daughter had a cold around that same time that her symptoms started. No chills or body aches.  Cough overall improving but has had left ear pain x 3 days now.  She states that this is also improving.  No hearing loss or ringing in the ears. Mild sore throat yesterday, but improved today. No change in chronic sob.     Hypercalcemia  Patient with history of hypercalcemia, as well as elevated PTH.  She is on chronic hydrochlorothiazide.  Has renal insufficiency.  Has history of hypothyroidism.  Admits to chronic fatigue and muscle cramping.    Vasomotor rhinitis  Patient admits to chronic runny nose when she eats, drinks, goes outside in cold weather.      Past Medical History:   Diagnosis Date    Hypercholesterolemia     Hypertension     Hypertension     Thyroid disease      Family History   Problem Relation Age of Onset    Hypertension Brother      Past Surgical History:   Procedure Laterality Date    ABDOMINAL HYSTERECTOMY TOTAL      GYN SURGERY      hyst and bladder suspension     Social History     Tobacco Use    Smoking status: Never    Smokeless tobacco: Never   Vaping Use    Vaping Use: Never used   Substance Use Topics    Alcohol use: No    Drug use: No     Social History     Social History Narrative    , lives alone. Retired, worked in an [employer]     Has 3 children, live near by and see's them fairly often.      Current Outpatient Medications Ordered in Epic   Medication Sig Dispense Refill    Cholecalciferol (VITAMIN D) 2000 UNIT Tab Take  by mouth every day.      ipratropium (ATROVENT) 0.06 % Solution Administer 2 Sprays into affected nostril(S) 4 times a day. 15 mL 0    gabapentin (NEURONTIN) 100 MG Cap Take 1 cap po  "bid prn pain 100 Capsule 0    Acetaminophen (TYLENOL) 325 MG Cap Take 1 tab po tid prn pain 120 Capsule 0    carvedilol (COREG) 3.125 MG Tab Take 1 Tablet by mouth 2 times a day with meals. 200 Tablet 3    levothyroxine (SYNTHROID) 25 MCG Tab Take 1 Tablet by mouth every morning on an empty stomach. 100 Tablet 3    Magnesium 500 MG Cap Take 1 Each by mouth every day at 6 PM.      acetaminophen (TYLENOL) 500 MG Tab Take 500-1,000 mg by mouth every 6 hours as needed for Mild Pain or Moderate Pain.      Home Care Oxygen Inhale 2 L/min at bedtime. Oxygen dose range: 2 L/min  Respiratory route via: Nasal Cannula   Oxygen supplier: [facility]          Ascorbic Acid (VITAMIN C) 1000 MG Tab Take 1,000 mg by mouth every day.      Cyanocobalamin (VITAMIN B-12) 1000 MCG Tab Take 1,000 mcg by mouth every day.      Misc Natural Products (GLUCOSAMINE CHOND CMP ADVANCED PO) Take 1 Tablet by mouth every day.      Multiple Vitamins-Minerals (CENTRUM SILVER PO) Take 1 Tablet by mouth every day.      aspirin (ASA) 81 MG Chew Tab chewable tablet Take 162 mg by mouth every day.       No current [facility]-ordered facility-administered medications on file.     Atorvastatin    PMH/PSH/FH/Social history reviewed.  Vaccinations discussed.  Previous records and labs reviewed. Discussed age appropriate anticipatory guidance.    ROS: see hpi  Gen: no fevers/chills  Pulm: no sob, no cough  CV: no chest pain, no palpitations, no edema  GI: no nausea/vomiting, no diarrhea  Skin: no rash    Objective:   Exam:  /68 (BP Location: Left arm, Patient Position: Sitting, BP Cuff Size: Adult)   Pulse 80   Temp 36.7 °C (98 °F) (Temporal)   Resp 16   Ht 1.448 m (4' 9\")   Wt 59.4 kg (131 lb)   SpO2 94%   BMI 28.35 kg/m²    Body mass index is 28.35 kg/m².    Gen: Alert and oriented, No apparent distress.  HEENT: Head atraumatic, normocephalic. Pupils equal and round.  Bilateral TMs pearly gray.  Clear rhinorrhea noted otherwise nasal passages " clear.  Neck: Neck is supple without lymphadenopathy.   Lungs: Normal effort, CTA bilaterally, no wheezes, rhonchi, or rales  CV: Regular rate and rhythm. No rubs or gallops.  1 out of 6 systolic murmur.  Ext: No clubbing, cyanosis, edema.    Assessment & Plan:     89 y.o. female with the following -     1. Acute cough  Improving, exam benign, likely residual symptoms from viral URI.  Continue to monitor.  We will reschedule patient's new patient appointment with Jo Ann Diop for next Tuesday for a general recheck.  If symptoms persist would consider chest x-ray imaging.    2. Hypercalcemia  Stop hydrochlorothiazide, ensure no overuse of vitamin D supplementation.  Take no more than 2000 IU daily.  Recommend updating bone density scan but will leave per discretion of patient's new PCP.  Repeat labs on Monday.  - Comp Metabolic Panel; Future  - TSH WITH REFLEX TO FT4; Future  - PTH INTACT (PTH ONLY); Future  - MAGNESIUM; Future  - PHOSPHORUS; Future    3. Hyperparathyroidism (HCC)  History of CKD, chronic hydrochlorothiazide use, hypothyroidism, hypercalcemia.  - Comp Metabolic Panel; Future  - TSH WITH REFLEX TO FT4; Future  - PTH INTACT (PTH ONLY); Future  - MAGNESIUM; Future  - PHOSPHORUS; Future    4. Hypothyroidism, unspecified type  - TSH WITH REFLEX TO FT4; Future    5. Vasomotor rhinitis  - ipratropium (ATROVENT) 0.06 % Solution; Administer 2 Sprays into affected nostril(S) 4 times a day.  Dispense: 15 mL; Refill: 0    Return in about 1 week (around 11/21/2023) for Follow-up.    Claudia Lee PA-C ([place])  Physician Assistant Certified  [facility]    Please note that this dictation was created using voice recognition software. I have made every reasonable attempt to correct obvious errors, but I expect that there are errors of grammar and possibly content that I did not discover before finalizing the note.

## 2023-11-15 NOTE — ASSESSMENT & PLAN NOTE
Pt admits to cold like symptoms x 3 weeks. Took cordicin with some relief. Her daughter had a cold around that same time that her symptoms started. No chills or body aches.  Cough overall improving but has had left ear pain x 3 days now.  She states that this is also improving.  No hearing loss or ringing in the ears. Mild sore throat yesterday, but improved today. No change in chronic sob.

## 2023-11-15 NOTE — ASSESSMENT & PLAN NOTE
Patient with history of hypercalcemia, as well as elevated PTH.  She is on chronic hydrochlorothiazide.  Has renal insufficiency.  Has history of hypothyroidism.  Admits to chronic fatigue and muscle cramping.

## 2023-11-21 PROBLEM — M79.7 FIBROMYALGIA AFFECTING MULTIPLE SITES: Status: ACTIVE | Noted: 2023-01-01

## 2023-11-21 NOTE — PROGRESS NOTES
Chief Complaint   Patient presents with    Establish Care    Follow-Up     Lab 11/20/2023     Other     Pt reported pain in the muscles  or nerve      HISTORY OF THE PRESENT ILLNESS: Patient is a 89 y.o. female. This pleasant patient is here today to establish care and to discuss:    To establish care  Previous PCP Dr. Garcia  Cardiology-Dr. Wadsworth  Dermatology-Lucille Echevarriatracy    Patient lives alone and her daughters live in town.       Fibromyalgia  Peripheral Neuropathy  Chronic condition. Affected areas right shoulder, neck,  lower back and  legs. Patient is taking gabapentin and Tylenol PRN.   Patient went to [facility] and received a steroid injection on her right shoulder over a year ago without any significant relief. She never followed up.   She continues to have right shoulder pain.   Her neck is becoming more stiff with limited ROM at times.     Hypertension  Chronic condition. Patient is taking coreg 3.125 mg. Tolerating medication well without side effects. Managed by cardiology. However, she has not followed up since last office visit.  Patient denies blurred blurred, severe headaches, chest pain, chest pressure, dizziness, palpitations, syncope, orthopnea, dyspnea or exertion, or leg swelling.    Hypothyroidism  Chronic condition. Patient is taking levothyroxine 25 mcg. Tolerating medication well without side effects. TSH at therapeutic range.   Patient denies weakness, fatigue, lethargy, cold/heat intolerance, difficulty losing weight, confusion, depression, dry skin/hair,onstipation.    Stage IIIa chronic kidney disease  Chronic, stable condition.  She denies decreased urine output.  She does not take NSAIDs.     Latest Reference Range & Units 10/12/22 08:24 12/02/22 09:12 10/25/23 10:41 11/20/23 11:10   GFR (CKD-EPI) >60 mL/min/1.73 m 2 51 ! 52 ! 50 ! 50 !   !: Data is abnormal    Hyperparathyroidism  Chronic, stable condition.  Asymptomatic.  Labs are improving.  Calcium, E-Z-EM, phosphorus,  vitamin D, and creatinine are WNL.      Latest Reference Range & Units 12/02/22 09:12 11/20/23 11:10   Pth, Intact 14.0 - 72.0 pg/mL 151.0 (H) 78.1 (H)      Latest Reference Range & Units 01/06/21 11:34 09/23/21 11:30 10/12/22 08:24 12/02/22 09:12 10/25/23 10:41 11/20/23 11:10   Calcium 8.5 - 10.5 mg/dL 10.9 (H) 10.9 (H) 10.4 10.3 10.8 (H) 10.2   (H): Data is abnormally high    Chronic Respiratory failure with hypoxia on home oxygen therapy  Chronic condition. Patient on supplemental oxygen 2 liters q HS. She has been using oxygen for the past 10 years.   She denies shortness of breath, wheezing, difficulty breathing, altered mental status.  No fevers or URI symptoms.     Allergies: Atorvastatin    Current Outpatient Medications Ordered in Epic   Medication Sig Dispense Refill    Cholecalciferol (VITAMIN D) 2000 UNIT Tab Take  by mouth every day.      ipratropium (ATROVENT) 0.06 % Solution Administer 2 Sprays into affected nostril(S) 4 times a day. 15 mL 0    gabapentin (NEURONTIN) 100 MG Cap Take 1 cap po bid prn pain 100 Capsule 0    Acetaminophen (TYLENOL) 325 MG Cap Take 1 tab po tid prn pain 120 Capsule 0    carvedilol (COREG) 3.125 MG Tab Take 1 Tablet by mouth 2 times a day with meals. 200 Tablet 3    levothyroxine (SYNTHROID) 25 MCG Tab Take 1 Tablet by mouth every morning on an empty stomach. 100 Tablet 3    Magnesium 500 MG Cap Take 1 Each by mouth every day at 6 PM.      Home Care Oxygen Inhale 2 L/min at bedtime. Oxygen dose range: 2 L/min  Respiratory route via: Nasal Cannula   Oxygen supplier: [facility]          Ascorbic Acid (VITAMIN C) 1000 MG Tab Take 1,000 mg by mouth every day.      Cyanocobalamin (VITAMIN B-12) 1000 MCG Tab Take 1,000 mcg by mouth every day.      Misc Natural Products (GLUCOSAMINE CHOND CMP ADVANCED PO) Take 1 Tablet by mouth every day.      Multiple Vitamins-Minerals (CENTRUM SILVER PO) Take 1 Tablet by mouth every day.      aspirin (ASA) 81 MG Chew Tab chewable tablet  Take 162 mg by mouth every day.      acetaminophen (TYLENOL) 500 MG Tab Take 500-1,000 mg by mouth every 6 hours as needed for Mild Pain or Moderate Pain.       No current Epic-ordered facility-administered medications on file.       Past Medical History:   Diagnosis Date    Hypercholesterolemia     Hypertension     Hypertension     Thyroid disease        Past Surgical History:   Procedure Laterality Date    ABDOMINAL HYSTERECTOMY TOTAL      GYN SURGERY      hyst and bladder suspension       Social History     Tobacco Use    Smoking status: Never    Smokeless tobacco: Never   Vaping Use    Vaping Use: Never used   Substance Use Topics    Alcohol use: No    Drug use: No       Social History     Social History Narrative    , lives alone. Retired, worked in an [employer]     Has 3 children, live near by and see's them fairly often.        Family History   Problem Relation Age of Onset    Hypertension Brother      ROS:     - Constitutional: Negative for fever, chills, unexpected weight change, and fatigue/generalized weakness.     - HEENT: Negative for headaches, vision changes, hearing changes, ear pain, ear discharge, rhinorrhea, sinus congestion, sore throat, and neck pain.      - Respiratory: Negative for cough, sputum production, chest congestion, dyspnea, wheezing, and crackles.      - Cardiovascular: Negative for chest pain, palpitations, orthopnea, and bilateral lower extremity edema.     - Gastrointestinal: Negative for heartburn, nausea, vomiting, abdominal pain, hematochezia, melena, diarrhea, constipation, and greasy/foul-smelling stools.     - Genitourinary: Negative for dysuria, polyuria, hematuria, pyuria, urinary urgency, and urinary incontinence.     - Musculoskeletal: positive for back pain, and joint pain.     - Skin: Negative for rash, itching, cyanotic skin color change.     - Neurological: Negative for dizziness, tingling, tremors, focal sensory deficit, focal weakness and headaches.     -  "Endo/Heme/Allergies: Does not bruise/bleed easily.     - Psychiatric/Behavioral: Negative for depression, suicidal/homicidal ideation and memory loss.          Exam: /56 (BP Location: Left arm, Patient Position: Sitting, BP Cuff Size: Adult)   Pulse 77   Temp 36.9 °C (98.4 °F) (Temporal)   Resp 14   Ht 1.448 m (4' 9\")   Wt 61.1 kg (134 lb 9.6 oz)   SpO2 98%  Body mass index is 29.13 kg/m².    General: Normal appearing. No distress.  HEENT: Normocephalic. Eyes conjunctiva clear lids without ptosis, pupils equal and reactive to light accommodation, ears normal shape and contour, canals are clear bilaterally, tympanic membranes are benign, nasal mucosa benign, oropharynx is without erythema, edema or exudates.   Neck: Supple without JVD or bruit. Thyroid is not enlarged.  Pulmonary: Clear to ausculation.  Normal effort. No rales, ronchi, or wheezing.  Cardiovascular: Regular rate and rhythm without murmur. Carotid and radial pulses are intact and equal bilaterally.  Abdomen: Soft, nontender, nondistended. Normal bowel sounds. Liver and spleen are not palpable  Neurologic: Grossly nonfocal  Lymph: No cervical, supraclavicular or axillary lymph nodes are palpable  Skin: Warm and dry.  No obvious lesions.  Musculoskeletal: Normal gait. No extremity cyanosis, clubbing, or edema.  Psych: Normal mood and affect. Alert and oriented x3. Judgment and insight is normal.    Please note that this dictation was created using voice recognition software. I have made every reasonable attempt to correct obvious errors, but I expect that there are errors of grammar and possibly content that I did not discover before finalizing the note.      Assessment/Plan    89 y.o. female with the following -    1. Osteopenia, unspecified location  2. Postmenopausal  Due for dexa scan. No recent falls.   Recommend to continue vitamin D supplementation.  Recommend healthy diet and regular exercise. Use cane or walker to prevent falls.    - " DS-BONE DENSITY STUDY (DEXA); Future    3.  Idiopathic peripheral neuropathy  4. Fibromyalgia affecting multiple sites  Chronic condition.  Continue with gabapentin.  Recommend nerve V supplementation.  For worsening symptoms, discussed referral to orthopedic or [facility] [place] for management.  Patient reports that she will notify me if her symptoms become unmanageable.    5. Hypothyroidism, unspecified type  Continue with current dose of levothyroxine.    6. Hyperparathyroidism (HCC)  Stable w/improving labs. Asymptomatic. We will continue to monitor.    7. Chronic respiratory failure with hypoxia, on home oxygen therapy (HCC)  Chronic, stable condition.  Continue with supplemental oxygen nightly.  For worsening symptoms, advised to follow-up in office.  ED symptoms discussed.     Medical Decision Making/Course:  In the course of preparing for this visit with review of the pertinent past medical history, recent and past clinic visits, current medications, and performing chart, immunization, medical history and medication reconciliation, and in the further course of obtaining the current history pertinent to the clinic visit today, performing an exam and evaluation, ordering and independently evaluating labs, tests, and/or procedures, prescribing any recommended new medications as noted above, providing any pertinent counseling and education and recommending further coordination of care. This was discussed with patient in a shared-decision making conversation, and they understand and agreed with plan of care.       Return in about 3 months (around 2/21/2024), or if symptoms worsen or fail to improve, for annual medicare.    Thank you, Jo Ann GASPAR  [facility]      Please note that this dictation was created using voice recognition software. I have made every reasonable attempt to correct obvious errors, but I expect that there are errors of grammar and possibly content that I did not discover before  finalizing the note.

## 2023-12-11 NOTE — TELEPHONE ENCOUNTER
Received request via: Pharmacy    Was the patient seen in the last year in this department? Yes    Does the patient have an active prescription (recently filled or refills available) for medication(s) requested? No    Does the patient have FPC Plus and need 100 day supply (blood pressure, diabetes and cholesterol meds only)? Patient does not have SCP      REQUEST FOR 90 DAYS PRESCRIPTION. DX Code Needed.

## 2023-12-13 NOTE — PROGRESS NOTES
"Subjective:     CC:   Chief Complaint   Patient presents with    Shoulder Pain     Both , pt report can not left her arms   Pt report fall w/ this shoulder injury   Level 9     Back Pain     Lower back . Pt report having incontinence      Other     Pt report fall x 2 days   Pt report having balance issues       HPI:   Regine presents today to discuss:    Patient lives alone. She is accompanied by her neighbor, Sofia.   Patient reports that her daughter lives in [place].     Recurrent falls  Balance issues  Physical deconditioning  Generalized weakness  Ongoing and worsening condition.  Patient admits to recurrent falls with most recent fall occurring 2 days ago. She reports that she was walking back to her house after being dropped off by a friend and as she turned, she lost her balance and sustained a fall.   She was able to hold on to the wall as she was falling. She denies hitting her head or any body part on hard surface. She did not loose consciousness.   However, she admits that this has been occurring more frequently.   Her last fall, her daughter was no able to help her up and they had to call EMS for assistance.   She uses a cane with ambulation.  She does have a walker at home.  She admits to having balance issues and generalized weakness as major contributing factor to her falls.   She denies vertigo, palpitations, dizziness, or syncope.    Difficulty with activities of daily living  Patient reports difficulties with activities of daily living. She does live alone. Her daughter helps out as much as she can.  She gets meals on wheels.   However, due to her bilateral shoulder pain. She is having difficulty with bathing and dressing herself.   She reports that she recently spoke to \"someone\" that has set up for someone to come a help clean her house.    Fibromyalgia affecting multiple sites  Chronic condition. Patient reports chronic pain of neck, bilateral shoulders, bilateral hands, right hip, and " low back.   She was seeing [facility] over a year ago for right shoulder pain. She received a cortisone injection which did not help.  She is taking gabapentin and Tylenol as needed which provides minimal relief.    Overall, she reports constant pain.    Chronic neck pain  Chronic condition. Patient reports having neck pain for years. Her symptoms are worsening. She is having sharp shooting pain on both arms.     Chronic bilateral shoulder pain.  Chronic condition, right worse than left. Pt's symptoms are worsening. She initially had sharp shooting pain down her right arm; however, she is developing similar symptoms on left arm.   She is having limited range of motion and weakness on both upper extremity.  She is having difficulty with bathing and dressing herself.  Tylenol and gabapentin provides minimal relief.  She is not able to take NSAIDs due to her kidney function.    Hypertension  Dyspnea on exertion on supplemental oxygen  Dyslipidemia  Chronic condition.  Patient takes carvedilol 3.125 mg BID. She stopped HCTZ one month ago due low BP and low GFR.   She uses 2 liters 02 PRN.  She admits that she has not f/u with cardiology since last visit in April.   Patient denies blurred blurred, severe headaches, chest pain, chest pressure, dizziness, palpitations, syncope, orthopnea, or leg swelling.      Past Medical History:   Diagnosis Date    Hypercholesterolemia     Hypertension     Hypertension     Thyroid disease      Family History   Problem Relation Age of Onset    Hypertension Brother      Past Surgical History:   Procedure Laterality Date    ABDOMINAL HYSTERECTOMY TOTAL      GYN SURGERY      hyst and bladder suspension     Social History     Tobacco Use    Smoking status: Never    Smokeless tobacco: Never   Vaping Use    Vaping Use: Never used   Substance Use Topics    Alcohol use: No    Drug use: No     Social History     Social History Narrative    , lives alone. Retired, worked in an [employer]  [facility] [employer]     Has 3 children, live near by and see's them fairly often.      Current Outpatient Medications Ordered in Epic   Medication Sig Dispense Refill    ipratropium (ATROVENT) 0.06 % Solution ADMINISTER 2 SPRAYS INTO AFFECTED NOSTRIL(S) 4 TIMES A DAY. 45 mL 1    gabapentin (NEURONTIN) 100 MG Cap Take 1 cap po bid prn pain 100 Capsule 0    Acetaminophen (TYLENOL) 325 MG Cap Take 1 tab po tid prn pain 120 Capsule 0    carvedilol (COREG) 3.125 MG Tab Take 1 Tablet by mouth 2 times a day with meals. 200 Tablet 3    levothyroxine (SYNTHROID) 25 MCG Tab Take 1 Tablet by mouth every morning on an empty stomach. 100 Tablet 3    Magnesium 500 MG Cap Take 1 Each by mouth every day at 6 PM.      acetaminophen (TYLENOL) 500 MG Tab Take 500-1,000 mg by mouth every 6 hours as needed for Mild Pain or Moderate Pain.      Home Care Oxygen Inhale 2 L/min at bedtime. Oxygen dose range: 2 L/min  Respiratory route via: Nasal Cannula   Oxygen supplier: [facility]          Ascorbic Acid (VITAMIN C) 1000 MG Tab Take 1,000 mg by mouth every day.      Cyanocobalamin (VITAMIN B-12) 1000 MCG Tab Take 1,000 mcg by mouth every day.      Multiple Vitamins-Minerals (CENTRUM SILVER PO) Take 1 Tablet by mouth every day.      aspirin (ASA) 81 MG Chew Tab chewable tablet Take 162 mg by mouth every day.      Cholecalciferol (VITAMIN D) 2000 UNIT Tab Take  by mouth every day. (Patient not taking: Reported on 12/13/2023)      Misc Natural Products (GLUCOSAMINE CHOND CMP ADVANCED PO) Take 1 Tablet by mouth every day. (Patient not taking: Reported on 12/13/2023)       No current [facility]-ordered facility-administered medications on file.     Atorvastatin    ROS: see hpi  Gen: no fevers/chills  Pulm: no sob, no cough  CV: no chest pain, no palpitations, no edema  GI: no nausea/vomiting, no diarrhea  Skin: no rash    Objective:   Exam:  BP (!) 142/66 (BP Location: Left arm, Patient Position: Sitting, BP Cuff Size: Adult long)   Pulse 76   Temp 36.6  "°C (97.8 °F) (Temporal)   Resp 14   Ht 1.448 m (4' 9\")   Wt 61.3 kg (135 lb 3.2 oz)   SpO2 96%   BMI 29.26 kg/m²    Body mass index is 29.26 kg/m².    Gen: Alert and oriented, No apparent distress.  HEENT: Head atraumatic, normocephalic. Pupils equal and round.  Neck: Neck is supple without lymphadenopathy.   Lungs: Normal effort, CTA bilaterally, no wheezes, rhonchi, or rales  CV: Regular rate and rhythm. No murmurs, rubs, or gallops.  ABD: +BS. Non-tender, non-distended. No rebound, rigidity, or guarding.  Ext: No clubbing, cyanosis, edema.  Shoulder: Shoulders symmetric in appearance, no visible deformity.  No bony tenderness.   Limited ROM-limited flexion, extension, and abduction of both arms.   Neuro: Mental Status: Speech fluent without errors. Follows all commands. No dysarthria or apraxia.  Cranial Nerves: Pupils equal round and reactive to light. Extraocular motion intact. Visual fields intact. No nystagmus. CN V1-V3 intact to light touch. No facial asymmetry. Hearing clinically intact bilaterally. Tongue protrusion midline. No uvular deviation. Normal shoulder shrug and head turn.    Assessment & Plan:     89 y.o. female with the following -     1. Chronic neck and back pain  Chronic, uncontrolled condition. Likely arthritis. However, radiculopathy is a concern. Neuro WNL.  Will order imaging.    - Referral to Pain Clinic  - DX-CERVICAL SPINE-2 OR 3 VIEWS; Future    2. Chronic pain of both shoulders  Chronic, uncontrolled condition. ROM is limited. Likely arthritis vs fibromyalgia. Will order imaging.   Will refer to specialty providers for evaluation and management.     - DX-SHOULDER 2+ LEFT; Future  - DX-SHOULDER 2+ RIGHT; Future  - Referral to Orthopedics  - Referral to Pain Clinic    3. Chronic low back pain, unspecified back pain laterality, unspecified whether sciatica present    - Referral to Orthopedics  - Referral to Pain Clinic  - POCT Urinalysis    4. Fibromyalgia affecting multiple " sites  Chronic, uncontrolled with gabapentin and tylenol. Pt unable to take NSAID due to low GFR.   Will refer to pain clinic.    - Referral to Pain Clinic    5. Balance problem  6. Recurrent falls  7. Physical deconditioning  8. Difficulty with activities of daily living  9. Generalized weakness  Worsening condition. VSS. Urinalysis negative for UTI.   Safety is a major concern. Will refer to case management for evaluation.   Will refer to Home Health  ED symptoms discussed.    - POCT Urinalysis  - REFERRAL TO CARE MANAGEMENT  - Referral to Home Health    10. Primary hypertension  Chronic, uncontrolled condition on carvedilol.  BP elevated in office. HCTZ was d/c due to low GFR by previous provider. Recommend to f/u with cardiology  Discussed signs and symptoms of major cardiovascular event and need to present to ED.    I spent a total of 45 minutes with record review, exam, communication with the patient, communication with other providers, and documentation of this encounter.      Medical Decision Making/Course:  In the course of preparing for this visit with review of the pertinent past medical history, recent and past clinic visits, current medications, and performing chart, immunization, medical history and medication reconciliation, and in the further course of obtaining the current history pertinent to the clinic visit today, performing an exam and evaluation, ordering and independently evaluating labs, tests, and/or procedures, prescribing any recommended new medications as noted above, providing any pertinent counseling and education and recommending further coordination of care. This was discussed with patient in a shared-decision making conversation, and they understand and agreed with plan of care.     Return in about 4 weeks (around 1/10/2024), or if symptoms worsen or fail to improve.    ENDY Lu.   [facility]    Please note that this dictation was created using voice  recognition software. I have made every reasonable attempt to correct obvious errors, but I expect that there are errors of grammar and possibly content that I did not discover before finalizing the note.

## 2023-12-14 NOTE — PROGRESS NOTES
Social Work Care Plan        Regine Zaldivargio Bryant's Goal:  Continue to work closely with [facility]   Barriers:  recurrent falls, difficulty with ADL's   Interventions:    CCM referral placed by PCP- recurrent falls, difficulty with activities of daily living     @1243  SW assigned to referral. As SW was completing chart review, SW identified that referral for HH was placed and has been approved for services. For continuity of care for Regine SW has called [facility] and requested that a SW consult be placed for Regine. PRINCE was informed that a note will be placed for the HH RN to request a SW consult.     SW will f/u with updated HH order to ensure SW consult has been placed.     Start Date: 12/14/23  Anticipated Goal Achievement Date:  12/18/23        Next Scheduled patient outreach:  12/18/23  Social Work Care Coordinator:  Karen Avila   [facility] Care Management:  [phone]

## 2023-12-15 NOTE — PROGRESS NOTES
Medication chart review for [facility] services    Received referral from [facility].   Medications reviewed  compared with discharge summary if available.  Discharge summary date, if applicable:   N/A    Current medication list per [facility]     Medication list one, patient is currently taking    Current Outpatient Medications:     non-formulary med, 1 Tablet, Oral, DAILY    non-formulary med, 1 Tablet, Oral, QDAY PRN    Aspercreme, 1 Application, Topical, BID PRN    ipratropium, 2 Spray, Nasal, 4XDAY    gabapentin, Take 1 cap po bid prn pain    Tylenol, Take 1 tab po tid prn pain (Patient not taking: Reported on 12/15/2023)    carvedilol, 3.125 mg, Oral, BID WITH MEALS    levothyroxine, 25 mcg, Oral, AM ES    Magnesium, 1 Each, Oral, DAILY AT 1800    acetaminophen, 500-1,000 mg, Oral, Q6HRS PRN    Home Care Oxygen, 2 L/min, Inhalation, QHS    Vitamin C, 1,000 mg, Oral, DAILY    vitamin B-12, 1,000 mcg, Oral, DAILY    Multiple Vitamins-Minerals (CENTRUM SILVER PO), 1 Tablet, Oral, DAILY    aspirin, 162 mg, Oral, DAILY      Medication list two, drugs that the patient has been prescribed or recommended to take by their healthcare provider on discharge summary  N/A    Allergies   Allergen Reactions    Atorvastatin        Labs     Lab Results   Component Value Date/Time    SODIUM 141 11/20/2023 11:10 AM    POTASSIUM 4.4 11/20/2023 11:10 AM    CHLORIDE 109 11/20/2023 11:10 AM    CO2 24 11/20/2023 11:10 AM    GLUCOSE 98 11/20/2023 11:10 AM    BUN 30 (H) 11/20/2023 11:10 AM    CREATININE 1.04 11/20/2023 11:10 AM     Lab Results   Component Value Date/Time    ALKPHOSPHAT 94 11/20/2023 11:10 AM    ASTSGOT 21 11/20/2023 11:10 AM    ALTSGPT 16 11/20/2023 11:10 AM    TBILIRUBIN 0.3 11/20/2023 11:10 AM    INR 1.5 01/18/2018 12:00 AM    ALBUMIN 4.0 11/20/2023 11:10 AM        Assessment for clinically significant drug interactions, drug omissions/additions, duplicative therapies.            CC   Jo Ann Diop,  A.[email or web address].  [address] [place] [address]  Fax: [phone]    [facility]  Phone [phone] fax [phone]    This note was created using voice recognition software (Dragon). The accuracy of the dictation is limited by the abilities of the software. I have reviewed the note prior to signing, however some errors in grammar and context are still possible. If you have any questions related to this note please do not hesitate to contact our office.

## 2023-12-18 NOTE — PROGRESS NOTES
Social Work Care Plan        Regine Bryant's Goal:  Continue working closely with [facility]  SW   Barriers:  recurrent falls, difficulty with ADL's    Interventions:     SW following up to see if a  SW consult has been placed for Regine. SW able to idenfity through chart review that [facility]  SW has coordinated home visit with Regine for 12/20/2023. Due to SW being assigned through , SW will no longer continue to follow. SW will be closing referral.       Start Date: N/A  Anticipated Goal Achievement Date:  Referral closed.         Next Scheduled patient outreach:  Referral closed.   Social Work Care Coordinator:  Karen Avila   [facility] Care Management:  [phone]

## 2023-12-20 PROBLEM — I50.9 CHRONIC CONGESTIVE HEART FAILURE (HCC): Status: ACTIVE | Noted: 2023-01-01

## 2023-12-20 NOTE — CASE COMMUNICATION
I agree with these changes.  ----- Message -----  From: Danisha Cotton R.N.  Sent: 12/20/2023   9:51 AM PST  To: Cheryl Greer R.N.      Quality Review for SOC OASIS by WILMAN Cotton RN on  December 20, 2023     Edits completed by WILMAN Cotton RN:  1.  and  diagnosis coding updated per chart review.  2. Changed  to 4 as patient only wears oxygen to sleep (at rest)  3.  added antiplatelet with indication n oted per MAR  4.  is 9 per care plan therapy sets.

## 2023-12-20 NOTE — CASE COMMUNICATION
Quality Review for SOC OASIS by WILMAN Cotton RN on  December 20, 2023     Edits completed by WILMAN Cotton RN:  1.  and  diagnosis coding updated per chart review.  2. Changed  to 4 as patient only wears oxygen to sleep (at rest)  3.  added antiplatelet with indication noted per MAR  4.  is 9 per care plan therapy sets.

## 2023-12-21 NOTE — PROGRESS NOTES
Interventional cardiology Follow-up Consultation Note        CC: Dyspnea on exertion    Patient ID/HPI:   89-year-old female patient here with dyspnea on exertion, previously seen by Dr. Hudson, a PET scan, echocardiogram was ordered.  She denies chest pains but significant dyspnea on exertion.          Past Medical History:   Diagnosis Date    Hypercholesterolemia     Hypertension     Hypertension     Thyroid disease          Current Outpatient Medications   Medication Sig Dispense Refill    furosemide (LASIX) 20 MG Tab Take 1 Tablet by mouth 2 times a day. 60 Tablet 11    non-formulary med Take 1 Tablet by mouth every day. Nervive nerve relief  Indications: nerve pain relief      non-formulary med Take 1 Tablet by mouth 1 time a day as needed (leg and back pain). [employer] Leg and Back pain relief  Indications: back and leg pain      Trolamine Salicylate (ASPERCREME) 10 % Lotion Apply 1 Application topically 2 times a day as needed (pain). Indications: pain      ipratropium (ATROVENT) 0.06 % Solution ADMINISTER 2 SPRAYS INTO AFFECTED NOSTRIL(S) 4 TIMES A DAY. 45 mL 1    gabapentin (NEURONTIN) 100 MG Cap Take 1 cap po bid prn pain 100 Capsule 0    carvedilol (COREG) 3.125 MG Tab Take 1 Tablet by mouth 2 times a day with meals. 200 Tablet 3    levothyroxine (SYNTHROID) 25 MCG Tab Take 1 Tablet by mouth every morning on an empty stomach. 100 Tablet 3    Magnesium 500 MG Cap Take 1 Each by mouth 1 time a day as needed (constipation). Patient reports she only take magnesium when she is constipated. Reports it provides good relief to constipation.   Indications: constipation      acetaminophen (TYLENOL) 500 MG Tab Take 1,000 mg by mouth every 8 hours. Indications: Fever, Pain      Home Care Oxygen Inhale 2 L/min at bedtime. Oxygen dose range: 2 L/min  Respiratory route via: Nasal Cannula   Oxygen supplier: [facility]      Indications: SOB      Ascorbic Acid (VITAMIN C) 1000 MG Tab Take 1,000 mg by mouth every  "day. Indications: Inadequate Vitamin C      Cyanocobalamin (VITAMIN B-12) 1000 MCG Tab Take 1,000 mcg by mouth every day. Indications: Inadequate Vitamin B12      Multiple Vitamins-Minerals (CENTRUM SILVER PO) Take 1 Tablet by mouth every day. Indications: supplement      aspirin (ASA) 81 MG Chew Tab chewable tablet Chew 162 mg every day. Indications: blood thinner       No current facility-administered medications for this visit.         Physical Exam:  Ambulatory Vitals  BP (!) 142/60 (BP Location: Left arm, Patient Position: Sitting, BP Cuff Size: Adult)   Pulse 63   Resp 14   Ht 1.448 m (4' 9\")   Wt 64.9 kg (143 lb)   SpO2 93%    Oxygen Therapy:  Pulse Oximetry: 93 %  BP Readings from Last 4 Encounters:   12/20/23 (!) 142/60   12/19/23 138/60   12/18/23 138/62   12/15/23 132/82       Weight/BMI: Body mass index is 30.94 kg/m².  Wt Readings from Last 4 Encounters:   12/20/23 64.9 kg (143 lb)   12/19/23 61.2 kg (135 lb)   12/15/23 59 kg (130 lb)   12/13/23 61.3 kg (135 lb 3.2 oz)       General: Well appearing and in no apparent distress  Neck: JVP absent, carotid bruits absent  Lungs: Bilateral crackles  Heart: Regular rhythm,   No palpable thrills on palpation, murmurs absent, no rubs,   Lower extremity edema absent.     PET scan reviewed, no evidence of ischemia.    Echocardiogram 4/26/2023  CONCLUSIONS  Normal left ventricular systolic function. The left ventricular   ejection fraction is visually estimated to be 60%.   Grade I diastolic dysfunction.  Normal right ventricular size and systolic function.  Mild mitral regurgitation.  Mild aortic insufficiency.  Mild tricuspid regurgitation. Right ventricular systolic pressure is   estimated to be 24 mmHg (normal).  No recent study for comparison.     Medical Decision Making:  Problem List Items Addressed This Visit       HTN (hypertension)    Relevant Medications    furosemide (LASIX) 20 MG Tab    Chronic congestive heart failure (HCC)    Relevant " Medications    furosemide (LASIX) 20 MG Tab    Other Relevant Orders    [employer] Natriuretic Peptide, NT     She has significant crackles on exam.  Obtain proBNP.  Clinically appears to be in congestive heart failure.  Will start Lasix 20 mg daily.  Advised to call back next week to see how she is doing.  Will follow-up again next month.  Continue other therapy with aspirin, carvedilol.    No follow-ups on file.      Alexx ESPINOZA  Interventional cardiologist  [facility], [facility]  [address] [address]  Phone: [phone]  Fax: [phone]

## 2023-12-31 PROBLEM — Z78.9 DNI (DO NOT INTUBATE): Status: ACTIVE | Noted: 2023-01-01

## 2023-12-31 PROBLEM — Z71.89 ADVANCE CARE PLANNING: Status: ACTIVE | Noted: 2023-01-01

## 2023-12-31 PROBLEM — M48.00 CENTRAL STENOSIS OF SPINAL CANAL: Status: ACTIVE | Noted: 2023-01-01

## 2023-12-31 PROBLEM — R29.810 FACIAL DROOP: Status: ACTIVE | Noted: 2023-01-01

## 2023-12-31 PROBLEM — M54.2 CERVICAL PAIN: Status: ACTIVE | Noted: 2023-01-01

## 2023-12-31 PROBLEM — R22.1 MASS IN NECK: Status: ACTIVE | Noted: 2023-01-01

## 2023-12-31 NOTE — ED TRIAGE NOTES
".  Chief Complaint   Patient presents with    Neck Pain     Left neck behind ear.  Pt states \" nerve pain, feels like an electric shock - goes to the top of my head. \"  Onset approximately 2 days ago.     Facial Pain     Left    Ear Pain    Hypertension   Pt BIB from home.  Pt c/o increasing left neck pain over the last 2 days.  No facial droop.  Equal , good push/pulls.  No headache.  Pt reports \" falling backwards and struck a wall \" on 12/10, \" did not get hurt at all. \"  Pt also states \" hard to get out of bed this morning because of so much pain. \"     "

## 2023-12-31 NOTE — ED NOTES
Pt transported to [facility] on [facility] by [employer].

## 2023-12-31 NOTE — H&P
"Hospital Medicine History & Physical Note    Date of Service  12/31/2023    Primary Care Physician  MANAV Lu    Consultants  None     Code Status  DNAR/DNI    Chief Complaint  Chief Complaint   Patient presents with    Neck Pain     Left neck behind ear.  Pt states \" nerve pain, feels like an electric shock - goes to the top of my head. \"  Onset approximately 2 days ago.     Facial Pain     Left    Ear Pain    Hypertension     History of Presenting Illness  Regine Bryant is a 89 y.o. female with a past medical history of chronic heart failure, hypertension and hyperlipidemia who presented 12/31/2023 with sudden onset severe left-sided facial numbness tingling in addition to severe left-sided pain.  The pain extends behind the left ear and to the left occipital region.  She denies having focal weakness or other sensory changes.  She denies having fevers or chills.    I discussed the plan of care with emergency department physician, the patient, patient daughter is present at bedside in the emergency room.    Review of Systems  Review of Systems   Constitutional:  Negative for chills and fever.   HENT:          Severe left-sided neck pain   Eyes:  Negative for discharge and redness.   Respiratory:  Negative for cough, shortness of breath and stridor.    Cardiovascular:  Negative for chest pain and leg swelling.   Gastrointestinal:  Negative for abdominal pain and vomiting.   Genitourinary:  Negative for flank pain.   Musculoskeletal:  Negative for myalgias.   Skin: Negative.    Neurological:  Positive for tingling. Negative for focal weakness.        Facial numbness and tingling   Endo/Heme/Allergies:  Does not bruise/bleed easily.   Psychiatric/Behavioral:  The patient is not nervous/anxious.      Past Medical History   has a past medical history of Hypercholesterolemia, Hypertension, Hypertension, and Thyroid disease.    Surgical History   has a past surgical history that includes gyn " surgery and abdominal hysterectomy total.     Family History  family history includes Hypertension in her brother.     Social History   reports that she has never smoked. She has never used smokeless tobacco. She reports current alcohol use. She reports that she does not use drugs.    Allergies  Allergies   Allergen Reactions    Atorvastatin      Medications  Prior to Admission Medications   Prescriptions Last Dose Informant Patient Reported? Taking?   Ascorbic Acid (VITAMIN C) 1000 MG Tab   Yes No   Sig: Take 1,000 mg by mouth every day. Indications: Inadequate Vitamin C   Cyanocobalamin (VITAMIN B-12) 1000 MCG Tab   Yes No   Sig: Take 1,000 mcg by mouth every day. Indications: Inadequate Vitamin B12   Home Care Oxygen   Yes No   Sig: Inhale 2 L/min at bedtime. Oxygen dose range: 2 L/min  Respiratory route via: Nasal Cannula   Oxygen supplier: [facility]      Indications: SOB   Magnesium 500 MG Cap   Yes No   Sig: Take 1 Each by mouth 1 time a day as needed (constipation). Patient reports she only take magnesium when she is constipated. Reports it provides good relief to constipation.   Indications: constipation   Multiple Vitamins-Minerals (CENTRUM SILVER PO)   Yes No   Sig: Take 1 Tablet by mouth every day. Indications: supplement   Trolamine Salicylate (ASPERCREME) 10 % Lotion   Yes No   Sig: Apply 1 Application topically 2 times a day as needed (pain). Indications: pain   acetaminophen (TYLENOL) 500 MG Tab   Yes No   Sig: Take 1,000 mg by mouth every 8 hours. Indications: Fever, Pain   aspirin (ASA) 81 MG Chew Tab chewable tablet   Yes No   Sig: Chew 162 mg every day. Indications: blood thinner   carvedilol (COREG) 3.125 MG Tab   No No   Sig: Take 1 Tablet by mouth 2 times a day with meals.   furosemide (LASIX) 20 MG Tab   No No   Sig: Take 1 Tablet by mouth 2 times a day.   gabapentin (NEURONTIN) 100 MG Cap   No No   Sig: Take 1 cap po bid prn pain   ipratropium (ATROVENT) 0.06 % Solution   No No    Sig: ADMINISTER 2 SPRAYS INTO AFFECTED NOSTRIL(S) 4 TIMES A DAY.   levothyroxine (SYNTHROID) 25 MCG Tab   No No   Sig: Take 1 Tablet by mouth every morning on an empty stomach.   non-formulary med   Yes No   Sig: Take 1 Tablet by mouth every day. Nervive nerve relief  Indications: nerve pain relief   non-formulary med   Yes No   Sig: Take 1 Tablet by mouth 1 time a day as needed (leg and back pain). [employer] Leg and Back pain relief  Indications: back and leg pain      Facility-Administered Medications: None     Physical Exam  Temp:  [37.3 °C (99.2 °F)] 37.3 °C (99.2 °F)  Pulse:  [60-92] 91  Resp:  [13-33] 28  BP: (185-188)/() 185/118  SpO2:  [86 %-95 %] 94 %  Blood Pressure : (!) 185/118   Temperature: 37.3 °C (99.2 °F)   Pulse: 91   Respiration: (!) 28   Pulse Oximetry: 94 %     Physical Exam  Constitutional:       General: She is not in acute distress.  HENT:      Head: Normocephalic and atraumatic.      Right Ear: External ear normal.      Left Ear: External ear normal.      Nose: No congestion or rhinorrhea.      Mouth/Throat:      Mouth: Mucous membranes are moist.      Pharynx: No oropharyngeal exudate or posterior oropharyngeal erythema.   Eyes:      General: No scleral icterus.        Right eye: No discharge.         Left eye: No discharge.      Conjunctiva/sclera: Conjunctivae normal.      Pupils: Pupils are equal, round, and reactive to light.   Neck:      Comments: Not able to move her neck secondary to pain.  Cardiovascular:      Rate and Rhythm: Regular rhythm. Tachycardia present.      Heart sounds:      No friction rub. No gallop.   Pulmonary:      Effort: Pulmonary effort is normal.   Abdominal:      General: Abdomen is flat. There is no distension.      Tenderness: There is no guarding.   Musculoskeletal:         General: No swelling.      Cervical back: No muscular tenderness.      Right lower leg: No edema.      Left lower leg: No edema.   Skin:     Capillary Refill: Capillary refill  "takes 2 to 3 seconds.      Coloration: Skin is not jaundiced or pale.      Findings: No bruising or erythema.   Neurological:      Mental Status: She is alert and oriented to person, place, and time.   Psychiatric:         Mood and Affect: Mood normal.         Judgment: Judgment normal.       Laboratory:  Recent Labs     12/31/23  1150   WBC 11.2*   RBC 3.78*   HEMOGLOBIN 12.0   HEMATOCRIT 35.5*   MCV 93.9   MCH 31.7   MCHC 33.8   RDW 48.4   PLATELETCT 170   MPV 10.3     Recent Labs     12/31/23  1150   SODIUM 138   POTASSIUM 4.6   CHLORIDE 103   CO2 21   GLUCOSE 120*   BUN 27*   CREATININE 0.92   CALCIUM 10.3     Recent Labs     12/31/23  1150   GLUCOSE 120*         No results for input(s): \"NTPROBNP\" in the last 72 hours.      No results for input(s): \"TROPONINT\" in the last 72 hours.    Imaging:  CT-CSPINE WITHOUT PLUS RECONS   Final Result      1.  No fracture detected.   2.  Severe degenerative changes throughout the cervical spine.   3.  If there are persistent or significant symptoms, further workup may be obtained with MRI.   4.  There is at least a moderate degenerative narrowing of the central canal at the C3-4 level. This could be more accurately assessed with MRI if indicated.      CT-CTA NECK WITH & W/O-POST PROCESSING   Final Result      1.  Negative for stenosis, dissection or occlusion      2.  3.7 x 2.2 x 2.4 cm hyperdense or hypervascular right paratracheal mass within the chest suspicious for a neoplasm      3.  Aortic and branch vessel atherosclerotic plaque      CT-CTA HEAD WITH & W/O-POST PROCESS   Final Result      1.  CT angiogram of the [place] within normal limits.      2.  Brain shows white matter changes and cerebral volume loss without hemorrhage      DX-HIP-COMPLETE - UNILATERAL 2+ RIGHT   Final Result      MR-BRAIN-W/O    (Results Pending)     Assessment/Plan:  Justification for Admission Status  I anticipate this patient is appropriate for observation status at this time " because possible discharge after 1 midnight.    Patient will need a Telemetry bed on NEUROLOGY service.  Facial numbness and severe neck pain.    * Facial droop- (present on admission)  Assessment & Plan  Admit to Neuro, with continuous cardiac monitoring  CT, CT-angiography, head, neck showed no acute infarction, or hemorrhage    I will check MRI brain  There is very severe degenerative changes around the cervical spine.  MRI has been recommended.  I will order MRI cervical spine  NPO, till screened by speech  Aspiration, Fall, and seizure precautions    Neuro checks   Speech, physical, occupational therapy evaluation ordered  I will place referral to physiatry       Mass in neck- (present on admission)  Assessment & Plan  Imaging show evidence for 3.7 x 2.2 x 2.4 cm hyperdense or hypervascular right paratracheal mass within the chest suspicious for a neoplasm.   The finding discussed with the patient and her daughters.  They report that this has been discussed with them in the past. They are not interested in a biopsy or further evaluation for this mass.    Cervical central stenosis of spinal canal- (present on admission)  Assessment & Plan  Presenting with severe pain and left-sided numbness that resolved  CT imaging shows severe degenerative changes throughout the cervical canal with central canal stenosis and MRI is recommended.  Multimodal pain control.  I will check an MRI of the cervical spine.    DNI (do not intubate)- (present on admission)  Assessment & Plan  DNR/DNI code status confirmed with patient on admission.  See advance care planning.       Advance care planning- (present on admission)  Assessment & Plan  I had a discussion with the patient and family  regarding goals of care, diagnoses, prognosis, and CODE STATUS. We discussed her prognosis and comorbidities.  The patient has advanced age and multiple comorbid conditions including heart failure, hypertension and has a relatively poor functional  performance.  At this point she wants to proceed with DNAR/DNI.  She is open for noninvasive medical interventions and only selectively open to invasive medical interventions based on risk-benefit discussion.      Chronic congestive heart failure (HCC)- (present on admission)  Assessment & Plan  Not currently in exacerbation.  Resume carvedilol and Lasix with hold parameters    Stage 3a chronic kidney disease (HCC)- (present on admission)  Assessment & Plan  Avoid/minimize nephrotoxins as much as possible, renally dose medications, monitor inputs and outputs     DNR (do not resuscitate)- (present on admission)  Assessment & Plan  DNR/DNI code status confirmed with patient on admission.  See advance care planning.      HTN (hypertension)- (present on admission)  Assessment & Plan  Resume carvedilol with hold parameters.    Cervical pain- (present on admission)  Assessment & Plan  Severe sudden pain started this morning.  CT imaging shows severe degenerative changes throughout the cervical canal with central canal stenosis and MRI is recommended.  Multimodal pain control.  I will check an MRI of the cervical spine.    Dyslipidemia- (present on admission)  Assessment & Plan  Cardiac diet      VTE prophylaxis: SCDs/TEDs and enoxaparin ppx    I had a discussion with the patient and family present at bedside regarding goals of care, diagnoses, prognosis, and CODE STATUS. We discussed her prognosis and comorbidities.  The patient has advanced age and multiple comorbid conditions including heart failure, hypertension and has a relatively poor functional performance.  At this point she wants to proceed with DNAR/DNI.  She is open for noninvasive medical interventions and only selectively open to invasive medical interventions based on risk-benefit discussion. I spent 16 minutes on advanced care planning in addition to the time spent managing the other medical problems.

## 2023-12-31 NOTE — ED NOTES
Med rec updated and complete. Allergies reviewed  confirmed name and date of birth. Interviewed pt/family at bedside.   No antibiotic use in last 30 days.  No anticoagulant medications.     Home pharmacy   [facility] [phone]

## 2023-12-31 NOTE — ED PROVIDER NOTES
"ED Provider Note    CHIEF COMPLAINT  Chief Complaint   Patient presents with    Neck Pain     Left neck behind ear.  Pt states \" nerve pain, feels like an electric shock - goes to the top of my head. \"  Onset approximately 2 days ago.     Facial Pain     Left    Ear Pain    Hypertension       EXTERNAL RECORDS REVIEWED  Other None    HPI/ROS  LIMITATION TO HISTORY   Select: : None  OUTSIDE HISTORIAN(S):  Family daughter is at bedside states that the patient has had neck pain and shoulder pain for years.    Regine Bryant is a 89 y.o. female who presents with a chief complaint of neck and face pain that worsened this morning.  Patient notes that she has had longstanding left-sided neck and shoulder pain for which she has been prescribed gabapentin.  She does not take this medication because she is concerned about the side effects.  This morning when she awoke she was experiencing acute worsening of the pain in the left side of her neck and she had some numbness along the left side of her face and left ear.  She did not have any new weakness or numbness in her extremities.  She denies any fevers, chest pain, shortness of breath.  She has pain in her hip that is worse today but no swelling in her legs.    PAST MEDICAL HISTORY   has a past medical history of Hypercholesterolemia, Hypertension, Hypertension, and Thyroid disease.    SURGICAL HISTORY   has a past surgical history that includes gyn surgery and abdominal hysterectomy total.    FAMILY HISTORY  Family History   Problem Relation Age of Onset    Hypertension Brother        SOCIAL HISTORY  Social History     Tobacco Use    Smoking status: Never    Smokeless tobacco: Never   Vaping Use    Vaping Use: Never used   Substance and Sexual Activity    Alcohol use: Yes     Comment: OCC wine    Drug use: No    Sexual activity: Not on file       CURRENT MEDICATIONS  Home Medications    **Home medications have not yet been reviewed for this encounter**   " "      ALLERGIES  Allergies   Allergen Reactions    Atorvastatin        PHYSICAL EXAM  VITAL SIGNS: BP (!) 186/77   Pulse 91   Temp 37.3 °C (99.2 °F) (Temporal)   Resp 17   Ht 1.448 m (4' 9\")   Wt 59 kg (130 lb)   SpO2 95%   BMI 28.13 kg/m²    Physical Exam  Vitals and nursing note reviewed.   Constitutional:       Appearance: Normal appearance.      Comments: Uncomfortable appearing.   HENT:      Head: Normocephalic and atraumatic.      Right Ear: External ear normal.      Left Ear: External ear normal.      Mouth/Throat:      Mouth: Mucous membranes are moist.      Pharynx: Oropharynx is clear.   Eyes:      Extraocular Movements: Extraocular movements intact.      Conjunctiva/sclera: Conjunctivae normal.      Pupils: Pupils are equal, round, and reactive to light.   Cardiovascular:      Rate and Rhythm: Normal rate and regular rhythm.      Pulses: Normal pulses.   Pulmonary:      Effort: Pulmonary effort is normal.      Breath sounds: Normal breath sounds.   Abdominal:      Palpations: Abdomen is soft.      Tenderness: There is no abdominal tenderness.   Musculoskeletal:      Cervical back: Normal range of motion and neck supple.      Comments: Decreased range of motion at the left shoulder due to pain.  Tender over left side of the neck without overlying erythema, crepitus, vesicles, rash.  Tenderness over the left trapezius musculature.  No swelling of the left upper extremity.  Full strength and sensation of the left upper extremity.   Skin:     General: Skin is warm and dry.   Neurological:      General: No focal deficit present.      Mental Status: She is alert and oriented to person, place, and time.   Psychiatric:      Comments: Anxious but pleasant and cooperative.       DIAGNOSTIC STUDIES / PROCEDURES  LABS  Results for orders placed or performed during the hospital encounter of 12/31/23   CBC WITH DIFFERENTIAL   Result Value Ref Range    WBC 11.2 (H) 4.8 - 10.8 K/uL    RBC 3.78 (L) 4.20 - 5.40 M/uL "    Hemoglobin 12.0 12.0 - 16.0 g/dL    Hematocrit 35.5 (L) 37.0 - 47.0 %    MCV 93.9 81.4 - 97.8 fL    MCH 31.7 27.0 - 33.0 pg    MCHC 33.8 32.2 - 35.5 g/dL    RDW 48.4 35.9 - 50.0 fL    Platelet Count 170 164 - 446 K/uL    MPV 10.3 9.0 - 12.9 fL    Neutrophils-Polys 66.00 44.00 - 72.00 %    Lymphocytes 24.80 22.00 - 41.00 %    Monocytes 7.00 0.00 - 13.40 %    Eosinophils 1.50 0.00 - 6.90 %    Basophils 0.40 0.00 - 1.80 %    Immature Granulocytes 0.30 0.00 - 0.90 %    Nucleated RBC 0.00 0.00 - 0.20 /100 WBC    Neutrophils (Absolute) 7.35 1.82 - 7.42 K/uL    Lymphs (Absolute) 2.77 1.00 - 4.80 K/uL    Monos (Absolute) 0.78 0.00 - 0.85 K/uL    Eos (Absolute) 0.17 0.00 - 0.51 K/uL    Baso (Absolute) 0.05 0.00 - 0.12 K/uL    Immature Granulocytes (abs) 0.03 0.00 - 0.11 K/uL    NRBC (Absolute) 0.00 K/uL   Basic Metabolic Panel   Result Value Ref Range    Sodium 138 135 - 145 mmol/L    Potassium 4.6 3.6 - 5.5 mmol/L    Chloride 103 96 - 112 mmol/L    Co2 21 20 - 33 mmol/L    Glucose 120 (H) 65 - 99 mg/dL    Bun 27 (H) 8 - 22 mg/dL    Creatinine 0.92 0.50 - 1.40 mg/dL    Calcium 10.3 8.5 - 10.5 mg/dL    Anion Gap 14.0 7.0 - 16.0   ESTIMATED GFR   Result Value Ref Range    GFR (CKD-EPI) 57 (A) >60 mL/min/1.73 m 2   HEMOGLOBIN A1C   Result Value Ref Range    Glycohemoglobin 5.9 (H) 4.0 - 5.6 %    Est Avg Glucose 123 mg/dL   Lipid Profile   Result Value Ref Range    Cholesterol,Tot 185 100 - 199 mg/dL    Triglycerides 140 0 - 149 mg/dL    HDL 39 (A) >=40 mg/dL     (H) <100 mg/dL   CBC without Differential   Result Value Ref Range    WBC 9.3 4.8 - 10.8 K/uL    RBC 3.60 (L) 4.20 - 5.40 M/uL    Hemoglobin 11.1 (L) 12.0 - 16.0 g/dL    Hematocrit 34.4 (L) 37.0 - 47.0 %    MCV 95.6 81.4 - 97.8 fL    MCH 30.8 27.0 - 33.0 pg    MCHC 32.3 32.2 - 35.5 g/dL    RDW 49.6 35.9 - 50.0 fL    Platelet Count 164 164 - 446 K/uL    MPV 10.3 9.0 - 12.9 fL   Comp Metabolic Panel (CMP)   Result Value Ref Range    Sodium 136 135 - 145 mmol/L     Potassium 4.3 3.6 - 5.5 mmol/L    Chloride 101 96 - 112 mmol/L    Co2 23 20 - 33 mmol/L    Anion Gap 12.0 7.0 - 16.0    Glucose 158 (H) 65 - 99 mg/dL    Bun 19 8 - 22 mg/dL    Creatinine 0.98 0.50 - 1.40 mg/dL    Calcium 10.0 8.5 - 10.5 mg/dL    Correct Calcium 9.9 8.5 - 10.5 mg/dL    AST(SGOT) 21 12 - 45 U/L    ALT(SGPT) 17 2 - 50 U/L    Alkaline Phosphatase 102 (H) 30 - 99 U/L    Total Bilirubin 0.4 0.1 - 1.5 mg/dL    Albumin 4.1 3.2 - 4.9 g/dL    Total Protein 6.5 6.0 - 8.2 g/dL    Globulin 2.4 1.9 - 3.5 g/dL    A-G Ratio 1.7 g/dL   Magnesium   Result Value Ref Range    Magnesium 2.0 1.5 - 2.5 mg/dL   ESTIMATED GFR   Result Value Ref Range    GFR (CKD-EPI) 53 (A) >60 mL/min/1.73 m 2   EKG   Result Value Ref Range    Report       [facility] Emergency Dept.    Test Date:  2023  Pt Name:    SHAHZAD CACERES             Department: ER  MRN:        [id number]                      Room:       [facility]  Gender:     Female                       Technician: [id number]  :        [id number]                   Requested By:ER TRIAGE PROTOCOL  Order #:    [id number]                    Reading MD:    Measurements  Intervals                                Axis  Rate:       107                          P:          84  IA:         156                          QRS:        33  QRSD:       87                           T:          57  QT:         341  QTc:        455    Interpretive Statements  Sinus tachycardia  Left atrial enlargement  Probable anteroseptal infarct, old  Compared to ECG 2023 15:20:19  Atrial abnormality now present  Sinus rhythm no longer present  First degree AV block no longer present  Myocardial infarct finding still present       RADIOLOGY  I have independently interpreted the diagnostic imaging associated with this visit and am waiting the final reading from the radiologist.   My preliminary interpretation is as follows: No intracranial hemorrhage    Radiologist interpretation:    MR-CERVICAL SPINE-W/O   Final Result      1.  Mild to moderate diffuse cervical kyphosis.      2.  Severe disc space narrowing throughout the cervical spine with mild to moderate marginal osteophytosis.      3.  Mild degenerative anterolisthesis at the C3-4 level with mild cervical spondylotic change and the posterior superior aspect of the C4 vertebral body indenting the ventral aspect of the cord and causing moderate central canal stenosis.      4.  Mild multilevel cervical spondylotic change.      5.  Moderate to severe multilevel neural foraminal stenosis.      MR-BRAIN-W/O   Final Result         1. Age-related cerebral atrophy.      2. Moderate periventricular white matter changes consistent with chronic microvascular ischemic gliosis.      3. Prominent degenerative anterolisthesis at the C3-4 level with the posterior superior aspect of the C4 vertebral body indenting the ventral surface of the cervical cord and causing a mild to moderate central canal stenosis.      CT-CSPINE WITHOUT PLUS RECONS   Final Result      1.  No fracture detected.   2.  Severe degenerative changes throughout the cervical spine.   3.  If there are persistent or significant symptoms, further workup may be obtained with MRI.   4.  There is at least a moderate degenerative narrowing of the central canal at the C3-4 level. This could be more accurately assessed with MRI if indicated.      CT-CTA NECK WITH & W/O-POST PROCESSING   Final Result      1.  Negative for stenosis, dissection or occlusion      2.  3.7 x 2.2 x 2.4 cm hyperdense or hypervascular right paratracheal mass within the chest suspicious for a neoplasm      3.  Aortic and branch vessel atherosclerotic plaque      CT-CTA HEAD WITH & W/O-POST PROCESS   Final Result      1.  CT angiogram of the [place] within normal limits.      2.  Brain shows white matter changes and cerebral volume loss without hemorrhage      DX-HIP-COMPLETE - UNILATERAL 2+ RIGHT   Final Result         COURSE & MEDICAL DECISION MAKING    ED Observation Status? No; Patient does not meet criteria for ED Observation.     INITIAL ASSESSMENT, COURSE AND PLAN  Care Narrative: This is a 89 year old female here with severe left side neck pain and facial numbness.    DDx includes, but is not limited to, stroke, dissection, shingles, cellulitis, myositis, muscle strain, disc disease, neoplasm.    Arrives hypertensive but AF with otherwise normal VS. Appears well hydrated and non-toxic. She is in distress secondary to her pain but has a normal and non-focal neurologic exam. The left side facial and ear numbness has resolved. She is able to range her neck and has no meningeal signs to suggest CNS infection. There is no rash or hyperesthesia to suggest shingles or cellulitis. No fluctuance to suggest abscess. No crepitus to suggest NSTI. She is tender over the left neck and into the left trapezius musculature which could indicate a MSK issue but the facial numbness is concerning for neurologic etiology..    Given a dose of pain medication and labs/imaging were obtained.    CBC is without leukocytosis. Metabolic panel is reassuring.    CTA head/neck without acute abnormality however did reveal a mass in the chest concerning for neoplasm.    Patient was reevaluated at bedside. She appears to be in significant discomfort once again. We discussed her lab/imaging results. Family has been told there was an abnormality in her chest but that she is not a surgical candidate. They were unaware it looked like cancer. Beyond that, patient remains hypertensive and uncomfortable despite two doses of opioid pain medication. She will require hospitalization for additional workup and management. Discussed with the hospitalist who concurs and admitted in guarded condition.    ADDITIONAL PROBLEM LIST  None  DISPOSITION AND DISCUSSIONS  I have discussed management of the patient with the following physicians and TORY's:  Dr. Vasquez,  hospitalist.    Discussion of management with other [facility] or appropriate source(s): None     Escalation of care considered, and ultimately not performed: N/A.    Barriers to care at this time, including but not limited to:  None .     Decision tools and prescription drugs considered including, but not limited to:  N/A .    FINAL DIAGNOSIS  Neck pain  Facial numbness    Electronically signed by: Hang Jaramillo M.D., 12/31/2023 11:22 AM

## 2024-01-01 ENCOUNTER — APPOINTMENT (OUTPATIENT)
Dept: OCCUPATIONAL THERAPY | Facility: REHABILITATION | Age: 89
DRG: 052 | End: 2024-01-01
Attending: PHYSICAL MEDICINE & REHABILITATION
Payer: MEDICARE

## 2024-01-01 ENCOUNTER — HOME CARE VISIT (OUTPATIENT)
Dept: HOME HEALTH SERVICES | Facility: HOME HEALTHCARE | Age: 89
End: 2024-01-01
Payer: MEDICARE

## 2024-01-01 ENCOUNTER — APPOINTMENT (OUTPATIENT)
Dept: PHYSICAL THERAPY | Facility: REHABILITATION | Age: 89
DRG: 052 | End: 2024-01-01
Attending: PHYSICAL MEDICINE & REHABILITATION
Payer: MEDICARE

## 2024-01-01 ENCOUNTER — HOME CARE VISIT (OUTPATIENT)
Dept: HOSPICE | Facility: HOSPICE | Age: 89
End: 2024-01-01
Payer: MEDICARE

## 2024-01-01 ENCOUNTER — APPOINTMENT (OUTPATIENT)
Dept: RADIOLOGY | Facility: REHABILITATION | Age: 89
DRG: 052 | End: 2024-01-01
Attending: HOSPITALIST
Payer: MEDICARE

## 2024-01-01 ENCOUNTER — PHARMACY VISIT (OUTPATIENT)
Dept: PHARMACY | Facility: MEDICAL CENTER | Age: 89
End: 2024-01-01
Payer: COMMERCIAL

## 2024-01-01 ENCOUNTER — OFFICE VISIT (OUTPATIENT)
Dept: CARDIOLOGY | Facility: MEDICAL CENTER | Age: 89
End: 2024-01-01
Payer: MEDICARE

## 2024-01-01 ENCOUNTER — APPOINTMENT (OUTPATIENT)
Dept: RADIOLOGY | Facility: MEDICAL CENTER | Age: 89
End: 2024-01-01
Attending: EMERGENCY MEDICINE
Payer: MEDICARE

## 2024-01-01 ENCOUNTER — HOSPITAL ENCOUNTER (INPATIENT)
Facility: REHABILITATION | Age: 89
LOS: 10 days | DRG: 052 | End: 2024-01-12
Attending: PHYSICAL MEDICINE & REHABILITATION | Admitting: PHYSICAL MEDICINE & REHABILITATION
Payer: MEDICARE

## 2024-01-01 ENCOUNTER — HOSPICE ADMISSION (OUTPATIENT)
Dept: HOSPICE | Facility: HOSPICE | Age: 89
End: 2024-01-01
Payer: MEDICARE

## 2024-01-01 ENCOUNTER — DOCUMENTATION (OUTPATIENT)
Dept: MEDICAL GROUP | Facility: PHYSICIAN GROUP | Age: 89
End: 2024-01-01
Payer: COMMERCIAL

## 2024-01-01 ENCOUNTER — PATIENT OUTREACH (OUTPATIENT)
Dept: MEDICAL GROUP | Facility: IMAGING CENTER | Age: 89
End: 2024-01-01
Payer: COMMERCIAL

## 2024-01-01 ENCOUNTER — OFFICE VISIT (OUTPATIENT)
Dept: MEDICAL GROUP | Facility: IMAGING CENTER | Age: 89
End: 2024-01-01
Payer: MEDICARE

## 2024-01-01 ENCOUNTER — HOME HEALTH ADMISSION (OUTPATIENT)
Dept: HOME HEALTH SERVICES | Facility: HOME HEALTHCARE | Age: 89
End: 2024-01-01
Payer: MEDICARE

## 2024-01-01 ENCOUNTER — HOSPITAL ENCOUNTER (OUTPATIENT)
Dept: RADIOLOGY | Facility: MEDICAL CENTER | Age: 89
End: 2024-01-25
Payer: MEDICARE

## 2024-01-01 ENCOUNTER — HOSPITAL ENCOUNTER (OUTPATIENT)
Facility: MEDICAL CENTER | Age: 89
End: 2024-02-01
Attending: EMERGENCY MEDICINE | Admitting: HOSPITALIST
Payer: MEDICARE

## 2024-01-01 ENCOUNTER — TELEPHONE (OUTPATIENT)
Dept: MEDICAL GROUP | Facility: IMAGING CENTER | Age: 89
End: 2024-01-01
Payer: COMMERCIAL

## 2024-01-01 ENCOUNTER — APPOINTMENT (OUTPATIENT)
Dept: RADIOLOGY | Facility: MEDICAL CENTER | Age: 89
End: 2024-01-01
Attending: HOSPITALIST
Payer: MEDICARE

## 2024-01-01 ENCOUNTER — APPOINTMENT (OUTPATIENT)
Dept: RADIOLOGY | Facility: REHABILITATION | Age: 89
DRG: 052 | End: 2024-01-01
Attending: PHYSICAL MEDICINE & REHABILITATION
Payer: MEDICARE

## 2024-01-01 ENCOUNTER — HOSPITAL ENCOUNTER (EMERGENCY)
Facility: MEDICAL CENTER | Age: 89
End: 2024-01-26
Attending: EMERGENCY MEDICINE
Payer: MEDICARE

## 2024-01-01 VITALS — DIASTOLIC BLOOD PRESSURE: 58 MMHG | HEART RATE: 100 BPM | SYSTOLIC BLOOD PRESSURE: 90 MMHG | RESPIRATION RATE: 16 BRPM

## 2024-01-01 VITALS
DIASTOLIC BLOOD PRESSURE: 80 MMHG | OXYGEN SATURATION: 95 % | HEART RATE: 105 BPM | TEMPERATURE: 99.8 F | RESPIRATION RATE: 30 BRPM | SYSTOLIC BLOOD PRESSURE: 150 MMHG

## 2024-01-01 VITALS
WEIGHT: 132 LBS | DIASTOLIC BLOOD PRESSURE: 63 MMHG | TEMPERATURE: 98 F | OXYGEN SATURATION: 98 % | HEART RATE: 93 BPM | RESPIRATION RATE: 18 BRPM | SYSTOLIC BLOOD PRESSURE: 137 MMHG | HEIGHT: 57 IN | BODY MASS INDEX: 28.48 KG/M2

## 2024-01-01 VITALS — HEART RATE: 108 BPM | RESPIRATION RATE: 24 BRPM

## 2024-01-01 VITALS
TEMPERATURE: 97.4 F | OXYGEN SATURATION: 97 % | SYSTOLIC BLOOD PRESSURE: 122 MMHG | HEART RATE: 75 BPM | RESPIRATION RATE: 18 BRPM | DIASTOLIC BLOOD PRESSURE: 60 MMHG

## 2024-01-01 VITALS
DIASTOLIC BLOOD PRESSURE: 58 MMHG | RESPIRATION RATE: 18 BRPM | HEART RATE: 75 BPM | OXYGEN SATURATION: 97 % | SYSTOLIC BLOOD PRESSURE: 102 MMHG | TEMPERATURE: 97.9 F

## 2024-01-01 VITALS
RESPIRATION RATE: 16 BRPM | TEMPERATURE: 97.7 F | HEART RATE: 67 BPM | SYSTOLIC BLOOD PRESSURE: 126 MMHG | WEIGHT: 133 LBS | HEIGHT: 57 IN | OXYGEN SATURATION: 98 % | BODY MASS INDEX: 28.69 KG/M2 | DIASTOLIC BLOOD PRESSURE: 58 MMHG

## 2024-01-01 VITALS
RESPIRATION RATE: 16 BRPM | OXYGEN SATURATION: 98 % | TEMPERATURE: 98.2 F | SYSTOLIC BLOOD PRESSURE: 120 MMHG | HEART RATE: 83 BPM | DIASTOLIC BLOOD PRESSURE: 82 MMHG

## 2024-01-01 VITALS
HEART RATE: 78 BPM | BODY MASS INDEX: 28.48 KG/M2 | RESPIRATION RATE: 16 BRPM | HEIGHT: 57 IN | WEIGHT: 132 LBS | SYSTOLIC BLOOD PRESSURE: 114 MMHG | DIASTOLIC BLOOD PRESSURE: 50 MMHG | OXYGEN SATURATION: 97 %

## 2024-01-01 VITALS
OXYGEN SATURATION: 97 % | HEART RATE: 76 BPM | RESPIRATION RATE: 18 BRPM | SYSTOLIC BLOOD PRESSURE: 118 MMHG | TEMPERATURE: 97.7 F | DIASTOLIC BLOOD PRESSURE: 58 MMHG

## 2024-01-01 VITALS
TEMPERATURE: 98.4 F | HEIGHT: 57 IN | RESPIRATION RATE: 16 BRPM | WEIGHT: 137 LBS | OXYGEN SATURATION: 93 % | DIASTOLIC BLOOD PRESSURE: 54 MMHG | BODY MASS INDEX: 29.56 KG/M2 | SYSTOLIC BLOOD PRESSURE: 132 MMHG | HEART RATE: 81 BPM

## 2024-01-01 VITALS
HEART RATE: 82 BPM | RESPIRATION RATE: 17 BRPM | SYSTOLIC BLOOD PRESSURE: 118 MMHG | DIASTOLIC BLOOD PRESSURE: 60 MMHG | TEMPERATURE: 97.2 F | OXYGEN SATURATION: 95 %

## 2024-01-01 VITALS — HEART RATE: 116 BPM | RESPIRATION RATE: 24 BRPM

## 2024-01-01 VITALS
DIASTOLIC BLOOD PRESSURE: 79 MMHG | HEART RATE: 94 BPM | BODY MASS INDEX: 28.35 KG/M2 | SYSTOLIC BLOOD PRESSURE: 148 MMHG | HEIGHT: 57 IN | RESPIRATION RATE: 16 BRPM | OXYGEN SATURATION: 92 % | TEMPERATURE: 97.2 F | WEIGHT: 131.39 LBS

## 2024-01-01 VITALS
OXYGEN SATURATION: 96 % | TEMPERATURE: 99.3 F | DIASTOLIC BLOOD PRESSURE: 76 MMHG | RESPIRATION RATE: 28 BRPM | HEART RATE: 102 BPM | SYSTOLIC BLOOD PRESSURE: 159 MMHG

## 2024-01-01 VITALS
HEART RATE: 83 BPM | DIASTOLIC BLOOD PRESSURE: 82 MMHG | TEMPERATURE: 98.2 F | SYSTOLIC BLOOD PRESSURE: 120 MMHG | RESPIRATION RATE: 16 BRPM | OXYGEN SATURATION: 98 %

## 2024-01-01 VITALS
HEART RATE: 83 BPM | OXYGEN SATURATION: 98 % | DIASTOLIC BLOOD PRESSURE: 52 MMHG | RESPIRATION RATE: 16 BRPM | SYSTOLIC BLOOD PRESSURE: 120 MMHG | TEMPERATURE: 97.8 F

## 2024-01-01 VITALS — HEART RATE: 90 BPM

## 2024-01-01 VITALS
TEMPERATURE: 97.7 F | OXYGEN SATURATION: 97 % | RESPIRATION RATE: 18 BRPM | WEIGHT: 130 LBS | BODY MASS INDEX: 28.05 KG/M2 | HEIGHT: 57 IN | HEART RATE: 87 BPM | SYSTOLIC BLOOD PRESSURE: 137 MMHG | DIASTOLIC BLOOD PRESSURE: 86 MMHG

## 2024-01-01 VITALS
OXYGEN SATURATION: 97 % | TEMPERATURE: 97.4 F | DIASTOLIC BLOOD PRESSURE: 60 MMHG | RESPIRATION RATE: 18 BRPM | SYSTOLIC BLOOD PRESSURE: 122 MMHG | HEART RATE: 75 BPM

## 2024-01-01 VITALS
TEMPERATURE: 98.9 F | HEART RATE: 88 BPM | RESPIRATION RATE: 16 BRPM | OXYGEN SATURATION: 95 % | SYSTOLIC BLOOD PRESSURE: 140 MMHG | DIASTOLIC BLOOD PRESSURE: 62 MMHG

## 2024-01-01 VITALS — HEART RATE: 92 BPM | RESPIRATION RATE: 16 BRPM

## 2024-01-01 VITALS — HEART RATE: 108 BPM | RESPIRATION RATE: 20 BRPM

## 2024-01-01 VITALS
RESPIRATION RATE: 16 BRPM | DIASTOLIC BLOOD PRESSURE: 60 MMHG | TEMPERATURE: 97.9 F | HEART RATE: 76 BPM | OXYGEN SATURATION: 98 % | SYSTOLIC BLOOD PRESSURE: 134 MMHG

## 2024-01-01 VITALS
DIASTOLIC BLOOD PRESSURE: 69 MMHG | BODY MASS INDEX: 30.82 KG/M2 | TEMPERATURE: 99.1 F | HEART RATE: 79 BPM | OXYGEN SATURATION: 93 % | WEIGHT: 142.86 LBS | RESPIRATION RATE: 18 BRPM | HEIGHT: 57 IN | SYSTOLIC BLOOD PRESSURE: 165 MMHG

## 2024-01-01 VITALS — DIASTOLIC BLOOD PRESSURE: 82 MMHG | SYSTOLIC BLOOD PRESSURE: 128 MMHG | HEART RATE: 88 BPM | RESPIRATION RATE: 16 BRPM

## 2024-01-01 DIAGNOSIS — Z78.0 POSTMENOPAUSAL: ICD-10-CM

## 2024-01-01 DIAGNOSIS — I10 PRIMARY HYPERTENSION: ICD-10-CM

## 2024-01-01 DIAGNOSIS — I50.9 CHRONIC CONGESTIVE HEART FAILURE, UNSPECIFIED HEART FAILURE TYPE (HCC): ICD-10-CM

## 2024-01-01 DIAGNOSIS — S16.1XXA STRAIN OF NECK MUSCLE, INITIAL ENCOUNTER: ICD-10-CM

## 2024-01-01 DIAGNOSIS — R52 INTRACTABLE PAIN: ICD-10-CM

## 2024-01-01 DIAGNOSIS — M48.00 CENTRAL STENOSIS OF SPINAL CANAL: ICD-10-CM

## 2024-01-01 DIAGNOSIS — N17.9 AKI (ACUTE KIDNEY INJURY) (HCC): ICD-10-CM

## 2024-01-01 DIAGNOSIS — G95.9 CERVICAL MYELOPATHY (HCC): ICD-10-CM

## 2024-01-01 DIAGNOSIS — B36.9 FUNGAL INFECTION OF SKIN: ICD-10-CM

## 2024-01-01 DIAGNOSIS — E03.9 HYPOTHYROIDISM, UNSPECIFIED TYPE: ICD-10-CM

## 2024-01-01 DIAGNOSIS — Z99.81 CHRONIC RESPIRATORY FAILURE WITH HYPOXIA, ON HOME OXYGEN THERAPY (HCC): ICD-10-CM

## 2024-01-01 DIAGNOSIS — J96.11 CHRONIC RESPIRATORY FAILURE WITH HYPOXIA, ON HOME OXYGEN THERAPY (HCC): ICD-10-CM

## 2024-01-01 DIAGNOSIS — R60.0 LOCALIZED EDEMA: ICD-10-CM

## 2024-01-01 DIAGNOSIS — R22.1 MASS IN NECK: ICD-10-CM

## 2024-01-01 DIAGNOSIS — L22 DIAPER DERMATITIS: ICD-10-CM

## 2024-01-01 DIAGNOSIS — S13.140A: ICD-10-CM

## 2024-01-01 DIAGNOSIS — M48.02 CERVICAL STENOSIS OF SPINE: ICD-10-CM

## 2024-01-01 DIAGNOSIS — M47.22 CERVICAL RADICULOPATHY DUE TO DEGENERATIVE JOINT DISEASE OF SPINE: ICD-10-CM

## 2024-01-01 DIAGNOSIS — E78.5 DYSLIPIDEMIA: ICD-10-CM

## 2024-01-01 DIAGNOSIS — M54.2 NECK PAIN: ICD-10-CM

## 2024-01-01 DIAGNOSIS — C77.1: ICD-10-CM

## 2024-01-01 DIAGNOSIS — R20.2 FACIAL PARESTHESIA: ICD-10-CM

## 2024-01-01 DIAGNOSIS — M85.80 OSTEOPENIA, UNSPECIFIED LOCATION: ICD-10-CM

## 2024-01-01 DIAGNOSIS — E21.3 HYPERPARATHYROIDISM (HCC): ICD-10-CM

## 2024-01-01 DIAGNOSIS — R06.09 DYSPNEA ON EXERTION: ICD-10-CM

## 2024-01-01 DIAGNOSIS — Z09 HOSPITAL DISCHARGE FOLLOW-UP: ICD-10-CM

## 2024-01-01 LAB
25(OH)D3 SERPL-MCNC: 31 NG/ML (ref 30–100)
ALBUMIN SERPL BCP-MCNC: 3.6 G/DL (ref 3.2–4.9)
ALBUMIN SERPL BCP-MCNC: 3.6 G/DL (ref 3.2–4.9)
ALBUMIN SERPL BCP-MCNC: 3.8 G/DL (ref 3.2–4.9)
ALBUMIN SERPL BCP-MCNC: 4.1 G/DL (ref 3.2–4.9)
ALBUMIN/GLOB SERPL: 1.4 G/DL
ALBUMIN/GLOB SERPL: 1.5 G/DL
ALBUMIN/GLOB SERPL: 1.6 G/DL
ALBUMIN/GLOB SERPL: 1.7 G/DL
ALP SERPL-CCNC: 102 U/L (ref 30–99)
ALP SERPL-CCNC: 104 U/L (ref 30–99)
ALP SERPL-CCNC: 94 U/L (ref 30–99)
ALP SERPL-CCNC: 98 U/L (ref 30–99)
ALT SERPL-CCNC: 12 U/L (ref 2–50)
ALT SERPL-CCNC: 12 U/L (ref 2–50)
ALT SERPL-CCNC: 17 U/L (ref 2–50)
ALT SERPL-CCNC: 17 U/L (ref 2–50)
ANION GAP SERPL CALC-SCNC: 10 MMOL/L (ref 7–16)
ANION GAP SERPL CALC-SCNC: 10 MMOL/L (ref 7–16)
ANION GAP SERPL CALC-SCNC: 11 MMOL/L (ref 7–16)
ANION GAP SERPL CALC-SCNC: 12 MMOL/L (ref 7–16)
ANION GAP SERPL CALC-SCNC: 12 MMOL/L (ref 7–16)
ANION GAP SERPL CALC-SCNC: 13 MMOL/L (ref 7–16)
ANION GAP SERPL CALC-SCNC: 16 MMOL/L (ref 7–16)
ANION GAP SERPL CALC-SCNC: 16 MMOL/L (ref 7–16)
APPEARANCE UR: CLEAR
AST SERPL-CCNC: 15 U/L (ref 12–45)
AST SERPL-CCNC: 17 U/L (ref 12–45)
AST SERPL-CCNC: 18 U/L (ref 12–45)
AST SERPL-CCNC: 21 U/L (ref 12–45)
BASOPHILS # BLD AUTO: 0.1 % (ref 0–1.8)
BASOPHILS # BLD AUTO: 0.2 % (ref 0–1.8)
BASOPHILS # BLD AUTO: 0.5 % (ref 0–1.8)
BASOPHILS # BLD: 0.01 K/UL (ref 0–0.12)
BASOPHILS # BLD: 0.02 K/UL (ref 0–0.12)
BASOPHILS # BLD: 0.03 K/UL (ref 0–0.12)
BILIRUB SERPL-MCNC: 0.3 MG/DL (ref 0.1–1.5)
BILIRUB SERPL-MCNC: 0.4 MG/DL (ref 0.1–1.5)
BUN SERPL-MCNC: 19 MG/DL (ref 8–22)
BUN SERPL-MCNC: 24 MG/DL (ref 8–22)
BUN SERPL-MCNC: 34 MG/DL (ref 8–22)
BUN SERPL-MCNC: 35 MG/DL (ref 8–22)
BUN SERPL-MCNC: 38 MG/DL (ref 8–22)
BUN SERPL-MCNC: 38 MG/DL (ref 8–22)
BUN SERPL-MCNC: 39 MG/DL (ref 8–22)
BUN SERPL-MCNC: 40 MG/DL (ref 8–22)
BUN SERPL-MCNC: 41 MG/DL (ref 8–22)
BUN SERPL-MCNC: 45 MG/DL (ref 8–22)
CALCIUM ALBUM COR SERPL-MCNC: 10 MG/DL (ref 8.5–10.5)
CALCIUM ALBUM COR SERPL-MCNC: 10.2 MG/DL (ref 8.5–10.5)
CALCIUM ALBUM COR SERPL-MCNC: 10.3 MG/DL (ref 8.5–10.5)
CALCIUM ALBUM COR SERPL-MCNC: 9.9 MG/DL (ref 8.5–10.5)
CALCIUM SERPL-MCNC: 10 MG/DL (ref 8.5–10.5)
CALCIUM SERPL-MCNC: 10.1 MG/DL (ref 8.5–10.5)
CALCIUM SERPL-MCNC: 10.1 MG/DL (ref 8.5–10.5)
CALCIUM SERPL-MCNC: 10.9 MG/DL (ref 8.4–10.2)
CALCIUM SERPL-MCNC: 9.7 MG/DL (ref 8.5–10.5)
CALCIUM SERPL-MCNC: 9.8 MG/DL (ref 8.4–10.2)
CALCIUM SERPL-MCNC: 9.8 MG/DL (ref 8.5–10.5)
CALCIUM SERPL-MCNC: 9.9 MG/DL (ref 8.4–10.2)
CHLORIDE SERPL-SCNC: 100 MMOL/L (ref 96–112)
CHLORIDE SERPL-SCNC: 101 MMOL/L (ref 96–112)
CHLORIDE SERPL-SCNC: 102 MMOL/L (ref 96–112)
CHLORIDE SERPL-SCNC: 103 MMOL/L (ref 96–112)
CHLORIDE SERPL-SCNC: 104 MMOL/L (ref 96–112)
CHLORIDE SERPL-SCNC: 105 MMOL/L (ref 96–112)
CHLORIDE SERPL-SCNC: 109 MMOL/L (ref 96–112)
CHOLEST SERPL-MCNC: 185 MG/DL (ref 100–199)
CO2 SERPL-SCNC: 20 MMOL/L (ref 20–33)
CO2 SERPL-SCNC: 21 MMOL/L (ref 20–33)
CO2 SERPL-SCNC: 22 MMOL/L (ref 20–33)
CO2 SERPL-SCNC: 22 MMOL/L (ref 20–33)
CO2 SERPL-SCNC: 23 MMOL/L (ref 20–33)
CO2 SERPL-SCNC: 23 MMOL/L (ref 20–33)
CO2 SERPL-SCNC: 24 MMOL/L (ref 20–33)
CO2 SERPL-SCNC: 25 MMOL/L (ref 20–33)
COLOR UR AUTO: YELLOW
CREAT SERPL-MCNC: 0.87 MG/DL (ref 0.5–1.4)
CREAT SERPL-MCNC: 0.91 MG/DL (ref 0.5–1.4)
CREAT SERPL-MCNC: 0.91 MG/DL (ref 0.5–1.4)
CREAT SERPL-MCNC: 0.97 MG/DL (ref 0.5–1.4)
CREAT SERPL-MCNC: 0.98 MG/DL (ref 0.5–1.4)
CREAT SERPL-MCNC: 1.06 MG/DL (ref 0.5–1.4)
CREAT SERPL-MCNC: 1.13 MG/DL (ref 0.5–1.4)
CREAT SERPL-MCNC: 1.13 MG/DL (ref 0.5–1.4)
CREAT SERPL-MCNC: 1.2 MG/DL (ref 0.5–1.4)
CREAT SERPL-MCNC: 1.41 MG/DL (ref 0.5–1.4)
CREAT UR-MCNC: 38.67 MG/DL
EKG IMPRESSION: NORMAL
EOSINOPHIL # BLD AUTO: 0 K/UL (ref 0–0.51)
EOSINOPHIL # BLD AUTO: 0 K/UL (ref 0–0.51)
EOSINOPHIL # BLD AUTO: 0.18 K/UL (ref 0–0.51)
EOSINOPHIL NFR BLD: 0 % (ref 0–6.9)
EOSINOPHIL NFR BLD: 0 % (ref 0–6.9)
EOSINOPHIL NFR BLD: 3 % (ref 0–6.9)
ERYTHROCYTE [DISTWIDTH] IN BLOOD BY AUTOMATED COUNT: 48.3 FL (ref 35.9–50)
ERYTHROCYTE [DISTWIDTH] IN BLOOD BY AUTOMATED COUNT: 49 FL (ref 35.9–50)
ERYTHROCYTE [DISTWIDTH] IN BLOOD BY AUTOMATED COUNT: 49.1 FL (ref 35.9–50)
ERYTHROCYTE [DISTWIDTH] IN BLOOD BY AUTOMATED COUNT: 49.2 FL (ref 35.9–50)
ERYTHROCYTE [DISTWIDTH] IN BLOOD BY AUTOMATED COUNT: 49.5 FL (ref 35.9–50)
ERYTHROCYTE [DISTWIDTH] IN BLOOD BY AUTOMATED COUNT: 49.6 FL (ref 35.9–50)
ERYTHROCYTE [DISTWIDTH] IN BLOOD BY AUTOMATED COUNT: 49.6 FL (ref 35.9–50)
ERYTHROCYTE [DISTWIDTH] IN BLOOD BY AUTOMATED COUNT: 50.4 FL (ref 35.9–50)
ERYTHROCYTE [DISTWIDTH] IN BLOOD BY AUTOMATED COUNT: 50.8 FL (ref 35.9–50)
ERYTHROCYTE [DISTWIDTH] IN BLOOD BY AUTOMATED COUNT: 52.5 FL (ref 35.9–50)
EST. AVERAGE GLUCOSE BLD GHB EST-MCNC: 114 MG/DL
GFR SERPLBLD CREATININE-BSD FMLA CKD-EPI: 34 ML/MIN/1.73 M 2
GFR SERPLBLD CREATININE-BSD FMLA CKD-EPI: 42 ML/MIN/1.73 M 2
GFR SERPLBLD CREATININE-BSD FMLA CKD-EPI: 45 ML/MIN/1.73 M 2
GFR SERPLBLD CREATININE-BSD FMLA CKD-EPI: 45 ML/MIN/1.73 M 2
GFR SERPLBLD CREATININE-BSD FMLA CKD-EPI: 48 ML/MIN/1.73 M 2
GFR SERPLBLD CREATININE-BSD FMLA CKD-EPI: 53 ML/MIN/1.73 M 2
GFR SERPLBLD CREATININE-BSD FMLA CKD-EPI: 54 ML/MIN/1.73 M 2
GFR SERPLBLD CREATININE-BSD FMLA CKD-EPI: 58 ML/MIN/1.73 M 2
GFR SERPLBLD CREATININE-BSD FMLA CKD-EPI: 58 ML/MIN/1.73 M 2
GFR SERPLBLD CREATININE-BSD FMLA CKD-EPI: 61 ML/MIN/1.73 M 2
GLOBULIN SER CALC-MCNC: 2.2 G/DL (ref 1.9–3.5)
GLOBULIN SER CALC-MCNC: 2.4 G/DL (ref 1.9–3.5)
GLOBULIN SER CALC-MCNC: 2.5 G/DL (ref 1.9–3.5)
GLOBULIN SER CALC-MCNC: 2.6 G/DL (ref 1.9–3.5)
GLUCOSE SERPL-MCNC: 105 MG/DL (ref 65–99)
GLUCOSE SERPL-MCNC: 106 MG/DL (ref 65–99)
GLUCOSE SERPL-MCNC: 116 MG/DL (ref 65–99)
GLUCOSE SERPL-MCNC: 119 MG/DL (ref 65–99)
GLUCOSE SERPL-MCNC: 129 MG/DL (ref 65–99)
GLUCOSE SERPL-MCNC: 136 MG/DL (ref 65–99)
GLUCOSE SERPL-MCNC: 141 MG/DL (ref 65–99)
GLUCOSE SERPL-MCNC: 158 MG/DL (ref 65–99)
GLUCOSE SERPL-MCNC: 162 MG/DL (ref 65–99)
GLUCOSE SERPL-MCNC: 182 MG/DL (ref 65–99)
GLUCOSE UR QL STRIP.AUTO: NEGATIVE MG/DL
HBA1C MFR BLD: 5.6 % (ref 4–5.6)
HCT VFR BLD AUTO: 30.8 % (ref 37–47)
HCT VFR BLD AUTO: 32 % (ref 37–47)
HCT VFR BLD AUTO: 33.2 % (ref 37–47)
HCT VFR BLD AUTO: 33.3 % (ref 37–47)
HCT VFR BLD AUTO: 34.4 % (ref 37–47)
HCT VFR BLD AUTO: 34.8 % (ref 37–47)
HCT VFR BLD AUTO: 35.3 % (ref 37–47)
HCT VFR BLD AUTO: 36.3 % (ref 37–47)
HCT VFR BLD AUTO: 36.6 % (ref 37–47)
HCT VFR BLD AUTO: 37.4 % (ref 37–47)
HCT VFR BLD AUTO: 38.1 % (ref 37–47)
HDLC SERPL-MCNC: 39 MG/DL
HGB BLD-MCNC: 10 G/DL (ref 12–16)
HGB BLD-MCNC: 10.7 G/DL (ref 12–16)
HGB BLD-MCNC: 10.9 G/DL (ref 12–16)
HGB BLD-MCNC: 10.9 G/DL (ref 12–16)
HGB BLD-MCNC: 11.1 G/DL (ref 12–16)
HGB BLD-MCNC: 11.2 G/DL (ref 12–16)
HGB BLD-MCNC: 11.7 G/DL (ref 12–16)
HGB BLD-MCNC: 11.8 G/DL (ref 12–16)
HGB BLD-MCNC: 12 G/DL (ref 12–16)
HGB BLD-MCNC: 12.3 G/DL (ref 12–16)
HGB BLD-MCNC: 9.7 G/DL (ref 12–16)
IMM GRANULOCYTES # BLD AUTO: 0.04 K/UL (ref 0–0.11)
IMM GRANULOCYTES # BLD AUTO: 0.07 K/UL (ref 0–0.11)
IMM GRANULOCYTES # BLD AUTO: 0.1 K/UL (ref 0–0.11)
IMM GRANULOCYTES NFR BLD AUTO: 0.5 % (ref 0–0.9)
IMM GRANULOCYTES NFR BLD AUTO: 0.7 % (ref 0–0.9)
IMM GRANULOCYTES NFR BLD AUTO: 0.8 % (ref 0–0.9)
KETONES UR QL STRIP.AUTO: NEGATIVE MG/DL
LDLC SERPL CALC-MCNC: 118 MG/DL
LEUKOCYTE ESTERASE UR QL STRIP.AUTO: NEGATIVE
LYMPHOCYTES # BLD AUTO: 1.34 K/UL (ref 1–4.8)
LYMPHOCYTES # BLD AUTO: 1.35 K/UL (ref 1–4.8)
LYMPHOCYTES # BLD AUTO: 1.88 K/UL (ref 1–4.8)
LYMPHOCYTES NFR BLD: 10 % (ref 22–41)
LYMPHOCYTES NFR BLD: 10.1 % (ref 22–41)
LYMPHOCYTES NFR BLD: 30.9 % (ref 22–41)
MAGNESIUM SERPL-MCNC: 2 MG/DL (ref 1.5–2.5)
MAGNESIUM SERPL-MCNC: 2.1 MG/DL (ref 1.5–2.5)
MAGNESIUM SERPL-MCNC: 2.3 MG/DL (ref 1.5–2.5)
MCH RBC QN AUTO: 30.7 PG (ref 27–33)
MCH RBC QN AUTO: 30.8 PG (ref 27–33)
MCH RBC QN AUTO: 30.9 PG (ref 27–33)
MCH RBC QN AUTO: 31 PG (ref 27–33)
MCH RBC QN AUTO: 31.1 PG (ref 27–33)
MCH RBC QN AUTO: 31.2 PG (ref 27–33)
MCH RBC QN AUTO: 31.2 PG (ref 27–33)
MCH RBC QN AUTO: 31.3 PG (ref 27–33)
MCH RBC QN AUTO: 31.3 PG (ref 27–33)
MCH RBC QN AUTO: 31.4 PG (ref 27–33)
MCHC RBC AUTO-ENTMCNC: 31.3 G/DL (ref 32.2–35.5)
MCHC RBC AUTO-ENTMCNC: 31.5 G/DL (ref 32.2–35.5)
MCHC RBC AUTO-ENTMCNC: 31.7 G/DL (ref 32.2–35.5)
MCHC RBC AUTO-ENTMCNC: 32 G/DL (ref 32.2–35.5)
MCHC RBC AUTO-ENTMCNC: 32.1 G/DL (ref 32.2–35.5)
MCHC RBC AUTO-ENTMCNC: 32.1 G/DL (ref 32.2–35.5)
MCHC RBC AUTO-ENTMCNC: 32.3 G/DL (ref 32.2–35.5)
MCHC RBC AUTO-ENTMCNC: 32.3 G/DL (ref 32.2–35.5)
MCHC RBC AUTO-ENTMCNC: 32.5 G/DL (ref 32.2–35.5)
MCHC RBC AUTO-ENTMCNC: 32.8 G/DL (ref 32.2–35.5)
MCV RBC AUTO: 95.1 FL (ref 81.4–97.8)
MCV RBC AUTO: 95.6 FL (ref 81.4–97.8)
MCV RBC AUTO: 95.7 FL (ref 81.4–97.8)
MCV RBC AUTO: 96 FL (ref 81.4–97.8)
MCV RBC AUTO: 96.5 FL (ref 81.4–97.8)
MCV RBC AUTO: 97.5 FL (ref 81.4–97.8)
MCV RBC AUTO: 97.7 FL (ref 81.4–97.8)
MCV RBC AUTO: 97.9 FL (ref 81.4–97.8)
MCV RBC AUTO: 99 FL (ref 81.4–97.8)
MCV RBC AUTO: 99.1 FL (ref 81.4–97.8)
MONOCYTES # BLD AUTO: 0.55 K/UL (ref 0–0.85)
MONOCYTES # BLD AUTO: 0.88 K/UL (ref 0–0.85)
MONOCYTES # BLD AUTO: 0.91 K/UL (ref 0–0.85)
MONOCYTES NFR BLD AUTO: 6.6 % (ref 0–13.4)
MONOCYTES NFR BLD AUTO: 6.8 % (ref 0–13.4)
MONOCYTES NFR BLD AUTO: 9 % (ref 0–13.4)
NEUTROPHILS # BLD AUTO: 10.95 K/UL (ref 1.82–7.42)
NEUTROPHILS # BLD AUTO: 11.1 K/UL (ref 1.82–7.42)
NEUTROPHILS # BLD AUTO: 3.41 K/UL (ref 1.82–7.42)
NEUTROPHILS NFR BLD: 55.9 % (ref 44–72)
NEUTROPHILS NFR BLD: 82.3 % (ref 44–72)
NEUTROPHILS NFR BLD: 82.6 % (ref 44–72)
NITRITE UR QL STRIP.AUTO: NEGATIVE
NRBC # BLD AUTO: 0 K/UL
NRBC BLD-RTO: 0 /100 WBC (ref 0–0.2)
NT-PROBNP SERPL IA-MCNC: 327 PG/ML (ref 0–125)
NT-PROBNP SERPL IA-MCNC: 756 PG/ML (ref 0–125)
PH UR STRIP.AUTO: 5.5 [PH] (ref 5–8)
PHOSPHATE SERPL-MCNC: 3.8 MG/DL (ref 2.5–4.5)
PLATELET # BLD AUTO: 152 K/UL (ref 164–446)
PLATELET # BLD AUTO: 155 K/UL (ref 164–446)
PLATELET # BLD AUTO: 162 K/UL (ref 164–446)
PLATELET # BLD AUTO: 163 K/UL (ref 164–446)
PLATELET # BLD AUTO: 164 K/UL (ref 164–446)
PLATELET # BLD AUTO: 173 K/UL (ref 164–446)
PLATELET # BLD AUTO: 177 K/UL (ref 164–446)
PLATELET # BLD AUTO: 182 K/UL (ref 164–446)
PLATELET # BLD AUTO: 183 K/UL (ref 164–446)
PLATELET # BLD AUTO: 220 K/UL (ref 164–446)
PMV BLD AUTO: 10 FL (ref 9–12.9)
PMV BLD AUTO: 10 FL (ref 9–12.9)
PMV BLD AUTO: 10.3 FL (ref 9–12.9)
PMV BLD AUTO: 10.6 FL (ref 9–12.9)
PMV BLD AUTO: 10.8 FL (ref 9–12.9)
PMV BLD AUTO: 10.9 FL (ref 9–12.9)
PMV BLD AUTO: 11.2 FL (ref 9–12.9)
PMV BLD AUTO: 11.3 FL (ref 9–12.9)
PMV BLD AUTO: 9.6 FL (ref 9–12.9)
PMV BLD AUTO: 9.9 FL (ref 9–12.9)
POTASSIUM SERPL-SCNC: 4.2 MMOL/L (ref 3.6–5.5)
POTASSIUM SERPL-SCNC: 4.2 MMOL/L (ref 3.6–5.5)
POTASSIUM SERPL-SCNC: 4.3 MMOL/L (ref 3.6–5.5)
POTASSIUM SERPL-SCNC: 4.3 MMOL/L (ref 3.6–5.5)
POTASSIUM SERPL-SCNC: 4.5 MMOL/L (ref 3.6–5.5)
POTASSIUM SERPL-SCNC: 4.5 MMOL/L (ref 3.6–5.5)
POTASSIUM SERPL-SCNC: 4.8 MMOL/L (ref 3.6–5.5)
POTASSIUM SERPL-SCNC: 4.9 MMOL/L (ref 3.6–5.5)
POTASSIUM SERPL-SCNC: 4.9 MMOL/L (ref 3.6–5.5)
POTASSIUM SERPL-SCNC: 5.1 MMOL/L (ref 3.6–5.5)
PROCALCITONIN SERPL-MCNC: 0.06 NG/ML
PROT SERPL-MCNC: 5.8 G/DL (ref 6–8.2)
PROT SERPL-MCNC: 6.2 G/DL (ref 6–8.2)
PROT SERPL-MCNC: 6.3 G/DL (ref 6–8.2)
PROT SERPL-MCNC: 6.5 G/DL (ref 6–8.2)
PROT UR QL STRIP: NEGATIVE MG/DL
RBC # BLD AUTO: 3.11 M/UL (ref 4.2–5.4)
RBC # BLD AUTO: 3.48 M/UL (ref 4.2–5.4)
RBC # BLD AUTO: 3.49 M/UL (ref 4.2–5.4)
RBC # BLD AUTO: 3.51 M/UL (ref 4.2–5.4)
RBC # BLD AUTO: 3.6 M/UL (ref 4.2–5.4)
RBC # BLD AUTO: 3.62 M/UL (ref 4.2–5.4)
RBC # BLD AUTO: 3.74 M/UL (ref 4.2–5.4)
RBC # BLD AUTO: 3.76 M/UL (ref 4.2–5.4)
RBC # BLD AUTO: 3.83 M/UL (ref 4.2–5.4)
RBC # BLD AUTO: 3.97 M/UL (ref 4.2–5.4)
RBC UR QL AUTO: ABNORMAL
SODIUM SERPL-SCNC: 135 MMOL/L (ref 135–145)
SODIUM SERPL-SCNC: 136 MMOL/L (ref 135–145)
SODIUM SERPL-SCNC: 137 MMOL/L (ref 135–145)
SODIUM SERPL-SCNC: 138 MMOL/L (ref 135–145)
SODIUM SERPL-SCNC: 139 MMOL/L (ref 135–145)
SODIUM SERPL-SCNC: 139 MMOL/L (ref 135–145)
SODIUM SERPL-SCNC: 140 MMOL/L (ref 135–145)
SODIUM SERPL-SCNC: 141 MMOL/L (ref 135–145)
SODIUM UR-SCNC: 21 MMOL/L
SP GR UR STRIP.AUTO: 1.02 (ref 1–1.03)
TRIGL SERPL-MCNC: 140 MG/DL (ref 0–149)
TROPONIN T SERPL-MCNC: 16 NG/L (ref 6–19)
TROPONIN T SERPL-MCNC: 16 NG/L (ref 6–19)
TSH SERPL DL<=0.005 MIU/L-ACNC: 0.55 UIU/ML (ref 0.38–5.33)
WBC # BLD AUTO: 11.4 K/UL (ref 4.8–10.8)
WBC # BLD AUTO: 13.3 K/UL (ref 4.8–10.8)
WBC # BLD AUTO: 13.4 K/UL (ref 4.8–10.8)
WBC # BLD AUTO: 6.1 K/UL (ref 4.8–10.8)
WBC # BLD AUTO: 7.1 K/UL (ref 4.8–10.8)
WBC # BLD AUTO: 7.7 K/UL (ref 4.8–10.8)
WBC # BLD AUTO: 8.1 K/UL (ref 4.8–10.8)
WBC # BLD AUTO: 9 K/UL (ref 4.8–10.8)
WBC # BLD AUTO: 9.3 K/UL (ref 4.8–10.8)
WBC # BLD AUTO: 9.3 K/UL (ref 4.8–10.8)

## 2024-01-01 PROCEDURE — 700102 HCHG RX REV CODE 250 W/ 637 OVERRIDE(OP): Performed by: HOSPITALIST

## 2024-01-01 PROCEDURE — 3078F DIAST BP <80 MM HG: CPT

## 2024-01-01 PROCEDURE — 84300 ASSAY OF URINE SODIUM: CPT

## 2024-01-01 PROCEDURE — 665999 HH PPS REVENUE DEBIT

## 2024-01-01 PROCEDURE — 700111 HCHG RX REV CODE 636 W/ 250 OVERRIDE (IP): Mod: JZ | Performed by: PHYSICAL MEDICINE & REHABILITATION

## 2024-01-01 PROCEDURE — 700105 HCHG RX REV CODE 258: Performed by: HOSPITALIST

## 2024-01-01 PROCEDURE — 97163 PT EVAL HIGH COMPLEX 45 MIN: CPT

## 2024-01-01 PROCEDURE — A9270 NON-COVERED ITEM OR SERVICE: HCPCS | Performed by: PHYSICAL MEDICINE & REHABILITATION

## 2024-01-01 PROCEDURE — 665998 HH PPS REVENUE CREDIT

## 2024-01-01 PROCEDURE — 770010 HCHG ROOM/CARE - REHAB SEMI PRIVAT*

## 2024-01-01 PROCEDURE — G0156 HHCP-SVS OF AIDE,EA 15 MIN: HCPCS

## 2024-01-01 PROCEDURE — 97530 THERAPEUTIC ACTIVITIES: CPT

## 2024-01-01 PROCEDURE — A9270 NON-COVERED ITEM OR SERVICE: HCPCS | Performed by: HOSPITALIST

## 2024-01-01 PROCEDURE — 97530 THERAPEUTIC ACTIVITIES: CPT | Mod: CO

## 2024-01-01 PROCEDURE — 97110 THERAPEUTIC EXERCISES: CPT

## 2024-01-01 PROCEDURE — 700101 HCHG RX REV CODE 250: Performed by: HOSPITALIST

## 2024-01-01 PROCEDURE — 99223 1ST HOSP IP/OBS HIGH 75: CPT | Mod: AI | Performed by: HOSPITALIST

## 2024-01-01 PROCEDURE — 77080 DXA BONE DENSITY AXIAL: CPT

## 2024-01-01 PROCEDURE — 85018 HEMOGLOBIN: CPT | Mod: XU

## 2024-01-01 PROCEDURE — 96376 TX/PRO/DX INJ SAME DRUG ADON: CPT

## 2024-01-01 PROCEDURE — S9126 HOSPICE CARE, IN THE HOME, P: HCPCS

## 2024-01-01 PROCEDURE — 99215 OFFICE O/P EST HI 40 MIN: CPT

## 2024-01-01 PROCEDURE — 700102 HCHG RX REV CODE 250 W/ 637 OVERRIDE(OP): Performed by: PHYSICAL MEDICINE & REHABILITATION

## 2024-01-01 PROCEDURE — G0299 HHS/HOSPICE OF RN EA 15 MIN: HCPCS

## 2024-01-01 PROCEDURE — 99232 SBSQ HOSP IP/OBS MODERATE 35: CPT | Performed by: PHYSICAL MEDICINE & REHABILITATION

## 2024-01-01 PROCEDURE — 71045 X-RAY EXAM CHEST 1 VIEW: CPT

## 2024-01-01 PROCEDURE — 99223 1ST HOSP IP/OBS HIGH 75: CPT | Performed by: PHYSICAL MEDICINE & REHABILITATION

## 2024-01-01 PROCEDURE — 83735 ASSAY OF MAGNESIUM: CPT

## 2024-01-01 PROCEDURE — 97161 PT EVAL LOW COMPLEX 20 MIN: CPT

## 2024-01-01 PROCEDURE — 72141 MRI NECK SPINE W/O DYE: CPT

## 2024-01-01 PROCEDURE — 99232 SBSQ HOSP IP/OBS MODERATE 35: CPT | Performed by: HOSPITALIST

## 2024-01-01 PROCEDURE — G0180 MD CERTIFICATION HHA PATIENT: HCPCS

## 2024-01-01 PROCEDURE — 97535 SELF CARE MNGMENT TRAINING: CPT

## 2024-01-01 PROCEDURE — G0151 HHCP-SERV OF PT,EA 15 MIN: HCPCS

## 2024-01-01 PROCEDURE — 700102 HCHG RX REV CODE 250 W/ 637 OVERRIDE(OP): Performed by: INTERNAL MEDICINE

## 2024-01-01 PROCEDURE — 82306 VITAMIN D 25 HYDROXY: CPT

## 2024-01-01 PROCEDURE — 3074F SYST BP LT 130 MM HG: CPT

## 2024-01-01 PROCEDURE — 92610 EVALUATE SWALLOWING FUNCTION: CPT

## 2024-01-01 PROCEDURE — 94760 N-INVAS EAR/PLS OXIMETRY 1: CPT

## 2024-01-01 PROCEDURE — G0152 HHCP-SERV OF OT,EA 15 MIN: HCPCS

## 2024-01-01 PROCEDURE — 80053 COMPREHEN METABOLIC PANEL: CPT

## 2024-01-01 PROCEDURE — 96374 THER/PROPH/DIAG INJ IV PUSH: CPT

## 2024-01-01 PROCEDURE — A9270 NON-COVERED ITEM OR SERVICE: HCPCS | Performed by: INTERNAL MEDICINE

## 2024-01-01 PROCEDURE — 84100 ASSAY OF PHOSPHORUS: CPT

## 2024-01-01 PROCEDURE — 99233 SBSQ HOSP IP/OBS HIGH 50: CPT | Performed by: HOSPITALIST

## 2024-01-01 PROCEDURE — G0493 RN CARE EA 15 MIN HH/HOSPICE: HCPCS

## 2024-01-01 PROCEDURE — 99285 EMERGENCY DEPT VISIT HI MDM: CPT

## 2024-01-01 PROCEDURE — 700102 HCHG RX REV CODE 250 W/ 637 OVERRIDE(OP): Performed by: EMERGENCY MEDICINE

## 2024-01-01 PROCEDURE — 96375 TX/PRO/DX INJ NEW DRUG ADDON: CPT

## 2024-01-01 PROCEDURE — 70450 CT HEAD/BRAIN W/O DYE: CPT

## 2024-01-01 PROCEDURE — 99233 SBSQ HOSP IP/OBS HIGH 50: CPT | Performed by: PHYSICAL MEDICINE & REHABILITATION

## 2024-01-01 PROCEDURE — 36415 COLL VENOUS BLD VENIPUNCTURE: CPT

## 2024-01-01 PROCEDURE — 700111 HCHG RX REV CODE 636 W/ 250 OVERRIDE (IP): Mod: JZ | Performed by: HOSPITALIST

## 2024-01-01 PROCEDURE — 97116 GAIT TRAINING THERAPY: CPT

## 2024-01-01 PROCEDURE — 700111 HCHG RX REV CODE 636 W/ 250 OVERRIDE (IP): Performed by: HOSPITALIST

## 2024-01-01 PROCEDURE — 83036 HEMOGLOBIN GLYCOSYLATED A1C: CPT

## 2024-01-01 PROCEDURE — G0378 HOSPITAL OBSERVATION PER HR: HCPCS

## 2024-01-01 PROCEDURE — A9270 NON-COVERED ITEM OR SERVICE: HCPCS | Performed by: EMERGENCY MEDICINE

## 2024-01-01 PROCEDURE — 81002 URINALYSIS NONAUTO W/O SCOPE: CPT

## 2024-01-01 PROCEDURE — 97166 OT EVAL MOD COMPLEX 45 MIN: CPT

## 2024-01-01 PROCEDURE — 80048 BASIC METABOLIC PNL TOTAL CA: CPT

## 2024-01-01 PROCEDURE — 97110 THERAPEUTIC EXERCISES: CPT | Mod: CO

## 2024-01-01 PROCEDURE — 700101 HCHG RX REV CODE 250: Performed by: PHYSICAL MEDICINE & REHABILITATION

## 2024-01-01 PROCEDURE — 99239 HOSP IP/OBS DSCHRG MGMT >30: CPT | Performed by: INTERNAL MEDICINE

## 2024-01-01 PROCEDURE — 97116 GAIT TRAINING THERAPY: CPT | Mod: CQ

## 2024-01-01 PROCEDURE — 84484 ASSAY OF TROPONIN QUANT: CPT

## 2024-01-01 PROCEDURE — 85025 COMPLETE CBC W/AUTO DIFF WBC: CPT

## 2024-01-01 PROCEDURE — 83880 ASSAY OF NATRIURETIC PEPTIDE: CPT

## 2024-01-01 PROCEDURE — 74018 RADEX ABDOMEN 1 VIEW: CPT

## 2024-01-01 PROCEDURE — 99233 SBSQ HOSP IP/OBS HIGH 50: CPT | Performed by: INTERNAL MEDICINE

## 2024-01-01 PROCEDURE — 665036 HSPC NOTICE OF ELECTION NOE

## 2024-01-01 PROCEDURE — 72125 CT NECK SPINE W/O DYE: CPT

## 2024-01-01 PROCEDURE — 84443 ASSAY THYROID STIM HORMONE: CPT

## 2024-01-01 PROCEDURE — 85027 COMPLETE CBC AUTOMATED: CPT

## 2024-01-01 PROCEDURE — 93010 ELECTROCARDIOGRAM REPORT: CPT | Performed by: INTERNAL MEDICINE

## 2024-01-01 PROCEDURE — 82570 ASSAY OF URINE CREATININE: CPT

## 2024-01-01 PROCEDURE — RXMED WILLOW AMBULATORY MEDICATION CHARGE: Performed by: PHYSICAL MEDICINE & REHABILITATION

## 2024-01-01 PROCEDURE — 97165 OT EVAL LOW COMPLEX 30 MIN: CPT

## 2024-01-01 PROCEDURE — 99239 HOSP IP/OBS DSCHRG MGMT >30: CPT | Performed by: HOSPITALIST

## 2024-01-01 PROCEDURE — 665001 SOC-HOME HEALTH

## 2024-01-01 PROCEDURE — 93005 ELECTROCARDIOGRAM TRACING: CPT | Performed by: HOSPITALIST

## 2024-01-01 PROCEDURE — 96372 THER/PROPH/DIAG INJ SC/IM: CPT | Mod: XU

## 2024-01-01 PROCEDURE — RXMED WILLOW AMBULATORY MEDICATION CHARGE: Performed by: REHABILITATION PRACTITIONER

## 2024-01-01 PROCEDURE — 70551 MRI BRAIN STEM W/O DYE: CPT

## 2024-01-01 PROCEDURE — 665005 NO-PAY RAP - HOME HEALTH

## 2024-01-01 PROCEDURE — 96372 THER/PROPH/DIAG INJ SC/IM: CPT

## 2024-01-01 PROCEDURE — 700111 HCHG RX REV CODE 636 W/ 250 OVERRIDE (IP): Mod: JZ | Performed by: EMERGENCY MEDICINE

## 2024-01-01 PROCEDURE — 97167 OT EVAL HIGH COMPLEX 60 MIN: CPT

## 2024-01-01 PROCEDURE — 99213 OFFICE O/P EST LOW 20 MIN: CPT

## 2024-01-01 PROCEDURE — 85014 HEMATOCRIT: CPT | Mod: XU

## 2024-01-01 PROCEDURE — 99239 HOSP IP/OBS DSCHRG MGMT >30: CPT | Performed by: PHYSICAL MEDICINE & REHABILITATION

## 2024-01-01 PROCEDURE — 80061 LIPID PANEL: CPT

## 2024-01-01 PROCEDURE — 99214 OFFICE O/P EST MOD 30 MIN: CPT

## 2024-01-01 PROCEDURE — 97530 THERAPEUTIC ACTIVITIES: CPT | Mod: CQ

## 2024-01-01 PROCEDURE — 84145 PROCALCITONIN (PCT): CPT

## 2024-01-01 PROCEDURE — 700111 HCHG RX REV CODE 636 W/ 250 OVERRIDE (IP): Performed by: EMERGENCY MEDICINE

## 2024-01-01 PROCEDURE — 99222 1ST HOSP IP/OBS MODERATE 55: CPT | Performed by: HOSPITALIST

## 2024-01-01 RX ORDER — BISACODYL 10 MG
10 SUPPOSITORY, RECTAL RECTAL
Status: DISCONTINUED | OUTPATIENT
Start: 2024-01-01 | End: 2024-01-01

## 2024-01-01 RX ORDER — POLYETHYLENE GLYCOL 3350 17 G/17G
1 POWDER, FOR SOLUTION ORAL TWICE DAILY
Status: DISCONTINUED | OUTPATIENT
Start: 2024-01-01 | End: 2024-01-01 | Stop reason: HOSPADM

## 2024-01-01 RX ORDER — AMLODIPINE BESYLATE 10 MG/1
10 TABLET ORAL DAILY
Status: ON HOLD | COMMUNITY
End: 2024-01-01

## 2024-01-01 RX ORDER — CARVEDILOL 6.25 MG/1
6.25 TABLET ORAL 2 TIMES DAILY WITH MEALS
Status: DISCONTINUED | OUTPATIENT
Start: 2024-01-01 | End: 2024-01-01 | Stop reason: HOSPADM

## 2024-01-01 RX ORDER — AMLODIPINE BESYLATE 10 MG/1
10 TABLET ORAL DAILY
Qty: 90 TABLET | Refills: 2 | Status: SHIPPED | OUTPATIENT
Start: 2024-01-01 | End: 2024-01-01 | Stop reason: SINTOL

## 2024-01-01 RX ORDER — ONDANSETRON 4 MG/1
4 TABLET, ORALLY DISINTEGRATING ORAL EVERY 6 HOURS
Qty: 15 TABLET | Refills: 10 | Status: SHIPPED | OUTPATIENT
Start: 2024-01-01

## 2024-01-01 RX ORDER — CYCLOBENZAPRINE HCL 10 MG
10 TABLET ORAL 3 TIMES DAILY PRN
Qty: 42 TABLET | Refills: 0 | Status: ON HOLD | OUTPATIENT
Start: 2024-01-01 | End: 2024-01-01

## 2024-01-01 RX ORDER — ASPIRIN 81 MG/1
81 TABLET, CHEWABLE ORAL DAILY
Status: DISCONTINUED | OUTPATIENT
Start: 2024-01-01 | End: 2024-01-01 | Stop reason: HOSPADM

## 2024-01-01 RX ORDER — AMLODIPINE BESYLATE 5 MG/1
5 TABLET ORAL
Status: DISCONTINUED | OUTPATIENT
Start: 2024-01-01 | End: 2024-01-01

## 2024-01-01 RX ORDER — ALUMINA, MAGNESIA, AND SIMETHICONE 2400; 2400; 240 MG/30ML; MG/30ML; MG/30ML
20 SUSPENSION ORAL
Status: DISCONTINUED | OUTPATIENT
Start: 2024-01-01 | End: 2024-01-01 | Stop reason: HOSPADM

## 2024-01-01 RX ORDER — AMOXICILLIN 250 MG
2 CAPSULE ORAL 2 TIMES DAILY PRN
Status: DISCONTINUED | OUTPATIENT
Start: 2024-01-01 | End: 2024-01-01 | Stop reason: HOSPADM

## 2024-01-01 RX ORDER — ACETAMINOPHEN 650 MG/1
650 SUPPOSITORY RECTAL EVERY 6 HOURS PRN
Qty: 5 SUPPOSITORY | Refills: 10 | Status: SHIPPED | OUTPATIENT
Start: 2024-01-01 | End: 2025-01-31

## 2024-01-01 RX ORDER — ACETAMINOPHEN 500 MG
1000 TABLET ORAL EVERY 8 HOURS
Qty: 84 TABLET | Refills: 23 | Status: SHIPPED | OUTPATIENT
Start: 2024-01-01

## 2024-01-01 RX ORDER — HYDRALAZINE HYDROCHLORIDE 10 MG/1
10 TABLET, FILM COATED ORAL EVERY 8 HOURS
Status: DISCONTINUED | OUTPATIENT
Start: 2024-01-01 | End: 2024-01-01

## 2024-01-01 RX ORDER — CARVEDILOL 12.5 MG/1
12.5 TABLET ORAL 2 TIMES DAILY WITH MEALS
Status: DISCONTINUED | OUTPATIENT
Start: 2024-01-01 | End: 2024-01-01 | Stop reason: HOSPADM

## 2024-01-01 RX ORDER — AMOXICILLIN 250 MG
2 CAPSULE ORAL 2 TIMES DAILY
Status: DISCONTINUED | OUTPATIENT
Start: 2024-01-01 | End: 2024-01-01

## 2024-01-01 RX ORDER — DEXAMETHASONE SODIUM PHOSPHATE 4 MG/ML
4 INJECTION, SOLUTION INTRA-ARTICULAR; INTRALESIONAL; INTRAMUSCULAR; INTRAVENOUS; SOFT TISSUE EVERY 8 HOURS
Status: CANCELLED | OUTPATIENT
Start: 2024-01-01

## 2024-01-01 RX ORDER — ONDANSETRON 4 MG/1
4 TABLET, ORALLY DISINTEGRATING ORAL EVERY 4 HOURS PRN
Qty: 10 TABLET | Refills: 0 | Status: ON HOLD
Start: 2024-01-01 | End: 2024-01-01

## 2024-01-01 RX ORDER — POLYETHYLENE GLYCOL 3350 17 G/17G
1 POWDER, FOR SOLUTION ORAL
Status: DISCONTINUED | OUTPATIENT
Start: 2024-01-01 | End: 2024-01-01 | Stop reason: HOSPADM

## 2024-01-01 RX ORDER — HYDRALAZINE HYDROCHLORIDE 10 MG/1
10 TABLET, FILM COATED ORAL EVERY 8 HOURS
Qty: 90 TABLET | Refills: 2 | Status: ON HOLD | OUTPATIENT
Start: 2024-01-01 | End: 2024-01-01

## 2024-01-01 RX ORDER — ASPIRIN 81 MG/1
81 TABLET, CHEWABLE ORAL DAILY
Status: CANCELLED | OUTPATIENT
Start: 2024-01-01

## 2024-01-01 RX ORDER — OXYCODONE HYDROCHLORIDE AND ACETAMINOPHEN 5; 325 MG/1; MG/1
1 TABLET ORAL ONCE
Status: COMPLETED | OUTPATIENT
Start: 2024-01-01 | End: 2024-01-01

## 2024-01-01 RX ORDER — LIDOCAINE 4 G/G
2 PATCH TOPICAL EVERY 24 HOURS
Status: DISCONTINUED | OUTPATIENT
Start: 2024-01-01 | End: 2024-01-01 | Stop reason: HOSPADM

## 2024-01-01 RX ORDER — POLYETHYLENE GLYCOL 3350 17 G/17G
1 POWDER, FOR SOLUTION ORAL TWICE DAILY
Status: DISCONTINUED | OUTPATIENT
Start: 2024-01-01 | End: 2024-01-01

## 2024-01-01 RX ORDER — DEXAMETHASONE 4 MG/1
4 TABLET ORAL EVERY 12 HOURS
Status: DISCONTINUED | OUTPATIENT
Start: 2024-01-01 | End: 2024-01-01

## 2024-01-01 RX ORDER — ENOXAPARIN SODIUM 100 MG/ML
40 INJECTION SUBCUTANEOUS DAILY
Status: DISCONTINUED | OUTPATIENT
Start: 2024-01-01 | End: 2024-01-01 | Stop reason: HOSPADM

## 2024-01-01 RX ORDER — IPRATROPIUM BROMIDE 42 UG/1
2 SPRAY, METERED NASAL PRN
COMMUNITY

## 2024-01-01 RX ORDER — OMEPRAZOLE 20 MG/1
20 CAPSULE, DELAYED RELEASE ORAL DAILY
Status: DISCONTINUED | OUTPATIENT
Start: 2024-01-01 | End: 2024-01-01 | Stop reason: HOSPADM

## 2024-01-01 RX ORDER — DOCUSATE SODIUM 100 MG/1
100 CAPSULE, LIQUID FILLED ORAL 2 TIMES DAILY
Qty: 60 CAPSULE | Refills: 0 | Status: SHIPPED | OUTPATIENT
Start: 2024-01-01 | End: 2024-01-01

## 2024-01-01 RX ORDER — MORPHINE SULFATE 100 MG/5ML
SOLUTION ORAL
Qty: 480 ML | Refills: 0 | Status: SHIPPED | OUTPATIENT
Start: 2024-01-01

## 2024-01-01 RX ORDER — POLYETHYLENE GLYCOL 3350 17 G/17G
1 POWDER, FOR SOLUTION ORAL
Status: DISCONTINUED | OUTPATIENT
Start: 2024-01-01 | End: 2024-01-01

## 2024-01-01 RX ORDER — CARVEDILOL 6.25 MG/1
6.25 TABLET ORAL 2 TIMES DAILY WITH MEALS
Status: CANCELLED | OUTPATIENT
Start: 2024-01-01

## 2024-01-01 RX ORDER — HYDRALAZINE HYDROCHLORIDE 10 MG/1
10 TABLET, FILM COATED ORAL EVERY 8 HOURS
Status: DISCONTINUED | OUTPATIENT
Start: 2024-01-01 | End: 2024-01-01 | Stop reason: HOSPADM

## 2024-01-01 RX ORDER — CARVEDILOL 12.5 MG/1
12.5 TABLET ORAL 2 TIMES DAILY WITH MEALS
Qty: 180 TABLET | Refills: 2 | Status: ON HOLD | OUTPATIENT
Start: 2024-01-01 | End: 2024-01-01

## 2024-01-01 RX ORDER — LEVOTHYROXINE SODIUM 0.03 MG/1
25 TABLET ORAL
Status: DISCONTINUED | OUTPATIENT
Start: 2024-01-01 | End: 2024-01-01 | Stop reason: HOSPADM

## 2024-01-01 RX ORDER — METHYLPREDNISOLONE 4 MG/1
TABLET ORAL
Qty: 1 EACH | Refills: 0 | Status: ON HOLD | OUTPATIENT
Start: 2024-01-01 | End: 2024-01-01

## 2024-01-01 RX ORDER — LEVOTHYROXINE SODIUM 0.03 MG/1
25 TABLET ORAL
Qty: 100 TABLET | Refills: 3 | Status: SHIPPED | OUTPATIENT
Start: 2024-01-01 | End: 2024-01-01

## 2024-01-01 RX ORDER — OXYCODONE HYDROCHLORIDE 5 MG/1
5 TABLET ORAL
Status: DISCONTINUED | OUTPATIENT
Start: 2024-01-01 | End: 2024-01-01 | Stop reason: HOSPADM

## 2024-01-01 RX ORDER — TRAMADOL HYDROCHLORIDE 50 MG/1
50 TABLET ORAL EVERY 4 HOURS PRN
Status: DISCONTINUED | OUTPATIENT
Start: 2024-01-01 | End: 2024-01-01 | Stop reason: HOSPADM

## 2024-01-01 RX ORDER — AMOXICILLIN 250 MG
2 CAPSULE ORAL 2 TIMES DAILY
Status: DISCONTINUED | OUTPATIENT
Start: 2024-01-01 | End: 2024-01-01 | Stop reason: HOSPADM

## 2024-01-01 RX ORDER — DEXAMETHASONE SODIUM PHOSPHATE 4 MG/ML
4 INJECTION, SOLUTION INTRA-ARTICULAR; INTRALESIONAL; INTRAMUSCULAR; INTRAVENOUS; SOFT TISSUE EVERY 8 HOURS
Status: DISCONTINUED | OUTPATIENT
Start: 2024-01-01 | End: 2024-01-01 | Stop reason: HOSPADM

## 2024-01-01 RX ORDER — FUROSEMIDE 20 MG/1
20 TABLET ORAL
Qty: 30 TABLET | Refills: 11 | Status: ON HOLD | OUTPATIENT
Start: 2024-01-01 | End: 2024-01-01

## 2024-01-01 RX ORDER — HYDRALAZINE HYDROCHLORIDE 25 MG/1
25 TABLET, FILM COATED ORAL EVERY 8 HOURS PRN
Status: DISCONTINUED | OUTPATIENT
Start: 2024-01-01 | End: 2024-01-01 | Stop reason: HOSPADM

## 2024-01-01 RX ORDER — DEXAMETHASONE 1 MG
1 TABLET ORAL EVERY 12 HOURS
Status: DISCONTINUED | OUTPATIENT
Start: 2024-01-01 | End: 2024-01-01

## 2024-01-01 RX ORDER — ENOXAPARIN SODIUM 100 MG/ML
40 INJECTION SUBCUTANEOUS DAILY
Status: SHIPPED | DISCHARGE
Start: 2024-01-01 | End: 2024-01-01

## 2024-01-01 RX ORDER — OXYCODONE HYDROCHLORIDE 5 MG/1
2.5 TABLET ORAL
Status: DISCONTINUED | OUTPATIENT
Start: 2024-01-01 | End: 2024-01-01

## 2024-01-01 RX ORDER — AMLODIPINE BESYLATE 5 MG/1
10 TABLET ORAL
Status: DISCONTINUED | OUTPATIENT
Start: 2024-01-01 | End: 2024-01-01 | Stop reason: HOSPADM

## 2024-01-01 RX ORDER — LIDOCAINE 4 G/G
2 PATCH TOPICAL EVERY 24 HOURS
Status: ON HOLD
Start: 2024-01-01 | End: 2024-01-01

## 2024-01-01 RX ORDER — DOCUSATE SODIUM 100 MG/1
100 CAPSULE, LIQUID FILLED ORAL EVERY EVENING
Status: ON HOLD | COMMUNITY
End: 2024-01-01

## 2024-01-01 RX ORDER — POLYETHYLENE GLYCOL 3350 17 G/17G
1 POWDER, FOR SOLUTION ORAL 4 TIMES DAILY
Status: DISPENSED | OUTPATIENT
Start: 2024-01-01 | End: 2024-01-01

## 2024-01-01 RX ORDER — ONDANSETRON 2 MG/ML
4 INJECTION INTRAMUSCULAR; INTRAVENOUS 4 TIMES DAILY PRN
Status: DISCONTINUED | OUTPATIENT
Start: 2024-01-01 | End: 2024-01-01 | Stop reason: HOSPADM

## 2024-01-01 RX ORDER — DEXAMETHASONE 1 MG
2 TABLET ORAL EVERY 12 HOURS
Status: DISCONTINUED | OUTPATIENT
Start: 2024-01-01 | End: 2024-01-01

## 2024-01-01 RX ORDER — OXYCODONE HYDROCHLORIDE 5 MG/1
5 TABLET ORAL
Qty: 30 TABLET | Refills: 0 | Status: ON HOLD
Start: 2024-01-01 | End: 2024-01-01

## 2024-01-01 RX ORDER — SODIUM CHLORIDE 9 MG/ML
INJECTION, SOLUTION INTRAVENOUS ONCE
Status: COMPLETED | OUTPATIENT
Start: 2024-01-01 | End: 2024-01-01

## 2024-01-01 RX ORDER — TRAMADOL HYDROCHLORIDE 50 MG/1
50 TABLET ORAL EVERY 6 HOURS PRN
Status: ON HOLD | COMMUNITY
End: 2024-01-01

## 2024-01-01 RX ORDER — KETOROLAC TROMETHAMINE 15 MG/ML
15 INJECTION, SOLUTION INTRAMUSCULAR; INTRAVENOUS ONCE
Status: COMPLETED | OUTPATIENT
Start: 2024-01-01 | End: 2024-01-01

## 2024-01-01 RX ORDER — CYCLOBENZAPRINE HCL 10 MG
10 TABLET ORAL 3 TIMES DAILY
Qty: 42 TABLET | Refills: 0 | Status: SHIPPED | OUTPATIENT
Start: 2024-01-01 | End: 2024-01-01 | Stop reason: SDUPTHER

## 2024-01-01 RX ORDER — ACETAMINOPHEN 325 MG/1
650 TABLET ORAL EVERY 6 HOURS
Status: CANCELLED | OUTPATIENT
Start: 2024-01-01 | End: 2024-01-01

## 2024-01-01 RX ORDER — CARVEDILOL 3.12 MG/1
6.25 TABLET ORAL 2 TIMES DAILY WITH MEALS
Status: DISCONTINUED | OUTPATIENT
Start: 2024-01-01 | End: 2024-01-01

## 2024-01-01 RX ORDER — MORPHINE SULFATE 4 MG/ML
4 INJECTION INTRAVENOUS ONCE
Status: COMPLETED | OUTPATIENT
Start: 2024-01-01 | End: 2024-01-01

## 2024-01-01 RX ORDER — KETOCONAZOLE 20 MG/G
1 CREAM TOPICAL 2 TIMES DAILY
Qty: 30 G | Refills: 1 | Status: SHIPPED | OUTPATIENT
Start: 2024-01-01 | End: 2024-01-01

## 2024-01-01 RX ORDER — ASPIRIN 81 MG/1
81 TABLET, CHEWABLE ORAL DAILY
Qty: 100 TABLET | Refills: 2 | Status: SHIPPED | OUTPATIENT
Start: 2024-01-01 | End: 2024-01-01 | Stop reason: SDUPTHER

## 2024-01-01 RX ORDER — OXYCODONE HYDROCHLORIDE 5 MG/1
5 TABLET ORAL EVERY 6 HOURS PRN
Qty: 20 TABLET | Refills: 0 | Status: ON HOLD | OUTPATIENT
Start: 2024-01-01 | End: 2024-01-01

## 2024-01-01 RX ORDER — LEVOTHYROXINE SODIUM 0.03 MG/1
25 TABLET ORAL
Status: CANCELLED | OUTPATIENT
Start: 2024-01-01

## 2024-01-01 RX ORDER — HYDRALAZINE HYDROCHLORIDE 25 MG/1
25 TABLET, FILM COATED ORAL EVERY 8 HOURS
Status: DISCONTINUED | OUTPATIENT
Start: 2024-01-01 | End: 2024-01-01

## 2024-01-01 RX ORDER — CARVEDILOL 3.12 MG/1
6.25 TABLET ORAL 2 TIMES DAILY WITH MEALS
Qty: 200 TABLET | Refills: 3 | Status: ON HOLD | OUTPATIENT
Start: 2024-01-01 | End: 2024-01-01

## 2024-01-01 RX ORDER — BISACODYL 10 MG
10 SUPPOSITORY, RECTAL RECTAL
Status: DISCONTINUED | OUTPATIENT
Start: 2024-01-01 | End: 2024-01-01 | Stop reason: HOSPADM

## 2024-01-01 RX ORDER — ONDANSETRON 4 MG/1
4 TABLET, ORALLY DISINTEGRATING ORAL 4 TIMES DAILY PRN
Status: DISCONTINUED | OUTPATIENT
Start: 2024-01-01 | End: 2024-01-01 | Stop reason: HOSPADM

## 2024-01-01 RX ORDER — BISACODYL 10 MG
10 SUPPOSITORY, RECTAL RECTAL PRN
Qty: 5 SUPPOSITORY | Refills: 10 | Status: SHIPPED | OUTPATIENT
Start: 2024-01-01 | End: 2025-01-31

## 2024-01-01 RX ORDER — CYCLOBENZAPRINE HCL 10 MG
10 TABLET ORAL 3 TIMES DAILY
Status: DISCONTINUED | OUTPATIENT
Start: 2024-01-01 | End: 2024-01-01 | Stop reason: HOSPADM

## 2024-01-01 RX ORDER — AMOXICILLIN 250 MG
2 CAPSULE ORAL 2 TIMES DAILY
Qty: 30 TABLET | Refills: 0 | Status: ON HOLD
Start: 2024-01-01 | End: 2024-01-01

## 2024-01-01 RX ORDER — MORPHINE SULFATE 100 MG/5ML
20 SOLUTION ORAL
Status: DISCONTINUED | OUTPATIENT
Start: 2024-01-01 | End: 2024-01-01 | Stop reason: HOSPADM

## 2024-01-01 RX ORDER — OXYCODONE HYDROCHLORIDE 5 MG/1
5 TABLET ORAL ONCE
Status: COMPLETED | OUTPATIENT
Start: 2024-01-01 | End: 2024-01-01

## 2024-01-01 RX ORDER — ACETAMINOPHEN 325 MG/1
650 TABLET ORAL EVERY 4 HOURS PRN
Status: DISCONTINUED | OUTPATIENT
Start: 2024-01-01 | End: 2024-01-01 | Stop reason: HOSPADM

## 2024-01-01 RX ORDER — ENOXAPARIN SODIUM 100 MG/ML
30 INJECTION SUBCUTANEOUS DAILY
Status: ON HOLD | DISCHARGE
Start: 2024-01-01 | End: 2024-01-01

## 2024-01-01 RX ORDER — MORPHINE SULFATE 100 MG/5ML
20 SOLUTION ORAL EVERY 4 HOURS
Status: DISCONTINUED | OUTPATIENT
Start: 2024-01-01 | End: 2024-01-01 | Stop reason: HOSPADM

## 2024-01-01 RX ORDER — ACETAMINOPHEN 325 MG/1
650 TABLET ORAL EVERY 6 HOURS PRN
Status: DISCONTINUED | OUTPATIENT
Start: 2024-01-01 | End: 2024-01-01

## 2024-01-01 RX ORDER — TRAZODONE HYDROCHLORIDE 50 MG/1
50 TABLET ORAL
Status: DISCONTINUED | OUTPATIENT
Start: 2024-01-01 | End: 2024-01-01 | Stop reason: HOSPADM

## 2024-01-01 RX ORDER — ONDANSETRON 4 MG/1
4 TABLET, ORALLY DISINTEGRATING ORAL EVERY 4 HOURS PRN
Status: DISCONTINUED | OUTPATIENT
Start: 2024-01-01 | End: 2024-01-01 | Stop reason: HOSPADM

## 2024-01-01 RX ORDER — TRAMADOL HYDROCHLORIDE 50 MG/1
50 TABLET ORAL 4 TIMES DAILY
Status: DISCONTINUED | OUTPATIENT
Start: 2024-01-01 | End: 2024-01-01 | Stop reason: HOSPADM

## 2024-01-01 RX ORDER — ONDANSETRON 2 MG/ML
4 INJECTION INTRAMUSCULAR; INTRAVENOUS EVERY 4 HOURS PRN
Status: DISCONTINUED | OUTPATIENT
Start: 2024-01-01 | End: 2024-01-01 | Stop reason: HOSPADM

## 2024-01-01 RX ORDER — MORPHINE SULFATE 4 MG/ML
2 INJECTION INTRAVENOUS
Status: DISCONTINUED | OUTPATIENT
Start: 2024-01-01 | End: 2024-01-01

## 2024-01-01 RX ORDER — ACETAMINOPHEN 325 MG/1
650 TABLET ORAL EVERY 6 HOURS
Status: DISPENSED | OUTPATIENT
Start: 2024-01-01 | End: 2024-01-01

## 2024-01-01 RX ORDER — TRAMADOL HYDROCHLORIDE 50 MG/1
50 TABLET ORAL 4 TIMES DAILY
Qty: 56 TABLET | Refills: 0 | Status: SHIPPED | OUTPATIENT
Start: 2024-01-01 | End: 2024-01-01

## 2024-01-01 RX ORDER — LORAZEPAM 2 MG/ML
1-2 CONCENTRATE ORAL
Qty: 360 ML | Refills: 0 | Status: SHIPPED | OUTPATIENT
Start: 2024-01-01 | End: 2025-01-31

## 2024-01-01 RX ORDER — ENOXAPARIN SODIUM 100 MG/ML
40 INJECTION SUBCUTANEOUS DAILY
Status: CANCELLED | OUTPATIENT
Start: 2024-01-01

## 2024-01-01 RX ORDER — HYDROMORPHONE HYDROCHLORIDE 1 MG/ML
0.5 INJECTION, SOLUTION INTRAMUSCULAR; INTRAVENOUS; SUBCUTANEOUS
Status: DISCONTINUED | OUTPATIENT
Start: 2024-01-01 | End: 2024-01-01 | Stop reason: HOSPADM

## 2024-01-01 RX ORDER — SIMETHICONE 125 MG
125 TABLET,CHEWABLE ORAL
Status: DISCONTINUED | OUTPATIENT
Start: 2024-01-01 | End: 2024-01-01 | Stop reason: HOSPADM

## 2024-01-01 RX ORDER — CYCLOBENZAPRINE HCL 10 MG
10 TABLET ORAL 3 TIMES DAILY PRN
Status: DISCONTINUED | OUTPATIENT
Start: 2024-01-01 | End: 2024-01-01 | Stop reason: HOSPADM

## 2024-01-01 RX ORDER — LEVOTHYROXINE SODIUM 0.03 MG/1
25 TABLET ORAL
Qty: 100 TABLET | Refills: 2 | Status: ON HOLD | OUTPATIENT
Start: 2024-01-01 | End: 2024-01-01 | Stop reason: SDUPTHER

## 2024-01-01 RX ORDER — BETAMETHASONE DIPROPIONATE 0.05 %
1 OINTMENT (GRAM) TOPICAL 2 TIMES DAILY
Qty: 45 G | Refills: 3 | Status: SHIPPED | OUTPATIENT
Start: 2024-01-01 | End: 2024-01-01

## 2024-01-01 RX ORDER — SODIUM CHLORIDE 9 MG/ML
INJECTION, SOLUTION INTRAVENOUS CONTINUOUS
Status: ACTIVE | OUTPATIENT
Start: 2024-01-01 | End: 2024-01-01

## 2024-01-01 RX ORDER — OXYCODONE HYDROCHLORIDE 10 MG/1
10 TABLET ORAL
Status: DISCONTINUED | OUTPATIENT
Start: 2024-01-01 | End: 2024-01-01 | Stop reason: HOSPADM

## 2024-01-01 RX ORDER — POLYETHYLENE GLYCOL 3350 17 G/17G
1 POWDER, FOR SOLUTION ORAL DAILY
Status: DISCONTINUED | OUTPATIENT
Start: 2024-01-01 | End: 2024-01-01

## 2024-01-01 RX ORDER — OXYCODONE HYDROCHLORIDE 5 MG/1
5 TABLET ORAL
Status: DISCONTINUED | OUTPATIENT
Start: 2024-01-01 | End: 2024-01-01

## 2024-01-01 RX ORDER — ASPIRIN 81 MG/1
162 TABLET, CHEWABLE ORAL DAILY
Qty: 100 TABLET | Refills: 2 | Status: ON HOLD | OUTPATIENT
Start: 2024-01-01 | End: 2024-01-01

## 2024-01-01 RX ORDER — DEXAMETHASONE SODIUM PHOSPHATE 4 MG/ML
4 INJECTION, SOLUTION INTRA-ARTICULAR; INTRALESIONAL; INTRAMUSCULAR; INTRAVENOUS; SOFT TISSUE EVERY 8 HOURS
Status: DISCONTINUED | OUTPATIENT
Start: 2024-01-01 | End: 2024-01-01

## 2024-01-01 RX ORDER — HEPARIN SODIUM 5000 [USP'U]/ML
5000 INJECTION, SOLUTION INTRAVENOUS; SUBCUTANEOUS EVERY 8 HOURS
Status: DISCONTINUED | OUTPATIENT
Start: 2024-01-01 | End: 2024-01-01

## 2024-01-01 RX ORDER — LABETALOL HYDROCHLORIDE 5 MG/ML
10 INJECTION, SOLUTION INTRAVENOUS EVERY 4 HOURS PRN
Status: DISCONTINUED | OUTPATIENT
Start: 2024-01-01 | End: 2024-01-01

## 2024-01-01 RX ORDER — ASPIRIN 81 MG/1
162 TABLET, CHEWABLE ORAL EVERY EVENING
Status: DISCONTINUED | OUTPATIENT
Start: 2024-01-01 | End: 2024-01-01

## 2024-01-01 RX ORDER — ECHINACEA PURPUREA EXTRACT 125 MG
2 TABLET ORAL PRN
Status: DISCONTINUED | OUTPATIENT
Start: 2024-01-01 | End: 2024-01-01 | Stop reason: HOSPADM

## 2024-01-01 RX ORDER — ACETAMINOPHEN 500 MG
1000 TABLET ORAL 3 TIMES DAILY
Status: DISCONTINUED | OUTPATIENT
Start: 2024-01-01 | End: 2024-01-01 | Stop reason: HOSPADM

## 2024-01-01 RX ORDER — AMLODIPINE BESYLATE 5 MG/1
10 TABLET ORAL DAILY
Status: DISCONTINUED | OUTPATIENT
Start: 2024-01-01 | End: 2024-01-01

## 2024-01-01 RX ORDER — LEVOTHYROXINE SODIUM 0.03 MG/1
25 TABLET ORAL
Qty: 30 TABLET | Refills: 11 | Status: SHIPPED | OUTPATIENT
Start: 2024-01-01 | End: 2025-01-31

## 2024-01-01 RX ORDER — ATROPINE SULFATE 10 MG/ML
2 SOLUTION/ DROPS OPHTHALMIC EVERY 4 HOURS PRN
Status: DISCONTINUED | OUTPATIENT
Start: 2024-01-01 | End: 2024-01-01 | Stop reason: HOSPADM

## 2024-01-01 RX ORDER — DEXAMETHASONE 4 MG/1
4 TABLET ORAL EVERY 8 HOURS
Status: DISCONTINUED | OUTPATIENT
Start: 2024-01-01 | End: 2024-01-01

## 2024-01-01 RX ORDER — LIDOCAINE 4 G/G
2 PATCH TOPICAL EVERY 24 HOURS
Status: CANCELLED | OUTPATIENT
Start: 2024-01-01

## 2024-01-01 RX ORDER — CARVEDILOL 6.25 MG/1
12.5 TABLET ORAL 2 TIMES DAILY WITH MEALS
Status: DISCONTINUED | OUTPATIENT
Start: 2024-01-01 | End: 2024-01-01

## 2024-01-01 RX ORDER — ACETAMINOPHEN 325 MG/1
650 TABLET ORAL EVERY 6 HOURS
Status: DISCONTINUED | OUTPATIENT
Start: 2024-01-01 | End: 2024-01-01 | Stop reason: HOSPADM

## 2024-01-01 RX ORDER — SENNA AND DOCUSATE SODIUM 50; 8.6 MG/1; MG/1
2 TABLET, FILM COATED ORAL 2 TIMES DAILY
Qty: 28 TABLET | Refills: 10 | Status: SHIPPED | OUTPATIENT
Start: 2024-01-01

## 2024-01-01 RX ADMIN — DOCUSATE SODIUM 50 MG AND SENNOSIDES 8.6 MG 2 TABLET: 8.6; 5 TABLET, FILM COATED ORAL at 04:20

## 2024-01-01 RX ADMIN — MORPHINE SULFATE 20 MG: 10 SOLUTION ORAL at 10:26

## 2024-01-01 RX ADMIN — DEXAMETHASONE SODIUM PHOSPHATE 4 MG: 4 INJECTION INTRA-ARTICULAR; INTRALESIONAL; INTRAMUSCULAR; INTRAVENOUS; SOFT TISSUE at 04:20

## 2024-01-01 RX ADMIN — OMEPRAZOLE 20 MG: 20 CAPSULE, DELAYED RELEASE ORAL at 17:59

## 2024-01-01 RX ADMIN — LEVOTHYROXINE SODIUM 25 MCG: 0.03 TABLET ORAL at 06:06

## 2024-01-01 RX ADMIN — ENOXAPARIN SODIUM 40 MG: 100 INJECTION SUBCUTANEOUS at 17:17

## 2024-01-01 RX ADMIN — TRAMADOL HYDROCHLORIDE 50 MG: 50 TABLET, COATED ORAL at 14:17

## 2024-01-01 RX ADMIN — LIDOCAINE 2 PATCH: 4 PATCH TOPICAL at 10:43

## 2024-01-01 RX ADMIN — ASPIRIN 162 MG: 81 TABLET, CHEWABLE ORAL at 17:47

## 2024-01-01 RX ADMIN — AMLODIPINE BESYLATE 10 MG: 5 TABLET ORAL at 06:06

## 2024-01-01 RX ADMIN — LIDOCAINE 2 PATCH: 4 PATCH TOPICAL at 10:05

## 2024-01-01 RX ADMIN — CYCLOBENZAPRINE 10 MG: 10 TABLET, FILM COATED ORAL at 21:02

## 2024-01-01 RX ADMIN — FUROSEMIDE 20 MG: 40 TABLET ORAL at 05:25

## 2024-01-01 RX ADMIN — SENNOSIDES AND DOCUSATE SODIUM 2 TABLET: 50; 8.6 TABLET ORAL at 09:18

## 2024-01-01 RX ADMIN — POLYETHYLENE GLYCOL 3350 1 PACKET: 17 POWDER, FOR SOLUTION ORAL at 09:10

## 2024-01-01 RX ADMIN — DOCUSATE SODIUM 50 MG AND SENNOSIDES 8.6 MG 2 TABLET: 8.6; 5 TABLET, FILM COATED ORAL at 05:21

## 2024-01-01 RX ADMIN — ACETAMINOPHEN 650 MG: 325 TABLET, FILM COATED ORAL at 23:30

## 2024-01-01 RX ADMIN — MORPHINE SULFATE 4 MG: 4 INJECTION, SOLUTION INTRAMUSCULAR; INTRAVENOUS at 15:58

## 2024-01-01 RX ADMIN — TRAMADOL HYDROCHLORIDE 50 MG: 50 TABLET, COATED ORAL at 17:17

## 2024-01-01 RX ADMIN — HYDRALAZINE HYDROCHLORIDE 10 MG: 10 TABLET ORAL at 15:19

## 2024-01-01 RX ADMIN — OXYCODONE HYDROCHLORIDE 10 MG: 10 TABLET ORAL at 21:27

## 2024-01-01 RX ADMIN — HYDRALAZINE HYDROCHLORIDE 25 MG: 25 TABLET, FILM COATED ORAL at 05:36

## 2024-01-01 RX ADMIN — SENNOSIDES AND DOCUSATE SODIUM 2 TABLET: 50; 8.6 TABLET ORAL at 08:12

## 2024-01-01 RX ADMIN — DEXAMETHASONE 4 MG: 4 TABLET ORAL at 21:46

## 2024-01-01 RX ADMIN — SIMETHICONE 125 MG: 125 TABLET, CHEWABLE ORAL at 20:07

## 2024-01-01 RX ADMIN — DOCUSATE SODIUM 50MG AND SENNOSIDES 8.6MG 2 TABLET: 8.6; 5 TABLET, FILM COATED ORAL at 06:10

## 2024-01-01 RX ADMIN — POLYETHYLENE GLYCOL 3350 1 PACKET: 17 POWDER, FOR SOLUTION ORAL at 13:56

## 2024-01-01 RX ADMIN — MORPHINE SULFATE 20 MG: 10 SOLUTION ORAL at 15:07

## 2024-01-01 RX ADMIN — OXYCODONE HYDROCHLORIDE 5 MG: 5 TABLET ORAL at 22:03

## 2024-01-01 RX ADMIN — DEXAMETHASONE 4 MG: 4 TABLET ORAL at 05:17

## 2024-01-01 RX ADMIN — MENTHOL 4 G: 10 GEL TOPICAL at 20:41

## 2024-01-01 RX ADMIN — POLYETHYLENE GLYCOL 3350 1 PACKET: 17 POWDER, FOR SOLUTION ORAL at 13:46

## 2024-01-01 RX ADMIN — ASPIRIN 81 MG: 81 TABLET, CHEWABLE ORAL at 08:15

## 2024-01-01 RX ADMIN — CARVEDILOL 12.5 MG: 12.5 TABLET, FILM COATED ORAL at 10:18

## 2024-01-01 RX ADMIN — DEXAMETHASONE 2 MG: 1 TABLET ORAL at 21:26

## 2024-01-01 RX ADMIN — CYCLOBENZAPRINE 10 MG: 10 TABLET, FILM COATED ORAL at 12:30

## 2024-01-01 RX ADMIN — MORPHINE SULFATE 20 MG: 10 SOLUTION ORAL at 01:58

## 2024-01-01 RX ADMIN — ASPIRIN 81 MG: 81 TABLET, CHEWABLE ORAL at 04:20

## 2024-01-01 RX ADMIN — POLYETHYLENE GLYCOL 3350 1 PACKET: 17 POWDER, FOR SOLUTION ORAL at 17:15

## 2024-01-01 RX ADMIN — MENTHOL 4 G: 10 GEL TOPICAL at 10:25

## 2024-01-01 RX ADMIN — OXYCODONE HYDROCHLORIDE 5 MG: 5 TABLET ORAL at 20:37

## 2024-01-01 RX ADMIN — LEVOTHYROXINE SODIUM 25 MCG: 0.03 TABLET ORAL at 05:25

## 2024-01-01 RX ADMIN — CARVEDILOL 12.5 MG: 12.5 TABLET, FILM COATED ORAL at 08:17

## 2024-01-01 RX ADMIN — TRAZODONE HYDROCHLORIDE 50 MG: 50 TABLET ORAL at 21:27

## 2024-01-01 RX ADMIN — HYDRALAZINE HYDROCHLORIDE 25 MG: 25 TABLET, FILM COATED ORAL at 20:58

## 2024-01-01 RX ADMIN — CYCLOBENZAPRINE 10 MG: 10 TABLET, FILM COATED ORAL at 15:15

## 2024-01-01 RX ADMIN — DEXAMETHASONE 4 MG: 4 TABLET ORAL at 13:46

## 2024-01-01 RX ADMIN — LEVOTHYROXINE SODIUM 25 MCG: 0.03 TABLET ORAL at 04:50

## 2024-01-01 RX ADMIN — HYDRALAZINE HYDROCHLORIDE 25 MG: 25 TABLET, FILM COATED ORAL at 16:48

## 2024-01-01 RX ADMIN — MENTHOL 4 G: 10 GEL TOPICAL at 15:22

## 2024-01-01 RX ADMIN — CARVEDILOL 12.5 MG: 12.5 TABLET, FILM COATED ORAL at 16:47

## 2024-01-01 RX ADMIN — ENOXAPARIN SODIUM 40 MG: 100 INJECTION SUBCUTANEOUS at 18:06

## 2024-01-01 RX ADMIN — HEPARIN SODIUM 5000 UNITS: 5000 INJECTION, SOLUTION INTRAVENOUS; SUBCUTANEOUS at 05:18

## 2024-01-01 RX ADMIN — SIMETHICONE 125 MG: 125 TABLET, CHEWABLE ORAL at 16:47

## 2024-01-01 RX ADMIN — DEXAMETHASONE 2 MG: 1 TABLET ORAL at 20:44

## 2024-01-01 RX ADMIN — SIMETHICONE 125 MG: 125 TABLET, CHEWABLE ORAL at 17:26

## 2024-01-01 RX ADMIN — DEXAMETHASONE 1 MG: 1 TABLET ORAL at 08:23

## 2024-01-01 RX ADMIN — OMEPRAZOLE 20 MG: 20 CAPSULE, DELAYED RELEASE ORAL at 09:17

## 2024-01-01 RX ADMIN — DEXAMETHASONE 2 MG: 1 TABLET ORAL at 08:14

## 2024-01-01 RX ADMIN — ACETAMINOPHEN 1000 MG: 500 TABLET ORAL at 21:02

## 2024-01-01 RX ADMIN — SIMETHICONE 125 MG: 125 TABLET, CHEWABLE ORAL at 17:17

## 2024-01-01 RX ADMIN — SENNOSIDES AND DOCUSATE SODIUM 2 TABLET: 50; 8.6 TABLET ORAL at 20:07

## 2024-01-01 RX ADMIN — SENNOSIDES AND DOCUSATE SODIUM 2 TABLET: 50; 8.6 TABLET ORAL at 10:17

## 2024-01-01 RX ADMIN — MORPHINE SULFATE 2 MG: 4 INJECTION, SOLUTION INTRAMUSCULAR; INTRAVENOUS at 14:35

## 2024-01-01 RX ADMIN — CARVEDILOL 6.25 MG: 3.12 TABLET, FILM COATED ORAL at 16:49

## 2024-01-01 RX ADMIN — SENNOSIDES AND DOCUSATE SODIUM 2 TABLET: 50; 8.6 TABLET ORAL at 08:14

## 2024-01-01 RX ADMIN — TRAMADOL HYDROCHLORIDE 50 MG: 50 TABLET, COATED ORAL at 15:02

## 2024-01-01 RX ADMIN — TRAMADOL HYDROCHLORIDE 50 MG: 50 TABLET, COATED ORAL at 13:32

## 2024-01-01 RX ADMIN — HEPARIN SODIUM 5000 UNITS: 5000 INJECTION, SOLUTION INTRAVENOUS; SUBCUTANEOUS at 13:55

## 2024-01-01 RX ADMIN — ASPIRIN 81 MG: 81 TABLET, CHEWABLE ORAL at 08:22

## 2024-01-01 RX ADMIN — HYDRALAZINE HYDROCHLORIDE 10 MG: 10 TABLET ORAL at 06:07

## 2024-01-01 RX ADMIN — TRAMADOL HYDROCHLORIDE 50 MG: 50 TABLET, COATED ORAL at 19:48

## 2024-01-01 RX ADMIN — ACETAMINOPHEN 1000 MG: 500 TABLET ORAL at 21:32

## 2024-01-01 RX ADMIN — TRAMADOL HYDROCHLORIDE 50 MG: 50 TABLET, COATED ORAL at 08:13

## 2024-01-01 RX ADMIN — CARVEDILOL 12.5 MG: 12.5 TABLET, FILM COATED ORAL at 17:24

## 2024-01-01 RX ADMIN — HYDRALAZINE HYDROCHLORIDE 25 MG: 25 TABLET, FILM COATED ORAL at 15:31

## 2024-01-01 RX ADMIN — CARVEDILOL 12.5 MG: 6.25 TABLET, FILM COATED ORAL at 08:38

## 2024-01-01 RX ADMIN — AMLODIPINE BESYLATE 10 MG: 5 TABLET ORAL at 05:26

## 2024-01-01 RX ADMIN — LIDOCAINE 2 PATCH: 4 PATCH TOPICAL at 11:00

## 2024-01-01 RX ADMIN — CARVEDILOL 3.12 MG: 6.25 TABLET, FILM COATED ORAL at 17:15

## 2024-01-01 RX ADMIN — HYDRALAZINE HYDROCHLORIDE 10 MG: 10 TABLET, FILM COATED ORAL at 05:18

## 2024-01-01 RX ADMIN — OXYCODONE 5 MG: 5 TABLET ORAL at 09:40

## 2024-01-01 RX ADMIN — OXYCODONE HYDROCHLORIDE 5 MG: 5 TABLET ORAL at 10:10

## 2024-01-01 RX ADMIN — LIDOCAINE 2 PATCH: 4 PATCH TOPICAL at 09:42

## 2024-01-01 RX ADMIN — ASPIRIN 81 MG: 81 TABLET, CHEWABLE ORAL at 09:11

## 2024-01-01 RX ADMIN — DEXAMETHASONE 4 MG: 4 TABLET ORAL at 05:31

## 2024-01-01 RX ADMIN — ACETAMINOPHEN 650 MG: 325 TABLET ORAL at 05:31

## 2024-01-01 RX ADMIN — ACETAMINOPHEN 650 MG: 325 TABLET ORAL at 12:45

## 2024-01-01 RX ADMIN — LEVOTHYROXINE SODIUM 25 MCG: 0.03 TABLET ORAL at 05:17

## 2024-01-01 RX ADMIN — MORPHINE SULFATE 20 MG: 10 SOLUTION ORAL at 22:48

## 2024-01-01 RX ADMIN — ACETAMINOPHEN 650 MG: 325 TABLET ORAL at 17:15

## 2024-01-01 RX ADMIN — SENNOSIDES AND DOCUSATE SODIUM 2 TABLET: 50; 8.6 TABLET ORAL at 19:48

## 2024-01-01 RX ADMIN — LIDOCAINE 2 PATCH: 4 PATCH TOPICAL at 10:45

## 2024-01-01 RX ADMIN — OMEPRAZOLE 20 MG: 20 CAPSULE, DELAYED RELEASE ORAL at 10:25

## 2024-01-01 RX ADMIN — ACETAMINOPHEN 1000 MG: 500 TABLET ORAL at 14:30

## 2024-01-01 RX ADMIN — DEXAMETHASONE 2 MG: 1 TABLET ORAL at 09:17

## 2024-01-01 RX ADMIN — OXYCODONE HYDROCHLORIDE 5 MG: 5 TABLET ORAL at 03:47

## 2024-01-01 RX ADMIN — MENTHOL 4 G: 10 GEL TOPICAL at 08:23

## 2024-01-01 RX ADMIN — OXYCODONE HYDROCHLORIDE 10 MG: 10 TABLET ORAL at 05:17

## 2024-01-01 RX ADMIN — LEVOTHYROXINE SODIUM 25 MCG: 0.03 TABLET ORAL at 05:26

## 2024-01-01 RX ADMIN — CYCLOBENZAPRINE 10 MG: 10 TABLET, FILM COATED ORAL at 21:00

## 2024-01-01 RX ADMIN — ENOXAPARIN SODIUM 40 MG: 100 INJECTION SUBCUTANEOUS at 16:34

## 2024-01-01 RX ADMIN — POLYETHYLENE GLYCOL 3350 1 PACKET: 17 POWDER, FOR SOLUTION ORAL at 06:07

## 2024-01-01 RX ADMIN — ASPIRIN 81 MG: 81 TABLET, CHEWABLE ORAL at 08:00

## 2024-01-01 RX ADMIN — MENTHOL 4 G: 10 GEL TOPICAL at 21:00

## 2024-01-01 RX ADMIN — MORPHINE SULFATE 2 MG: 4 INJECTION, SOLUTION INTRAMUSCULAR; INTRAVENOUS at 11:52

## 2024-01-01 RX ADMIN — ASPIRIN 81 MG: 81 TABLET, CHEWABLE ORAL at 10:25

## 2024-01-01 RX ADMIN — SENNOSIDES AND DOCUSATE SODIUM 2 TABLET: 50; 8.6 TABLET ORAL at 21:46

## 2024-01-01 RX ADMIN — DEXAMETHASONE 4 MG: 4 TABLET ORAL at 20:32

## 2024-01-01 RX ADMIN — POLYETHYLENE GLYCOL 3350 1 PACKET: 17 POWDER, FOR SOLUTION ORAL at 17:50

## 2024-01-01 RX ADMIN — CARVEDILOL 12.5 MG: 12.5 TABLET, FILM COATED ORAL at 17:19

## 2024-01-01 RX ADMIN — LEVOTHYROXINE SODIUM 25 MCG: 0.03 TABLET ORAL at 04:20

## 2024-01-01 RX ADMIN — LIDOCAINE 2 PATCH: 4 PATCH TOPICAL at 08:05

## 2024-01-01 RX ADMIN — MENTHOL 4 G: 10 GEL TOPICAL at 14:17

## 2024-01-01 RX ADMIN — SENNOSIDES AND DOCUSATE SODIUM 2 TABLET: 50; 8.6 TABLET ORAL at 20:54

## 2024-01-01 RX ADMIN — OXYCODONE HYDROCHLORIDE 5 MG: 5 TABLET ORAL at 16:48

## 2024-01-01 RX ADMIN — LEVOTHYROXINE SODIUM 25 MCG: 0.03 TABLET ORAL at 05:31

## 2024-01-01 RX ADMIN — DEXAMETHASONE 1 MG: 1 TABLET ORAL at 10:18

## 2024-01-01 RX ADMIN — OXYCODONE HYDROCHLORIDE 5 MG: 5 TABLET ORAL at 13:35

## 2024-01-01 RX ADMIN — AMLODIPINE BESYLATE 5 MG: 5 TABLET ORAL at 11:39

## 2024-01-01 RX ADMIN — ENOXAPARIN SODIUM 40 MG: 100 INJECTION SUBCUTANEOUS at 17:16

## 2024-01-01 RX ADMIN — SENNOSIDES AND DOCUSATE SODIUM 2 TABLET: 50; 8.6 TABLET ORAL at 21:27

## 2024-01-01 RX ADMIN — CARVEDILOL 6.25 MG: 3.12 TABLET, FILM COATED ORAL at 17:59

## 2024-01-01 RX ADMIN — SIMETHICONE 125 MG: 125 TABLET, CHEWABLE ORAL at 08:24

## 2024-01-01 RX ADMIN — SIMETHICONE 125 MG: 125 TABLET, CHEWABLE ORAL at 09:11

## 2024-01-01 RX ADMIN — DEXAMETHASONE 4 MG: 4 TABLET ORAL at 08:00

## 2024-01-01 RX ADMIN — HYDRALAZINE HYDROCHLORIDE 25 MG: 25 TABLET, FILM COATED ORAL at 05:30

## 2024-01-01 RX ADMIN — AMLODIPINE BESYLATE 10 MG: 5 TABLET ORAL at 05:17

## 2024-01-01 RX ADMIN — MENTHOL 4 G: 10 GEL TOPICAL at 09:00

## 2024-01-01 RX ADMIN — SIMETHICONE 125 MG: 125 TABLET, CHEWABLE ORAL at 08:22

## 2024-01-01 RX ADMIN — ACETAMINOPHEN 650 MG: 325 TABLET ORAL at 05:17

## 2024-01-01 RX ADMIN — CARVEDILOL 3.12 MG: 6.25 TABLET, FILM COATED ORAL at 07:02

## 2024-01-01 RX ADMIN — LEVOTHYROXINE SODIUM 25 MCG: 0.03 TABLET ORAL at 05:33

## 2024-01-01 RX ADMIN — AMLODIPINE BESYLATE 10 MG: 5 TABLET ORAL at 05:25

## 2024-01-01 RX ADMIN — CYCLOBENZAPRINE 10 MG: 10 TABLET, FILM COATED ORAL at 15:07

## 2024-01-01 RX ADMIN — SIMETHICONE 125 MG: 125 TABLET, CHEWABLE ORAL at 10:38

## 2024-01-01 RX ADMIN — DEXAMETHASONE 4 MG: 4 TABLET ORAL at 20:29

## 2024-01-01 RX ADMIN — AMLODIPINE BESYLATE 10 MG: 5 TABLET ORAL at 05:33

## 2024-01-01 RX ADMIN — HEPARIN SODIUM 5000 UNITS: 5000 INJECTION, SOLUTION INTRAVENOUS; SUBCUTANEOUS at 13:40

## 2024-01-01 RX ADMIN — LEVOTHYROXINE SODIUM 25 MCG: 0.03 TABLET ORAL at 06:11

## 2024-01-01 RX ADMIN — DOCUSATE SODIUM 50MG AND SENNOSIDES 8.6MG 2 TABLET: 8.6; 5 TABLET, FILM COATED ORAL at 17:47

## 2024-01-01 RX ADMIN — DEXAMETHASONE SODIUM PHOSPHATE 4 MG: 4 INJECTION INTRA-ARTICULAR; INTRALESIONAL; INTRAMUSCULAR; INTRAVENOUS; SOFT TISSUE at 14:01

## 2024-01-01 RX ADMIN — MORPHINE SULFATE 20 MG: 10 SOLUTION ORAL at 14:33

## 2024-01-01 RX ADMIN — OXYCODONE HYDROCHLORIDE 5 MG: 5 TABLET ORAL at 18:42

## 2024-01-01 RX ADMIN — MENTHOL 4 G: 10 GEL TOPICAL at 21:02

## 2024-01-01 RX ADMIN — CARVEDILOL 6.25 MG: 3.12 TABLET, FILM COATED ORAL at 08:05

## 2024-01-01 RX ADMIN — MENTHOL 4 G: 10 GEL TOPICAL at 15:10

## 2024-01-01 RX ADMIN — TRAMADOL HYDROCHLORIDE 50 MG: 50 TABLET, COATED ORAL at 17:44

## 2024-01-01 RX ADMIN — TRAMADOL HYDROCHLORIDE 50 MG: 50 TABLET, COATED ORAL at 08:22

## 2024-01-01 RX ADMIN — CYCLOBENZAPRINE 10 MG: 10 TABLET, FILM COATED ORAL at 21:40

## 2024-01-01 RX ADMIN — OMEPRAZOLE 20 MG: 20 CAPSULE, DELAYED RELEASE ORAL at 08:00

## 2024-01-01 RX ADMIN — SENNOSIDES AND DOCUSATE SODIUM 2 TABLET: 50; 8.6 TABLET ORAL at 09:10

## 2024-01-01 RX ADMIN — HYDRALAZINE HYDROCHLORIDE 10 MG: 10 TABLET, FILM COATED ORAL at 13:56

## 2024-01-01 RX ADMIN — DOCUSATE SODIUM 50MG AND SENNOSIDES 8.6MG 2 TABLET: 8.6; 5 TABLET, FILM COATED ORAL at 17:27

## 2024-01-01 RX ADMIN — AMLODIPINE BESYLATE 5 MG: 5 TABLET ORAL at 05:02

## 2024-01-01 RX ADMIN — MORPHINE SULFATE 2 MG: 4 INJECTION, SOLUTION INTRAMUSCULAR; INTRAVENOUS at 00:56

## 2024-01-01 RX ADMIN — MENTHOL 4 G: 10 GEL TOPICAL at 20:04

## 2024-01-01 RX ADMIN — SIMETHICONE 125 MG: 125 TABLET, CHEWABLE ORAL at 11:42

## 2024-01-01 RX ADMIN — CYCLOBENZAPRINE 10 MG: 10 TABLET, FILM COATED ORAL at 19:48

## 2024-01-01 RX ADMIN — MENTHOL 4 G: 10 GEL TOPICAL at 15:00

## 2024-01-01 RX ADMIN — AMLODIPINE BESYLATE 10 MG: 5 TABLET ORAL at 06:08

## 2024-01-01 RX ADMIN — SENNOSIDES AND DOCUSATE SODIUM 2 TABLET: 50; 8.6 TABLET ORAL at 20:30

## 2024-01-01 RX ADMIN — MORPHINE SULFATE 2 MG: 4 INJECTION, SOLUTION INTRAMUSCULAR; INTRAVENOUS at 05:17

## 2024-01-01 RX ADMIN — SIMETHICONE 125 MG: 125 TABLET, CHEWABLE ORAL at 08:06

## 2024-01-01 RX ADMIN — MENTHOL 4 G: 10 GEL TOPICAL at 20:38

## 2024-01-01 RX ADMIN — MORPHINE SULFATE 20 MG: 10 SOLUTION ORAL at 17:29

## 2024-01-01 RX ADMIN — POLYETHYLENE GLYCOL 3350 1 PACKET: 17 POWDER, FOR SOLUTION ORAL at 10:48

## 2024-01-01 RX ADMIN — ACETAMINOPHEN 650 MG: 325 TABLET, FILM COATED ORAL at 11:01

## 2024-01-01 RX ADMIN — SENNOSIDES AND DOCUSATE SODIUM 2 TABLET: 50; 8.6 TABLET ORAL at 19:49

## 2024-01-01 RX ADMIN — OXYCODONE HYDROCHLORIDE 5 MG: 5 TABLET ORAL at 09:16

## 2024-01-01 RX ADMIN — TRAMADOL HYDROCHLORIDE 50 MG: 50 TABLET, COATED ORAL at 10:18

## 2024-01-01 RX ADMIN — LIDOCAINE 2 PATCH: 4 PATCH TOPICAL at 10:35

## 2024-01-01 RX ADMIN — MORPHINE SULFATE 2 MG: 4 INJECTION, SOLUTION INTRAMUSCULAR; INTRAVENOUS at 10:05

## 2024-01-01 RX ADMIN — TRAMADOL HYDROCHLORIDE 50 MG: 50 TABLET, COATED ORAL at 20:07

## 2024-01-01 RX ADMIN — ACETAMINOPHEN 1000 MG: 500 TABLET ORAL at 08:36

## 2024-01-01 RX ADMIN — OXYCODONE 5 MG: 5 TABLET ORAL at 08:51

## 2024-01-01 RX ADMIN — CARVEDILOL 6.25 MG: 3.12 TABLET, FILM COATED ORAL at 16:47

## 2024-01-01 RX ADMIN — SIMETHICONE 125 MG: 125 TABLET, CHEWABLE ORAL at 19:48

## 2024-01-01 RX ADMIN — TRAMADOL HYDROCHLORIDE 50 MG: 50 TABLET, COATED ORAL at 08:23

## 2024-01-01 RX ADMIN — OXYCODONE HYDROCHLORIDE 5 MG: 5 TABLET ORAL at 17:51

## 2024-01-01 RX ADMIN — TRAMADOL HYDROCHLORIDE 50 MG: 50 TABLET, COATED ORAL at 22:01

## 2024-01-01 RX ADMIN — TRAMADOL HYDROCHLORIDE 50 MG: 50 TABLET, COATED ORAL at 21:40

## 2024-01-01 RX ADMIN — OXYCODONE HYDROCHLORIDE 5 MG: 5 TABLET ORAL at 14:08

## 2024-01-01 RX ADMIN — LEVOTHYROXINE SODIUM 25 MCG: 0.03 TABLET ORAL at 05:02

## 2024-01-01 RX ADMIN — SIMETHICONE 125 MG: 125 TABLET, CHEWABLE ORAL at 17:46

## 2024-01-01 RX ADMIN — CARVEDILOL 6.25 MG: 3.12 TABLET, FILM COATED ORAL at 17:15

## 2024-01-01 RX ADMIN — LEVOTHYROXINE SODIUM 25 MCG: 0.03 TABLET ORAL at 05:18

## 2024-01-01 RX ADMIN — ASPIRIN 81 MG: 81 TABLET, CHEWABLE ORAL at 05:21

## 2024-01-01 RX ADMIN — DEXAMETHASONE 1 MG: 1 TABLET ORAL at 19:48

## 2024-01-01 RX ADMIN — TRAMADOL HYDROCHLORIDE 50 MG: 50 TABLET, COATED ORAL at 08:00

## 2024-01-01 RX ADMIN — CARVEDILOL 6.25 MG: 3.12 TABLET, FILM COATED ORAL at 08:12

## 2024-01-01 RX ADMIN — HEPARIN SODIUM 5000 UNITS: 5000 INJECTION, SOLUTION INTRAVENOUS; SUBCUTANEOUS at 21:33

## 2024-01-01 RX ADMIN — SIMETHICONE 125 MG: 125 TABLET, CHEWABLE ORAL at 10:51

## 2024-01-01 RX ADMIN — TRAMADOL HYDROCHLORIDE 50 MG: 50 TABLET, COATED ORAL at 20:39

## 2024-01-01 RX ADMIN — DEXAMETHASONE SODIUM PHOSPHATE 4 MG: 4 INJECTION INTRA-ARTICULAR; INTRALESIONAL; INTRAMUSCULAR; INTRAVENOUS; SOFT TISSUE at 23:30

## 2024-01-01 RX ADMIN — HEPARIN SODIUM 5000 UNITS: 5000 INJECTION, SOLUTION INTRAVENOUS; SUBCUTANEOUS at 06:11

## 2024-01-01 RX ADMIN — ENOXAPARIN SODIUM 40 MG: 100 INJECTION SUBCUTANEOUS at 17:22

## 2024-01-01 RX ADMIN — OXYCODONE HYDROCHLORIDE 10 MG: 10 TABLET ORAL at 10:50

## 2024-01-01 RX ADMIN — HYDRALAZINE HYDROCHLORIDE 10 MG: 10 TABLET ORAL at 15:07

## 2024-01-01 RX ADMIN — TRAMADOL HYDROCHLORIDE 50 MG: 50 TABLET, COATED ORAL at 20:55

## 2024-01-01 RX ADMIN — HYDRALAZINE HYDROCHLORIDE 10 MG: 10 TABLET, FILM COATED ORAL at 21:32

## 2024-01-01 RX ADMIN — TRAMADOL HYDROCHLORIDE 50 MG: 50 TABLET, COATED ORAL at 12:37

## 2024-01-01 RX ADMIN — TRAMADOL HYDROCHLORIDE 50 MG: 50 TABLET, COATED ORAL at 15:47

## 2024-01-01 RX ADMIN — TRAMADOL HYDROCHLORIDE 50 MG: 50 TABLET, COATED ORAL at 16:47

## 2024-01-01 RX ADMIN — TRAMADOL HYDROCHLORIDE 50 MG: 50 TABLET, COATED ORAL at 21:00

## 2024-01-01 RX ADMIN — OMEPRAZOLE 20 MG: 20 CAPSULE, DELAYED RELEASE ORAL at 08:15

## 2024-01-01 RX ADMIN — FUROSEMIDE 20 MG: 40 TABLET ORAL at 17:15

## 2024-01-01 RX ADMIN — HYDRALAZINE HYDROCHLORIDE 25 MG: 25 TABLET, FILM COATED ORAL at 20:41

## 2024-01-01 RX ADMIN — ASPIRIN 81 MG: 81 TABLET, CHEWABLE ORAL at 09:12

## 2024-01-01 RX ADMIN — ENOXAPARIN SODIUM 40 MG: 100 INJECTION SUBCUTANEOUS at 17:45

## 2024-01-01 RX ADMIN — KETOROLAC TROMETHAMINE 15 MG: 15 INJECTION, SOLUTION INTRAMUSCULAR; INTRAVENOUS at 14:07

## 2024-01-01 RX ADMIN — AMLODIPINE BESYLATE 10 MG: 5 TABLET ORAL at 05:18

## 2024-01-01 RX ADMIN — SIMETHICONE 125 MG: 125 TABLET, CHEWABLE ORAL at 10:48

## 2024-01-01 RX ADMIN — CARVEDILOL 6.25 MG: 3.12 TABLET, FILM COATED ORAL at 08:00

## 2024-01-01 RX ADMIN — HYDRALAZINE HYDROCHLORIDE 10 MG: 10 TABLET, FILM COATED ORAL at 13:36

## 2024-01-01 RX ADMIN — HYDRALAZINE HYDROCHLORIDE 10 MG: 10 TABLET, FILM COATED ORAL at 22:03

## 2024-01-01 RX ADMIN — POLYETHYLENE GLYCOL 3350 1 PACKET: 17 POWDER, FOR SOLUTION ORAL at 17:59

## 2024-01-01 RX ADMIN — MENTHOL 4 G: 10 GEL TOPICAL at 09:06

## 2024-01-01 RX ADMIN — SODIUM CHLORIDE: 9 INJECTION, SOLUTION INTRAVENOUS at 12:19

## 2024-01-01 RX ADMIN — SIMETHICONE 125 MG: 125 TABLET, CHEWABLE ORAL at 07:59

## 2024-01-01 RX ADMIN — TRAMADOL HYDROCHLORIDE 50 MG: 50 TABLET, COATED ORAL at 09:11

## 2024-01-01 RX ADMIN — POLYETHYLENE GLYCOL 3350 1 PACKET: 17 POWDER, FOR SOLUTION ORAL at 14:23

## 2024-01-01 RX ADMIN — DEXAMETHASONE SODIUM PHOSPHATE 4 MG: 4 INJECTION INTRA-ARTICULAR; INTRALESIONAL; INTRAMUSCULAR; INTRAVENOUS; SOFT TISSUE at 10:43

## 2024-01-01 RX ADMIN — ACETAMINOPHEN 650 MG: 325 TABLET ORAL at 11:01

## 2024-01-01 RX ADMIN — SENNOSIDES AND DOCUSATE SODIUM 2 TABLET: 50; 8.6 TABLET ORAL at 08:23

## 2024-01-01 RX ADMIN — HYDROMORPHONE HYDROCHLORIDE 0.5 MG: 1 INJECTION, SOLUTION INTRAMUSCULAR; INTRAVENOUS; SUBCUTANEOUS at 15:27

## 2024-01-01 RX ADMIN — AMLODIPINE BESYLATE 10 MG: 5 TABLET ORAL at 06:07

## 2024-01-01 RX ADMIN — SIMETHICONE 125 MG: 125 TABLET, CHEWABLE ORAL at 17:16

## 2024-01-01 RX ADMIN — TRAMADOL HYDROCHLORIDE 50 MG: 50 TABLET, COATED ORAL at 15:07

## 2024-01-01 RX ADMIN — TRAMADOL HYDROCHLORIDE 50 MG: 50 TABLET, COATED ORAL at 07:42

## 2024-01-01 RX ADMIN — OMEPRAZOLE 20 MG: 20 CAPSULE, DELAYED RELEASE ORAL at 08:05

## 2024-01-01 RX ADMIN — MORPHINE SULFATE 20 MG: 10 SOLUTION ORAL at 05:55

## 2024-01-01 RX ADMIN — SIMETHICONE 125 MG: 125 TABLET, CHEWABLE ORAL at 16:49

## 2024-01-01 RX ADMIN — AMLODIPINE BESYLATE 10 MG: 5 TABLET ORAL at 04:50

## 2024-01-01 RX ADMIN — MENTHOL 4 G: 10 GEL TOPICAL at 15:02

## 2024-01-01 RX ADMIN — TRAMADOL HYDROCHLORIDE 50 MG: 50 TABLET, COATED ORAL at 12:33

## 2024-01-01 RX ADMIN — DOCUSATE SODIUM 50MG AND SENNOSIDES 8.6MG 2 TABLET: 8.6; 5 TABLET, FILM COATED ORAL at 18:38

## 2024-01-01 RX ADMIN — ACETAMINOPHEN 650 MG: 325 TABLET ORAL at 17:58

## 2024-01-01 RX ADMIN — CYCLOBENZAPRINE 10 MG: 10 TABLET, FILM COATED ORAL at 08:22

## 2024-01-01 RX ADMIN — ASPIRIN 81 MG: 81 TABLET, CHEWABLE ORAL at 08:05

## 2024-01-01 RX ADMIN — SIMETHICONE 125 MG: 125 TABLET, CHEWABLE ORAL at 21:26

## 2024-01-01 RX ADMIN — OXYCODONE 5 MG: 5 TABLET ORAL at 04:20

## 2024-01-01 RX ADMIN — TRAMADOL HYDROCHLORIDE 50 MG: 50 TABLET, COATED ORAL at 17:26

## 2024-01-01 RX ADMIN — OMEPRAZOLE 20 MG: 20 CAPSULE, DELAYED RELEASE ORAL at 08:13

## 2024-01-01 RX ADMIN — MENTHOL 4 G: 10 GEL TOPICAL at 20:32

## 2024-01-01 RX ADMIN — CYCLOBENZAPRINE 10 MG: 10 TABLET, FILM COATED ORAL at 10:25

## 2024-01-01 RX ADMIN — ASPIRIN 81 MG: 81 TABLET, CHEWABLE ORAL at 09:17

## 2024-01-01 RX ADMIN — MENTHOL 4 G: 10 GEL TOPICAL at 09:10

## 2024-01-01 RX ADMIN — SIMETHICONE 125 MG: 125 TABLET, CHEWABLE ORAL at 08:15

## 2024-01-01 RX ADMIN — ENOXAPARIN SODIUM 40 MG: 100 INJECTION SUBCUTANEOUS at 17:59

## 2024-01-01 RX ADMIN — MENTHOL 4 G: 10 GEL TOPICAL at 08:37

## 2024-01-01 RX ADMIN — TRAMADOL HYDROCHLORIDE 50 MG: 50 TABLET, COATED ORAL at 17:59

## 2024-01-01 RX ADMIN — LIDOCAINE 2 PATCH: 4 PATCH TOPICAL at 10:30

## 2024-01-01 RX ADMIN — HYDRALAZINE HYDROCHLORIDE 25 MG: 25 TABLET, FILM COATED ORAL at 16:34

## 2024-01-01 RX ADMIN — SENNOSIDES AND DOCUSATE SODIUM 2 TABLET: 50; 8.6 TABLET ORAL at 08:00

## 2024-01-01 RX ADMIN — CARVEDILOL 12.5 MG: 6.25 TABLET, FILM COATED ORAL at 08:37

## 2024-01-01 RX ADMIN — SIMETHICONE 125 MG: 125 TABLET, CHEWABLE ORAL at 17:22

## 2024-01-01 RX ADMIN — DEXAMETHASONE 4 MG: 4 TABLET ORAL at 15:22

## 2024-01-01 RX ADMIN — HEPARIN SODIUM 5000 UNITS: 5000 INJECTION, SOLUTION INTRAVENOUS; SUBCUTANEOUS at 22:03

## 2024-01-01 RX ADMIN — CARVEDILOL 12.5 MG: 12.5 TABLET, FILM COATED ORAL at 08:22

## 2024-01-01 RX ADMIN — SIMETHICONE 125 MG: 125 TABLET, CHEWABLE ORAL at 16:48

## 2024-01-01 RX ADMIN — OMEPRAZOLE 20 MG: 20 CAPSULE, DELAYED RELEASE ORAL at 08:22

## 2024-01-01 RX ADMIN — MENTHOL 4 G: 10 GEL TOPICAL at 14:27

## 2024-01-01 RX ADMIN — DOCUSATE SODIUM 50 MG AND SENNOSIDES 8.6 MG 2 TABLET: 8.6; 5 TABLET, FILM COATED ORAL at 17:15

## 2024-01-01 RX ADMIN — MENTHOL 4 G: 10 GEL TOPICAL at 14:49

## 2024-01-01 RX ADMIN — ENOXAPARIN SODIUM 40 MG: 100 INJECTION SUBCUTANEOUS at 17:26

## 2024-01-01 RX ADMIN — DEXAMETHASONE 2 MG: 1 TABLET ORAL at 09:10

## 2024-01-01 RX ADMIN — DEXAMETHASONE 2 MG: 1 TABLET ORAL at 20:54

## 2024-01-01 RX ADMIN — OXYCODONE 5 MG: 5 TABLET ORAL at 05:21

## 2024-01-01 RX ADMIN — OXYCODONE HYDROCHLORIDE 5 MG: 5 TABLET ORAL at 03:17

## 2024-01-01 RX ADMIN — MORPHINE SULFATE 2 MG: 4 INJECTION, SOLUTION INTRAMUSCULAR; INTRAVENOUS at 19:42

## 2024-01-01 RX ADMIN — CARVEDILOL 12.5 MG: 12.5 TABLET, FILM COATED ORAL at 08:23

## 2024-01-01 RX ADMIN — DOCUSATE SODIUM 50MG AND SENNOSIDES 8.6MG 2 TABLET: 8.6; 5 TABLET, FILM COATED ORAL at 05:17

## 2024-01-01 RX ADMIN — OXYCODONE HYDROCHLORIDE 5 MG: 5 TABLET ORAL at 08:55

## 2024-01-01 RX ADMIN — CYCLOBENZAPRINE 10 MG: 10 TABLET, FILM COATED ORAL at 14:27

## 2024-01-01 RX ADMIN — ACETAMINOPHEN 1000 MG: 500 TABLET ORAL at 14:31

## 2024-01-01 RX ADMIN — SIMETHICONE 125 MG: 125 TABLET, CHEWABLE ORAL at 21:00

## 2024-01-01 RX ADMIN — SIMETHICONE 125 MG: 125 TABLET, CHEWABLE ORAL at 10:24

## 2024-01-01 RX ADMIN — SIMETHICONE 125 MG: 125 TABLET, CHEWABLE ORAL at 20:38

## 2024-01-01 RX ADMIN — OXYCODONE HYDROCHLORIDE 10 MG: 10 TABLET ORAL at 05:02

## 2024-01-01 RX ADMIN — OMEPRAZOLE 20 MG: 20 CAPSULE, DELAYED RELEASE ORAL at 09:11

## 2024-01-01 RX ADMIN — SIMETHICONE 125 MG: 125 TABLET, CHEWABLE ORAL at 11:33

## 2024-01-01 RX ADMIN — MENTHOL 4 G: 10 GEL TOPICAL at 21:34

## 2024-01-01 RX ADMIN — SENNOSIDES AND DOCUSATE SODIUM 2 TABLET: 50; 8.6 TABLET ORAL at 20:39

## 2024-01-01 RX ADMIN — OXYCODONE HYDROCHLORIDE 5 MG: 5 TABLET ORAL at 21:32

## 2024-01-01 RX ADMIN — DEXAMETHASONE 1 MG: 1 TABLET ORAL at 20:04

## 2024-01-01 RX ADMIN — HYDRALAZINE HYDROCHLORIDE 10 MG: 10 TABLET ORAL at 21:47

## 2024-01-01 RX ADMIN — SIMETHICONE 125 MG: 125 TABLET, CHEWABLE ORAL at 11:00

## 2024-01-01 RX ADMIN — CARVEDILOL 12.5 MG: 6.25 TABLET, FILM COATED ORAL at 17:47

## 2024-01-01 RX ADMIN — ACETAMINOPHEN 650 MG: 325 TABLET, FILM COATED ORAL at 17:15

## 2024-01-01 RX ADMIN — HYDRALAZINE HYDROCHLORIDE 10 MG: 10 TABLET ORAL at 10:32

## 2024-01-01 RX ADMIN — ASPIRIN 81 MG: 81 TABLET, CHEWABLE ORAL at 10:05

## 2024-01-01 RX ADMIN — MENTHOL 4 G: 10 GEL TOPICAL at 15:15

## 2024-01-01 RX ADMIN — CARVEDILOL 6.25 MG: 3.12 TABLET, FILM COATED ORAL at 16:34

## 2024-01-01 RX ADMIN — CARVEDILOL 6.25 MG: 3.12 TABLET, FILM COATED ORAL at 09:17

## 2024-01-01 RX ADMIN — ACETAMINOPHEN 1000 MG: 500 TABLET ORAL at 09:40

## 2024-01-01 RX ADMIN — CARVEDILOL 12.5 MG: 12.5 TABLET, FILM COATED ORAL at 17:26

## 2024-01-01 RX ADMIN — OXYCODONE HYDROCHLORIDE 5 MG: 5 TABLET ORAL at 10:25

## 2024-01-01 RX ADMIN — TRAMADOL HYDROCHLORIDE 50 MG: 50 TABLET, COATED ORAL at 03:54

## 2024-01-01 RX ADMIN — HYDRALAZINE HYDROCHLORIDE 10 MG: 10 TABLET ORAL at 06:06

## 2024-01-01 RX ADMIN — CARVEDILOL 12.5 MG: 12.5 TABLET, FILM COATED ORAL at 09:11

## 2024-01-01 RX ADMIN — CARVEDILOL 6.25 MG: 3.12 TABLET, FILM COATED ORAL at 09:10

## 2024-01-01 RX ADMIN — ACETAMINOPHEN 650 MG: 325 TABLET, FILM COATED ORAL at 04:21

## 2024-01-01 RX ADMIN — MENTHOL 4 G: 10 GEL TOPICAL at 09:26

## 2024-01-01 RX ADMIN — POLYETHYLENE GLYCOL 3350 1 PACKET: 17 POWDER, FOR SOLUTION ORAL at 04:50

## 2024-01-01 RX ADMIN — CARVEDILOL 12.5 MG: 6.25 TABLET, FILM COATED ORAL at 18:37

## 2024-01-01 RX ADMIN — LEVOTHYROXINE SODIUM 25 MCG: 0.03 TABLET ORAL at 06:07

## 2024-01-01 RX ADMIN — SIMETHICONE 125 MG: 125 TABLET, CHEWABLE ORAL at 20:54

## 2024-01-01 RX ADMIN — SIMETHICONE 125 MG: 125 TABLET, CHEWABLE ORAL at 21:41

## 2024-01-01 RX ADMIN — SENNOSIDES AND DOCUSATE SODIUM 2 TABLET: 50; 8.6 TABLET ORAL at 21:41

## 2024-01-01 RX ADMIN — HYDRALAZINE HYDROCHLORIDE 25 MG: 25 TABLET, FILM COATED ORAL at 16:49

## 2024-01-01 RX ADMIN — SIMETHICONE 125 MG: 125 TABLET, CHEWABLE ORAL at 16:35

## 2024-01-01 RX ADMIN — ACETAMINOPHEN 650 MG: 325 TABLET ORAL at 00:01

## 2024-01-01 RX ADMIN — MENTHOL 4 G: 10 GEL TOPICAL at 19:48

## 2024-01-01 RX ADMIN — OXYCODONE 5 MG: 5 TABLET ORAL at 11:59

## 2024-01-01 RX ADMIN — ENOXAPARIN SODIUM 40 MG: 100 INJECTION SUBCUTANEOUS at 16:47

## 2024-01-01 RX ADMIN — TRAMADOL HYDROCHLORIDE 50 MG: 50 TABLET, COATED ORAL at 16:34

## 2024-01-01 RX ADMIN — MORPHINE SULFATE 2 MG: 4 INJECTION, SOLUTION INTRAMUSCULAR; INTRAVENOUS at 04:41

## 2024-01-01 RX ADMIN — TRAMADOL HYDROCHLORIDE 50 MG: 50 TABLET, COATED ORAL at 09:10

## 2024-01-01 RX ADMIN — SODIUM CHLORIDE: 9 INJECTION, SOLUTION INTRAVENOUS at 12:44

## 2024-01-01 RX ADMIN — HYDRALAZINE HYDROCHLORIDE 10 MG: 10 TABLET ORAL at 22:00

## 2024-01-01 RX ADMIN — HYDRALAZINE HYDROCHLORIDE 25 MG: 25 TABLET, FILM COATED ORAL at 05:19

## 2024-01-01 RX ADMIN — OMEPRAZOLE 20 MG: 20 CAPSULE, DELAYED RELEASE ORAL at 08:23

## 2024-01-01 RX ADMIN — HYDRALAZINE HYDROCHLORIDE 10 MG: 10 TABLET, FILM COATED ORAL at 06:10

## 2024-01-01 RX ADMIN — CYCLOBENZAPRINE 10 MG: 10 TABLET, FILM COATED ORAL at 09:12

## 2024-01-01 RX ADMIN — ACETAMINOPHEN 650 MG: 325 TABLET, FILM COATED ORAL at 11:37

## 2024-01-01 RX ADMIN — FUROSEMIDE 20 MG: 40 TABLET ORAL at 04:58

## 2024-01-01 RX ADMIN — OXYCODONE AND ACETAMINOPHEN 1 TABLET: 5; 325 TABLET ORAL at 15:38

## 2024-01-01 RX ADMIN — CARVEDILOL 3.12 MG: 6.25 TABLET, FILM COATED ORAL at 09:42

## 2024-01-01 RX ADMIN — SENNOSIDES AND DOCUSATE SODIUM 2 TABLET: 50; 8.6 TABLET ORAL at 08:06

## 2024-01-01 RX ADMIN — LEVOTHYROXINE SODIUM 25 MCG: 0.03 TABLET ORAL at 05:54

## 2024-01-01 RX ADMIN — ENOXAPARIN SODIUM 40 MG: 100 INJECTION SUBCUTANEOUS at 18:36

## 2024-01-01 RX ADMIN — MENTHOL 4 G: 10 GEL TOPICAL at 08:29

## 2024-01-01 RX ADMIN — CARVEDILOL 12.5 MG: 12.5 TABLET, FILM COATED ORAL at 17:45

## 2024-01-01 RX ADMIN — LEVOTHYROXINE SODIUM 25 MCG: 0.03 TABLET ORAL at 05:21

## 2024-01-01 RX ADMIN — TRAMADOL HYDROCHLORIDE 50 MG: 50 TABLET, COATED ORAL at 17:21

## 2024-01-01 RX ADMIN — TRAMADOL HYDROCHLORIDE 50 MG: 50 TABLET, COATED ORAL at 17:22

## 2024-01-01 RX ADMIN — OXYCODONE 5 MG: 5 TABLET ORAL at 14:00

## 2024-01-01 RX ADMIN — SIMETHICONE 125 MG: 125 TABLET, CHEWABLE ORAL at 09:17

## 2024-01-01 RX ADMIN — OXYCODONE HYDROCHLORIDE 10 MG: 10 TABLET ORAL at 13:20

## 2024-01-01 RX ADMIN — LIDOCAINE 2 PATCH: 4 PATCH TOPICAL at 08:00

## 2024-01-01 RX ADMIN — ASPIRIN 81 MG: 81 TABLET, CHEWABLE ORAL at 08:23

## 2024-01-01 SDOH — ECONOMIC STABILITY: TRANSPORTATION INSECURITY
IN THE PAST 12 MONTHS, HAS LACK OF RELIABLE TRANSPORTATION KEPT YOU FROM MEDICAL APPOINTMENTS, MEETINGS, WORK OR FROM GETTING THINGS NEEDED FOR DAILY LIVING?: NO

## 2024-01-01 SDOH — ECONOMIC STABILITY: GENERAL

## 2024-01-01 SDOH — ECONOMIC STABILITY: TRANSPORTATION INSECURITY
IN THE PAST 12 MONTHS, HAS THE LACK OF TRANSPORTATION KEPT YOU FROM MEDICAL APPOINTMENTS OR FROM GETTING MEDICATIONS?: NO

## 2024-01-01 SDOH — ECONOMIC STABILITY: HOUSING INSECURITY: HOME SAFETY: FAMILY HAS PLACED RAMPS SO PATIENT HAS EASIER ACCESS TO SUNKEN LIVING ROOM AND BEDROOM

## 2024-01-01 SDOH — ECONOMIC STABILITY: HOUSING INSECURITY: EVIDENCE OF SMOKING MATERIAL: 0

## 2024-01-01 SDOH — ECONOMIC STABILITY: HOUSING INSECURITY
HOME SAFETY: OXYGEN SAFETY RISK ASSESSMENT PERFORMED. PATIENT DOES HAVE A NO SMOKING SIGN POSTED IN THE HOME. PATIENT DOES NOT HAVE A WORKING FIRE EXTINGUISHER PRESENT IN THE HOME. SMOKE ALARMS ARE PRESENT AND FUNCTIONAL ON EACH LEVEL OF THE HOME. PATIENT DOES HA

## 2024-01-01 SDOH — ECONOMIC STABILITY: HOUSING INSECURITY
HOME SAFETY: VE A FIRE ESCAPE PLAN DEVELOPED. PATIENT DOES NOT HAVE FLAMMABLE MATERIALS PRESENT IN THE HOME PRESENTING A FIRE HAZARD. NO EVIDENCE FOUND OF SMOKING MATERIALS PRESENT IN THE HOME.

## 2024-01-01 SDOH — ECONOMIC STABILITY: HOUSING INSECURITY
HOME SAFETY: PT UTILIZES O2 CONCENTRATOR FOR PRN NOC WEAR. O2 CONCENTRATOR MOVED TO SAFE LOCATION TO DECREASE RISK OF FALLS FOR PT TO ACCESS.

## 2024-01-01 ASSESSMENT — PATIENT HEALTH QUESTIONNAIRE - PHQ9
2. FEELING DOWN, DEPRESSED, IRRITABLE, OR HOPELESS: NOT AT ALL
1. LITTLE INTEREST OR PLEASURE IN DOING THINGS: NOT AT ALL
2. FEELING DOWN, DEPRESSED, IRRITABLE, OR HOPELESS: NOT AT ALL
2. FEELING DOWN, DEPRESSED, IRRITABLE, OR HOPELESS: NOT AT ALL
1. LITTLE INTEREST OR PLEASURE IN DOING THINGS: NOT AT ALL
1. LITTLE INTEREST OR PLEASURE IN DOING THINGS: NOT AT ALL
2. FEELING DOWN, DEPRESSED, IRRITABLE, OR HOPELESS: NOT AT ALL
1. LITTLE INTEREST OR PLEASURE IN DOING THINGS: NOT AT ALL
SUM OF ALL RESPONSES TO PHQ9 QUESTIONS 1 AND 2: 0
1. LITTLE INTEREST OR PLEASURE IN DOING THINGS: NOT AT ALL
2. FEELING DOWN, DEPRESSED, IRRITABLE, OR HOPELESS: NOT AT ALL
SUM OF ALL RESPONSES TO PHQ9 QUESTIONS 1 AND 2: 0
CLINICAL INTERPRETATION OF PHQ2 SCORE: 0
2. FEELING DOWN, DEPRESSED, IRRITABLE, OR HOPELESS: NOT AT ALL
SUM OF ALL RESPONSES TO PHQ9 QUESTIONS 1 AND 2: 0
2. FEELING DOWN, DEPRESSED, IRRITABLE, OR HOPELESS: NOT AT ALL
SUM OF ALL RESPONSES TO PHQ9 QUESTIONS 1 AND 2: 0
SUM OF ALL RESPONSES TO PHQ9 QUESTIONS 1 AND 2: 0

## 2024-01-01 ASSESSMENT — LIFESTYLE VARIABLES
ALCOHOL_USE: NO
TOTAL SCORE: 0
SUBSTANCE_ABUSE: 0
HAVE PEOPLE ANNOYED YOU BY CRITICIZING YOUR DRINKING: NO
ALCOHOL_USE: NO
CONSUMPTION TOTAL: NEGATIVE
CONSUMPTION TOTAL: NEGATIVE
HAVE YOU EVER FELT YOU SHOULD CUT DOWN ON YOUR DRINKING: NO
EVER HAD A DRINK FIRST THING IN THE MORNING TO STEADY YOUR NERVES TO GET RID OF A HANGOVER: NO
AVERAGE NUMBER OF DAYS PER WEEK YOU HAVE A DRINK CONTAINING ALCOHOL: 0
SUBSTANCE_ABUSE: 0
HOW MANY TIMES IN THE PAST YEAR HAVE YOU HAD 5 OR MORE DRINKS IN A DAY: 0
SUBSTANCE_ABUSE: 0
HAVE YOU EVER FELT YOU SHOULD CUT DOWN ON YOUR DRINKING: NO
TOTAL SCORE: 0
ON A TYPICAL DAY WHEN YOU DRINK ALCOHOL HOW MANY DRINKS DO YOU HAVE: 0
EVER_SMOKED: NEVER
EVER_SMOKED: NEVER
TOTAL SCORE: 0
EVER FELT BAD OR GUILTY ABOUT YOUR DRINKING: NO
HAVE PEOPLE ANNOYED YOU BY CRITICIZING YOUR DRINKING: NO
EVER HAD A DRINK FIRST THING IN THE MORNING TO STEADY YOUR NERVES TO GET RID OF A HANGOVER: NO
EVER FELT BAD OR GUILTY ABOUT YOUR DRINKING: NO
TOTAL SCORE: 0
HOW MANY TIMES IN THE PAST YEAR HAVE YOU HAD 5 OR MORE DRINKS IN A DAY: 0
TOTAL SCORE: 0
TOTAL SCORE: 0
ON A TYPICAL DAY WHEN YOU DRINK ALCOHOL HOW MANY DRINKS DO YOU HAVE: 0
AVERAGE NUMBER OF DAYS PER WEEK YOU HAVE A DRINK CONTAINING ALCOHOL: 0

## 2024-01-01 ASSESSMENT — ENCOUNTER SYMPTOMS
VOMITING: NO
VOMITING: 0
DIZZINESS: 0
DECREASED ORAL INTAKE: 1
GASTROINTESTINAL NEGATIVE: 1
DIZZINESS: 0
SPEECH CHANGE: 0
FEVER: 0
MUSCLE WEAKNESS: 1
CONSTIPATION: 1
FEVER: 0
DIZZINESS: 0
DIZZINESS: 0
NERVOUS/ANXIOUS: 0
PAIN LOCATION - PAIN SEVERITY: 6/10
VOMITING: 0
BLURRED VISION: 0
RESPIRATORY NEGATIVE: 1
SHORTNESS OF BREATH: 0
CHILLS: 0
NAUSEA: 0
CONSTIPATION: 1
LAST BOWEL MOVEMENT: 66855
ABDOMINAL PAIN: 0
DOUBLE VISION: 0
CHILLS: 0
PAIN LOCATION - PAIN SEVERITY: 8/10
NAUSEA: NO
DIZZINESS: 0
SHORTNESS OF BREATH: 0
PAIN: 1
WEIGHT LOSS: 0
FEVER: 0
FALLS: 1
HALLUCINATIONS: 0
NAUSEA: 0
CONSTIPATION: 0
FATIGUE: 1
HEMOPTYSIS: 0
SNORING: 1
VOMITING: DENIES
PAIN SEVERITY GOAL: 0/10
PERSON REPORTING PAIN: PATIENT
ORTHOPNEA: 0
CONTUSION: 1
FATIGUES EASILY: 1
VOMITING: 0
COUGH: 0
PAIN LOCATION - PAIN QUALITY: ACHY
INCREASED FATIGUE: 1
PALPITATIONS: 0
PERSON REPORTING PAIN: PATIENT
INCREASED SLEEPING: 1
MYALGIAS: 1
MYALGIAS: 0
HEARTBURN: 0
SHORTNESS OF BREATH: 0
PERSON REPORTING PAIN: PATIENT
PAIN LOCATION - RELIEVING FACTORS: NO
SLEEP QUALITY: ADEQUATE
STOOL FREQUENCY: LESS THAN DAILY
SHORTNESS OF BREATH: 0
INCREASED SLEEPING: 1
DOUBLE VISION: 0
INCREASED FATIGUE: 1
CHILLS: 0
DOUBLE VISION: 0
DESCRIPTION OF MEMORY LOSS: SHORT TERM
WEIGHT LOSS: 1
CARDIOVASCULAR NEGATIVE: 1
COUGH: 0
BLURRED VISION: 0
COUGH: 0
PALPITATIONS: 0
BLURRED VISION: 0
BRUISES/BLEEDS EASILY: 0
DECREASED ORAL INTAKE: 1
CONSTIPATION: 1
DENIES PAIN: 1
LOWEST PAIN SEVERITY IN PAST 24 HOURS: 8/10
FALLS: 0
PAIN LOCATION - PAIN FREQUENCY: CONSTANT
CARDIOVASCULAR NEGATIVE: 1
DECREASED TO NO URINARY OUTPUT: 1
VOMITING: 0
PALPITATIONS: 0
DESCRIPTION OF MEMORY LOSS: SHORT TERM
PND: 0
PAIN LOCATION: BOTH SHOULDERS
DECREASED ORAL INTAKE: 1
LOWER EXTREMITY EDEMA: 1
WHEEZING: 0
NAUSEA: 0
NERVOUS/ANXIOUS: 0
NAUSEA: 0
CLAUDICATION: 0
VOMITING: 0
PAIN LOCATION - EXACERBATING FACTORS: NO
HIGHEST PAIN SEVERITY IN PAST 24 HOURS: 6/10
SENSORY CHANGE: 1
COUGH: 0
SHORTNESS OF BREATH: 1
FEVER: 0
PAIN LOCATION - PAIN DURATION: WITH ACTIVITY
PAIN SEVERITY GOAL: 0/10
PAIN LOCATION - RELIEVING FACTORS: REPOSITION
DEPRESSION: 0
NAUSEA: 0
NECK PAIN: 1
SUBJECTIVE PAIN PROGRESSION: UNCHANGED
POLYDIPSIA: 0
EYES NEGATIVE: 1
RESPIRATORY NEGATIVE: 1
BRUISES/BLEEDS EASILY: 0
HEARTBURN: 0
INCREASED SLEEPING: 1
SHORTNESS OF BREATH: 0
DECREASED BLOOD PRESSURE: 1
DEPRESSION: 0
PAIN: 1
INCREASED FATIGUE: 1
HIGHEST PAIN SEVERITY IN PAST 24 HOURS: 8/10
PSYCHIATRIC NEGATIVE: 1
FALLS: 1
EYES NEGATIVE: 1
EYES NEGATIVE: 1
WEAKNESS: 0
PAIN LOCATION - PAIN DURATION: ALL THE TIME
BRUISES/BLEEDS EASILY: 0
HEARTBURN: 0
BACK PAIN: 1
PALPITATIONS: 0
FEVER: 0
HIGHEST PAIN SEVERITY IN PAST 24 HOURS: 4/10
EYES NEGATIVE: 1
MUSCLE WEAKNESS: 1
LIMITED RANGE OF MOTION: 1
SEIZURES: 0
BOWEL PATTERN NORMAL: 1
FEVER: 0
EYES NEGATIVE: 1
COUGH: 0
NECK PAIN: 1
APPETITE LEVEL: POOR
FLANK PAIN: 0
SHORTNESS OF BREATH: 1
BLOOD IN STOOL: 0
LAST BOWEL MOVEMENT: 66858
CONSTIPATION: 1
DEBILITATING PAIN: 1
TREMORS: 0
GASTROINTESTINAL NEGATIVE: 1
EYES NEGATIVE: 1
VOMITING: DENIES
MYALGIAS: 0
SUBJECTIVE PAIN PROGRESSION: UNCHANGED
EYE DISCHARGE: 0
DECREASED TO NO URINARY OUTPUT: 1
DIAPHORESIS: 0
VOMITING: 0
VOMITING: NO
PALPITATIONS: 0
EYES NEGATIVE: 1
DEBILITATING PAIN: 1
PAIN LOCATION - PAIN FREQUENCY: WITH ACTIVITY
NAUSEA: DENIES
NECK PAIN: 1
FEVER: 0
PAIN: 1
PAIN LOCATION - PAIN FREQUENCY: WITH ACTIVITY
FEVER: 0
INSOMNIA: 0
SHORTNESS OF BREATH: 0
PAIN LOCATION - PAIN SEVERITY: 0/10
ABDOMINAL PAIN: 0
DEPRESSED MOOD: 1
BRUISES/BLEEDS EASILY: 0
LOWEST PAIN SEVERITY IN PAST 24 HOURS: 0/10
INCREASED FATIGUE: 1
BLURRED VISION: 0
VOMITING: 0
MUSCLE WEAKNESS: 1
ABDOMINAL PAIN: 0
HEADACHES: 0
NECK PAIN: 1
HEADACHES: 0
SHORTNESS OF BREATH: 0
POLYDIPSIA: 0
PERSON REPORTING PAIN: CAREGIVER
CHILLS: 0
FEVER: 0
DECREASED TO NO URINARY OUTPUT: 1
VOMITING: 0
FOCAL WEAKNESS: 0
FEVER: 0
CHILLS: 0
SPUTUM PRODUCTION: 0
CHILLS: 0
TINGLING: 1
CONSTIPATION: 1
DECREASED TO NO URINARY OUTPUT: 1
EYE REDNESS: 0
PALPITATIONS: 0
LIMITED RANGE OF MOTION: 1
WEAKNESS: 1
CHILLS: 0
POLYDIPSIA: 0
ABDOMINAL PAIN: 0
COUGH: 0
FEVER: 0
HIGHEST PAIN SEVERITY IN PAST 24 HOURS: 8/10
PSYCHIATRIC NEGATIVE: 1
POLYDIPSIA: 0
BRUISES/BLEEDS EASILY: 0
CHANGE IN APPETITE: DECREASED
NAUSEA: 0
MOTTLING: 1
COUGH: 0
PAIN: 1
BACK PAIN: 0
DECREASED ORAL INTAKE: 1
ABDOMINAL PAIN: 0
NAUSEA: DENIES
FATIGUE: 1
COUGH: 0
HEADACHES: 0
LOWEST PAIN SEVERITY IN PAST 24 HOURS: 4/10
HEADACHES: 0
BLURRED VISION: 0
DIARRHEA: 0
HEADACHES: 1
TREMORS: 1
POLYDIPSIA: 0
WHEEZING: 0
COUGH: 0
NERVOUS/ANXIOUS: 0
PAIN SEVERITY GOAL: 0/10
FOCAL WEAKNESS: 0
DOUBLE VISION: 0
PALPITATIONS: 0
NECK PAIN: 1
MENTAL STATUS CHANGE: 0
HIGHEST PAIN SEVERITY IN PAST 24 HOURS: 7/10
ABDOMINAL PAIN: 0
CONSTIPATION: 1
PAIN SEVERITY GOAL: 0/10
PALPITATIONS: 0
BACK PAIN: 0
INCREASED FATIGUE: 1
FEVER: 0
NAUSEA: NO
LAST BOWEL MOVEMENT: 66862
SEIZURES: 0
PAIN LOCATION: NECK
INCREASED SLEEPING: 1
SHORTNESS OF BREATH: 0
DENIES PAIN: 1
ABDOMINAL PAIN: 0
LOSS OF CONSCIOUSNESS: 0
DYSPNEA ACTIVITY LEVEL: AT REST
NAUSEA: 0
FALLS: 0
SINUS PAIN: 0
VOMITING: 0
ABDOMINAL PAIN: 0
NECK PAIN: 1
DECREASED ORAL INTAKE: 1
LOWEST PAIN SEVERITY IN PAST 24 HOURS: 0/10
BRUISES/BLEEDS EASILY: 0
SHORTNESS OF BREATH: 0
PAIN LOCATION: NECK
APNEIC BREATHING: 1
CONSTIPATION: 1
VOMITING: 0
INCREASED SLEEPING: 1
PALPITATIONS: 0
SUBJECTIVE PAIN PROGRESSION: UNCHANGED
ABDOMINAL PAIN: 0
PERSON REPORTING PAIN: FAMILY
POLYDIPSIA: 0
STRIDOR: 0
PAIN LOCATION - PAIN QUALITY: NUMBNESS
DIZZINESS: 0
DEBILITATING PAIN: 1
APNEIC BREATHING: 1
DIAPHORESIS: 0
HEARTBURN: 0
DIZZINESS: 1
PERSON REPORTING PAIN: PATIENT
DEBILITATING PAIN: 1
SHORTNESS OF BREATH: 0
NAUSEA: 0
MUSCLE WEAKNESS: 1
PERSON REPORTING PAIN: PATIENT
LAST BOWEL MOVEMENT: 66852
DIZZINESS: 0
PHOTOPHOBIA: 0
NERVOUS/ANXIOUS: 0
PAIN: 1
PND: 0
COUGH: 0
SUBJECTIVE PAIN PROGRESSION: UNCHANGED
BRUISES/BLEEDS EASILY: 0
SENSORY CHANGE: 1
CHILLS: 0
TINGLING: 0
DECREASED ORAL INTAKE: 1
SHORTNESS OF BREATH: 1
LIMITED RANGE OF MOTION: 1
PAIN: 1
CHILLS: 0
DIARRHEA: 0
PAIN SEVERITY GOAL: 5/10
SHORTNESS OF BREATH: 0

## 2024-01-01 ASSESSMENT — COGNITIVE AND FUNCTIONAL STATUS - GENERAL
MOVING TO AND FROM BED TO CHAIR: UNABLE
TURNING FROM BACK TO SIDE WHILE IN FLAT BAD: UNABLE
TOILETING: TOTAL
HELP NEEDED FOR BATHING: A LOT
MOVING FROM LYING ON BACK TO SITTING ON SIDE OF FLAT BED: UNABLE
EATING MEALS: A LITTLE
SUGGESTED CMS G CODE MODIFIER MOBILITY: CN
DRESSING REGULAR LOWER BODY CLOTHING: TOTAL
PERSONAL GROOMING: A LOT
PERSONAL GROOMING: A LITTLE
MOBILITY SCORE: 11
MOBILITY SCORE: 6
DRESSING REGULAR UPPER BODY CLOTHING: TOTAL
SUGGESTED CMS G CODE MODIFIER DAILY ACTIVITY: CL
EATING MEALS: A LITTLE
TOILETING: A LOT
WALKING IN HOSPITAL ROOM: TOTAL
DRESSING REGULAR LOWER BODY CLOTHING: A LOT
MOVING FROM LYING ON BACK TO SITTING ON SIDE OF FLAT BED: UNABLE
CLIMB 3 TO 5 STEPS WITH RAILING: TOTAL
STANDING UP FROM CHAIR USING ARMS: A LOT
DRESSING REGULAR UPPER BODY CLOTHING: A LOT
WALKING IN HOSPITAL ROOM: A LOT
TOILETING: A LOT
MOBILITY SCORE: 9
CLIMB 3 TO 5 STEPS WITH RAILING: A LOT
HELP NEEDED FOR BATHING: TOTAL
SUGGESTED CMS G CODE MODIFIER MOBILITY: CL
TURNING FROM BACK TO SIDE WHILE IN FLAT BAD: A LOT
DAILY ACTIVITIY SCORE: 13
TURNING FROM BACK TO SIDE WHILE IN FLAT BAD: UNABLE
MOVING TO AND FROM BED TO CHAIR: A LOT
DAILY ACTIVITIY SCORE: 9
SUGGESTED CMS G CODE MODIFIER DAILY ACTIVITY: CL
MOVING FROM LYING ON BACK TO SITTING ON SIDE OF FLAT BED: A LOT
CLIMB 3 TO 5 STEPS WITH RAILING: TOTAL
STANDING UP FROM CHAIR USING ARMS: TOTAL
SUGGESTED CMS G CODE MODIFIER DAILY ACTIVITY: CL
STANDING UP FROM CHAIR USING ARMS: A LOT
WALKING IN HOSPITAL ROOM: A LOT
HELP NEEDED FOR BATHING: A LOT
PERSONAL GROOMING: A LOT
MOVING TO AND FROM BED TO CHAIR: UNABLE
DRESSING REGULAR UPPER BODY CLOTHING: A LOT
EATING MEALS: A LOT
DRESSING REGULAR LOWER BODY CLOTHING: A LOT
DAILY ACTIVITIY SCORE: 13
SUGGESTED CMS G CODE MODIFIER MOBILITY: CM

## 2024-01-01 ASSESSMENT — GAIT ASSESSMENTS
DISTANCE (FEET): 110
GAIT LEVEL OF ASSIST: CONTACT GUARD ASSIST
ASSISTIVE DEVICE: FRONT WHEEL WALKER
GAIT LEVEL OF ASSIST: STANDBY ASSIST
DISTANCE (FEET): 30
ASSISTIVE DEVICE: FRONT WHEEL WALKER
DISTANCE (FEET): 2
DISTANCE (FEET): 20
ASSISTIVE DEVICE: FRONT WHEEL WALKER
GAIT LEVEL OF ASSIST: STANDBY ASSIST
ASSISTIVE DEVICE: FRONT WHEEL WALKER
GAIT LEVEL OF ASSIST: STANDBY ASSIST
ASSISTIVE DEVICE: FRONT WHEEL WALKER
GAIT LEVEL OF ASSIST: STANDBY ASSIST
GAIT LEVEL OF ASSIST: MODERATE ASSIST
ASSISTIVE DEVICE: FRONT WHEEL WALKER
ASSISTIVE DEVICE: FRONT WHEEL WALKER
GAIT LEVEL OF ASSIST: STANDBY ASSIST
ASSISTIVE DEVICE: FRONT WHEEL WALKER
DISTANCE (FEET): 100
ASSISTIVE DEVICE: FRONT WHEEL WALKER
ASSISTIVE DEVICE: FRONT WHEEL WALKER
DEVIATION: ANTALGIC;STEP TO;DECREASED BASE OF SUPPORT
ASSISTIVE DEVICE: FRONT WHEEL WALKER
DISTANCE (FEET): 80
ASSISTIVE DEVICE: FRONT WHEEL WALKER
GAIT LEVEL OF ASSIST: UNABLE TO PARTICIPATE
GAIT LEVEL OF ASSIST: CONTACT GUARD ASSIST
DEVIATION: ANTALGIC;STEP TO;DECREASED BASE OF SUPPORT
ASSISTIVE DEVICE: FRONT WHEEL WALKER
ASSISTIVE DEVICE: FRONT WHEEL WALKER
GAIT LEVEL OF ASSIST: CONTACT GUARD ASSIST
GAIT LEVEL OF ASSIST: CONTACT GUARD ASSIST
ASSISTIVE DEVICE: FRONT WHEEL WALKER
GAIT LEVEL OF ASSIST: MINIMAL ASSIST
GAIT LEVEL OF ASSIST: STANDBY ASSIST
DISTANCE (FEET): 75
DISTANCE (FEET): 154
GAIT LEVEL OF ASSIST: CONTACT GUARD ASSIST
DISTANCE (FEET): 30
GAIT LEVEL OF ASSIST: CONTACT GUARD ASSIST
DISTANCE (FEET): 15
ASSISTIVE DEVICE: OTHER (COMMENTS)
DISTANCE (FEET): 30

## 2024-01-01 ASSESSMENT — BRIEF INTERVIEW FOR MENTAL STATUS (BIMS)
ASKED TO RECALL BLUE: YES, NO CUE REQUIRED
WHAT MONTH IS IT: MISSED BY MORE THAN 1 MONTH
WHAT DAY OF THE WEEK IS IT: CORRECT
INITIAL REPETITION OF BED BLUE SOCK - FIRST ATTEMPT: 3
ASKED TO RECALL BED: YES, NO CUE REQUIRED
WHAT YEAR IS IT: CORRECT
BIMS SUMMARY SCORE: 9
BIMS SUMMARY SCORE: 15
INITIAL REPETITION OF BED BLUE SOCK - FIRST ATTEMPT: 3
WHAT YEAR IS IT: CORRECT
WHAT DAY OF THE WEEK IS IT: CORRECT
WHAT MONTH IS IT: ACCURATE WITHIN 5 DAYS
ASKED TO RECALL BED: NO, COULD NOT RECALL
ASKED TO RECALL SOCK: NO, COULD NOT RECALL
ASKED TO RECALL SOCK: YES, NO CUE REQUIRED
ASKED TO RECALL BLUE: YES, NO CUE REQUIRED

## 2024-01-01 ASSESSMENT — PAIN DESCRIPTION - PAIN TYPE
TYPE: ACUTE PAIN

## 2024-01-01 ASSESSMENT — ACTIVITIES OF DAILY LIVING (ADL)
TOILET_TRANSFER_DESCRIPTION: ADAPTIVE EQUIPMENT;GRAB BAR;INCREASED TIME;SUPERVISION FOR SAFETY
BATHING_REQUIRES_ASSISTANCE: 1
CURRENT_FUNCTION: ONE PERSON
BED_CHAIR_WHEELCHAIR_TRANSFER_DESCRIPTION: ADAPTIVE EQUIPMENT;INCREASED TIME;INITIAL PREPARATION FOR TASK;SET-UP OF EQUIPMENT;SUPERVISION FOR SAFETY
TUB_SHOWER_TRANSFER_DESCRIPTION: GRAB BAR;SHOWER BENCH;INCREASED TIME;SUPERVISION FOR SAFETY;SET-UP OF EQUIPMENT
TRANSPORTATION: DEPENDENT
CONTINENCE_REQUIRES_ASSISTANCE: 1
CONTINENCE_REQUIRES_ASSISTANCE: 1
BED_CHAIR_WHEELCHAIR_TRANSFER_DESCRIPTION: ADAPTIVE EQUIPMENT;INITIAL PREPARATION FOR TASK;INCREASED TIME;SUPERVISION FOR SAFETY
AMBULATION ASSISTANCE: CONTACT GUARD ASSIST
AMBULATION ASSISTANCE: NON-AMBULATORY
AMBULATION ASSISTANCE: ONE PERSON
BED_CHAIR_WHEELCHAIR_TRANSFER_DESCRIPTION: ADAPTIVE EQUIPMENT;INCREASED TIME;INITIAL PREPARATION FOR TASK;SUPERVISION FOR SAFETY
DRESSING_LB_CURRENT_FUNCTION: MAXIMUM ASSIST
BED_CHAIR_WHEELCHAIR_TRANSFER_DESCRIPTION: ADAPTIVE EQUIPMENT;INCREASED TIME;INITIAL PREPARATION FOR TASK;SUPERVISION FOR SAFETY
HOUSEKEEPING ASSESSED: 1
CURRENT_FUNCTION: MAXIMUM ASSIST
BED_CHAIR_WHEELCHAIR_TRANSFER_DESCRIPTION: ADAPTIVE EQUIPMENT;SET-UP OF EQUIPMENT;SUPERVISION FOR SAFETY;VERBAL CUEING
BED_CHAIR_WHEELCHAIR_TRANSFER_DESCRIPTION: ADAPTIVE EQUIPMENT;INITIAL PREPARATION FOR TASK;INCREASED TIME;SUPERVISION FOR SAFETY
ORAL_CARE_ASSESSED: 1
BED_CHAIR_WHEELCHAIR_TRANSFER_DESCRIPTION: SET-UP OF EQUIPMENT;SUPERVISION FOR SAFETY;VERBAL CUEING
AMBULATION ASSISTANCE ON FLAT SURFACES: 1
TOILETING: CONTACT GUARD ASSIST
TOILET_TRANSFER_DESCRIPTION: GRAB BAR;INCREASED TIME;VERBAL CUEING
CURRENT_FUNCTION: ONE PERSON
BED_CHAIR_WHEELCHAIR_TRANSFER_DESCRIPTION: ADAPTIVE EQUIPMENT;INCREASED TIME;INITIAL PREPARATION FOR TASK;SUPERVISION FOR SAFETY
PREPARING MEALS: DEPENDENT
DRESSING_REQUIRES_ASSISTANCE: 1
FEEDING ASSESSED: 1
TOILETING: 1
BATHING ASSESSED: 1
OASIS_M1830: 03
TOILET_TRANSFER_DESCRIPTION: ADAPTIVE EQUIPMENT;GRAB BAR;INCREASED TIME;INITIAL PREPARATION FOR TASK;SUPERVISION FOR SAFETY;VERBAL CUEING
DRESSING_REQUIRES_ASSISTANCE: 1
PHYSICAL_TRANSFER_REQUIRES_ASSISTANCE: 1
AMBULATION ASSISTANCE: 1
TOILETING_LEVEL_OF_ASSIST_DESCRIPTION: ASSIST TO PULL PANTS UP;GRAB BAR;INCREASED TIME;SUPERVISION FOR SAFETY
TOILET_TRANSFER_DESCRIPTION: GRAB BAR;ADAPTIVE EQUIPMENT;INCREASED TIME;SUPERVISION FOR SAFETY
TOILET_TRANSFER_DESCRIPTION: GRAB BAR;INCREASED TIME
BED_CHAIR_WHEELCHAIR_TRANSFER_DESCRIPTION: ADAPTIVE EQUIPMENT;INCREASED TIME;SET-UP OF EQUIPMENT;SUPERVISION FOR SAFETY
BED_CHAIR_WHEELCHAIR_TRANSFER_DESCRIPTION: ADAPTIVE EQUIPMENT;INCREASED TIME;INITIAL PREPARATION FOR TASK;SUPERVISION FOR SAFETY
TELEPHONE USE ASSESSED: 1
DRESSING_UB_CURRENT_FUNCTION: MODERATE ASSIST
TUB_SHOWER_TRANSFER_DESCRIPTION: GRAB BAR;SHOWER BENCH;INCREASED TIME;SUPERVISION FOR SAFETY;SET-UP OF EQUIPMENT
TOILET_TRANSFER_DESCRIPTION: GRAB BAR;VERBAL CUEING;SUPERVISION FOR SAFETY
TUB_SHOWER_TRANSFER_DESCRIPTION: GRAB BAR;SHOWER BENCH;INCREASED TIME;SUPERVISION FOR SAFETY;SET-UP OF EQUIPMENT
CURRENT_FUNCTION: ONE PERSON
BATHING_CURRENT_FUNCTION: MAXIMUM ASSIST
DRESSING_LB_CURRENT_FUNCTION: MAXIMUM ASSIST
SHOPPING ASSESSED: 1
GROOMING_CURRENT_FUNCTION: MODERATE ASSIST
BED_CHAIR_WHEELCHAIR_TRANSFER_DESCRIPTION: ADAPTIVE EQUIPMENT;INCREASED TIME;SET-UP OF EQUIPMENT;SUPERVISION FOR SAFETY;VERBAL CUEING
PREPARING MEALS: DEPENDENT
TOILETING: INDEPENDENT
AMBULATION ASSISTANCE: ONE PERSON
BED_CHAIR_WHEELCHAIR_TRANSFER_DESCRIPTION: INCREASED TIME;SET-UP OF EQUIPMENT;SUPERVISION FOR SAFETY;VERBAL CUEING
OASIS_M1830: 03
PHYSICAL TRANSFERS ASSESSED: 1
BED_CHAIR_WHEELCHAIR_TRANSFER_DESCRIPTION: ADAPTIVE EQUIPMENT;INCREASED TIME;INITIAL PREPARATION FOR TASK;SUPERVISION FOR SAFETY;VERBAL CUEING
TOILET_TRANSFER_DESCRIPTION: GRAB BAR;INCREASED TIME;SET-UP OF EQUIPMENT;SUPERVISION FOR SAFETY;VERBAL CUEING
LIGHT HOUSEKEEPING: DEPENDENT
FEEDING ASSESSED: 1
PHYSICAL_TRANSFER_REQUIRES_ASSISTANCE: 1
ORAL_CARE_CURRENT_FUNCTION: NEEDS ASSISTANCE
LIGHT HOUSEKEEPING: DEPENDENT
AMBULATION_REQUIRES_ASSISTANCE: 1
BED_CHAIR_WHEELCHAIR_TRANSFER_DESCRIPTION: ADAPTIVE EQUIPMENT;INCREASED TIME;INITIAL PREPARATION FOR TASK;SUPERVISION FOR SAFETY
SHOPPING: DEPENDENT
TRANSPORTATION: DEPENDENT
BED_CHAIR_WHEELCHAIR_TRANSFER_DESCRIPTION: ADAPTIVE EQUIPMENT
TOILET_TRANSFER_DESCRIPTION: GRAB BAR;ADAPTIVE EQUIPMENT;INCREASED TIME;SUPERVISION FOR SAFETY;VERBAL CUEING
TOILETING_LEVEL_OF_ASSIST_DESCRIPTION: GRAB BAR;INCREASED TIME;SUPERVISION FOR SAFETY
BED_CHAIR_WHEELCHAIR_TRANSFER_DESCRIPTION: ADAPTIVE EQUIPMENT;SET-UP OF EQUIPMENT;SUPERVISION FOR SAFETY;VERBAL CUEING;INCREASED TIME;INITIAL PREPARATION FOR TASK
TUB_SHOWER_TRANSFER_DESCRIPTION: GRAB BAR;SHOWER BENCH;INCREASED TIME;SUPERVISION FOR SAFETY;SET-UP OF EQUIPMENT
BED_CHAIR_WHEELCHAIR_TRANSFER_DESCRIPTION: ADAPTIVE EQUIPMENT;INITIAL PREPARATION FOR TASK
GROOMING ASSESSED: 1
TOILETING: REQUIRES ASSIST
GROOMING ASSESSED: 1
TOILET_TRANSFER_DESCRIPTION: GRAB BAR;INCREASED TIME;SET-UP OF EQUIPMENT;SUPERVISION FOR SAFETY;VERBAL CUEING
GROOMING_CURRENT_FUNCTION: MAXIMUM ASSIST
BED_CHAIR_WHEELCHAIR_TRANSFER_DESCRIPTION: ADAPTIVE EQUIPMENT;INCREASED TIME;INITIAL PREPARATION FOR TASK;SUPERVISION FOR SAFETY
TOILET_TRANSFER_DESCRIPTION: ADAPTIVE EQUIPMENT;GRAB BAR;INCREASED TIME;SET-UP OF EQUIPMENT;SUPERVISION FOR SAFETY
HOME_HEALTH_OASIS: 01
USING THE TELPHONE: DEPENDENT
TOILET_TRANSFER_DESCRIPTION: ADAPTIVE EQUIPMENT;GRAB BAR;INCREASED TIME;SET-UP OF EQUIPMENT;SUPERVISION FOR SAFETY
BED_CHAIR_WHEELCHAIR_TRANSFER_DESCRIPTION: ADAPTIVE EQUIPMENT;INCREASED TIME;INITIAL PREPARATION FOR TASK;SUPERVISION FOR SAFETY;VERBAL CUEING
OASIS_M1830: 03
TOILETING: 1
MONEY MANAGEMENT (EXPENSES/BILLS): NEEDS ASSISTANCE
TOILETING_LEVEL_OF_ASSIST_DESCRIPTION: ADAPTIVE EQUIPMENT
BATHING ASSESSED: 1
DRESSING_UB_CURRENT_FUNCTION: MAXIMUM ASSIST
AMBULATION ASSISTANCE: ONE PERSON
TOILET_TRANSFER_DESCRIPTION: ADAPTIVE EQUIPMENT;GRAB BAR;INCREASED TIME;SUPERVISION FOR SAFETY
TOILETING: INDEPENDENT
TRANSPORTATION ASSESSED: 1
HOUSEKEEPING ASSESSED: 1
BATHING_CURRENT_FUNCTION: MINIMUM ASSIST
BATHING_REQUIRES_ASSISTANCE: 1
TRANSPORTATION ASSESSED: 1
HOME_HEALTH_OASIS: 01
TRANSPORTATION COMMENTS: PT REQUIRES ASSIST AND ASSISTIVE DEVICE TO LEAVE HOME; FALL RISK
TOILETING: STAND BY ASSIST
FEEDING: INDEPENDENT
FEEDING: INDEPENDENT

## 2024-01-01 ASSESSMENT — PAIN SCALES - PAIN ASSESSMENT IN ADVANCED DEMENTIA (PAINAD)
FACIALEXPRESSION: 0 - SMILING OR INEXPRESSIVE.
BODYLANGUAGE: 0 - RELAXED.
FACIALEXPRESSION: 1 - SAD. FRIGHTENED. FROWN.
NEGVOCALIZATION: 1 - OCCASIONAL MOAN OR GROAN. LOW-LEVEL SPEECH WITH A NEGATIVE OR DISAPPROVING QUALITY.
FACIALEXPRESSION: SMILING OR INEXPRESSIVE
CONSOLABILITY: 0 - NO NEED TO CONSOLE.
NEGVOCALIZATION: 0 - NONE.
CONSOLABILITY: 2 - UNABLE TO CONSOLE, DISTRACT OR REASSURE.
BODYLANGUAGE: 1 - TENSE. DISTRESSED PACING. FIDGETING.
BODYLANGUAGE: RELAXED
FACIALEXPRESSION: 0 - SMILING OR INEXPRESSIVE.
CONSOLABILITY: 0 - NO NEED TO CONSOLE.
TOTALSCORE: 2
NEGVOCALIZATION: 0 - NONE.
NEGVOCALIZATION: 1 - OCCASIONAL MOAN OR GROAN. LOW-LEVEL SPEECH WITH A NEGATIVE OR DISAPPROVING QUALITY.
CONSOLABILITY: 1 - DISTRACTED OR REASSURED BY VOICE OR TOUCH.
FACIALEXPRESSION: 1 - SAD. FRIGHTENED. FROWN.
BODYLANGUAGE: 0 - RELAXED.
FACIALEXPRESSION: 0 - SMILING OR INEXPRESSIVE.
CONSOLABILITY: 0 - NO NEED TO CONSOLE.
TOTALSCORE: 0
BODYLANGUAGE: 0 - RELAXED.
BREATHING: NORMAL
BODYLANGUAGE: 0 - RELAXED.
TOTALSCORE: 5
TOTALSCORE: 1
FACIALEXPRESSION: 0 - SMILING OR INEXPRESSIVE.
NEGVOCALIZATION: 0 - NONE.
NEGVOCALIZATION: 0 - NONE.
CONSOLABILITY: 0 - NO NEED TO CONSOLE.
TOTALSCORE: 0
BODYLANGUAGE: 1 - TENSE. DISTRESSED PACING. FIDGETING.
FACIALEXPRESSION: 0 - SMILING OR INEXPRESSIVE.
NEGVOCALIZATION: 1 - OCCASIONAL MOAN OR GROAN. LOW-LEVEL SPEECH WITH A NEGATIVE OR DISAPPROVING QUALITY.
CONSOLABILITY: 0 - NO NEED TO CONSOLE.
BODYLANGUAGE: 0 - RELAXED.
NEGVOCALIZATION: 0 - NONE.
TOTALSCORE: 0
TOTALSCORE: 6
CONSOLABILITY: NO NEED TO CONSOLE
TOTALSCORE: 0
BODYLANGUAGE: 0 - RELAXED.
TOTALSCORE: 0
FACIALEXPRESSION: 0 - SMILING OR INEXPRESSIVE.
CONSOLABILITY: 0 - NO NEED TO CONSOLE.

## 2024-01-01 ASSESSMENT — VISUAL ACUITY: OU: 1

## 2024-01-01 ASSESSMENT — FIBROSIS 4 INDEX
FIB4 SCORE: 2.32
FIB4 SCORE: 2.1
FIB4 SCORE: 2.98
FIB4 SCORE: 2.92
FIB4 SCORE: 2.32
FIB4 SCORE: 2.32
FIB4 SCORE: 2.9
FIB4 SCORE: 3.15
FIB4 SCORE: 2.32

## 2024-01-01 ASSESSMENT — COPD QUESTIONNAIRES
DO YOU EVER COUGH UP ANY MUCUS OR PHLEGM?: NO/ONLY WITH OCCASIONAL COLDS OR INFECTIONS
HAVE YOU SMOKED AT LEAST 100 CIGARETTES IN YOUR ENTIRE LIFE: NO/DON'T KNOW
DURING THE PAST 4 WEEKS HOW MUCH DID YOU FEEL SHORT OF BREATH: NONE/LITTLE OF THE TIME
COPD SCREENING SCORE: 2

## 2024-01-01 ASSESSMENT — SOCIAL DETERMINANTS OF HEALTH (SDOH): ACTIVE STRESSOR - HEALTH CHANGES: 1

## 2024-01-01 ASSESSMENT — PAIN SCALES - WONG BAKER: WONGBAKER_NUMERICALRESPONSE: DOESN'T HURT AT ALL

## 2024-01-01 NOTE — ED NOTES
Bedside report received from off going RN/tech: Caden, assumed care of patient.  POC discussed with patient. Call light within reach, all needs addressed at this time.       Fall risk interventions in place: Patient's personal possessions are with in their safe reach, Place socks on patient, Place fall risk sign on patient's door, Keep floor surfaces clean and dry, and Accompanied to restroom (all applicable per [place] Fall risk assessment)   Continuous monitoring: Cardiac Leads, Pulse Ox, or Blood Pressure  IVF/IV medications: Not Applicable   Oxygen: How many liters 2L  Bedside sitter: Not Applicable   Isolation: Not Applicable

## 2024-01-01 NOTE — PROGRESS NOTES
[facility] Medicine Daily Progress Note    Date of Service  1/1/2024    Chief Complaint  Regine Bryant is a 89 y.o. female admitted 12/31/2023 with neck pain, facial numbness.     Hospital Course  Regine Bryant is a 89 y.o. female with a past medical history of chronic heart failure, hypertension and hyperlipidemia who presented 12/31/2023 with sudden onset severe left-sided facial numbness tingling in addition to severe left-sided pain.  The pain extends behind the left ear and to the left occipital region.  She denies having focal weakness or other sensory changes.  She denies having fevers or chills.       Interval Problem Update  1/1 patient is new to me, patient is in bed, daughter at bedside, patient is alert and oriented, follows commands, c/o neck pain, no sob, stridor, chronic b/l shoulder pain and upper extremities weakness no abdominal pain. Mri head and c spine reviewed, patient and daughter would like conservative management, she stated that she has been told that she would no tolerate surgery.     I have discussed this patient's plan of care and discharge plan at IDT rounds today with Case Management, Nursing, Nursing leadership, and other members of the IDT team.    Consultants/Specialty  na    Code Status  DNAR/DNI    Disposition  The patient is not medically cleared for discharge to home or a post-acute facility.      I have placed the appropriate orders for post-discharge needs.    Review of Systems  Review of Systems   Constitutional:  Negative for chills and fever.   Eyes:  Negative for blurred vision and double vision.   Respiratory:  Negative for cough, hemoptysis and wheezing.    Cardiovascular:  Negative for chest pain, palpitations, claudication, leg swelling and PND.   Gastrointestinal:  Negative for heartburn, nausea and vomiting.   Genitourinary:  Negative for hematuria and urgency.   Musculoskeletal:  Positive for neck pain. Negative for back pain and myalgias.   Skin:   Negative for rash.   Neurological:  Negative for dizziness and headaches.   Endo/Heme/Allergies:  Does not bruise/bleed easily.   Psychiatric/Behavioral:  Negative for depression.         Physical Exam  Temp:  [36.5 °C (97.7 °F)-37.1 °C (98.8 °F)] 36.5 °C (97.7 °F)  Pulse:  [74-99] 74  Resp:  [18-41] 19  BP: (143-194)/() 143/58  SpO2:  [91 %-97 %] 95 %    Physical Exam  Vitals and nursing note reviewed.   Constitutional:       Appearance: Normal appearance. She is ill-appearing (pain).   HENT:      Mouth/Throat:      Pharynx: Oropharynx is clear. No oropharyngeal exudate or posterior oropharyngeal erythema.   Eyes:      General: No scleral icterus.        Right eye: No discharge.         Left eye: No discharge.      Conjunctiva/sclera: Conjunctivae normal.   Cardiovascular:      Rate and Rhythm: Normal rate and regular rhythm.      Pulses: Normal pulses.      Heart sounds: Normal heart sounds.   Pulmonary:      Effort: Pulmonary effort is normal. No respiratory distress.      Breath sounds: Normal breath sounds.   Abdominal:      General: Bowel sounds are normal. There is no distension.      Tenderness: There is no abdominal tenderness. There is no guarding.   Musculoskeletal:         General: Normal range of motion.      Cervical back: Normal range of motion and neck supple.      Right lower leg: No edema.      Left lower leg: No edema.   Skin:     General: Skin is warm.      Capillary Refill: Capillary refill takes less than 2 seconds.   Neurological:      Mental Status: She is alert and oriented to person, place, and time.      Comments: B/l shoulder stiffness.    Psychiatric:         Mood and Affect: Mood normal.         Behavior: Behavior normal.         Fluids  No intake or output data in the 24 hours ending 01/01/24 1450    Laboratory  Recent Labs     12/31/23  1150 01/01/24  0056   WBC 11.2* 9.3   RBC 3.78* 3.60*   HEMOGLOBIN 12.0 11.1*   HEMATOCRIT 35.5* 34.4*   MCV 93.9 95.6   MCH 31.7 30.8   MCHC  33.8 32.3   RDW 48.4 49.6   PLATELETCT 170 164   MPV 10.3 10.3     Recent Labs     12/31/23  1150 01/01/24  0056   SODIUM 138 136   POTASSIUM 4.6 4.3   CHLORIDE 103 101   CO2 21 23   GLUCOSE 120* 158*   BUN 27* 19   CREATININE 0.92 0.98   CALCIUM 10.3 10.0             Recent Labs     01/01/24  0056   TRIGLYCERIDE 140   HDL 39*   *       Imaging  MR-CERVICAL SPINE-W/O   Final Result      1.  Mild to moderate diffuse cervical kyphosis.      2.  Severe disc space narrowing throughout the cervical spine with mild to moderate marginal osteophytosis.      3.  Mild degenerative anterolisthesis at the C3-4 level with mild cervical spondylotic change and the posterior superior aspect of the C4 vertebral body indenting the ventral aspect of the cord and causing moderate central canal stenosis.      4.  Mild multilevel cervical spondylotic change.      5.  Moderate to severe multilevel neural foraminal stenosis.      MR-BRAIN-W/O   Final Result         1. Age-related cerebral atrophy.      2. Moderate periventricular white matter changes consistent with chronic microvascular ischemic gliosis.      3. Prominent degenerative anterolisthesis at the C3-4 level with the posterior superior aspect of the C4 vertebral body indenting the ventral surface of the cervical cord and causing a mild to moderate central canal stenosis.      CT-CSPINE WITHOUT PLUS RECONS   Final Result      1.  No fracture detected.   2.  Severe degenerative changes throughout the cervical spine.   3.  If there are persistent or significant symptoms, further workup may be obtained with MRI.   4.  There is at least a moderate degenerative narrowing of the central canal at the C3-4 level. This could be more accurately assessed with MRI if indicated.      CT-CTA NECK WITH & W/O-POST PROCESSING   Final Result      1.  Negative for stenosis, dissection or occlusion      2.  3.7 x 2.2 x 2.4 cm hyperdense or hypervascular right paratracheal mass within the  chest suspicious for a neoplasm      3.  Aortic and branch vessel atherosclerotic plaque      CT-CTA HEAD WITH & W/O-POST PROCESS   Final Result      1.  CT angiogram of the [place] within normal limits.      2.  Brain shows white matter changes and cerebral volume loss without hemorrhage      DX-HIP-COMPLETE - UNILATERAL 2+ RIGHT   Final Result           Assessment/Plan  * Facial droop- (present on admission)  Assessment & Plan  Admit to Neuro, with continuous cardiac monitoring  CT, CT-angiography, head, neck showed no acute infarction, or hemorrhage    I will check MRI brain  There is very severe degenerative changes around the cervical spine.  MRI has been recommended.  I will order MRI cervical spine  NPO, till screened by speech  Aspiration, Fall, and seizure precautions    Neuro checks   Speech, physical, occupational therapy evaluation ordered  I will place referral to physiatry       Mri brain did not show acute finding.     Mass in neck- (present on admission)  Assessment & Plan  Imaging show evidence for 3.7 x 2.2 x 2.4 cm hyperdense or hypervascular right paratracheal mass within the chest suspicious for a neoplasm.   The finding discussed with the patient and her daughters.  They report that this has been discussed with them in the past. They are not interested in a biopsy or further evaluation for this mass.    Cervical central stenosis of spinal canal- (present on admission)  Assessment & Plan  Presenting with severe pain and left-sided numbness that resolved  CT imaging shows severe degenerative changes throughout the cervical canal with central canal stenosis and MRI is recommended.  Multimodal pain control.  I will check an MRI of the cervical spine.    MRI c spine showed   2.  Severe disc space narrowing throughout the cervical spine with mild to moderate marginal osteophytosis.     3.  Mild degenerative anterolisthesis at the C3-4 level with mild cervical spondylotic change and the  posterior superior aspect of the C4 vertebral body indenting the ventral aspect of the cord and causing moderate central canal stenosis.     4.  Mild multilevel cervical spondylotic change.     5.  Moderate to severe multilevel neural foraminal stenosis.    Continue pain management. Patient would like medical management, pain 5/10    Cervical pain- (present on admission)  Assessment & Plan  Severe sudden pain started this morning.  CT imaging shows severe degenerative changes throughout the cervical canal with central canal stenosis and MRI is recommended.  Multimodal pain control.    As above.     DNI (do not intubate)- (present on admission)  Assessment & Plan  DNR/DNI code status confirmed with patient on admission.  See advance care planning.       Advance care planning- (present on admission)  Assessment & Plan  I had a discussion with the patient and family  regarding goals of care, diagnoses, prognosis, and CODE STATUS. We discussed her prognosis and comorbidities.  The patient has advanced age and multiple comorbid conditions including heart failure, hypertension and has a relatively poor functional performance.  At this point she wants to proceed with DNAR/DNI.  She is open for noninvasive medical interventions and only selectively open to invasive medical interventions based on risk-benefit discussion.      Chronic congestive heart failure (HCC)- (present on admission)  Assessment & Plan  Not currently in exacerbation.  Resume carvedilol and Lasix with hold parameters    Stage 3a chronic kidney disease (HCC)- (present on admission)  Assessment & Plan  Avoid/minimize nephrotoxins as much as possible, renally dose medications, monitor inputs and outputs     Dyslipidemia- (present on admission)  Assessment & Plan  Cardiac diet    DNR (do not resuscitate)- (present on admission)  Assessment & Plan  DNR/DNI code status confirmed with patient on admission.  See advance care planning.      HTN (hypertension)-  (present on admission)  Assessment & Plan  Resume carvedilol with hold parameters.         VTE prophylaxis: lovenox    I have performed a physical exam and reviewed and updated ROS and Plan today (1/1/2024). In review of yesterday's note (12/31/2023), there are no changes except as documented above.    Total time of 52 minutes spent prepping to see patient (e.g. reviewing  tests/imaging results, notes from consultants, bedside nurse, night shift ) obtaining and/or reviewing separately obtained history. Performing a medically appropriate examination and evaluation.  Counseling and educating the patient.  Ordering medications, tests, or procedures.  Referring and communicating with other health care professionals.  Documenting clinical information in EPIC.  Independently interpreting results and communicating results to patient.  Care coordination.

## 2024-01-01 NOTE — CARE PLAN
The patient is Stable - Low risk of patient condition declining or worsening    Shift Goals  Clinical Goals: neuro q4hr, labs  Patient Goals: rest    Progress made toward(s) clinical / shift goals:    Problem: Pain - Standard  Goal: Alleviation of pain or a reduction in pain to the patient’s comfort goal  1/1/2024 0452 by Laure Mcallister R.N.  Outcome: Progressing  Flowsheets  Taken 1/1/2024 0148  Non Verbal Scale: Sleeping  Taken 1/1/2024 0049  Pain Rating Scale (NPRS): 3  Note: Regine was given PRN medication to help control pain this shift.   1/1/2024 0232 by Laure Mcallister R.N.  Outcome: Progressing     Problem: Knowledge Deficit - Stroke Education  Goal: Patient's knowledge of stroke and risk factors will improve  1/1/2024 0452 by Laure Mcallister R.N.  Outcome: Progressing  Note: Regine neuros were completed every 4 hours and MRI completed this shift.   1/1/2024 0232 by Laure Mcallister R.N.  Outcome: Progressing     Problem: Psychosocial - Patient Condition  Goal: Patient's ability to verbalize feelings about condition will improve  1/1/2024 0452 by Laure Mcallister R.N.  Outcome: Progressing  Note: Regine was able to make her needs known this shift.   1/1/2024 0232 by Laure Mcallister R.N.  Outcome: Progressing     Problem: Neuro Status  Goal: Neuro status will remain stable or improve  Outcome: Progressing  Note: Neuro status was unchanged this shift.      Problem: Respiratory - Stroke Patient  Goal: Patient will achieve/maintain optimum respiratory rate/effort  Outcome: Progressing  Flowsheets (Taken 12/31/2023 1850 by Silvino Sykes R.N.)  O2 Delivery Device: Silicone Nasal Cannula  Note: Regine was able to maintain oxygen saturation above 90% on 2L NC this shift.      Problem: Dysphagia  Goal: Dysphagia will improve  Outcome: Progressing  Note: Regine passed the dysphagia test this shift and was given a diet.      Problem: Urinary Elimination  Goal: Establish and maintain regular urinary output  Outcome:  Progressing  Note: Regine was able to urinate without difficulty this shift.      Problem: Skin Integrity  Goal: Skin integrity is maintained or improved  Outcome: Progressing  Note: Regine skin was intact this shift with turns.      Problem: Fall Risk  Goal: Patient will remain free from falls  Outcome: Progressing  Note: Regine was free from falls this shift.

## 2024-01-01 NOTE — ASSESSMENT & PLAN NOTE
Presenting with severe pain and left-sided numbness that resolved  CT imaging shows severe degenerative changes throughout the cervical canal with central canal stenosis and MRI is recommended.  Multimodal pain control.      MRI c spine showed   2.  Severe disc space narrowing throughout the cervical spine with mild to moderate marginal osteophytosis.     3.  Mild degenerative anterolisthesis at the C3-4 level with mild cervical spondylotic change and the posterior superior aspect of the C4 vertebral body indenting the ventral aspect of the cord and causing moderate central canal stenosis.     4.  Mild multilevel cervical spondylotic change.     5.  Moderate to severe multilevel neural foraminal stenosis.    Continue pain management.   PT OT  Rehab referral

## 2024-01-01 NOTE — THERAPY
Occupational Therapy   Initial Evaluation     Patient Name: Regine Bryant  Age:  89 y.o., Sex:  female  Medical Record #: [id number]  Today's Date: 1/1/2024     Precautions  Precautions: Fall Risk    Assessment  Patient is 89 y.o. female with a diagnosis of Neck pain, L side pain.  Pt currently limited by decreased functional mobility, activity tolerance, sensation, strength, AROM, coordination, balance, and pain which are affecting pt's ability to complete ADLs/IADLs at baseline. Pt would benefit from OT services in the acute care setting to maximize functional recovery.       Plan    Occupational Therapy Initial Treatment Plan   Treatment Interventions: (P) Self Care / Activities of Daily Living, Therapeutic Activity  Treatment Frequency: (P) 3 Times per Week  Duration: (P) Until Therapy Goals Met       Discharge Recommendations: (P) Recommend post-acute placement for additional occupational therapy services prior to discharge home        01/01/24 0836   Prior Living Situation   Prior Services Meals on Wheels   Housing / Facility 1 Story House   Steps Into Home 3   Steps In Home 1   Bathroom Set up Walk In Shower   Equipment Owned Front-Wheel Walker;Single Point Cane   Lives with - Patient's Self Care Capacity Alone and Able to Care For Self   Prior Level of ADL Function   Self Feeding Independent   Grooming / Hygiene Independent   Bathing Independent   Dressing Independent   Toileting Independent   ADL Assessment   Grooming Moderate Assist   Upper Body Dressing Moderate Assist   Lower Body Dressing Maximal Assist   Toileting Maximal Assist   Functional Mobility   Sit to Stand Moderate Assist   Bed, Chair, Wheelchair Transfer Unable to Participate   Short Term Goals   Short Term Goal # 1 supervised with UB dressing   Short Term Goal # 2 min A with LB dressing   Short Term Goal # 3 min A with ADL txfs   Occupational Therapy Initial Treatment Plan    Treatment Interventions Self Care / Activities of Daily  Living;Therapeutic Activity   Treatment Frequency 3 Times per Week   Duration Until Therapy Goals Met   Anticipated Discharge Equipment and Recommendations   Discharge Recommendations Recommend post-acute placement for additional occupational therapy services prior to discharge home

## 2024-01-01 NOTE — ASSESSMENT & PLAN NOTE
I had a discussion with the patient and family  regarding goals of care, diagnoses, prognosis, and CODE STATUS. We discussed her prognosis and comorbidities.  The patient has advanced age and multiple comorbid conditions including heart failure, hypertension and has a relatively poor functional performance.  At this point she wants to proceed with DNAR/DNI.  She is open for noninvasive medical interventions and only selectively open to invasive medical interventions based on risk-benefit discussion.

## 2024-01-01 NOTE — THERAPY
Speech Language Pathology   Clinical Swallow Evaluation     Patient Name: Regine Bryant  AGE:  89 y.o., SEX:  female  Medical Record #: [id number]  Date of Service: 1/1/2024      History of Present Illness  89 y.o. female who presented 12/31/2023 with sudden onset severe left-sided facial numbness tingling in addition to severe left-sided pain. CT, CT-angiography, head, neck showed no acute infarction, or hemorrhage.    PMHx chronic heart failure, hypertension, hyperlipidemia, thyroid disease, neck mass    CMHx Facial droop, mass in neck, cervical central stenosis of spinal canal, cervical pain    MRI - pending      General Information:  Vitals  O2 (LPM): 1  O2 Delivery Device: Silicone Nasal Cannula  Level of Consciousness: Alert, Awake  Orientation: Oriented x 4  Follows Directives: Yes      Prior Living Situation & Level of Function:  Prior Services: Meals on Wheels  Housing / Facility: [address]  Lives with - Patient's Self Care Capacity: Alone and Able to Care For Self  Communication: WFL  Swallowing: Pt denies any h/o dysphagia       Oral Mechanism Evaluation:  Dentition: Good   Facial Symmetry: Equal  Facial Sensation:  (intermittent numbness on L-side)     Labial Observations: WFL   Lingual Observations: Midline             Laryngeal Function:  Secretion Management: Adequate  Voice Quality:  ([place])  Cough: Perceptually WNL         Subjective  Pt cleared by RN for evaluation. Pt received awake, fully cooperated with SLP tasks. Pt's daughter at bedside.      Assessment  Current Method of Nutrition: Oral diet (RG/TN)  Positioning: Zaman's (60-90 degrees)  Bolus Administration: SLP, Patient  O2 (LPM): 1 O2 Delivery Device: Silicone Nasal Cannula  Factor(s) Affecting Performance: None  Tracheostomy : No             Swallowing Trials:  Swallowing Trials  Thin Liquid (TN0): WFL  Liquidised (LQ3): WFL  Soft & Bite Sized (SB6): WFL  Regular (RG7): WFL      Comments: Patient needing min feeding A  "2/2 b/l UE weakness/tremors. Adequate oral bolus acceptance and containment. Adequate bite with prolonged but functional mastication of solids. Mild lingual residue with trials of dry solids, resolved with thin liquid rinse. No cough/throat clear appreciated with PO intake. Vocal quality remained clear. Single swallow completed per bolus. No signs of esophageal dysfunction. No overt s/sx of aspiration appreciated. Provided education regarding general aspiration precautions as well as signs of aspiration.         Clinical Impressions  Patient presents with a functional swallow. Diet modification is not indicated. Service will follow likely 1-2x to ensure diet tolerance and monitor for changes.      Recommendations  Diet Consistency: Regular solids, thin liquids  Instrumentation: None indicated at this time  Medication: As tolerated  Supervision: Independent (1:1 feeding A PRN)  Positioning: Fully upright and midline during oral intake  Risk Management : Slow rate of intake, Physical mobility, as tolerated  Oral Care: BID         SLP Treatment Plan  Treatment Plan: Dysphagia Treatment, Patient/Family/Caregiver Training  SLP Frequency: 2x Per Week  Estimated Duration: Until Therapy Goals Met      Anticipated Discharge Needs  Discharge Recommendations: Anticipate that the patient will have no further speech therapy needs after discharge from the hospital   Therapy Recommendations Upon DC: Not Indicated        Patient / Family Goals  Patient / Family Goal #1: \"You have to limit your carbs\"  Short Term Goals  Short Term Goal # 1: Pt will consume a RG/TN diet with no overt s/sx of aspiration or decline in respiratory status      Dacia Pearce MS,CCC-SLP    "

## 2024-01-01 NOTE — ED NOTES
Pt called to schedule surgery. Please give him a call back when available. The best number is [phone]. Thanks! Pt medicated as per MAR.

## 2024-01-01 NOTE — THERAPY
Physical Therapy   Initial Evaluation     Patient Name: Regine Bryant  Age:  89 y.o., Sex:  female  Medical Record #: [id number]  Today's Date: 1/1/2024     Precautions  Precautions: (P) Fall Risk    Assessment  Patient is 89 y.o. female who was recently admitted for L sided facial numbness and pain along with cervical pain. Pt was agreeable to therapy evaluation, however, tend to take extra duration to initiate evaluation due to the need for voiding into purewick. Pt presented to PT with impaired balance, impaired strength, impaired gait, and dec activity tolerance. Pt was primarily limited by generalized weakness and required Mod A for all upright mobility with 2 person HHA. Pt is incontinent and required frequent breaks in order to void at times. Pt was only able to take a few side steps up towards HOB and demonstrated with posterior lean throughout session. Pt will continue to benefit from skilled PT while in house, with recommendation for post acute therapy prior to d/c home.     Plan    Physical Therapy Initial Treatment Plan   Treatment Plan : (P) Bed Mobility, Equipment, Gait Training, Manual Therapy, Neuro Re-Education / Balance, Self Care / Home Evaluation, Stair Training, Therapeutic Exercise, Therapeutic Activities  Treatment Frequency: (P) 3 Times per Week  Duration: (P) Until Therapy Goals Met    DC Equipment Recommendations: (P) Unable to determine at this time  Discharge Recommendations: (P) Recommend post-acute placement for additional physical therapy services prior to discharge home     Objective       01/01/24 0940   Initial Contact Note    Initial Contact Note Order Received and Verified, Physical Therapy Evaluation in Progress with Full Report to Follow.   Precautions   Precautions Fall Risk   Vitals   O2 (LPM) 1   O2 Delivery Device Silicone Nasal Cannula   Pain 0 - 10 Group   Location Neck   Location Orientation Left   Description Aching   Therapist Pain Assessment During  Activity;Prior to Activity;Post Activity;Nurse Notified  (c/o moderate pain, not formally rated)   Prior Living Situation   Housing / Facility 1 Story House   Steps Into Home 3   Steps In Home 1   Rail Both Rail (Steps into Home);Both Rail (Steps in Home)   Equipment Owned Front-Wheel Walker;Single Point Cane   Lives with - Patient's Self Care Capacity Alone and Able to Care For Self   Comments pt states she primarily lives alone, however, dtr lives nearby and checks up on her at times.   Prior Level of Functional Mobility   Bed Mobility Independent   Transfer Status Independent   Ambulation Independent   Ambulation Distance   (household)   Assistive Devices Used Single Point Cane   Stairs Independent   Comments pt reports of an IPLOF   History of Falls   History of Falls No   Cognition    Cognition / Consciousness WDL   Level of Consciousness Alert   Comments pleasant/cooperative   Passive ROM Lower Body   Passive ROM Lower Body WDL   Active ROM Lower Body    Active ROM Lower Body  WDL   Strength Lower Body   Lower Body Strength  X   Gross Strength Generalized Weakness, Equal Bilaterally   Comments presents with gross strength of 3+/5 in B LE   Sensation Lower Body   Lower Extremity Sensation   WDL   Lower Body Muscle Tone   Lower Body Muscle Tone  WDL   Neurological Concerns   Neurological Concerns Yes   Comments L facial numbness   Coordination Upper Body   Coordination Not Tested   Coordination Lower Body    Coordination Lower Body  Not Tested   Balance Assessment   Sitting Balance (Static) Poor +   Sitting Balance (Dynamic) Poor   Standing Balance (Static) Poor -   Standing Balance (Dynamic) Trace +   Weight Shift Sitting Poor   Weight Shift Standing Poor   Comments w/HHA x 2, demonstrates with posterior LOB sitting at EOB and posterior lean upon standing   Bed Mobility    Supine to Sit Moderate Assist   Sit to Supine Moderate Assist   Scooting Moderate Assist   Comments HOB elevated and rails pu   Gait  Analysis   Gait Level Of Assist Moderate Assist   Assistive Device Other (Comments)  (HHA x 2)   Distance (Feet) 2   # of Times Distance was Traveled 1   Deviation Step To;Decreased Base Of Support;Antalgic;Shuffled Gait   Weight Bearing Status fwb   Comments only able to take a few side steps up HOB   Functional Mobility   Sit to Stand Moderate Assist   Mobility EOB, sit<>stand, side steps, back to bed   How much difficulty does the patient currently have...   Turning over in bed (including adjusting bedclothes, sheets and blankets)? 2   Sitting down on and standing up from a chair with arms (e.g., wheelchair, bedside commode, etc.) 1   Moving from lying on back to sitting on the side of the bed? 1   How much help from another person does the patient currently need...   Moving to and from a bed to a chair (including a wheelchair)? 2   Need to walk in a hospital room? 2   Climbing 3-5 steps with a railing? 1   6 clicks Mobility Score 9   Activity Tolerance   Sitting in Chair NT   Sitting Edge of Bed 10 mins   Standing 2-3 mins   Comments limited by fatigue and weakness   Edema / Skin Assessment   Edema / Skin  Not Assessed   Patient / Family Goals    Patient / Family Goal #1 none stated   Short Term Goals    Short Term Goal # 1 pt will go supine<>sit w/hob elevated and rails up w/Min A in 6tx   Short Term Goal # 2 pt will go sit<>stand w/fww w/Min A in 6tx   Short Term Goal # 3 pt will transfer bed<>chair w/fww w/Min A in 6tx   Short Term Goal # 4 pt will ambulate for 25 ft w/Min A w/FWW in 6tx   Education Group   Education Provided Role of Physical Therapist   Role of Physical Therapist Patient Response Patient;Acceptance;Explanation;Demonstration;Verbal Demonstration;Action Demonstration   Physical Therapy Initial Treatment Plan    Treatment Plan  Bed Mobility;Equipment;Gait Training;Manual Therapy;Neuro Re-Education / Balance;Self Care / Home Evaluation;Stair Training;Therapeutic Exercise;Therapeutic Activities    Treatment Frequency 3 Times per Week   Duration Until Therapy Goals Met   Problem List    Problems Impaired Bed Mobility;Impaired Transfers;Impaired Ambulation;Pain;Functional Strength Deficit;Impaired Balance;Decreased Activity Tolerance;Motor Planning / Sequencing   Anticipated Discharge Equipment and Recommendations   DC Equipment Recommendations Unable to determine at this time   Discharge Recommendations Recommend post-acute placement for additional physical therapy services prior to discharge home   Interdisciplinary Plan of Care Collaboration   IDT Collaboration with  Nursing;Occupational Therapist   Patient Position at End of Therapy In Bed;Bed Alarm On;Call Light within Reach;Tray Table within Reach;Phone within Reach;Family / Friend in Room   Collaboration Comments aware of visit and recs   Session Information   Date / Session Number  1/1-1 (1/3, 1/7)

## 2024-01-01 NOTE — ASSESSMENT & PLAN NOTE
Admit to Neuro, with continuous cardiac monitoring  CT, CT-angiography, head, neck showed no acute infarction, or hemorrhage    Mri brain did not show acute finding.

## 2024-01-01 NOTE — ASSESSMENT & PLAN NOTE
With mild to moderate cervical stenosis    Patient is not interested in any invasive interventions at this time  Continue pain management with scheduled Tylenol Lidoderm and as needed oxycodone  Avoid NSAIDs given GERARD

## 2024-01-01 NOTE — PROGRESS NOTES
4 Eyes Skin Assessment Completed by VINCE Kelsey and Paul RN.    Head WDL  Ears WDL  Nose WDL  Mouth WDL  Neck WDL  Breast/Chest WDL  Shoulder Blades WDL  Spine WDL  (R) Arm/Elbow/Hand Bruising and Discoloration  (L) Arm/Elbow/Hand WDL  Abdomen WDL  Groin Redness  Scrotum/Coccyx/Buttocks WDL  (R) Leg WDL  (L) Leg Scab  (R) Heel/Foot/Toe flaky skin   (L) Heel/Foot/Toe flaky skin           Devices In Places Tele Box, Blood Pressure Cuff, and Pulse Ox      Interventions In Place NC W/Ear Foams and Pillows    Possible Skin Injury No    Pictures Uploaded Into Epic N/A  Wound Consult Placed N/A  RN Wound Prevention Protocol Ordered No

## 2024-01-01 NOTE — PROGRESS NOTES
Monitor summary: SR 68-75, OK .25, QRS .09, QT .37, with PVCs and PACs per strip from monitor room.

## 2024-01-01 NOTE — ED NOTES
Checked on bed, connected to monitor,  with unlabored respirations. Vitals monitoring done.   Denied any new complaints. No current needs identified.  Gurney in low position, side rail up for pt safety. Call light within reach.

## 2024-01-01 NOTE — ASSESSMENT & PLAN NOTE
Imaging show evidence for 3.7 x 2.2 x 2.4 cm hyperdense or hypervascular right paratracheal mass within the chest suspicious for a neoplasm.   The finding was previously discussed with the patient and her daughters.  They report that this has been discussed with them in the past. They are not interested in a biopsy or further evaluation for this mass.

## 2024-01-01 NOTE — HOSPITAL COURSE
Regine Bryant is a 89 y.o. female with a past medical history of chronic heart failure, hypertension and hyperlipidemia who presented 12/31/2023 with sudden onset severe left-sided facial numbness tingling in addition to severe left-sided pain.  The pain extends behind the left ear and to the left occipital region.  She denies having focal weakness or other sensory changes.  She denies having fevers or chills.

## 2024-01-01 NOTE — ED NOTES
Pt complaining of left neck pain and wants medicine prior to transfer to the floor. Pt medicated, floor nurse at bedside.

## 2024-01-02 PROBLEM — G95.9 CERVICAL MYELOPATHY (HCC): Status: ACTIVE | Noted: 2024-01-01

## 2024-01-02 NOTE — DISCHARGE PLANNING
1150  Received Choice form at 0918  Agency/Facility Name: [facility]   Referral sent per Choice form @ 1154

## 2024-01-02 NOTE — DISCHARGE PLANNING
[facility] Transitional Care Coordination    Referral from: Dr. Edith Ontiveros    Insurance Provider on Facesheet: [employer]    Potential Rehab Diagnosis: [facility]    Chart review indicates patient may have on going medical management and may have therapy needs to possibly meet inpatient rehab facility criteria with the goal of returning to community.    D/C support will need to be verified: Daughter    Physiatry consultation forwarded per protocol.  Spoke with Yoni daughter regarding [facility] & D/C resources/support.  She is hopeful for an admission as her son [facility] and had a good outcome.  Regine will return to her 1LV home with 3ST to enter.  Kika, daughter will move in with Rgeine.  She works P/T.  Yoni owns a business.  Therefore, she will stay with Regine while Kika is not home.  Yoni and Kika will provide 24/7 assist.       Thank you for the referral.     1424-Dr. Xiao has accepted pending insurance auth.

## 2024-01-02 NOTE — PROGRESS NOTES
[facility] Medicine Daily Progress Note    Date of Service  1/2/2024    Chief Complaint  Regine Bryant is a 89 y.o. female admitted 12/31/2023 with neck pain, facial numbness.     Hospital Course  Regine Bryant is a 89 y.o. female with a past medical history of chronic heart failure, hypertension and hyperlipidemia who presented 12/31/2023 with sudden onset severe left-sided facial numbness tingling in addition to severe left-sided pain.  The pain extends behind the left ear and to the left occipital region.  She denies having focal weakness or other sensory changes.  She denies having fevers or chills.     1/1 patient is new to me, patient is in bed, daughter at bedside, patient is alert and oriented, follows commands, c/o neck pain, no sob, stridor, chronic b/l shoulder pain and upper extremities weakness no abdominal pain. Mri head and c spine reviewed, patient and daughter would like conservative management, she stated that she has been told that she would no tolerate surgery.     Interval Problem Update    Patient is complaining of neck pain  I reviewed chemistry panel BUN 35 creatinine 1.41 I held Lasix  I reviewed CBC hemoglobin 10.9  I reviewed MRI and discussed results with patient she does not want to consider any invasive procedures.  Continue pain management  Hold off on NSAIDs given elevated creatinine      I have discussed this patient's plan of care and discharge plan at IDT rounds today with Case Management, Nursing, Nursing leadership, and other members of the IDT team.    Consultants/Specialty  na    Code Status  DNAR/DNI    Disposition  Medically Cleared  I have placed the appropriate orders for post-discharge needs.    Review of Systems  Review of Systems   Constitutional:  Negative for fever.   Respiratory:  Negative for shortness of breath.    Cardiovascular:  Negative for chest pain.   Musculoskeletal:  Positive for joint pain and neck pain.   All other systems reviewed and  are negative.       Physical Exam  Temp:  [36.6 °C (97.9 °F)-37.4 °C (99.3 °F)] 36.8 °C (98.2 °F)  Pulse:  [72-85] 75  Resp:  [16-19] 18  BP: (133-179)/(53-83) 133/53  SpO2:  [92 %-95 %] 93 %    Physical Exam  Vitals and nursing note reviewed.   Constitutional:       General: She is not in acute distress.  HENT:      Head: Normocephalic and atraumatic.      Nose: Nose normal. No rhinorrhea.      Mouth/Throat:      Pharynx: No oropharyngeal exudate or posterior oropharyngeal erythema.   Eyes:      General:         Right eye: No discharge.         Left eye: No discharge.   Cardiovascular:      Rate and Rhythm: Normal rate and regular rhythm.      Heart sounds: Normal heart sounds. No murmur heard.     No friction rub. No gallop.   Pulmonary:      Effort: Pulmonary effort is normal. No respiratory distress.      Breath sounds: Normal breath sounds. No stridor. No wheezing, rhonchi or rales.   Chest:      Chest wall: No tenderness.   Abdominal:      General: Bowel sounds are normal. There is no distension.      Palpations: Abdomen is soft. There is no mass.      Tenderness: There is no abdominal tenderness. There is no rebound.   Musculoskeletal:         General: No swelling or tenderness.      Cervical back: Neck supple. No rigidity.   Skin:     General: Skin is warm and dry.      Coloration: Skin is not cyanotic or jaundiced.      Nails: There is no clubbing.   Neurological:      General: No focal deficit present.      Mental Status: She is alert and oriented to person, place, and time.      Cranial Nerves: No cranial nerve deficit.      Motor: Weakness (Secondary to shoulder pain) present.   Psychiatric:         Mood and Affect: Mood normal.         Behavior: Behavior normal.         Fluids    Intake/Output Summary (Last 24 hours) at 1/2/2024 1254  Last data filed at 1/2/2024 0800  Gross per 24 hour   Intake --   Output 2000 ml   Net -2000 ml       Laboratory  Recent Labs     12/31/23  1150 01/01/24  0056  01/02/24  0130   WBC 11.2* 9.3 7.7   RBC 3.78* 3.60* 3.49*   HEMOGLOBIN 12.0 11.1* 10.9*   HEMATOCRIT 35.5* 34.4* 33.2*   MCV 93.9 95.6 95.1   MCH 31.7 30.8 31.2   MCHC 33.8 32.3 32.8   RDW 48.4 49.6 48.3   PLATELETCT 170 164 152*   MPV 10.3 10.3 10.6       Recent Labs     12/31/23  1150 01/01/24  0056 01/02/24  0130   SODIUM 138 136 136   POTASSIUM 4.6 4.3 4.5   CHLORIDE 103 101 100   CO2 21 23 23   GLUCOSE 120* 158* 182*   BUN 27* 19 35*   CREATININE 0.92 0.98 1.41*   CALCIUM 10.3 10.0 10.0               Recent Labs     01/01/24  0056   TRIGLYCERIDE 140   HDL 39*   *         Imaging  MR-CERVICAL SPINE-W/O   Final Result      1.  Mild to moderate diffuse cervical kyphosis.      2.  Severe disc space narrowing throughout the cervical spine with mild to moderate marginal osteophytosis.      3.  Mild degenerative anterolisthesis at the C3-4 level with mild cervical spondylotic change and the posterior superior aspect of the C4 vertebral body indenting the ventral aspect of the cord and causing moderate central canal stenosis.      4.  Mild multilevel cervical spondylotic change.      5.  Moderate to severe multilevel neural foraminal stenosis.      MR-BRAIN-W/O   Final Result         1. Age-related cerebral atrophy.      2. Moderate periventricular white matter changes consistent with chronic microvascular ischemic gliosis.      3. Prominent degenerative anterolisthesis at the C3-4 level with the posterior superior aspect of the C4 vertebral body indenting the ventral surface of the cervical cord and causing a mild to moderate central canal stenosis.      CT-CSPINE WITHOUT PLUS RECONS   Final Result      1.  No fracture detected.   2.  Severe degenerative changes throughout the cervical spine.   3.  If there are persistent or significant symptoms, further workup may be obtained with MRI.   4.  There is at least a moderate degenerative narrowing of the central canal at the C3-4 level. This could be more  accurately assessed with MRI if indicated.      CT-CTA NECK WITH & W/O-POST PROCESSING   Final Result      1.  Negative for stenosis, dissection or occlusion      2.  3.7 x 2.2 x 2.4 cm hyperdense or hypervascular right paratracheal mass within the chest suspicious for a neoplasm      3.  Aortic and branch vessel atherosclerotic plaque      CT-CTA HEAD WITH & W/O-POST PROCESS   Final Result      1.  CT angiogram of the [place] within normal limits.      2.  Brain shows white matter changes and cerebral volume loss without hemorrhage      DX-HIP-COMPLETE - UNILATERAL 2+ RIGHT   Final Result           Assessment/Plan  * Facial droop- (present on admission)  Assessment & Plan  Admit to Neuro, with continuous cardiac monitoring  CT, CT-angiography, head, neck showed no acute infarction, or hemorrhage    Mri brain did not show acute finding.     Mass in neck- (present on admission)  Assessment & Plan  Imaging show evidence for 3.7 x 2.2 x 2.4 cm hyperdense or hypervascular right paratracheal mass within the chest suspicious for a neoplasm.   The finding was previously discussed with the patient and her daughters.  They report that this has been discussed with them in the past. They are not interested in a biopsy or further evaluation for this mass.    Cervical central stenosis of spinal canal- (present on admission)  Assessment & Plan  Presenting with severe pain and left-sided numbness that resolved  CT imaging shows severe degenerative changes throughout the cervical canal with central canal stenosis and MRI is recommended.  Multimodal pain control.      MRI c spine showed   2.  Severe disc space narrowing throughout the cervical spine with mild to moderate marginal osteophytosis.     3.  Mild degenerative anterolisthesis at the C3-4 level with mild cervical spondylotic change and the posterior superior aspect of the C4 vertebral body indenting the ventral aspect of the cord and causing moderate central canal  stenosis.     4.  Mild multilevel cervical spondylotic change.     5.  Moderate to severe multilevel neural foraminal stenosis.    Continue pain management.       Cervical pain- (present on admission)  Assessment & Plan  With mild to moderate cervical stenosis    Patient is not interested in any invasive interventions at this time  Continue pain management with scheduled Tylenol Lidoderm and as needed oxycodone  Avoid NSAIDs given GERARD    Chronic congestive heart failure (HCC)- (present on admission)  Assessment & Plan  Chronic diastolic CHF    No acute exacerbation    Hold furosemide  Monitor volume status    Stage 3a chronic kidney disease (HCC)- (present on admission)  Assessment & Plan  With GERARD    Hold furosemide  Monitor intake and output  I ordered repeat BMP    Dyslipidemia- (present on admission)  Assessment & Plan  Cardiac diet    HTN (hypertension)- (present on admission)  Assessment & Plan  Continue carvedilol and monitor blood pressure  Hold Lasix         VTE prophylaxis: lovenox    I have performed a physical exam and reviewed and updated ROS and Plan today (1/2/2024). In review of yesterday's note (1/1/2024), there are no changes except as documented above.

## 2024-01-02 NOTE — CARE PLAN
The patient is Stable - Low risk of patient condition declining or worsening    Shift Goals  Clinical Goals: stable neuro status, pain control  Patient Goals: rest  Family Goals: rest, comfort    Progress made toward(s) clinical / shift goals:    Problem: Neuro Status  Goal: Neuro status will remain stable or improve  Outcome: Progressing   Q4H neuro checks in place. Pt's neurological status (A/Ox4) remains unchanged throughout shift. Bed alarm is on, call light within reach.     Problem: Pain - Standard  Goal: Alleviation of pain or a reduction in pain to the patient’s comfort goal  Outcome: Progressing   Frequent pain assessments throughout shift. PRN pain medications provided per MAR and pt request. Pharmacological and non-pharmacological interventions utilized.       Problem: Fall Risk  Goal: Patient will remain free from falls  Outcome: Progressing    Bed alarm in place. Pt calls for assistance and is provided appropriate assistive devices when needed. Treaded socks used when ambulating.      Patient is not progressing towards the following goals: n/a

## 2024-01-02 NOTE — DISCHARGE SUMMARY
"Discharge Summary    CHIEF COMPLAINT ON ADMISSION  Chief Complaint   Patient presents with    Neck Pain     Left neck behind ear.  Pt states \" nerve pain, feels like an electric shock - goes to the top of my head. \"  Onset approximately 2 days ago.     Facial Pain     Left    Ear Pain    Hypertension       Reason for Admission  EMS    Admission Date  12/31/2023     CODE STATUS  DNAR/DNI    HPI & HOSPITAL COURSE    Regine Bryant is a 89 y.o. female with a past medical history of chronic heart failure, hypertension and hyperlipidemia who presented 12/31/2023 with sudden onset severe left-sided facial numbness tingling in addition to severe left-sided pain.  The pain extends behind the left ear and to the left occipital region.  She denies having focal weakness or other sensory changes.  She denies having fevers or chills.  The patient was admitted underwent an MRI of the brain and C-spine.  MRI of the C-spine was consistent with degenerative changes with mild to moderate marginal osteophytosis and moderate central canal stenosis at C4.  The patient was started on pain management.  She did not wish to have any invasive procedures due to her age and comorbidities.    CTA of the neck revealed hyperdense or hypervascular right paratracheal mass suspicious for neoplasm.  This finding was discussed with the patient and her daughters on admission and they were not interested in further invasive workup for this.    On evaluation this morning the patient is complaining of neck pain I have increased her oxycodone and carvedilol for pain and blood pressure control.  Her serum creatinine is up to 1.4 so I have held her furosemide and will need to monitor her creatinine and volume status.    She is otherwise clinically stable to transfer to inpatient rehab    Therefore, she is discharged in good and stable condition to an inpatient rehabilitation hospital.          FOLLOW UP ITEMS POST DISCHARGE  Monitor blood pressure " adjust medical therapy accordingly  Monitor renal function and volume status and resume diuretics accordingly    Follow-up with primary care and reevaluate further workup for her chest mass depending on her goals of care    DISCHARGE DIAGNOSES  Principal Problem:    Facial droop (POA: Yes)  Active Problems:    HTN (hypertension) (POA: Yes)      Overview: Chronic, stable on carvedilol 3.125 mg twice daily and       hydrochlorothiazide, previously she was on 25 mg of hydrochlorothiazide       daily however after her lab review she does have decreased kidney function       so we will cut this in half to 12.5 mg daily and she will monitor her home       blood pressures and let me know if they elevate above 140/90 and we can       readjust her medications.    DNR (do not resuscitate) (POA: Yes)      Overview: D/w me 09/23/21 - needs POLST form signed. Will do.    Dyslipidemia (POA: Yes)    Stage 3a chronic kidney disease (HCC) (POA: Yes)      Overview: Stable, continue current plan of care, 9/21 GFR 50, avoids NSAIDS          Chronic congestive heart failure (HCC) (POA: Yes)    Advance care planning (POA: Yes)    DNI (do not intubate) (POA: Yes)    Cervical pain (POA: Yes)    Cervical central stenosis of spinal canal (POA: Yes)    Mass in neck (POA: Yes)  Resolved Problems:    * No resolved hospital problems. *      FOLLOW UP  Future Appointments   Date Time Provider Department Center   1/3/2024 To Be Determined Meera Giles R.N. [facility] None   1/4/2024 To Be Determined Greta Fitch, PT [facility] None   1/4/2024  2:00 PM Eloise Solis, OT [facility] None   1/8/2024 11:00 AM Greta Fitch, PT [facility] None   1/9/2024 To Be Determined Eloise Solis OT [facility] None   1/10/2024  1:00 PM Greta Fitch, PT [facility] None   1/15/2024 11:00 AM Greta Fitch, PT [facility] None   1/16/2024 To Be Determined Eloise Solis OT [facility] None   1/17/2024  1:00 PM MANAV Lu   1/18/2024 11:00 AM Greta Fitch, PT [facility] None    1/18/2024  2:15 PM MANAV Giron [facility] None   1/25/2024  3:15 PM S TIFFANY BD 1 [facility]   2/28/2024  1:40 PM MANAV Lu [facility] Karey Toro     No follow-up provider specified.    MEDICATIONS ON DISCHARGE     Medication List        START taking these medications        Instructions   enoxaparin 40 MG/0.4ML Sosy inj  Commonly known as: Lovenox   Inject 30 mg under the skin every day at 6 PM.  Dose: 30 mg     lidocaine 4 % Ptch  Start taking on: January 3, 2024  Commonly known as: Asperflex   Place 2 Patches on the skin every 24 hours.  Dose: 2 Patch     ondansetron 4 MG Tbdp  Commonly known as: Zofran ODT   Take 1 Tablet by mouth every four hours as needed for Nausea/Vomiting (give PO if IV route is unavailable.).  Dose: 4 mg     oxyCODONE immediate-release 5 MG Tabs  Commonly known as: Roxicodone   Take 1 Tablet by mouth every 3 hours as needed for Severe Pain for up to 4 days.  Dose: 5 mg     senna-docusate 8.6-50 MG Tabs  Commonly known as: Pericolace Or Senokot S   Take 2 Tablets by mouth 2 times a day.  Dose: 2 Tablet            CHANGE how you take these medications        Instructions   carvedilol 3.125 MG Tabs  What changed: how much to take  Commonly known as: Coreg   Doctor's comments: DX Code Needed  PATIENT OUT OF REFILLS & MEDICINE, PLEASE SEND NEW RX.  Take 2 Tablets by mouth 2 times a day with meals. Indications: High Blood Pressure Disorder  Dose: 6.25 mg            CONTINUE taking these medications        Instructions   acetaminophen 500 MG Tabs  Commonly known as: Tylenol   Take 1,000 mg by mouth every 8 hours. 500 mg x 2 tablets = 1000 mg  Indications: Fever, Pain  Dose: 1,000 mg     aspirin 81 MG Chew chewable tablet  Commonly known as: Asa   Chew 81 mg every day. Indications: blood thinner  Dose: 81 mg     Centrum Silver Tabs   Take 1 Tablet by mouth every day.  Dose: 1 Tablet     levothyroxine 25 MCG Tabs  Commonly known as: Synthroid   Take 1 Tablet by  mouth every morning on an empty stomach.  Dose: 25 mcg     vitamin B-12 1000 MCG Tabs   Take 1,000 mcg by mouth every day. Indications: Inadequate Vitamin B12  Dose: 1,000 mcg     Vitamin C 1000 MG Tabs   Take 1,000 mg by mouth every day. Indications: Inadequate Vitamin C  Dose: 1,000 mg            STOP taking these medications      Aspercreme 10 % Lotn  Generic drug: Trolamine Salicylate     furosemide 20 MG Tabs  Commonly known as: Lasix     NON SPECIFIED              Allergies  Allergies   Allergen Reactions    Atorvastatin        DIET  Orders Placed This Encounter   Procedures    Diet Order Diet: Cardiac     Standing Status:   Standing     Number of Occurrences:   1     Order Specific Question:   Diet:     Answer:   Cardiac [6]       ACTIVITY  As tolerated.  Weight bearing as tolerated    LINES, DRAINS, AND WOUNDS  This is an automated list. Peripheral IVs will be removed prior to discharge.  Peripheral IV 12/31/23 20 G Left Antecubital (Active)   Site Assessment Clean;Dry;Intact 01/02/24 0800   Dressing Type Tape;Transparent Film 01/02/24 0800   Line Status No blood return;Scrubbed the hub prior to access;Saline locked;Flushed 01/02/24 0800   Dressing Status Dry;Clean;Intact 01/02/24 0800   Dressing Intervention N/A 01/02/24 0800   Infiltration Grading ([facility]) 0 01/02/24 0800   Phlebitis Scale ([facility]) 0 01/02/24 0800     External Urinary Catheter ([facility]) (Active)   Collection Container Suction container 01/01/24 2000   Output (mL) 1200 mL 01/02/24 0800         Peripheral IV 12/31/23 20 G Left Antecubital (Active)   Site Assessment Clean;Dry;Intact 01/02/24 0800   Dressing Type Tape;Transparent Film 01/02/24 0800   Line Status No blood return;Scrubbed the hub prior to access;Saline locked;Flushed 01/02/24 0800   Dressing Status Dry;Clean;Intact 01/02/24 0800   Dressing Intervention N/A 01/02/24 0800   Infiltration Grading ([facility]) 0 01/02/24 0800   Phlebitis Scale ([facility] Only) 0  01/02/24 0800               MENTAL STATUS ON TRANSFER                 LABORATORY  Lab Results   Component Value Date    SODIUM 136 01/02/2024    POTASSIUM 4.5 01/02/2024    CHLORIDE 100 01/02/2024    CO2 23 01/02/2024    GLUCOSE 182 (H) 01/02/2024    BUN 35 (H) 01/02/2024    CREATININE 1.41 (H) 01/02/2024        Lab Results   Component Value Date    WBC 7.7 01/02/2024    HEMOGLOBIN 10.9 (L) 01/02/2024    HEMATOCRIT 33.2 (L) 01/02/2024    PLATELETCT 152 (L) 01/02/2024        Total time of the discharge process exceeds 35 minutes.

## 2024-01-02 NOTE — CARE PLAN
The patient is Stable - Low risk of patient condition declining or worsening    Shift Goals  Clinical Goals: MRI results, PT/OT/SLP  Patient Goals: rest    Progress made toward(s) clinical / shift goals:    MRI completed  PT/OT/SLP consult completed.           Problem: Skin Integrity  Goal: Skin integrity is maintained or improved  1/1/2024 1615 by Cesia Braden RALVIN.  Outcome: Progressing    Problem: Pain - Standard  Goal: Alleviation of pain or a reduction in pain to the patient’s comfort goal  Outcome: Progressing     Problem: Risk for Aspiration  Goal: Patient's risk for aspiration will be absent or decrease  1/1/2024 1615 by Cesia Braden R.N.  Outcome: Progressing

## 2024-01-02 NOTE — PREADMISSION SCREENING NOTE
Pre-Admission Screening Form    Patient Information:   Name: Regine Bryant     MRN: [id number]       : 3/20/1929      Age: 89 y.o.   Gender: female      Race: White [7]       Marital Status:  [5]  Family Contact: Yoni Chau Lisa        Relationship: Daughter [2]  Daughter [2]  Home Phone: [phone]             Cell Phone: [phone]  Advanced Directives:  Yes  Code Status:  DNAR/DNI  Current Attending Provider: Edmundo Ontiveros M.D.  Referring Physician: Dr. Edith Ontiveros  Physiatrist Consult: Dr. Xiao   Referral Date: 23  Primary Payor Source:  [facility]  Secondary Payor Source:      Medical Information:   Date of Admission to Acute Care [place]  Room Number: S176/02  Rehabilitation Diagnosis: 0004.1211 - Spinal Cord Dysfunction, Non-Traumatic: Quadriplegia, Incomplete C1-4  Immunization History   Administered Date(s) Administered    Comirnaty (Covid-19 Vaccine, Mrna, 2882-1490 Formula) 2023    Influenza (IM) Preservative Free - HISTORICAL DATA 2022    Influenza Vaccine Adult HD 2014, 2015, 2016, 2017, 2019, 2021    Influenza Vaccine Quad Inj (Pf) 2016, 2018    PFIZER PURPLE CAP SARS-COV-2 VACCINATION (12+) 2021, 2021    Pneumococcal Conjugate Vaccine (Prevnar/PCV-13) 2016    Pneumococcal Vaccine (UF) - HISTORICAL DATA 2017    Pneumococcal polysaccharide vaccine (PPSV-23) 2021    Tdap Vaccine 2021     Allergies   Allergen Reactions    Atorvastatin      Past Medical History:   Diagnosis Date    Hypercholesterolemia     Hypertension     Hypertension     Thyroid disease      Past Surgical History:   Procedure Laterality Date    ABDOMINAL HYSTERECTOMY TOTAL      GYN SURGERY      hyst and bladder suspension       History Leading to Admission, Conditions that Caused the Need for Rehab (CMS):     Dr. Vasquez H&P:  Chief Complaint  Patient  "presents with   Neck Pain      Left neck behind ear.  Pt states \" nerve pain, feels like an electric shock - goes to the top of my head. \"  Onset approximately 2 days ago.    Facial Pain      Left   Ear Pain   Hypertension     History of Presenting Illness  Regine Bryant is a 89 y.o. female with a past medical history of chronic heart failure, hypertension and hyperlipidemia who presented 12/31/2023 with sudden onset severe left-sided facial numbness tingling in addition to severe left-sided pain.  The pain extends behind the left ear and to the left occipital region.  She denies having focal weakness or other sensory changes.  She denies having fevers or chills.     Assessment/Plan:  Justification for Admission Status  I anticipate this patient is appropriate for observation status at this time because possible discharge after 1 midnight.     Patient will need a Telemetry bed on NEUROLOGY service.  Facial numbness and severe neck pain.     * Facial droop- (present on admission)  Assessment & Plan  Admit to Neuro, with continuous cardiac monitoring  CT, CT-angiography, head, neck showed no acute infarction, or hemorrhage    I will check MRI brain  There is very severe degenerative changes around the cervical spine.  MRI has been recommended.  I will order MRI cervical spine  NPO, till screened by speech  Aspiration, Fall, and seizure precautions    Neuro checks   Speech, physical, occupational therapy evaluation ordered  I will place referral to physiatry        Mass in neck- (present on admission)  Assessment & Plan  Imaging show evidence for 3.7 x 2.2 x 2.4 cm hyperdense or hypervascular right paratracheal mass within the chest suspicious for a neoplasm.   The finding discussed with the patient and her daughters.  They report that this has been discussed with them in the past. They are not interested in a biopsy or further evaluation for this mass.     Cervical central stenosis of spinal canal- (present " on admission)  Assessment & Plan  Presenting with severe pain and left-sided numbness that resolved  CT imaging shows severe degenerative changes throughout the cervical canal with central canal stenosis and MRI is recommended.  Multimodal pain control.  I will check an MRI of the cervical spine.     DNI (do not intubate)- (present on admission)  Assessment & Plan  DNR/DNI code status confirmed with patient on admission.  See advance care planning.        Advance care planning- (present on admission)  Assessment & Plan  I had a discussion with the patient and family  regarding goals of care, diagnoses, prognosis, and CODE STATUS. We discussed her prognosis and comorbidities.  The patient has advanced age and multiple comorbid conditions including heart failure, hypertension and has a relatively poor functional performance.  At this point she wants to proceed with DNAR/DNI.  She is open for noninvasive medical interventions and only selectively open to invasive medical interventions based on risk-benefit discussion.       Chronic congestive heart failure (HCC)- (present on admission)  Assessment & Plan  Not currently in exacerbation.  Resume carvedilol and Lasix with hold parameters     Stage 3a chronic kidney disease (HCC)- (present on admission)  Assessment & Plan  Avoid/minimize nephrotoxins as much as possible, renally dose medications, monitor inputs and outputs      DNR (do not resuscitate)- (present on admission)  Assessment & Plan  DNR/DNI code status confirmed with patient on admission.  See advance care planning.       HTN (hypertension)- (present on admission)  Assessment & Plan  Resume carvedilol with hold parameters.     Cervical pain- (present on admission)  Assessment & Plan  Severe sudden pain started this morning.  CT imaging shows severe degenerative changes throughout the cervical canal with central canal stenosis and MRI is recommended.  Multimodal pain control.  I will check an MRI of the  cervical spine.     Dyslipidemia- (present on admission)  Assessment & Plan  Cardiac diet     VTE prophylaxis: SCDs/TEDs and enoxaparin ppx    Dr. Edith Ontiveros progress note:  Date of Service  1/2/2024     Chief Complaint  Regine Bryant is a 89 y.o. female admitted 12/31/2023 with neck pain, facial numbness.      Hospital Course  Regine Bryant is a 89 y.o. female with a past medical history of chronic heart failure, hypertension and hyperlipidemia who presented 12/31/2023 with sudden onset severe left-sided facial numbness tingling in addition to severe left-sided pain.  The pain extends behind the left ear and to the left occipital region.  She denies having focal weakness or other sensory changes.  She denies having fevers or chills.     1/1 patient is new to me, patient is in bed, daughter at bedside, patient is alert and oriented, follows commands, c/o neck pain, no sob, stridor, chronic b/l shoulder pain and upper extremities weakness no abdominal pain. Mri head and c spine reviewed, patient and daughter would like conservative management, she stated that she has been told that she would no tolerate surgery.      Interval Problem Update     Patient is complaining of neck pain  I reviewed chemistry panel BUN 35 creatinine 1.41 I held Lasix  I reviewed CBC hemoglobin 10.9  I reviewed MRI and discussed results with patient she does not want to consider any invasive procedures.  Continue pain management  Hold off on NSAIDs given elevated creatinine     I have discussed this patient's plan of care and discharge plan at IDT rounds today with Case Management, Nursing, Nursing leadership, and other members of the IDT team.     Consultants/Specialty  na     Code Status  DNAR/DNI     Disposition  Medically Cleared  I have placed the appropriate orders for post-discharge needs.     Review of Systems  Review of Systems   Constitutional:  Negative for fever.   Respiratory:  Negative for shortness of breath.     Cardiovascular:  Negative for chest pain.   Musculoskeletal:  Positive for joint pain and neck pain.   All other systems reviewed and are negative.     Physical Exam  Temp:  [36.6 °C (97.9 °F)-37.4 °C (99.3 °F)] 36.8 °C (98.2 °F)  Pulse:  [72-85] 75  Resp:  [16-19] 18  BP: (133-179)/(53-83) 133/53  SpO2:  [92 %-95 %] 93 %     Physical Exam  Vitals and nursing note reviewed.   Constitutional:       General: She is not in acute distress.  HENT:      Head: Normocephalic and atraumatic.      Nose: Nose normal. No rhinorrhea.      Mouth/Throat:      Pharynx: No oropharyngeal exudate or posterior oropharyngeal erythema.   Eyes:      General:         Right eye: No discharge.         Left eye: No discharge.   Cardiovascular:      Rate and Rhythm: Normal rate and regular rhythm.      Heart sounds: Normal heart sounds. No murmur heard.     No friction rub. No gallop.   Pulmonary:      Effort: Pulmonary effort is normal. No respiratory distress.      Breath sounds: Normal breath sounds. No stridor. No wheezing, rhonchi or rales.   Chest:      Chest wall: No tenderness.   Abdominal:      General: Bowel sounds are normal. There is no distension.      Palpations: Abdomen is soft. There is no mass.      Tenderness: There is no abdominal tenderness. There is no rebound.   Musculoskeletal:         General: No swelling or tenderness.      Cervical back: Neck supple. No rigidity.   Skin:     General: Skin is warm and dry.      Coloration: Skin is not cyanotic or jaundiced.      Nails: There is no clubbing.   Neurological:      General: No focal deficit present.      Mental Status: She is alert and oriented to person, place, and time.      Cranial Nerves: No cranial nerve deficit.      Motor: Weakness (Secondary to shoulder pain) present.   Psychiatric:         Mood and Affect: Mood normal.         Behavior: Behavior normal.      Fluids     Intake/Output Summary (Last 24 hours) at 1/2/2024 1254  Last data filed at 1/2/2024  0800  Gross per 24 hour  Intake --  Output 2000 ml  Net -2000 ml     Laboratory  Recent Labs    12/31/23  1150 01/01/24  0056 01/02/24  0130  WBC 11.2* 9.3 7.7  RBC 3.78* 3.60* 3.49*  HEMOGLOBIN 12.0 11.1* 10.9*  HEMATOCRIT 35.5* 34.4* 33.2*  MCV 93.9 95.6 95.1  MCH 31.7 30.8 31.2  MCHC 33.8 32.3 32.8  RDW 48.4 49.6 48.3  PLATELETCT 170 164 152*  MPV 10.3 10.3 10.6     Recent Labs    12/31/23  1150 01/01/24  0056 01/02/24  0130  SODIUM 138 136 136  POTASSIUM 4.6 4.3 4.5  CHLORIDE 103 101 100  CO2 21 23 23  GLUCOSE 120* 158* 182*  BUN 27* 19 35*  CREATININE 0.92 0.98 1.41*  CALCIUM 10.3 10.0 10.0     Recent Labs    01/01/24  0056  TRIGLYCERIDE 140  HDL 39*  *     Imaging  MR-CERVICAL SPINE-W/O  Final Result     1.  Mild to moderate diffuse cervical kyphosis.     2.  Severe disc space narrowing throughout the cervical spine with mild to moderate marginal osteophytosis.     3.  Mild degenerative anterolisthesis at the C3-4 level with mild cervical spondylotic change and the posterior superior aspect of the C4 vertebral body indenting the ventral aspect of the cord and causing moderate central canal stenosis.     4.  Mild multilevel cervical spondylotic change.     5.  Moderate to severe multilevel neural foraminal stenosis.     MR-BRAIN-W/O  Final Result     1. Age-related cerebral atrophy.     2. Moderate periventricular white matter changes consistent with chronic microvascular ischemic gliosis.     3. Prominent degenerative anterolisthesis at the C3-4 level with the posterior superior aspect of the C4 vertebral body indenting the ventral surface of the cervical cord and causing a mild to moderate central canal stenosis.     CT-CSPINE WITHOUT PLUS RECONS  Final Result     1.  No fracture detected.  2.  Severe degenerative changes throughout the cervical spine.  3.  If there are persistent or significant symptoms, further workup may be obtained with MRI.  4.  There is at least a moderate degenerative narrowing  of the central canal at the C3-4 level. This could be more accurately assessed with MRI if indicated.     CT-CTA NECK WITH & W/O-POST PROCESSING  Final Result     1.  Negative for stenosis, dissection or occlusion     2.  3.7 x 2.2 x 2.4 cm hyperdense or hypervascular right paratracheal mass within the chest suspicious for a neoplasm     3.  Aortic and branch vessel atherosclerotic plaque     CT-CTA HEAD WITH & W/O-POST PROCESS  Final Result     1.  CT angiogram of the Lummi of [place] within normal limits.     2.  Brain shows white matter changes and cerebral volume loss without hemorrhage     DX-HIP-COMPLETE - UNILATERAL 2+ RIGHT  Final Result     Assessment/Plan  * Facial droop- (present on admission)  Assessment & Plan  Admit to Neuro, with continuous cardiac monitoring  CT, CT-angiography, head, neck showed no acute infarction, or hemorrhage    Mri brain did not show acute finding.      Mass in neck- (present on admission)  Assessment & Plan  Imaging show evidence for 3.7 x 2.2 x 2.4 cm hyperdense or hypervascular right paratracheal mass within the chest suspicious for a neoplasm.   The finding was previously discussed with the patient and her daughters.  They report that this has been discussed with them in the past. They are not interested in a biopsy or further evaluation for this mass.     Cervical central stenosis of spinal canal- (present on admission)  Assessment & Plan  Presenting with severe pain and left-sided numbness that resolved  CT imaging shows severe degenerative changes throughout the cervical canal with central canal stenosis and MRI is recommended.  Multimodal pain control.     MRI c spine showed   2.  Severe disc space narrowing throughout the cervical spine with mild to moderate marginal osteophytosis.     3.  Mild degenerative anterolisthesis at the C3-4 level with mild cervical spondylotic change and the posterior superior aspect of the C4 vertebral body indenting the ventral aspect of  the cord and causing moderate central canal stenosis.     4.  Mild multilevel cervical spondylotic change.     5.  Moderate to severe multilevel neural foraminal stenosis.     Continue pain management.      Cervical pain- (present on admission)  Assessment & Plan  With mild to moderate cervical stenosis     Patient is not interested in any invasive interventions at this time  Continue pain management with scheduled Tylenol Lidoderm and as needed oxycodone  Avoid NSAIDs given GERARD     Chronic congestive heart failure (HCC)- (present on admission)  Assessment & Plan  Chronic diastolic CHF     No acute exacerbation     Hold furosemide  Monitor volume status     Stage 3a chronic kidney disease (HCC)- (present on admission)  Assessment & Plan  With GERARD     Hold furosemide  Monitor intake and output  I ordered repeat BMP     Dyslipidemia- (present on admission)  Assessment & Plan  Cardiac diet     HTN (hypertension)- (present on admission)  Assessment & Plan  Continue carvedilol and monitor blood pressure  Hold Lasix     VTE prophylaxis: lovenox    1/1/2024 4:15 AM     HISTORY/REASON FOR EXAM:  NECK PAIN, FACIAL PAIN, EAR PAIN, HYPERTENSION; Severe neck pain, central canal stenosis.     TECHNIQUE/EXAM DESCRIPTION:  MRI of the cervical spine without contrast.     The study was performed on a TE2 Signa 1.5 Jennifer MRI scanner.  T1 sagittal, T2 fast spin-echo sagittal, and gradient echo axial images were obtained of the cervical spine. T2 fat suppressed sagittal images were also obtained. Optional T2 axial images may also be included.     COMPARISON: None.     FINDINGS:  There is a mild to moderate cervical kyphosis. There is 3 mm of anterolisthesis at the C3-4 level.. Marrow signal in the vertebral bodies is within normal limits. There is severe disc space narrowing throughout the cervical spine with mild to moderate   marginal osteophytosis. There are no significant osteophytic changes. The prevertebral and paraspinous soft  tissues are unremarkable.     There are no anomalies at the craniovertebral junction. The cervical spinal cord is normal in caliber and signal throughout its course.     Level specific findings:     C1-2 level the posterior ring of C1 indents the posterior aspect of the cervical cord causing a mild central canal stenosis.     C2-3 level there is moderate hypertrophic degenerative changes of the facets and ligamentum flavum which cause a mild central canal stenosis.     C3-4 level there is mild posterior spurring and annular bulging. Further the posterior superior aspect of the C4 vertebral body indents the ventral surface of the cord. This combines with moderate facet arthropathy to cause a moderate central canal   stenosis. There is severe right-sided neural foraminal narrowing and moderate left-sided neural foraminal narrowing secondary to uncinate hypertrophy.     C4-5 level minimal posterior spurring moderate severe right-sided neural foraminal narrowing and mild left-sided neural foraminal narrowing secondary to uncinate hypertrophy.     C5-6 level mild posterior spurring and annular bulging causing moderate effacement of the ventral surface of thecal sac. Severe right-sided neural foraminal narrowing and moderate left-sided neural foraminal narrowing secondary to uncinate hypertrophy.     C6-7 level mild broad-based posterior spurring and annular bulging causing moderate effacement of ventral surface of thecal sac moderate left-sided neural foraminal narrowing secondary to uncinate hypertrophy.     C7-T1 level minimal posterior spurring and annular bulging.     IMPRESSION:     1.  Mild to moderate diffuse cervical kyphosis.     2.  Severe disc space narrowing throughout the cervical spine with mild to moderate marginal osteophytosis.     3.  Mild degenerative anterolisthesis at the C3-4 level with mild cervical spondylotic change and the posterior superior aspect of the C4 vertebral body indenting the ventral  "aspect of the cord and causing moderate central canal stenosis.     4.  Mild multilevel cervical spondylotic change.     5.  Moderate to severe multilevel neural foraminal stenosis.    Co-morbidities:  See PMH  Potential Risk - Complications: Contractures, Deep Vein Thrombosis, Incontinence, Malnutrition, Pain, Paralysis, Pneumonia, Pressure Ulcer, and Urinary Tract Infection  Level of Risk: High    Ongoing Medical Management Needed (Medical/Nursing Needs):   Patient Active Problem List    Diagnosis Date Noted    Facial droop 12/31/2023    Advance care planning 12/31/2023    DNI (do not intubate) 12/31/2023    Cervical pain 12/31/2023    Cervical central stenosis of spinal canal 12/31/2023    Mass in neck 12/31/2023    Chronic congestive heart failure (HCC) 12/20/2023    Fibromyalgia affecting multiple sites 11/21/2023    Acute cough 11/14/2023    Vasomotor rhinitis 11/14/2023    Myalgia 09/08/2023    Chronic right shoulder pain 09/08/2023    Physical deconditioning 09/08/2023    Angina pectoris, unspecified (HCC) 04/25/2023    AK (actinic keratosis) 03/08/2023    Dyspnea on exertion 08/17/2022    Peripheral neuropathy 11/03/2021    History of fall 09/23/2021    Liver lesion 09/23/2021    History of DVT (deep vein thrombosis) 09/23/2021    Gait disorder 09/23/2021    Stage 3a chronic kidney disease (HCC) 09/23/2021    Dyslipidemia 09/01/2021    BMI 29.0-29.9,adult 08/30/2021    Chronic respiratory failure with hypoxia, on home oxygen therapy (HCC) 08/30/2021    Hyperparathyroidism (HCC) 12/29/2017    Hypercalcemia 12/29/2017    HTN (hypertension) 09/20/2016    Hypothyroid 09/20/2016    Lung mass 09/20/2016    Anemia 09/20/2016    DNR (do not resuscitate) 09/20/2016     A & O    Current Vital Signs:   Temperature: 36.8 °C (98.2 °F) Pulse: 75 Respiration: 18 Blood Pressure : 133/53  Weight: 64.8 kg (142 lb 13.7 oz) Height: 144.8 cm (4' 9\")  Pulse Oximetry: 93 % O2 (LPM): 20      Completed Laboratory Reports:  Recent " Labs     23  1150 24  0056 24  0130   WBC 11.2* 9.3 7.7   HEMOGLOBIN 12.0 11.1* 10.9*   HEMATOCRIT 35.5* 34.4* 33.2*   PLATELETCT 170 164 152*   SODIUM 138 136 136   POTASSIUM 4.6 4.3 4.5   BUN 27* 19 35*   CREATININE 0.92 0.98 1.41*   ALBUMIN  --  4.1  --    GLUCOSE 120* 158* 182*     Additional Labs: Not Applicable    Prior Living Situation:   Housing / Facility: 1 Story House  Steps Into Home: 3  Steps In Home: 1  Lives with - Patient's Self Care Capacity: Alone and Able to Care For Self  Equipment Owned: Front-Wheel Walker, Single Point Cane    Prior Level of Function / Living Situation:   Physical Therapy: Prior Services: Meals on Wheels  Housing / Facility: 1 Story House  Steps Into Home: 3  Steps In Home: 1  Rail: Both Rail (Steps into Home), Both Rail (Steps in Home)  Bathroom Set up: Walk In Shower  Equipment Owned: Front-Wheel Walker, Single Point Cane  Lives with - Patient's Self Care Capacity: Alone and Able to Care For Self  Bed Mobility: Independent  Transfer Status: Independent  Ambulation: Independent  Assistive Devices Used: Single Point Cane  Stairs: Independent  Current Level of Function:   Gait Level Of Assist: Moderate Assist  Assistive Device: Other (Comments) (HHA x 2)  Distance (Feet): 2  Deviation: Step To, Decreased Base Of Support, Antalgic, Shuffled Gait  Weight Bearing Status: fwb  Supine to Sit: Moderate Assist  Sit to Supine: Moderate Assist  Scooting: Moderate Assist  Comments: HOB elevated and rails pu  Sit to Stand: Moderate Assist  Bed, Chair, Wheelchair Transfer: Unable to Participate  Sitting in Chair: NT  Sitting Edge of Bed: 10 mins  Standin-3 mins  Occupational Therapy:   Self Feeding: Independent  Grooming / Hygiene: Independent  Bathing: Independent  Dressing: Independent  Toileting: Independent  Medication Management: Independent  Laundry: Independent  Kitchen Mobility: Independent  Finances: Independent  Home Management: Independent  Shopping:  Independent  Prior Services: Meals on Wheels  Housing / Facility: [address] [place] [address]  Current Level of Function:   Upper Body Dressing: Moderate Assist  Lower Body Dressing: Maximal Assist  Toileting: Maximal Assist  Speech Language Pathology:      Rehabilitation Prognosis/Potential: Good  Estimated Length of Stay: 10-12 days    Nursing:      Incontinent-external cath in place    Scope/Intensity of Services Recommended:  Physical Therapy: 1.5 hr / day  5 days / week. Therapeutic Interventions Required: Maximize Endurance, Mobility, Strength, and Safety  Occupational Therapy: 1.5 hr / day 5 days / week. Therapeutic Interventions Required: Maximize Self Care, ADLs, IADLs, and Energy Conservation  Rehabilitation Nursin/7. Therapeutic Interventions Required: Monitor Pain, Skin, Vital Signs, Intake and Output, Labs, Safety, and Family Training  Rehabilitation Physician: 3 - 5 days / week. Therapeutic Interventions Required: Medical Management  Respiratory Care: Pulmonary Toileting. Therapeutic Interventions Required: Pulmonary Toileting and O2 Weaning    She requires 24-hour rehabilitation nursing to manage bowel and bladder function, skin care, nutrition and fluid intake, pulmonary hygiene, pain control, safety, medication management, and patient/family goals. In addition, rehabilitation nursing will reiterate and reinforce therapy skills and equipment use, including ADLs, as well as provide education to the patient and family. Regine Bryant is willing to participate in and is able to tolerate the proposed plan of care.    Rehabilitation Goals and Plan (Expected frequency & duration of treatment in the IRF):   Return to the Community, Minimal Assist Level of Care, and Family Able to Provide 24/7 Assistance  Anticipated Date of Rehabilitation Admission: 24  Patient/Family oriented IRF level of care/facility/plan: Yes  Patient/Family willing to participate in IRF care/facility/plan: Yes  Patient able to  tolerate IRF level of care proposed: Yes  Patient has potential to benefit IRF level of care proposed: Yes  Comments: Not Applicable    Special Needs or Precautions - Medical Necessity:  Safety Concerns/Precautions:  Fall Risk / High Risk for Falls, Balance, and Bed / Chair Alarm  Pain Management  Precautions: Fall Risk   IV Site: Peripheral  Requires Oxygen  Current Medications:    Current Facility-Administered Medications Ordered in Epic   Medication Dose Route Frequency Provider Last Rate Last Admin    acetaminophen (Tylenol) tablet 650 mg  650 mg Oral Q6HRS Alex Chappell M.D.   650 mg at 01/02/24 1137    dexamethasone (Decadron) injection 4 mg  4 mg Intravenous Q8HRS Alex Chappell M.D.   4 mg at 01/02/24 0420    lidocaine (Asperflex) 4 % patch 2 Patch  2 Patch Transdermal Q24HR Alex Chappell M.D.   2 Patch at 01/02/24 0942    senna-docusate (Pericolace Or Senokot S) 8.6-50 MG per tablet 2 Tablet  2 Tablet Oral BID Dengm GLO Vasquez M.D.   2 Tablet at 01/02/24 0420    And    polyethylene glycol/lytes (Miralax) Packet 1 Packet  1 Packet Oral QDAY PRN Gonzalo Vasquez M.D.        And    magnesium hydroxide (Milk Of Magnesia) suspension 30 mL  30 mL Oral QDAY PRN Gonzalo Vasquez M.D.        And    bisacodyl (Dulcolax) suppository 10 mg  10 mg Rectal QDAY PRN Gonzalo Vasquez M.D.        enoxaparin (Lovenox) inj 40 mg  40 mg Subcutaneous DAILY AT 1800 Aseelmo Vasquez M.D.   40 mg at 01/01/24 1716    Pharmacy Consult Request ...Pain Management Review 1 Each  1 Each Other PHARMACY TO DOSE Gonzalo Vasquez M.D.        oxyCODONE immediate-release (Roxicodone) tablet 2.5 mg  2.5 mg Oral Q3HRS PRN Gonzalo Vasquez M.D.        Or    oxyCODONE immediate-release (Roxicodone) tablet 5 mg  5 mg Oral Q3HRS PRN Gonzalo Vasquez M.D.   5 mg at 01/02/24 0940    Or    HYDROmorphone (Dilaudid) injection 0.25 mg  0.25 mg Intravenous Q3HRS PRN Aseelmo Vasquez M.D.        ondansetron (Zofran) syringe/vial  injection 4 mg  4 mg Intravenous Q4HRS PRN Gonzalo Vasquez M.D.        ondansetron (Zofran ODT) dispertab 4 mg  4 mg Oral Q4HRS PRN Gonzalo Vasquez M.D.        aspirin (Asa) chewable tab 81 mg  81 mg Oral DAILY Aseelmo Vasquez M.D.   81 mg at 01/02/24 0420    carvedilol (Coreg) tablet 3.125 mg  3.125 mg Oral BID WITH MEALS Gonzalo Vasquez M.D.   3.125 mg at 01/02/24 0942    [Held by provider] furosemide (Lasix) tablet 20 mg  20 mg Oral BID Gonzalo Vasquez M.D.   20 mg at 01/02/24 0458    levothyroxine (Synthroid) tablet 25 mcg  25 mcg Oral AM ES Gonzalo Vasquez M.D.   25 mcg at 01/02/24 0420     No current [facility]-ordered outpatient medications on file.     Diet:   DIET ORDERS (From admission to next 24h)       Start     Ordered    12/31/23 1720  Diet Order Diet: Cardiac  ALL MEALS        Question:  Diet:  Answer:  Cardiac    12/31/23 1721                    Anticipated Discharge Destination / Patient/Family Goal:  Destination: Home with Assistance Support System:  Daughters  Anticipated home health services: OT and PT  Previously used HH service/ provider: Not Applicable  Anticipated DME Needs: [facility] [employer] [facility]  Outpatient Services: OT and PT  Alternative resources to address additional identified needs:   Future Appointments   Date Time Provider Department Center   1/3/2024 To Be Determined Meera Giles R.N. [facility] None   1/4/2024 To Be Determined Greta Fitch PT [facility] None   1/4/2024  2:00 PM Eloise Solis OT [facility] None   1/8/2024 11:00 AM Greta Fitch PT [facility] None   1/9/2024 To Be Determined Eloise Solis OT [facility] None   1/10/2024  1:00 PM Greta Fitch PT [facility] None   1/15/2024 11:00 AM Greta Fitch PT [facility] None   1/16/2024 To Be Determined Eloise Solis OT [facility] None   1/17/2024  1:00 PM MANAV Lu [facility] Karey Muna   1/18/2024 11:00 AM Greta Fitch, PT [facility] None   1/18/2024  2:15 PM MANAV Giron [facility] None   1/25/2024  3:15 PM SULEMAN ALLEN BD 1 [facility]  MALIK   2/28/2024  1:40 PM MANAV Lu [facility] Karey Toro      Pre-Screen Completed: 1/2/2024 1:43 PM Koko Morales L.P.N.

## 2024-01-02 NOTE — PROGRESS NOTES
Monitor Summary: SR-ST , NM .0.25, QRS .0.09, QT .0.41 with rare/occasional/frequent PVCs and PACs per strip from monitor room

## 2024-01-02 NOTE — DISCHARGE PLANNING
[facility] authorized #[id number]. Pt has been accepted by Dr Xiao at [facility]. Transport set for 16/1630 GMT stretcher, nurse report [id number]. Notified care team.

## 2024-01-02 NOTE — PROGRESS NOTES
Discussed discharge to SNF with patient and her daughter Kika. Pt verbalized concern about cost as she has no money. I explained to her and her daughter that everything will go through insurance first. MD notified of patients concerns. Will have CM see patient tomorrow to discuss options.

## 2024-01-02 NOTE — PROGRESS NOTES
Monitor summary: SR/ST, HR , WI 0.20, QRS 0.10, QT 0.33 with (R)PVCs, (R)PACs, first degree HB and 1.6 second pause @2305 per strip from monitor room

## 2024-01-02 NOTE — DISCHARGE PLANNING
HTH/SCP TCN chart review completed. Collaborated with CM prior to meeting with the pt. The most current review of medical record, knowledge of pt's PLOF and social support, LACE+ score of 70, 6 clicks scores of 9 mobility were considered.      Pt seen at bedside. Introduced TCN program. Provided education regarding post acute levels of care. Discussed HTH/SCP plan benefits. Pt verbalizes understanding.     Patient somewhat circular in discussion and appears anxious about talk of discharge.  Patient reports she is very concerned about cost of post acute stay.  Patient reports she is open to post acute placement, but would prefer to see where accepts her, and then reports her DPOA can make decision on where she goes.  TCN informed CM, who reports he has sent out blanket referrals.  Patient is aware that both PT/OT recommend post acute placement at this time. Patient keeps repeated that she would prefer to go home, but not sure if she would be able to take care of herself.  TCN will continue to follow.     No choice proactively obtained secondary to patient's decision. TCN will continue to follow and collaborate with discharge planning team as additional post acute needs arise. Thank you.     Completed today:  OT/PT recommends post acute placement - 1/1/24  SLP recommends no needs - 1/1/24  Choice obtained: none  Patient open to SNF/IRF, want to wait until accepted by post acute facilities to decide  SCP with [facility] PCP.     *Update*  Discussed with CM, ANDI reports he would get choice, reports that POA wants IRF.  TCC reached out to TCN for authorization.  Authorization provided.

## 2024-01-02 NOTE — CARE PLAN
The patient is Stable - Low risk of patient condition declining or worsening    Shift Goals  Clinical Goals: neuro exam, pain control  Patient Goals: rest  Family Goals: rest    Progress made toward(s) clinical / shift goals:    Problem: Pain - Standard  Goal: Alleviation of pain or a reduction in pain to the patient’s comfort goal  Outcome: Progressing     Problem: Neuro Status  Goal: Neuro status will remain stable or improve  Outcome: Progressing       Patient is not progressing towards the following goals:

## 2024-01-02 NOTE — THERAPY
Speech Language Therapy Contact Note    Patient Name: Regine Bryant  Age:  89 y.o., Sex:  female  Medical Record #: [id number]  Today's Date: 1/2/2024    Discussed missed therapy with MD.        01/02/24 1010   Initial Contact Note    Initial Contact Note  Order Received and Verified. Speech Therapy Evaluation NOT Completed Because Patient Does Not Require Acute Speech Therapy at this Time.   Interdisciplinary Plan of Care Collaboration   IDT Collaboration with  Physician   Collaboration Comments Orders received for cognitive linguistic evaluation. MRI brain without acute findings. Discussed with Dr. Edith Ontiveros who cleared cancelling evaluation.

## 2024-01-03 PROBLEM — D75.839 THROMBOCYTOSIS: Status: ACTIVE | Noted: 2024-01-01

## 2024-01-03 PROBLEM — R14.0 ABDOMINAL DISTENSION: Status: ACTIVE | Noted: 2024-01-01

## 2024-01-03 PROBLEM — N17.9 AKI (ACUTE KIDNEY INJURY) (HCC): Status: ACTIVE | Noted: 2024-01-01

## 2024-01-03 PROBLEM — D75.839 THROMBOCYTOSIS: Status: RESOLVED | Noted: 2024-01-01 | Resolved: 2024-01-01

## 2024-01-03 PROBLEM — R07.9 CHEST PAIN: Status: ACTIVE | Noted: 2023-01-01

## 2024-01-03 NOTE — FLOWSHEET NOTE
01/02/24 1824   Events/Summary/Plan   Events/Summary/Plan RT Assessment   Vital Signs   Pulse 75   Respiration 18   Pulse Oximetry 93 %   $ Pulse Oximetry (Spot Check) Yes   Respiratory Assessment   Respiratory Pattern Within Normal Limits   Level of Consciousness Alert   Chest Exam   Work Of Breathing / Effort Within Normal Limits   Oxygen   O2 (LPM) 2   O2 Delivery Device Nasal Cannula   Smoking History   Have you ever smoked Never

## 2024-01-03 NOTE — CARE PLAN
"The patient is Stable - Low risk of patient condition declining or worsening    Patient is not progressing towards the following goals:    Problem: Fall Risk - Rehab  Goal: Patient will remain free from falls  Outcome: Not Met  Note: Marquita Butcher Fall risk Assessment Score: 14    Moderate fall risk Interventions  - Bed and strip alarm   - Yellow sign by the door   - Yellow wrist band \"Fall risk\"  - Room near to the nurse station  - Do not leave patient unattended in the bathroom  - Fall risk education provided     Problem: Bowel Elimination  Goal: Patient will participate in bowel management program  Outcome: Not Progressing  Note: Patient is constipated and received prune juice with evening medications     "

## 2024-01-03 NOTE — THERAPY
"Physical Therapy   Initial Evaluation     Patient Name: Regine Bryant  Age:  89 y.o., Sex:  female  Medical Record #: [id number]  Today's Date: 1/3/2024     Subjective    \"I want to be able to walk on my own and I want my neck pain to go away\"     Objective       01/03/24 1030   PT Charge Group   PT Therapeutic Activities (Units) 1   PT Evaluation PT Evaluation Low   PT Total Time Spent   PT Individual Total Time Spent (Mins) 60   Prior Living Situation   Prior Services Home-Independent;Meals on Wheels;Other (Comments)   Housing / Facility 1 Story House   Steps Into Home 3   Rail Right Rail (Steps into Home)  (1 step at back entrance which pt prefers to use)   Bathroom Set up Grab Bars;Shower Chair;Walk In Shower   Equipment Owned Front-Wheel Walker;Single Point Cane   Lives with - Patient's Self Care Capacity Alone and Able to Care For Self   Prior Level of Functional Mobility   Bed Mobility Independent   Transfer Status Independent   Ambulation Independent   Distance Ambulation (Feet)   (limited community)   Assistive Devices Used Single Point Cane   Stairs Independent   Vitals   Pulse 65   Patient BP Position Sitting   Blood Pressure  (!) 165/54   Pulse Oximetry 95 %   O2 (LPM) 2   O2 Delivery Device Nasal Cannula   Pain 0 - 10 Group   Location Neck   Location Orientation Lateral;Left   Pain Rating Scale (NPRS) 10   Description Sharp;Aching   Therapist Pain Assessment Post Activity Pain Same as Prior to Activity   Cognition    Orientation Level Oriented x 4   Level of Consciousness Alert  (though fatigued)   Ability To Follow Commands 2 Step   Strength Lower Body   Lower Body Strength  X   Gross Strength Generalized Weakness, Equal Bilaterally   Comments pt reports L>R sided weakness though both sides equally weak on formal assessment, decr L stance stability with ambulation   Sensation Lower Body   Lower Extremity Sensation   X   Paresthesia Present    Comments numbness in B plantar surface of feet, " tingling in R UE 3rd digit   Lower Body Muscle Tone   Lower Body Muscle Tone  WDL   Balance Assessment   Sitting Balance (Static) Fair -   Sitting Balance (Dynamic) Poor +   Standing Balance (Static) Trace +   Standing Balance (Dynamic) Trace +   Weight Shift Sitting Poor   Weight Shift Standing Poor   Comments standing assessed without UE support   Bed Mobility    Supine to Sit Moderate Assist   Sit to Supine Maximal Assist   Sit to Stand Minimal Assist   Scooting Moderate Assist   Rolling Minimal Assist to Rt.   Roll Left and Right   Assistance Needed Physical assistance   Physical Assistance Level 25% or less   CARE Score - Roll Left and Right 3   Roll Left and Right Discharge Goal   Discharge Goal 6   Sit to Lying   Assistance Needed Physical assistance   Physical Assistance Level 26%-50%   CARE Score - Sit to Lying 3   Sit to Lying Discharge Goal   Discharge Goal 6   Lying to Sitting on Side of Bed   Assistance Needed Physical assistance   Physical Assistance Level 26%-50%   CARE Score - Lying to Sitting on Side of Bed 3   Lying to Sitting on Side of Bed Discharge Goal   Discharge Goal 6   Sit to Stand   Assistance Needed Physical assistance   Physical Assistance Level 25% or less   CARE Score - Sit to Stand 3   Sit to Stand Discharge Goal   Discharge Goal 6   Chair/Bed-to-Chair Transfer   Assistance Needed Physical assistance   Physical Assistance Level 25% or less   CARE Score - Chair/Bed-to-Chair Transfer 3   Chair/Bed-to-Chair Transfer Discharge Goal   Discharge Goal 6   Toilet Transfer   Assistance Needed Physical assistance   Physical Assistance Level 26%-50%   CARE Score - Toilet Transfer 3   Toilet Transfer Discharge Goal   Discharge Goal 6   Car Transfer   Reason if not Attempted Safety concerns   CARE Score - Car Transfer 88   Car Transfer Discharge Goal   Discharge Goal 4   Walk 10 Feet   Assistance Needed Physical assistance   Physical Assistance Level 25% or less   CARE Score - Walk 10 Feet 3    Walk 10 Feet Discharge Goal   Discharge Goal 6   Walk 50 Feet with Two Turns   Reason if not Attempted Safety concerns   CARE Score - Walk 50 Feet with Two Turns 88   Walk 50 Feet with Two Turns Discharge Goal   Discharge Goal 4   Walk 150 Feet   Reason if not Attempted Safety concerns   CARE Score - Walk 150 Feet 88   Walk 150 Feet Discharge Goal   Discharge Goal 4   Walking 10 Feet on Uneven Surfaces   Reason if not Attempted Safety concerns   CARE Score - Walking 10 Feet on Uneven Surfaces 88   Walking 10 Feet on Uneven Surfaces Discharge Goal   Discharge Goal 4   1 Step (Curb)   Assistance Needed Physical assistance   Physical Assistance Level 25% or less   CARE Score - 1 Step (Curb) 3   1 Step (Curb) Discharge Goal   Discharge Goal 4   4 Steps   Reason if not Attempted Safety concerns   CARE Score - 4 Steps 88   4 Steps Discharge Goal   Discharge Goal 4   12 Steps   Reason if not Attempted Activity not applicable   CARE Score - 12 Steps 9   12 Steps Discharge Goal   Discharge Goal 9   Picking Up Object   Reason if not Attempted Safety concerns   CARE Score - Picking Up Object 88   Picking Up Object Discharge Goal   Discharge Goal 4   Wheel 50 Feet with Two Turns   Reason if not Attempted Activity not applicable   CARE Score - Wheel 50 Feet with Two Turns 9   Wheel 50 Feet with Two Turns Discharge Goal   Discharge Goal 9   Wheel 150 Feet   Reason if not Attempted Activity not applicable   CARE Score - Wheel 150 Feet 9   Wheel 150 Feet Discharge Goal   Discharge Goal 9   Gait Functional Level of Assist    Gait Level Of Assist Minimal Assist   Assistive Device Front Wheel Walker   Distance (Feet) 15   # of Times Distance was Traveled 1   Deviation Decreased Base Of Support;Step To;Antalgic;Bradykinetic;Trendelenberg   Stairs Functional Level of Assist   Level of Assist with Stairs Minimal Assist   # of Stairs Climbed 1   Stairs Description Limited by fatigue;Hand rails;Oxygen;Supervision for safety;Verbal  cueing;Safety concerns;Extra time   Transfer Functional Level of Assist   Bed, Chair, Wheelchair Transfer Minimal Assist   Bed Chair Wheelchair Transfer Description Adaptive equipment;Set-up of equipment;Supervision for safety;Verbal cueing;Increased time;Initial preparation for task  (stand pivot no AD vs stand step FWW)   Toilet Transfers Moderate Assist   Toilet Transfer Description Grab bar;Supervision for safety;Verbal cueing;Increased time;Initial preparation for task   Problem List    Problems Pain;Impaired Bed Mobility;Impaired Transfers;Impaired Ambulation;Functional Strength Deficit;Impaired Balance;Decreased Activity Tolerance   Precautions   Precautions Fall Risk   Comments 2L O2 wean   Current Discharge Plan   Current Discharge Plan Unknown at this Time   Interdisciplinary Plan of Care Collaboration   IDT Collaboration with  Physical Therapist;Occupational Therapist   Patient Position at End of Therapy Seated;Chair Alarm On;Self Releasing Lap Belt Applied;Call Light within Reach;Tray Table within Reach;Phone within Reach   Collaboration Comments CLOF/POC   PT DME Recommendations   Assistive Device   (pt has SPC and FWW)   Physical Therapist Assigned   Assigned PT / Treatment Time / Comments EL   Benefit   Therapy Benefit Patient Would Benefit from Inpatient Rehabilitation Physical Therapy to Maximize Functional [place] with ADLs, IADLs and Mobility.   Strengths & Barriers   Strengths Able to follow instructions;Good insight into deficits/needs;Independent prior level of function;Motivated for self care and independence;Pleasant and cooperative;Supportive family;Willingly participates in therapeutic activities   Barriers Generalized weakness;Fatigue;Decreased endurance;Home accessibility;Impaired activity tolerance;Impaired balance;Pain     Trialed RA with O2 desaturation to 87% in sitting, returned to 2L O2 with activity. RT notified and input in flowsheet    Patient education: reviewed PT plan of  care, rehab expectations, mobility needs and patient passport, orientation to unit, role of PT, fall risk and use of call light.     Assessment    The patient is a 89 y.o. 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     Patient's PMH includes:  Past Medical History:   Diagnosis Date    Hypercholesterolemia     Hypertension     Hypertension     Thyroid disease          PT evaluation performed today; functional performance at today's assessment is as above.     Additional factors influencing patient status and prognosis include: prior level of function, history of falls (2 recently without injury, has life alert button), and the above comorbidities.     The patient is performing well below their baseline level of function and will benefit from an interdisciplinary high intensity rehabilitation program to maximize functional independence, decrease burden of care, and support a safe return to home vs family's home with family support.     Plan  Recommend Physical Therapy  minutes per day 5-7 days per week for 2 weeks for the following treatments:  PT Gait Training, PT Therapeutic Exercises, PT Neuro Re-Ed/Balance, PT Therapeutic Activity, and PT Evaluation.    Passport items to be completed:  Get in/out of bed safely, in/out of a vehicle, safely use mobility device, walk or wheel around home/community, navigate up and down stairs, show how to get up/down from the ground, ensure home is accessible, demonstrate HEP, complete caregiver training    Goals:  Long term and short term goals have been discussed with patient and they are in agreement.          Physical Therapy Problems (Active)       Problem: Mobility       Dates: Start:  01/03/24         Goal: STG-Within one week, patient will ambulate 100ft FWW CGA       Dates: Start:  01/03/24            Goal: STG-Within one week, patient will ambulate  up/down a curb step with FWW Armando       Dates: Start:  01/03/24            Goal: STG-Within one week, patient will ascend and descend three stairs with R HR and CGA       Dates: Start:  01/03/24               Problem: Mobility Transfers       Dates: Start:  01/03/24         Goal: STG-Within one week, patient will perform bed mobility consistently with Armando       Dates: Start:  01/03/24            Goal: STG-Within one week, patient will transfer bed to chair SBA       Dates: Start:  01/03/24               Problem: PT-Long Term Goals       Dates: Start:  01/03/24         Goal: LTG-By discharge, patient will ambulate 150ft LRAD [place]       Dates: Start:  01/03/24            Goal: LTG-By discharge, patient will transfer one surface to another Arcadio       Dates: Start:  01/03/24            Goal: LTG-By discharge, patient will perform home exercise program with SPV       Dates: Start:  01/03/24            Goal: LTG-By discharge, patient will ambulate up/down 3 stairs R HR vs curb step LRAD SBA       Dates: Start:  01/03/24            Goal: LTG-By discharge, patient will transfer in/out of a car CGA       Dates: Start:  01/03/24            Goal: LTG-By discharge, patient will perform bed mobility independently       Dates: Start:  01/03/24

## 2024-01-03 NOTE — PROGRESS NOTES
Patient admitted to facility at 1630 via gurney; accompanied by hospital transport.  Patient assisted to room and positioned in bed for comfort and safety; call light within reach.  Patient assisted with stowing belongings and oriented to room and facility.  Admission assessment performed and documented in computer.  Admission paperwork completed; signed copies placed in chart.  Will continue to monitor.

## 2024-01-03 NOTE — FLOWSHEET NOTE
01/03/24 0825   Events/Summary/Plan   Events/Summary/Plan SpO2 check   Vital Signs   Pulse 70   Respiration 16   Pulse Oximetry 93 %   $ Pulse Oximetry (Spot Check) Yes   Respiratory Assessment   Respiratory Pattern Within Normal Limits   Level of Consciousness Alert   Chest Exam   Work Of Breathing / Effort Within Normal Limits   Oxygen   O2 Delivery Device Room air w/o2 available

## 2024-01-03 NOTE — PROGRESS NOTES
4 Eyes Skin Assessment Completed by VINCE Arnold and VINCE Abraham.    Head WDL  Ears WDL  Nose WDL  Mouth WDL  Neck WDL  Breast/Chest WDL  Shoulder Blades WDL  Spine WDL  (R) Arm/Elbow/Hand Bruising  (L) Arm/Elbow/Hand Bruising  Abdomen WDL  Groin WDL  Scrotum/Coccyx/Buttocks WDL  (R) Leg Scab  (L) Leg WDL  (R) Heel/Foot/Toe WDL  (L) Heel/Foot/Toe WDL            Interventions In Place Waffle Overlay and Pillows      Pictures Uploaded Into Epic Yes  Wound Consult Placed N/A  RN Wound Prevention Protocol Ordered Yes

## 2024-01-03 NOTE — PROGRESS NOTES
..                                                         NURSING DAILY NOTE    Name: Regine Bryant   Date of Admission: 1/2/2024   Admitting Diagnosis: Central stenosis of spinal canal  Attending Physician: Sushma Xiao M.d.  Allergies: Atorvastatin    Safety  Patient Assist  max  Patient Precautions     Precaution Comments     Bed Transfer Status     Toilet Transfer Status      Assistive Devices  Rails  Oxygen  Nasal Cannula  Diet/Therapeutic Dining  Current Diet Order   Procedures    Diet Order Diet: Cardiac     Pill Administration  whole  Agitated Behavioral Scale     ABS Level of Severity       Fall Risk  Has the patient had a fall this admission?   No  Marquita Butcher Fall Risk Scoring  14, MODERATE RISK  Fall Risk Safety Measures  bed alarm, chair alarm, poor balance, and low vision/ hearing    Vitals  Temperature: 36.3 °C (97.4 °F)  Temp src: Oral  Pulse: 75  Respiration: 18  Blood Pressure : 138/66  Blood Pressure MAP (Calculated): 90 MM HG     Oxygen  Pulse Oximetry: 93 %  O2 (LPM): 2  O2 Delivery Device: Nasal Cannula    Bowel and Bladder  Last Bowel Movement  12/31/23 (per report)  Stool Type     Bowel Device   (no BM since admit)  Continent  Bladder: Stress incontinence   Bowel:    Bladder Function  Urine Void (mL):  (Incontinence)  Urine Color: Yellow  Number of Times Incontinent of Urine: 1  Genitourinary Assessment   Bladder Assessment (WDL):  WDL Except  Santo Catheter: Not Applicable  Urinary Elimination: Incontinence  Urine Color: Yellow  Number of Times Incontinent of Urine: 1  Bladder Device: Diaper  Bladder Scan: Post Void  $ Bladder Scan Results (mL): 251    Skin  Mayco Score   17  Sensory Interventions   Bed Types: Standard/Trauma Mattress  Skin Preventative Measures: Waffle Overlay  Moisture Interventions         Pain  Pain Rating Scale  7 - Focus of attention, prevents doing daily activities  Pain Location  Neck  Pain Location Orientation  Posterior, Left  Pain  Interventions   Medication (see MAR)    ADLs    Bathing      Linen Change      Personal Hygiene  Change Crystal Pads, Moist Crystal Wipes, Perineal Care  Chlorhexidine Bath      Oral Care     Teeth/Dentures     Shave     Nutrition Percentage Eaten     Environmental Precautions     Patient Turns/Positioning  Patient Turns Self from Side to Side  Patient Turns Assistance/Tolerance     Bed Positions     Head of Bed Elevated         Psychosocial/Neurologic Assessment  Psychosocial Assessment  Psychosocial (WDL):  WDL Except  Patient Behaviors: Anxious  Family Behaviors: Family Present  Neurologic Assessment  Neuro (WDL): Exceptions to WDL  Level of Consciousness: Alert  Orientation Level: Oriented X4  Cognition: Follows commands  Speech: Clear  Pupil Assesment: No  Muscle Strength Right Arm: Good Strength Against Gravity and Moderate Resistance  Muscle Strength Left Arm: Good Strength Against Gravity and Moderate Resistance  Muscle Strength Right Leg: Good Strength Against Gravity and Moderate Resistance  Muscle Strength Left Leg: Good Strength Against Gravity and Moderate Resistance  EENT (WDL):  WDL Except    Cardio/Pulmonary Assessment  Edema      Respiratory Breath Sounds  RUL Breath Sounds: Clear  RML Breath Sounds: Clear  RLL Breath Sounds: Diminished  SERVANDO Breath Sounds: Clear  LLL Breath Sounds: Diminished  Cardiac Assessment   Cardiac (WDL):  WDL Except (hx chf, htn)

## 2024-01-03 NOTE — FLOWSHEET NOTE
01/03/24 0825   Events/Summary/Plan   Events/Summary/Plan SpO2 check   Vital Signs   $ Pulse Oximetry (Spot Check) Yes   Respiratory Assessment   Respiratory Pattern Within Normal Limits   Level of Consciousness Alert   Chest Exam   Work Of Breathing / Effort Within Normal Limits   Oxygen   O2 Delivery Device Room air w/o2 available

## 2024-01-03 NOTE — PROGRESS NOTES
"  Physical Medicine & Rehabilitation Progress Note    Encounter Date: 1/3/2024    Chief Complaint: Decreased mobility, weakness    Interval Events (Subjective):  Patient sitting up in room. She reports ongoing neck pain. She reports it is mostly left sided. She reports lidocaine patches are only helping slightly. SBP into 150s with recent elevation in Cr, will consult Hospitalist.    Objective:  VITAL SIGNS: BP (!) 158/80   Pulse 74   Temp 36.3 °C (97.3 °F)   Resp 18   Ht 1.448 m (4' 9\")   Wt 62.1 kg (137 lb)   SpO2 96%   BMI 29.65 kg/m²   Gen: NAD  Psych: Mood and affect appropriate  CV: RRR, 1+ edema  Resp: CTAB, no upper airway sounds  Abd: NTND  Neuro: AOx4, following commands    Laboratory Values:  Recent Results (from the past 72 hour(s))   EKG    Collection Time: 23  3:43 PM   Result Value Ref Range    Report       [facility] Emergency Dept.    Test Date:  2023  Pt Name:    SHAHZAD CACERES             Department: ER  MRN:        [id number]                      Room:       [facility]  Gender:     Female                       Technician: [id number]  :        [id number]                   Requested By:ER TRIAGE PROTOCOL  Order #:    [id number]                    Reading MD:    Measurements  Intervals                                Axis  Rate:       107                          P:          84  CT:         156                          QRS:        33  QRSD:       87                           T:          57  QT:         341  QTc:        455    Interpretive Statements  Sinus tachycardia  Left atrial enlargement  Probable anteroseptal infarct, old  Compared to ECG 2023 15:20:19  Atrial abnormality now present  Sinus rhythm no longer present  First degree AV block no longer present  Myocardial infarct finding still present     Lipid Profile    Collection Time: 24 12:56 AM   Result Value Ref Range    Cholesterol,Tot 185 100 - 199 mg/dL    Triglycerides 140 0 - 149 mg/dL    HDL 39 " (A) >=40 mg/dL     (H) <100 mg/dL   CBC without Differential    Collection Time: 01/01/24 12:56 AM   Result Value Ref Range    WBC 9.3 4.8 - 10.8 K/uL    RBC 3.60 (L) 4.20 - 5.40 M/uL    Hemoglobin 11.1 (L) 12.0 - 16.0 g/dL    Hematocrit 34.4 (L) 37.0 - 47.0 %    MCV 95.6 81.4 - 97.8 fL    MCH 30.8 27.0 - 33.0 pg    MCHC 32.3 32.2 - 35.5 g/dL    RDW 49.6 35.9 - 50.0 fL    Platelet Count 164 164 - 446 K/uL    MPV 10.3 9.0 - 12.9 fL   Comp Metabolic Panel (CMP)    Collection Time: 01/01/24 12:56 AM   Result Value Ref Range    Sodium 136 135 - 145 mmol/L    Potassium 4.3 3.6 - 5.5 mmol/L    Chloride 101 96 - 112 mmol/L    Co2 23 20 - 33 mmol/L    Anion Gap 12.0 7.0 - 16.0    Glucose 158 (H) 65 - 99 mg/dL    Bun 19 8 - 22 mg/dL    Creatinine 0.98 0.50 - 1.40 mg/dL    Calcium 10.0 8.5 - 10.5 mg/dL    Correct Calcium 9.9 8.5 - 10.5 mg/dL    AST(SGOT) 21 12 - 45 U/L    ALT(SGPT) 17 2 - 50 U/L    Alkaline Phosphatase 102 (H) 30 - 99 U/L    Total Bilirubin 0.4 0.1 - 1.5 mg/dL    Albumin 4.1 3.2 - 4.9 g/dL    Total Protein 6.5 6.0 - 8.2 g/dL    Globulin 2.4 1.9 - 3.5 g/dL    A-G Ratio 1.7 g/dL   Magnesium    Collection Time: 01/01/24 12:56 AM   Result Value Ref Range    Magnesium 2.0 1.5 - 2.5 mg/dL   ESTIMATED GFR    Collection Time: 01/01/24 12:56 AM   Result Value Ref Range    GFR (CKD-EPI) 53 (A) >60 mL/min/1.73 m 2   CBC WITHOUT DIFFERENTIAL    Collection Time: 01/02/24  1:30 AM   Result Value Ref Range    WBC 7.7 4.8 - 10.8 K/uL    RBC 3.49 (L) 4.20 - 5.40 M/uL    Hemoglobin 10.9 (L) 12.0 - 16.0 g/dL    Hematocrit 33.2 (L) 37.0 - 47.0 %    MCV 95.1 81.4 - 97.8 fL    MCH 31.2 27.0 - 33.0 pg    MCHC 32.8 32.2 - 35.5 g/dL    RDW 48.3 35.9 - 50.0 fL    Platelet Count 152 (L) 164 - 446 K/uL    MPV 10.6 9.0 - 12.9 fL   Basic Metabolic Panel    Collection Time: 01/02/24  1:30 AM   Result Value Ref Range    Sodium 136 135 - 145 mmol/L    Potassium 4.5 3.6 - 5.5 mmol/L    Chloride 100 96 - 112 mmol/L    Co2 23 20 - 33  mmol/L    Glucose 182 (H) 65 - 99 mg/dL    Bun 35 (H) 8 - 22 mg/dL    Creatinine 1.41 (H) 0.50 - 1.40 mg/dL    Calcium 10.0 8.5 - 10.5 mg/dL    Anion Gap 13.0 7.0 - 16.0   MAGNESIUM    Collection Time: 01/02/24  1:30 AM   Result Value Ref Range    Magnesium 2.1 1.5 - 2.5 mg/dL   ESTIMATED GFR    Collection Time: 01/02/24  1:30 AM   Result Value Ref Range    GFR (CKD-EPI) 34 (A) >60 mL/min/1.73 m 2   TSH with Reflex to FT4    Collection Time: 01/03/24  5:25 AM   Result Value Ref Range    TSH 0.550 0.380 - 5.330 uIU/mL   Vitamin D, 25-hydroxy (blood)    Collection Time: 01/03/24  5:25 AM   Result Value Ref Range    25-Hydroxy   Vitamin D 25 31 30 - 100 ng/mL       Medications:  Scheduled Medications   Medication Dose Frequency    senna-docusate  2 Tablet BID    omeprazole  20 mg DAILY    acetaminophen  650 mg Q6HRS    aspirin  81 mg DAILY    carvedilol  6.25 mg BID WITH MEALS    enoxaparin (LOVENOX) injection  40 mg DAILY AT 1800    levothyroxine  25 mcg AM ES    lidocaine  2 Patch Q24HR    dexamethasone  4 mg Q8HRS     PRN medications: Respiratory Therapy Consult, hydrALAZINE, acetaminophen, senna-docusate **AND** polyethylene glycol/lytes **AND** magnesium hydroxide **AND** bisacodyl, mag hydrox-al hydrox-simeth, ondansetron **OR** ondansetron, traZODone, sodium chloride, oxyCODONE immediate-release **OR** oxyCODONE immediate-release, traMADol    Diet:  Current Diet Order   Procedures    Diet Order Diet: Cardiac       Medical Decision Making and Plan:  Cervical Myelopathy - Patient with cervical stenosis as well as paratracheal mass with left sided weakness and neck/head pain concerning for myelopathy. Patient elected conservative management  -PT and OT for mobility and ADLs. Per guidelines, 15 hours per week between PT, OT and/or SLP.  -Follow-up PCP     HTN/CHF - Patient on ASA. Patient on Coreg 6.25 mg, Lasix 20 mg (on hold on transfer). Consult hospitalist into 150s     HLD - Patient not on statin on transfer.  Had reaction to atorvastatin in the past.      CKD3a - Avoid nephrotoxic agents. Check AM CMP - Cr pending but had increased > 20%, will consult hospitalist     Pulmonary nodules - Patient electing no further work-up.      Anemia - Check AM CBC - pending     Azotemia - Check AM CMP - pending     Hypothyroidism - Patient on Levothyroxine 25 mcg     Obesity due to excess calories - BMI of 30.9 on admission, meets medical criteria.      Pain - Patient on APAP/Oxycodone PRN     Neurogenic bladder - Having urge incontinence, most likely neurogenic with limited voiding. Will check bladder scans and PVRs. May need to learn CIC     Skin - Patient at risk for skin breakdown due to debility in areas including sacrum, achilles, elbows and head in addition to other sites. Nursing to assess skin daily.      GI Ppx - Patient on Prilosec for GERD prophylaxis. Patient on Senna-docusate for constipation prophylaxis.      DVT Ppx - Patient Lovenox on transfer.  ____________________________________    T. René Xiao MD/PhD  [facility] - Physical Medicine & Rehabilitation   [facility] - Brain Injury Medicine   ____________________________________    Total time:  50 minutes. Time spent included pre-rounding review of vitals and tests, unit/floor time, face-to-face time with the patient including physical examination, care coordination, counseling of patient and/or family, ordering medications/procedures/tests, discussion with CM, PT, OT, SLP and/or other healthcare providers, and documentation in the electronic medical record. Topics discussed included admission labs pending, GERARD on CKD, elevated SBP and consult hospitalist. Patient's case was discussed face to face with Hospitalist on [facility] floor.

## 2024-01-03 NOTE — FLOWSHEET NOTE
01/02/24 1832   Protocol Assessment   Initial Assessment Yes   Patient History   Pulmonary Diagnosis None   Procedures Relevant to Respiratory Status None   Home O2 Yes   Oxygen liter flow 2   Oxygen at night only Yes   Nocturnal CPAP No   Home Treatments/Frequency No   Protocol Pathways   Protocol Pathways None

## 2024-01-03 NOTE — THERAPY
"Occupational Therapy  Daily Treatment     Patient Name: Regine Bryant  Age:  89 y.o., Sex:  female  Medical Record #: [id number]  Today's Date: 1/3/2024               Subjective    \" I can't get it up at all. \" Stated after several attempts to drink water  using a straw      Objective       01/03/24 0931   OT Charge Group   OT Therapy Activity (Units) 1   OT Therapeutic Exercise (Units) 1   OT Total Time Spent   OT Individual Total Time Spent (Mins) 30   Pain 0 - 10 Group   Location Neck   Location Orientation Posterior;Left   Therapist Pain Assessment Prior to Activity;During Activity;Post Activity;Nurse Notified;9  (ice pack to area  x 20 mintues for comfort with no resolve)   Functional Level of Assist   Eating Minimal Assist  (requried assist to drink water from a cup    unable to manage a straw  requried therapist to hold cup)   Sitting Upper Body Exercises   Bicep Curls 2 sets of 10;Right ;Left   Pronation / Supination 2 sets of 10;Right ;Left   Comments no weight   seated in chair         Assessment    Very limited by pain significant  bilateral UE   shoulder limitations    Strengths: Able to follow instructions, Alert and oriented, Good insight into deficits/needs, Independent prior level of function, Motivated for self care and independence, Pleasant and cooperative, Supportive family, Willingly participates in therapeutic activities  Barriers: Bladder incontinence, Decreased endurance, Fatigue, Generalized weakness, Impaired activity tolerance, Impaired balance, Pain, Limited mobility, Visual impairment    Plan     ADL  , related mobility and transfer    strength/endurance building  pain mgmt     DME  OT DME Recommendations  Bathroom Equipment:  (Pt owns shower chair; family looking to install fixated GB in shower)  Additional Equipment:  (LHS, reacher, sock aid)    Occupational Therapy Goals (Active)       There are no active problems.            "

## 2024-01-03 NOTE — CARE PLAN
Problem: Pain - Standard  Goal: Alleviation of pain or a reduction in pain to the patient’s comfort goal  Outcome: Progressing  Note: Patient able to verbalize pain level and verbalize an acceptable level of pain.   The patient is Stable - Low risk of patient condition declining or worsening         Progress made toward(s) clinical / shift goals:  pt using prn and scheduled meds when needed    Patient is not progressing towards the following goals:

## 2024-01-03 NOTE — THERAPY
Physical Therapy   Daily Treatment     Patient Name: Regine Bryant  Age:  89 y.o., Sex:  female  Medical Record #: [id number]  Today's Date: 1/3/2024     Precautions  Precautions: Fall Risk  Comments: 2L O2 wean    Subjective    Pt was supine in bed upon arrival and agreeable to treatment.        Objective       01/03/24 1301   PT Charge Group   PT Therapeutic Activities (Units) 2   PT Total Time Spent   PT Individual Total Time Spent (Mins) 30   Precautions   Precautions Fall Risk   Transfer Functional Level of Assist   Bed, Chair, Wheelchair Transfer Minimal Assist   Bed Chair Wheelchair Transfer Description Set-up of equipment;Supervision for safety;Verbal cueing  (SPT no AD)   Toilet Transfers Minimal Assist   Toilet Transfer Description Grab bar;Verbal cueing;Supervision for safety   Bed Mobility    Supine to Sit Moderate Assist   Sit to Supine Moderate Assist   Sit to Stand Minimal Assist   Scooting Minimal Assist   Interdisciplinary Plan of Care Collaboration   Patient Position at End of Therapy In Bed;Call Light within Reach;Tray Table within Reach;Phone within Reach     Transfer and bed mobility training with focus on pain management and mechanics  Assisted pt to/from toilet for UA collection, toileting with max A, assisted with pants and brief change  Pt able to complete grooming (brushing teeth) with set up    Assessment    Pt pain limited this session but was agreeable to participate with encouragement.  Pt also limited d/t poor activity tolerance, intermittent SOB on 2L, and decreased endurance.   Strengths: Able to follow instructions, Good insight into deficits/needs, Independent prior level of function, Motivated for self care and independence, Pleasant and cooperative, Supportive family, Willingly participates in therapeutic activities  Barriers: Generalized weakness, Fatigue, Decreased endurance, Home accessibility, Impaired activity tolerance, Impaired balance, Pain    Plan    Progress gait  training with FWW, transfer training with FWW, pain management strategies, LE strengthening    DME  PT DME Recommendations  Assistive Device:  (pt has SPC and FWW)  Additional Equipment:  (LHS, reacher, sock aid)    Passport items to be completed:  Get in/out of bed safely, in/out of a vehicle, safely use mobility device, walk or wheel around home/community, navigate up and down stairs, show how to get up/down from the ground, ensure home is accessible, demonstrate HEP, complete caregiver training    Physical Therapy Problems (Active)       Problem: Mobility       Dates: Start:  01/03/24         Goal: STG-Within one week, patient will ambulate 100ft FWW CGA       Dates: Start:  01/03/24            Goal: STG-Within one week, patient will ambulate up/down a curb step with FWW Armando       Dates: Start:  01/03/24            Goal: STG-Within one week, patient will ascend and descend three stairs with R HR and CGA       Dates: Start:  01/03/24               Problem: Mobility Transfers       Dates: Start:  01/03/24         Goal: STG-Within one week, patient will perform bed mobility consistently with Armando       Dates: Start:  01/03/24            Goal: STG-Within one week, patient will transfer bed to chair SBA       Dates: Start:  01/03/24               Problem: PT-Long Term Goals       Dates: Start:  01/03/24         Goal: LTG-By discharge, patient will ambulate 150ft LRAD Arcadio       Dates: Start:  01/03/24            Goal: LTG-By discharge, patient will transfer one surface to another Arcadio       Dates: Start:  01/03/24            Goal: LTG-By discharge, patient will perform home exercise program with SPV       Dates: Start:  01/03/24            Goal: LTG-By discharge, patient will ambulate up/down 3 stairs R HR vs curb step LRAD SBA       Dates: Start:  01/03/24            Goal: LTG-By discharge, patient will transfer in/out of a car CGA       Dates: Start:  01/03/24            Goal: LTG-By discharge, patient will perform  bed mobility independently       Dates: Start:  01/03/24

## 2024-01-03 NOTE — PROGRESS NOTES
Patient off to Rehab with transport.   Went over AVS with patient.   Awaiting callback for report.

## 2024-01-03 NOTE — DISCHARGE PLANNING
"CASE MANAGEMENT INITIAL ASSESSMENT    Admit Date:  1/2/2024     Patient is a  89 y.o. female transferred from [facility] where she was hospitalized from 12/31 to 1/2.  I  spoke with pt's dtr Yonialeida Chau to confirm information for assessment.      Per medical records, \"MRI of the C-spine was consistent with degenerative changes with mild to moderate marginal osteophytosis and moderate central canal stenosis at C4.  The patient was started on pain management.  She did not wish to have any invasive procedures due to her age and comorbidities.     CTA of the neck revealed hyperdense or hypervascular right paratracheal mass suspicious for neoplasm.  This finding was discussed with the patient and her daughters on admission and they were not interested in further invasive workup for this.       Dc plan: Plan is for Regine to return to her 1LV home with 3STE home in [place]; Dtr  Kika who works part time will move in with her; other dtr Yoni owns a business. Patient will have 24/7 at home between two daughters. PT has FWW/4WW//SPC/shower chair @ home; handrails in place.  Has [facility] [employer] Whe[facility] and [facility]  in place.       Diagnosis: Cervical myelopathy (HCC) [G95.9]    Co-morbidities:   Patient Active Problem List    Diagnosis Date Noted    Cervical myelopathy (HCC) 01/02/2024    Facial droop 12/31/2023    Advance care planning 12/31/2023    DNI (do not intubate) 12/31/2023    Cervical pain 12/31/2023    Cervical central stenosis of spinal canal 12/31/2023    Mass in neck 12/31/2023    Chronic congestive heart failure (HCC) 12/20/2023    Fibromyalgia affecting multiple sites 11/21/2023    Acute cough 11/14/2023    Vasomotor rhinitis 11/14/2023    Myalgia 09/08/2023    Chronic right shoulder pain 09/08/2023    Physical deconditioning 09/08/2023    Angina pectoris, unspecified (HCC) 04/25/2023    AK (actinic keratosis) 03/08/2023    Dyspnea on exertion 08/17/2022    Peripheral neuropathy 11/03/2021    History of fall " 09/23/2021    Liver lesion 09/23/2021    History of DVT (deep vein thrombosis) 09/23/2021    Gait disorder 09/23/2021    Stage 3a chronic kidney disease (HCC) 09/23/2021    Dyslipidemia 09/01/2021    BMI 29.0-29.9,adult 08/30/2021    Chronic respiratory failure with hypoxia, on home oxygen therapy (HCC) 08/30/2021    Hyperparathyroidism (HCC) 12/29/2017    Hypercalcemia 12/29/2017    HTN (hypertension) 09/20/2016    Hypothyroid 09/20/2016    Lung mass 09/20/2016    Anemia 09/20/2016    DNR (do not resuscitate) 09/20/2016     Prior Living Situation:  Housing / Facility: 1 Story House w/ 3 [address] in [place]  Lives with - Patient's Self Care Capacity: Alone and Able to Care For Self    Prior Level of Function:  Medication Management: Indep  Finances: Indep  Home Management: Indep  Shopping: Dtrs rotate and take pt shopping  Prior Level Of Mobility: Independent With Device in Community, Independent With Device in Home  Driving / Transportation: Relatives / Others Provide Transportation    Support Systems:  Dtrs:  Yoni Chau  and Kika Bryant   Advance Directives:  (has advanced directive and POLST)  Power of  (Name & Phone): none    Previous Services Utilized:   Prior Services: [facility]; Me[employer] on Wheels     Other Information:  Occupation (Pre-Hospital Vocational): Retired Due To Age  PCP: Jo Ann GASPAR     Patient / Family Goal:   Return home to previous living situation;  family confirms they will be able to provide 24 hr care @ home - dtr Kika BROWN will be able to stay w/ pt temporarily      Plan:  1. Continue to follow patient through hospitalization and provide discharge planning in collaboration with patient, family, physicians and ancillary services.     2. Utilize community resources to ensure a safe discharge.    3. Anticipate resume home health; follow up with pcp.

## 2024-01-03 NOTE — CONSULTS
HOSPITAL MEDICINE CONSULTATION    Requesting Physician:  Dr. Xiao    Reason for Consult:  Acute Kidney Injury, Hypertension    History of Present Illness:  The patient is a 89-year-old  female with past medical history significant for hypertension, stage three chronic kidney disease, hypothyroidism, and known lung mass for which the family has reportedly declined further work up.  She was admitted to [facility] on 12/31/23 for left facial numbness and left neck pain.  Neuroimaging revealed cervical stenosis, for which the patient was conservatively managed with Decadron.  Due to her ongoing functional debility, the patient was transferred to [facility] on 1/2/24.  Hospital Medicine consultation is requested to assist in the management of this patient's acute kidney injury and hypertension.  She is also noted to have thrombocytopenia.  Review of systems is positive for chest pain and constipation.    Review of Systems:  Review of Systems   Constitutional:  Negative for chills and fever.   HENT: Negative.     Eyes: Negative.    Respiratory:  Negative for cough and shortness of breath.    Cardiovascular:  Positive for chest pain. Negative for palpitations.   Gastrointestinal:  Positive for constipation. Negative for abdominal pain, nausea and vomiting.   Musculoskeletal:  Positive for joint pain.   Skin:  Negative for itching and rash.   Endo/Heme/Allergies:  Negative for polydipsia. Does not bruise/bleed easily.   All other systems reviewed and are negative.      Allergies:  Allergies   Allergen Reactions    Atorvastatin        Medications:    Current Facility-Administered Medications:     diclofenac sodium (Voltaren) 1 % gel 4 g, 4 g, Topical, TID, Sushma Xiao M.D., 4 g at 01/03/24 2038    simethicone (Mylicon) chewable tablet 125 mg, 125 mg, Oral, 4X/DAY WITH MEALS + NIGHTLY, Kanwal Wallace M.D., 125 mg at 01/03/24 2100    Respiratory Therapy Consult, ,  Nebulization, Continuous RT, Sushma Xiao M.D.    hydrALAZINE (Apresoline) tablet 25 mg, 25 mg, Oral, Q8HRS PRN, Sushma Xiao M.D., 25 mg at 01/03/24 0536    acetaminophen (Tylenol) tablet 650 mg, 650 mg, Oral, Q4HRS PRN, Sushma Xiao M.D.    senna-docusate (Pericolace Or Senokot S) 8.6-50 MG per tablet 2 Tablet, 2 Tablet, Oral, BID, 2 Tablet at 01/03/24 2030 **AND** polyethylene glycol/lytes (Miralax) Packet 1 Packet, 1 Packet, Oral, QDAY PRN, 1 Packet at 01/03/24 1715 **AND** magnesium hydroxide (Milk Of Magnesia) suspension 30 mL, 30 mL, Oral, QDAY PRN **AND** bisacodyl (Dulcolax) suppository 10 mg, 10 mg, Rectal, QDAY PRN, Sushma Xiao M.D.    omeprazole (PriLOSEC) capsule 20 mg, 20 mg, Oral, DAILY, Sushma Xiao M.D., 20 mg at 01/03/24 0813    mag hydrox-al hydrox-simeth (Maalox Plus Es Or Mylanta Ds) suspension 20 mL, 20 mL, Oral, Q2HRS PRN, Sushma Xiao M.D.    ondansetron (Zofran ODT) dispertab 4 mg, 4 mg, Oral, 4X/DAY PRN **OR** ondansetron (Zofran) syringe/vial injection 4 mg, 4 mg, Intramuscular, 4X/DAY PRN, Sushma Xiao M.D.    traZODone (Desyrel) tablet 50 mg, 50 mg, Oral, QHS PRN, Sushma Xiao M.D.    sodium chloride (Ocean) 0.65 % nasal spray 2 Spray, 2 Spray, Nasal, PRN, Sushma Xiao M.D.    oxyCODONE immediate-release (Roxicodone) tablet 5 mg, 5 mg, Oral, Q3HRS PRN, 5 mg at 01/03/24 2037 **OR** oxyCODONE immediate release (Roxicodone) tablet 10 mg, 10 mg, Oral, Q3HRS PRN, Sushma Xiao M.D.    traMADol (Ultram) 50 MG tablet 50 mg, 50 mg, Oral, Q4HRS PRN, Sushma Xiao M.D., 50 mg at 01/03/24 1721    acetaminophen (Tylenol) tablet 650 mg, 650 mg, Oral, Q6HRS, Sushma Xiao M.D., 650 mg at 01/03/24 1715    aspirin (Asa) chewable tab 81 mg, 81 mg, Oral, DAILY, Sushma Xiao M.D., 81 mg at 01/03/24 1005    carvedilol (Coreg) tablet 6.25 mg, 6.25 mg, Oral,  BID WITH MEALS, Sushma Xiao M.D., 6.25 mg at 01/03/24 1715    enoxaparin (Lovenox) inj 40 mg, 40 mg, Subcutaneous, DAILY AT 1800, Sushma Xiao M.D., 40 mg at 01/03/24 1716    levothyroxine (Synthroid) tablet 25 mcg, 25 mcg, Oral, AM ES, Sushma Xiao M.D., 25 mcg at 01/03/24 0531    lidocaine (Asperflex) 4 % patch 2 Patch, 2 Patch, Transdermal, Q24HR, Sushma Xiao M.D., 2 Patch at 01/03/24 1005    dexamethasone (Decadron) tablet 4 mg, 4 mg, Oral, Q8HRS, Sushma Xiao M.D., 4 mg at 01/03/24 2029    Past Medical/Surgical History:  Past Medical History:   Diagnosis Date    Hypercholesterolemia     Hypertension     Hypertension     Thyroid disease      Past Surgical History:   Procedure Laterality Date    ABDOMINAL HYSTERECTOMY TOTAL      GYN SURGERY      hyst and bladder suspension       Social History:  Social History     Socioeconomic History    Marital status:      Spouse name: Not on file    Number of children: Not on file    Years of education: Not on file    Highest education level: Not on file   Occupational History    Not on file   Tobacco Use    Smoking status: Never    Smokeless tobacco: Never   Vaping Use    Vaping Use: Never used   Substance and Sexual Activity    Alcohol use: Yes     Comment: OCC wine    Drug use: No    Sexual activity: Not on file   Other Topics Concern    Not on file   Social History Narrative    , lives alone. Retired, worked in an [employer]     Has 3 children, live near by and see's them fairly often.      Social Determinants of Health     Financial Resource Strain: Low Risk  (5/11/2023)    Overall Financial Resource Strain (CARDIA)     Difficulty of Paying Living Expenses: Not hard at all   Recent Concern: Financial Resource Strain - High Risk (3/8/2023)    Overall Financial Resource Strain (CARDIA)     Difficulty of Paying Living Expenses: Hard   Food Insecurity: No Food Insecurity (5/11/2023)    Hunger Vital  Sign     Worried About Running Out of Food in the Last Year: Never true     Ran Out of Food in the Last Year: Never true   Transportation Needs: No Transportation Needs (1/3/2024)    PRAPARE - Transportation     Lack of Transportation (Medical): No     Lack of Transportation (Non-Medical): No   Physical Activity: Inactive (5/11/2023)    Exercise Vital Sign     Days of Exercise per Week: 0 days     Minutes of Exercise per Session: 0 min   Stress: No Stress Concern Present (5/11/2023)    [employer] - Occupational Stress Questionnaire     Feeling of Stress : Not at all   Social Connections: Feeling Socially Integrated (12/15/2023)    OASIS : Social Isolation     Frequency of experiencing loneliness or isolation: Never   Intimate Partner Violence: Not on file   Housing Stability: Low Risk  (5/11/2023)    Housing Stability Vital Sign     Unable to Pay for Housing in the Last Year: No     Number of Places Lived in the Last Year: 1     Unstable Housing in the Last Year: No       Family History:  Family History   Problem Relation Age of Onset    Hypertension Brother        Physical Examination:   Vitals:    01/03/24 1030 01/03/24 1045 01/03/24 1047 01/03/24 1535   BP: (!) 165/54  (!) 158/80 132/80   Pulse: 65  74 70   Resp:   18 18   Temp:   36.3 °C (97.3 °F) 36 °C (96.8 °F)   TempSrc:       SpO2: 95% (!) 87% 96% 96%   Weight:       Height:           Physical Exam  Vitals reviewed.   Constitutional:       General: She is not in acute distress.     Appearance: Normal appearance. She is not ill-appearing.   HENT:      Head: Normocephalic and atraumatic.      Right Ear: External ear normal.      Left Ear: External ear normal.      Nose: Nose normal.      Mouth/Throat:      Pharynx: Oropharynx is clear.   Eyes:      General:         Right eye: No discharge.         Left eye: No discharge.      Extraocular Movements: Extraocular movements intact.      Conjunctiva/sclera: Conjunctivae normal.    Cardiovascular:      Rate and Rhythm: Normal rate and regular rhythm.   Pulmonary:      Effort: No respiratory distress.      Breath sounds: No wheezing.      Comments: Decreased BS  Abdominal:      General: Bowel sounds are normal. There is distension.      Palpations: Abdomen is soft.      Tenderness: There is no abdominal tenderness. There is no guarding or rebound.   Musculoskeletal:      Cervical back: Normal range of motion and neck supple.      Right lower leg: No edema.      Left lower leg: No edema.   Skin:     General: Skin is warm and dry.   Neurological:      Mental Status: She is alert and oriented to person, place, and time.         Laboratory Data:  Recent Labs     01/01/24  0056 01/02/24  0130 01/03/24  1310   WBC 9.3 7.7 13.4*   RBC 3.60* 3.49* 3.74*   HEMOGLOBIN 11.1* 10.9* 11.7*   HEMATOCRIT 34.4* 33.2* 36.6*   MCV 95.6 95.1 97.9*   MCH 30.8 31.2 31.3   MCHC 32.3 32.8 32.0*   RDW 49.6 48.3 49.2   PLATELETCT 164 152* 173   MPV 10.3 10.6 10.8     Recent Labs     01/01/24  0056 01/02/24  0130 01/03/24  0525   SODIUM 136 136 137   POTASSIUM 4.3 4.5 4.2   CHLORIDE 101 100 101   CO2 23 23 20   GLUCOSE 158* 182* 162*   BUN 19 35* 45*   CREATININE 0.98 1.41* 1.13   CALCIUM 10.0 10.0 10.1           Impressions/Recommendations:  HTN (hypertension)  Echo 4/26/23 EF 60%, grade I DD, RVSP 24 mmHg  Observe blood pressure trends on Coreg    Abdominal distension  Check KUB    Chest pain  Check BNP, Trop, EKG, and CXR    Hypothyroid  Euthyroid on Synthroid    Thrombocytopenia (HCC)  Follow PLT    GERARD (acute kidney injury) (HCC)  Also has h/o Stage III CKD  Request FENa  Start IVF  Closely follow renal function    Cervical central stenosis of spinal canal  Non-surgical management  On Decadron  Ongoing management per Physiatry    Lung mass  Family desires no further work up    DNR    Discussed with RN Timothy) and Dr. Xiao.  Thank you for the opportunity to assist in this patient's care.  We will continue  to follow along with you.

## 2024-01-03 NOTE — THERAPY
Occupational Therapy   Initial Evaluation     Patient Name: Regine Bryant  Age:  89 y.o., Sex:  female  Medical Record #: [id number]  Today's Date: 1/3/2024     Subjective    Pt encountered for OT supine in bed. Pleasant and agreeable to participate in OT eval and ADLs.      Objective       01/03/24 0701   OT Charge Group   Charges Yes   OT Self Care / ADL (Units) 1   OT Evaluation OT Evaluation Low   OT Total Time Spent   OT Individual Total Time Spent (Mins) 60   Prior Living Situation   Prior Services Home-Independent;Meals on Wheels;Other (Comments)   Housing / Facility 1 Story House   Steps Into Home 3   Rail Both Rail (Steps in Home)   Bathroom Set up Grab Bars;Shower Chair;Walk In Shower   Lives with - Patient's Self Care Capacity Alone and Able to Care For Self   Prior Level of ADL Function   Self Feeding Independent   Grooming / Hygiene Independent   Bathing Requires Assist  (In process of getting a CG to assist with bathing.)   Dressing Requires Assist   Toileting Independent   Prior Level of IADL Function   Medication Management Unable To Determine At This Time   Laundry Independent   Kitchen Mobility Requires Assist  (pt receives meals on wheels)   Finances Unable To Determine At This Time   Home Management Unable To Determine At This Time   Shopping Requires Assist   Prior Level Of Mobility Independent With Device in Community;Independent With Device in Home   Driving / Transportation Relatives / Others Provide Transportation   Occupation (Pre-Hospital Vocational) Retired Due To Age   Prior Functioning: Everyday Activities   Self Care Independent   Indoor Mobility (Ambulation) Independent   Stairs Independent   Functional Cognition Independent   Prior Device Use None of the given options  (Quad cane)   Cognitive Pattern Assessment   Cognitive Pattern Assessment Used BIMS   Brief Interview for Mental Status (BIMS)   Repetition of Three Words (First Attempt) 3   Temporal Orientation: Year Correct  "  Temporal Orientation: Month Accurate within 5 days   Temporal Orientation: Day Correct   Recall: \"Sock\" Yes, no cue required   Recall: \"Blue\" Yes, no cue required   Recall: \"Bed\" Yes, no cue required   BIMS Summary Score 15   Confusion Assessment Method (CAM)   Is there evidence of an acute change in mental status from the patient's baseline? Yes   Inattention Behavior present, fluctuates (comes and goes, changes in severity)   Disorganized thinking Behavior present, fluctuates (comes and goes, changes in severity)   Altered level of consciousness Behavior not present   Active ROM Upper Body   Active ROM Upper Body  X   Dominant Hand Right   Shoulder Elevation Symmetric  (PMHX of B rotator cuff tear, pt unable to flex shoulder >90 degrees.)   Bed Mobility    Supine to Sit Standby Assist   Sit to Stand Moderate Assist   Scooting Standby Assist   Eating   Assistance Needed Set-up / clean-up   Physical Assistance Level No physical assistance   CARE Score - Eating 5   Eating Discharge Goal   Discharge Goal 6   Oral Hygiene   Assistance Needed Supervision   Physical Assistance Level No physical assistance   CARE Score - Oral Hygiene 4   Oral Hygiene Discharge Goal   Discharge Goal 6   Shower/Bathe Self   Assistance Needed Physical assistance   Physical Assistance Level 26%-50%   CARE Score - Shower/Bathe Self 3   Shower/Bathe Self Discharge Goal   Discharge Goal 5   Upper Body Dressing   Assistance Needed Physical assistance   Physical Assistance Level 25% or less   CARE Score - Upper Body Dressing 3   Upper Body Dressing Discharge Goal   Discharge Goal 4   Lower Body Dressing   Assistance Needed Physical assistance   Physical Assistance Level 51%-75%   CARE Score - Lower Body Dressing 2   Lower Body Dressing Discharge Goal   Discharge Goal 4   Putting On/Taking Off Footwear   Assistance Needed Physical assistance   Physical Assistance Level 51%-75%   CARE Score - Putting On/Taking Off Footwear 2   Putting On/Taking " Off Footwear Discharge Goal   Discharge Goal 4   Toileting Hygiene   Assistance Needed Physical assistance   Physical Assistance Level 51%-75%   CARE Score - Toileting Hygiene 2   Toileting Hygiene Discharge Goal   Discharge Goal 5   Toilet Transfer   Assistance Needed Physical assistance   Physical Assistance Level 26%-50%   CARE Score - Toilet Transfer 3   Toilet Transfer Discharge Goal   Discharge Goal 4   Hearing, Speech, and Vision   Ability to Hear Adequate   Ability to See in Adequate Light Impaired   Expression of Ideas and Wants Without difficulty   Understanding Verbal and Non-Verbal Content Sometimes understands   Functional Level of Assist   Bathing Moderate Assist   Bathing Description Grab bar;Hand held shower;Tub bench;Assit with back;Assit wtih lower extremities;Increased time;Initial preparation for task;Supervision for safety;Verbal cueing  (Assistance needed to wash/dry back and to dry BLE. Pt declined washing hair d/t limited B AROM at the shoulders.)   Upper Body Dressing Minimal Assist   Upper Body Dressing Description Increased time;Assist with pulling shirt over head;Supervision for safety;Verbal cueing  (Assistance to pull short over head d/t limited B shoulder AROM (PMHX of rotator cuff tears).)   Lower Body Dressing Maximal Assist   Lower Body Dressing Description Grab bar;Assist with threading into pant leg;Increased time;Verbal cueing;Set-up of equipment  (CGA to doff socks in sitting. TA to don socks d/t time constraints. MaxA to don pants d/t limited mobility and difficulties with standing balance.)   Toileting Maximal Assist   Toileting Description Assist to pull pants up;Assist to pull pants down;Grab bar;Increased time;Supervision for safety  (Incontient of urine. MaxA to don/doff brief)   Bed, Chair, Wheelchair Transfer Moderate Assist   Bed Chair Wheelchair Transfer Description Adaptive equipment;Increased time;Set-up of equipment;Supervision for safety;Verbal cueing  (Stand step  "from bed <> WC)   Tub / Shower Transfers Moderate Assist   Tub Shower Transfer Description Grab bar;Shower bench;Increased time;Supervision for safety;Set-up of equipment  (Stand step from WC <> shower bench; x2)   Interdisciplinary Plan of Care Collaboration   IDT Collaboration with  Physical Therapist;Nursing   Patient Position at End of Therapy Seated;Chair Alarm On;Other (Comments)  (Placed in cafeteria for breakfast.)   Collaboration Comments Nursing notifed of end of session c/o chest \"discomfort\"   OT DME Recommendations   Bathroom Equipment   (Pt owns shower chair; family looking to install fixated GB in shower)   Additional Equipment   (LHS, reacher, sock aid)   Strengths & Barriers   Strengths Able to follow instructions;Alert and oriented;Good insight into deficits/needs;Independent prior level of function;Motivated for self care and independence;Pleasant and cooperative;Supportive family;Willingly participates in therapeutic activities   Barriers Bladder incontinence;Decreased endurance;Fatigue;Generalized weakness;Impaired activity tolerance;Impaired balance;Pain;Limited mobility;Visual impairment   Occupational Therapist Assigned   Assigned OT / Treatment Time / Comments  60/120     OTR reviewed what OT is, expectations for in-patient therapy, and goals.     OTR reviewed and updated fall card accordingly.     Assessment  Patient is 89 y.o. female with a diagnosis of: spinal cord dysfunction, non-traumatic: quadriplegia, Incomplete C1-4; central stenosis of spinal canal; cervical myelopathy.      According to H&P, pt presented on 12/31/23 for left sided weakness, tingling, and lower face droop. CT head was negative, pain noted to be to the ear and posterior to occiput. C spine imaging showed paratracheal mass. 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 C spine MRI showed moderate to severe stenosis at C4/5 and felt to be most likely source of her pain and " weakness from C3 down. Patient was managed non-operatively.     Additional factors influencing patient status / progress (ie: cognitive factors, co-morbidities, social support, etc): CHF, CKD, HTN, HLD, and known pulmonary masses.    Pt lives alone in [place] in a 1 story home (3 [address]). She has a daughter nearby who she relies on for transportation. Pt received meals on wheels and up until her most recent hospitalization was looking into acquiring a CG for assistance with daily routines.        During her eval, pt presented at modA for functional tranfers and max to [place] for ADLs d/t decreased shoulder AROM (PMHX of rotator cuff tears, per pt report), pain, and reduced activity tolerance. Pt is currently presenting below baseline for her ADLs and would benefit from skilled therapy to increase her functional performance for safe DC home.     Plan  Recommend Occupational Therapy  minutes per day 5-6 days per week for 2 weeks for the following treatments:  OT Self Care/ADL, OT Neuro Re-Ed/Balance, and OT Therapeutic Exercise.    Passport items to be completed:  Perform bathroom transfers, complete dressing, complete feeding, get ready for the day, prepare a simple meal, participate in household tasks, adapt home for safety needs, demonstrate home exercise program, complete caregiver training     Goals:  Long term and short term goals have been discussed with patient and they are in agreement.    Occupational Therapy Goals (Active)       Problem: Bathing       Dates: Start:  01/03/24         Goal: STG-Within one week, patient will bathe       Dates: Start:  01/03/24               Problem: Dressing       Dates: Start:  01/03/24         Goal: STG-Within one week, patient will dress UB       Dates: Start:  01/03/24            Goal: STG-Within one week, patient will dress LB       Dates: Start:  01/03/24               Problem: Eating       Dates: Start:  01/03/24         Goal: STG-Within one week, patient will feed self        Dates: Start:  01/03/24               Problem: Grooming       Dates: Start:  01/03/24         Goal: STG-Within one week, patient will complete grooming       Dates: Start:  01/03/24               Problem: OT Long Term Goals       Dates: Start:  01/03/24         Goal: LTG-By discharge, patient will complete basic self care tasks       Dates: Start:  01/03/24            Goal: LTG-By discharge, patient will perform bathroom transfers       Dates: Start:  01/03/24

## 2024-01-03 NOTE — PROGRESS NOTES
NURSING DAILY NOTE    Name: Regine Bryant   Date of Admission: 1/2/2024   Admitting Diagnosis: Central stenosis of spinal canal  Attending Physician: Sushma Xiao M.d.  Allergies: Atorvastatin    Safety  Patient Assist     Patient Precautions     Precaution Comments     Bed Transfer Status     Toilet Transfer Status      Assistive Devices     Oxygen  None - Room Air  Diet/Therapeutic Dining  Current Diet Order   Procedures    Diet Order Diet: Cardiac     Pill Administration  whole and one at a time   Agitated Behavioral Scale     ABS Level of Severity       Fall Risk  Has the patient had a fall this admission?   No  Marquita Butcher Fall Risk Scoring  18, HIGH RISK  Fall Risk Safety Measures  bed alarm, chair alarm, and poor balance    Vitals  Temperature: 36.3 °C (97.4 °F)  Temp src: Oral  Pulse: 75  Respiration: 18  Blood Pressure : 138/66  Blood Pressure MAP (Calculated): 90 MM HG     Oxygen  Pulse Oximetry: 93 %  O2 Delivery Device: None - Room Air    Bowel and Bladder  Last Bowel Movement     Stool Type     Bowel Device     Continent  Bladder: Stress incontinence   Bowel:    Bladder Function     Genitourinary Assessment        Skin  Mayco Score   16  Sensory Interventions   Bed Types: Standard/Trauma Mattress  Skin Preventative Measures: Waffle Overlay, Pillows in Use for Support / Positioning  Moisture Interventions         Pain  Pain Rating Scale     Pain Location     Pain Location Orientation     Pain Interventions        ADLs    Bathing      Linen Change      Personal Hygiene     Chlorhexidine Bath      Oral Care     Teeth/Dentures     Shave     Nutrition Percentage Eaten     Environmental Precautions     Patient Turns/Positioning  Patient Turns Self from Side to Side  Patient Turns Assistance/Tolerance     Bed Positions     Head of Bed Elevated         Psychosocial/Neurologic Assessment  Psychosocial Assessment     Neurologic  Assessment          Cardio/Pulmonary Assessment  Edema      Respiratory Breath Sounds     Cardiac Assessment

## 2024-01-03 NOTE — H&P
Physical Medicine & Rehabilitation  History and Physical (H&P)  &     Post Admission Physician Evaluation (ANGELINA)       Date of Admission: 1/2/2024  Date of Service: 1/2/2024   Regine Bryant    [facility] Code to Support Admission: 0004.1211 - Spinal Cord Dysfunction, Non-Traumatic: Quadriplegia, Incomplete C1-4  Etiologic Diagnosis: Central stenosis of spinal canal; cervical myelopathy    _______________________________________________    Chief Complaint: Decreased mobility, weakness    History of Present Illness:  Patient is a 89 y.o. female with a PMH of CHF, CKD, HTN, HLD, and known pulmonary masses who presented on 12/31/23 for left sided weakness, tingling, and lower face droop. CT head was negative, pain noted to be to the ear and posterior to occiput. C spine imaging showed paratracheal mass. 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 C spine MRI showed moderate to severe stenosis at C4/5 and felt to be most likely source of her pain and weakness from C3 down.  Patient was managed non-operatively.     Patient tolerated transfer to [facility]. She reports she is not doing well. She reports an episode of incontinence which she is been now having. Discussed about neurogenic bladder. She reports both of her rotator cuffs are injured so transferring is difficult. She reports mild left sided weakness in her arms. Denies sensation changes currently but did have left sided numbness.     Review of Systems:     Comprehensive 14 point ROS was reviewed and all were negative except as noted elsewhere in this document.     Past Medical History:  Past Medical History:   Diagnosis Date    Hypercholesterolemia     Hypertension     Hypertension     Thyroid disease        Past Surgical History:  Past Surgical History:   Procedure Laterality Date    ABDOMINAL HYSTERECTOMY TOTAL      GYN SURGERY      hyst and bladder suspension       Family History:  Family History   Problem Relation Age of  Onset    Hypertension Brother        Medications:  Current Facility-Administered Medications   Medication Dose    Respiratory Therapy Consult      Pharmacy Consult Request ...Pain Management Review 1 Each  1 Each    hydrALAZINE (Apresoline) tablet 25 mg  25 mg    acetaminophen (Tylenol) tablet 650 mg  650 mg    senna-docusate (Pericolace Or Senokot S) 8.6-50 MG per tablet 2 Tablet  2 Tablet    And    polyethylene glycol/lytes (Miralax) Packet 1 Packet  1 Packet    And    magnesium hydroxide (Milk Of Magnesia) suspension 30 mL  30 mL    And    bisacodyl (Dulcolax) suppository 10 mg  10 mg    omeprazole (PriLOSEC) capsule 20 mg  20 mg    mag hydrox-al hydrox-simeth (Maalox Plus Es Or Mylanta Ds) suspension 20 mL  20 mL    ondansetron (Zofran ODT) dispertab 4 mg  4 mg    Or    ondansetron (Zofran) syringe/vial injection 4 mg  4 mg    traZODone (Desyrel) tablet 50 mg  50 mg    sodium chloride (Ocean) 0.65 % nasal spray 2 Spray  2 Spray    oxyCODONE immediate-release (Roxicodone) tablet 5 mg  5 mg    Or    oxyCODONE immediate release (Roxicodone) tablet 10 mg  10 mg    traMADol (Ultram) 50 MG tablet 50 mg  50 mg    acetaminophen (Tylenol) tablet 650 mg  650 mg    [START ON 1/3/2024] aspirin (Asa) chewable tab 81 mg  81 mg    carvedilol (Coreg) tablet 6.25 mg  6.25 mg    enoxaparin (Lovenox) inj 40 mg  40 mg    [START ON 1/3/2024] levothyroxine (Synthroid) tablet 25 mcg  25 mcg    [START ON 1/3/2024] lidocaine (Asperflex) 4 % patch 2 Patch  2 Patch    dexamethasone (Decadron) tablet 4 mg  4 mg       Allergies:  Atorvastatin    Psychosocial History:  Housing / Facility: 1 Story House  Steps Into Home: 3  Steps In Home: 1  Lives with - Patient's Self Care Capacity: Alone and Able to Care For Self  Equipment Owned: Front-Wheel Walker, Single Point Cane     Prior Level of Function / Living Situation:   Physical Therapy: Prior Services: Meals on Wheels  Housing / Facility: 1 Story House  Steps Into Home: 3  Steps In Home:  "1  Rail: Both Rail (Steps into Home), Both Rail (Steps in Home)  Bathroom Set up: Walk In Shower  Equipment Owned: Front-Wheel Walker, Single Point Cane  Lives with - Patient's Self Care Capacity: Alone and Able to Care For Self  Bed Mobility: Independent  Transfer Status: Independent  Ambulation: Independent  Assistive Devices Used: Single Point Cane  Stairs: Independent  Current Level of Function:   Gait Level Of Assist: Moderate Assist  Assistive Device: Other (Comments) (HHA x 2)  Distance (Feet): 2  Deviation: Step To, Decreased Base Of Support, Antalgic, Shuffled Gait  Weight Bearing Status: fwb  Supine to Sit: Moderate Assist  Sit to Supine: Moderate Assist  Scooting: Moderate Assist  Comments: HOB elevated and rails pu  Sit to Stand: Moderate Assist  Bed, Chair, Wheelchair Transfer: Unable to Participate  Sitting in Chair: NT  Sitting Edge of Bed: 10 mins  Standin-3 mins  Occupational Therapy:   Self Feeding: Independent  Grooming / Hygiene: Independent  Bathing: Independent  Dressing: Independent  Toileting: Independent  Medication Management: Independent  Laundry: Independent  Kitchen Mobility: Independent  Finances: Independent  Home Management: Independent  Shopping: Independent  Prior Services: Meals on Wheels  Housing / Facility: [address] [place] [address]  Current Level of Function:   Upper Body Dressing: Moderate Assist  Lower Body Dressing: Maximal Assist  Toileting: Maximal Assist    CURRENT LEVEL OF FUNCTION:   Same as level of function prior to admission to [facility]    Physical Examination:     VITAL SIGNS:   height is 1.448 m (4' 9\") and weight is 62.1 kg (137 lb). Her oral temperature is 36.3 °C (97.4 °F). Her blood pressure is 138/66 and her pulse is 75. Her respiration is 18 and oxygen saturation is 93%.    GENERAL: No apparent distress  HEENT: Normocephalic/atraumatic and EOMI  CARDIAC: Regular rate and rhythm, normal S1, S2   LUNGS: Clear to auscultation   ABDOMINAL: bowel " sounds present, soft, and nontender    EXTREMITIES: no contractures, spasticity, or edema.  No calf tenderness bilaterally, 2+ bilateral DP/PT pulses.  NEURO:  Mental status:  A&Ox4 (person, place, date, situation) answers questions appropriately  Speech: fluent, no aphasia or dysarthria  Motor:   Pain limited (to jaw and to shoulders)   Shoulder flexors:  Right -  3/5, Left -  3/5  Elbow flexors:  Right -  4/5, Left -  4/5  Wrist extensors:  Right -  5/5, Left -  3/5  Elbow extensors:  Right -  4/5, Left -  4/5  Finger flexors:  Right -  4/5, Left -  4/5  Finger abductors:  Right -  5/5, Left -  4/5  Hip flexors:  Right -  4/5, Left -  3/5  Knee ext:  Right -  4/5, Left -  4/5  Dorsiflexors:  Right -  5/5, Left -  4/5  EHL:  Right -  4/5, Left -  4/5  Plantar flexors:  Right -  4/5, Left -  4/5  Sensory: intact to light touch through out  DTRs: 2+ in bilateral biceps and patellar tendons    Radiology:      Results for orders placed during the hospital encounter of 12/31/23    MR-BRAIN-W/O    Impression  1. Age-related cerebral atrophy.    2. Moderate periventricular white matter changes consistent with chronic microvascular ischemic gliosis.    3. Prominent degenerative anterolisthesis at the C3-4 level with the posterior superior aspect of the C4 vertebral body indenting the ventral surface of the cervical cord and causing a mild to moderate central canal stenosis.             Results for orders placed during the hospital encounter of 12/31/23    MR-CERVICAL SPINE-W/O    Impression  1.  Mild to moderate diffuse cervical kyphosis.    2.  Severe disc space narrowing throughout the cervical spine with mild to moderate marginal osteophytosis.    3.  Mild degenerative anterolisthesis at the C3-4 level with mild cervical spondylotic change and the posterior superior aspect of the C4 vertebral body indenting the ventral aspect of the cord and causing moderate central canal stenosis.    4.  Mild multilevel cervical  spondylotic change.    5.  Moderate to severe multilevel neural foraminal stenosis.                                                 Laboratory Values:  Recent Labs     12/31/23  1150 01/01/24  0056 01/02/24  0130   SODIUM 138 136 136   POTASSIUM 4.6 4.3 4.5   CHLORIDE 103 101 100   CO2 21 23 23   GLUCOSE 120* 158* 182*   BUN 27* 19 35*   CREATININE 0.92 0.98 1.41*   CALCIUM 10.3 10.0 10.0     Recent Labs     12/31/23  1150 01/01/24  0056 01/02/24  0130   WBC 11.2* 9.3 7.7   RBC 3.78* 3.60* 3.49*   HEMOGLOBIN 12.0 11.1* 10.9*   HEMATOCRIT 35.5* 34.4* 33.2*   MCV 93.9 95.6 95.1   MCH 31.7 30.8 31.2   MCHC 33.8 32.3 32.8   RDW 48.4 49.6 48.3   PLATELETCT 170 164 152*   MPV 10.3 10.3 10.6           Primary Rehabilitation Diagnosis:    This patient is a 89 y.o. female admitted for acute inpatient rehabilitation with Central stenosis of spinal canal.    Impairments:   ADLs/IADLs  Mobility    Secondary Diagnosis/Medical Co-morbidities Affecting Function  HTN/CHF  HLD  CKD  Pulmonary nodules  Anemia  Azotemia  Hypothyroidism  Obesity due to excess calories    Relevant Changes Since Preadmission Evaluation:    Status unchanged    The patient's rehabilitation potential is Good  The patient's medical prognosis is fair    Rehabilitation Plan:   Discussion and Recommendations:   1. The patient requires an acute inpatient rehabilitation program with a coordinated program of care at an intensity and frequency not available at a lower level of care. This recommendation is substantiated by the patient's medical physicians who recommend that the patient's intervention and assessment of medical issues needs to be done at an acute level of care for patient's safety and maximum outcome.   2. A coordinated program of care will be supplied by an interdisciplinary team of physical therapy, occupational therapy, rehab physician, rehab nursing, and, if needed, speech therapy and rehab psychology. Rehab team presents a patient-specific  rehabilitation and education program concentrating on prevention of future problems related to accessibility, mobility, skin, bowel, bladder, sexuality, and psychosocial and medical/surgical problems.   3. Need for Rehabilitation Physician: The rehab physician will be evaluating the patient on a multi-weekly basis to help coordinate the program of care. The rehab physician communicates between medical physicians, therapists, and nurses to maximize the patient's potential outcome. Specific areas in which the rehab physician will be providing daily assessment include the following:   A. Assessing the patient's heart rate and blood pressure response (vitals monitoring) to activity and making adjustments in medications or conservative measures as needed.   B. The rehab physician will be assessing the frequency at which the program can be increased to allow the patient to reach optimal functional outcome.   C. The rehab physician will also provide assessments in daily skin care, especially in light of patient's impairments in mobility.   D. The rehab physician will provide special expertise in understanding how to work with functional impairment and recommend appropriate interventions, compensatory techniques, and education that will facilitate the patient's outcome.   4. Rehab R.N.   The rehab RN will be working with patient to carry over in room mobility and activities of daily living when the patient is not in 3 hours of skilled therapy. Rehab nursing will be working in conjunction with rehab physician to address all the medical issues above and continue to assess laboratory work and discuss abnormalities with the treating physicians, assess vitals, and response to activity, and discuss and report abnormalities with the rehab physician. Rehab RN will also continue daily skin care, supervise bladder/bowel program, instruct in medication administration, and ensure patient safety.   5. Rehab Therapy: Therapies to treat  at intensity and frequency of (may change after completion of evaluation by all therapeutic disciplines):       PT:  Physical therapy to address mobility, transfer, gait training and evaluation for adaptive equipment needs 1 and 1/2 hour/day at least 5 days/week for the duration of the ELOS (see below)       OT:  Occupational therapy to address ADLs, self-care, home management training, functional mobility/transfers and assistive device evaluation, and community re-integration 1 and 1/2 hour/day at least 5 days/week for the duration of the ELOS (see below).     Medical management / Rehabilitation Issues/ Adverse Potential as part of rehabilitation plan     Rehabilitation Issues/Adverse Potential  1.  C3 AIS E - Patient with cervical stenosis as well as paratracheal mass with left sided weakness and neck/head pain concerning for myelopathy. Patient elected conservative management. Patient demonstrates functional deficits in strength, balance, coordination, and ADL's. Patient is admitted to [facility] for comprehensive rehabilitation therapy as described below.   Rehabilitation nursing monitors bowel and bladder control, educates on medication administration, co-morbidities and monitors patient safety.    2.  Neurostimulants: None at this time but continue to assess daily for need to initiate should status change.    3.  DVT prophylaxis:  Patient is on Lovenox for anticoagulation upon transfer. Encourage OOB. Monitor daily for signs and symptoms of DVT including but not limited to swelling and pain to prevent the development of DVT that may interfere with therapies.    4.  GI prophylaxis:  On prilosec to prevent gastritis/dyspepsia which may interfere with therapies.    5.  Pain: No issues with pain currently / Controlled with APAP/Oxycodone    6.  Nutrition/Dysphagia: Dietician monitors nutrient intake, recommend supplements prn and provide nutrition education to pt/family to promote optimal  nutrition for wound healing/recovery.     7.  Bladder/bowel:  Start bowel and bladder program as described below, to prevent constipation, urinary retention (which may lead to UTI), and urinary incontinence (which will impact upon pt's functional independence).   - Post void bladder scans, I&O cath for PVRs >400  - up to commode after meal     8.  Skin/dermal ulcer prophylaxis: Monitor for new skin conditions with q.2 h. turns as required to prevent the development of skin breakdown.     9.  Cognition/Behavior: As needed psychologist provides adjustment counseling to illness and psychosocial barriers that may be potential barriers to rehabilitation.     10. Respiratory therapy: RT performs O2 management prn, breathing retraining, pulmonary hygiene and bronchospasm management prn to optimize participation in therapies.     Medical Co-Morbidities/Adverse Potential Affecting Function:   C3 AIS E - Patient with cervical stenosis as well as paratracheal mass with left sided weakness and neck/head pain concerning for myelopathy. Patient elected conservative management  -PT and OT for mobility and ADLs. Per guidelines, 15 hours per week between PT, OT and/or SLP.  -Follow-up PCP    HTN/CHF - Patient on ASA. Patient on Coreg 6.25 mg, Lasix 20 mg (on hold on transfer),     HLD - Patient not on statin on transfer. Had reaction to atorvastatin in the past.     CKD3a - Avoid nephrotoxic agents. Check AM CMP    Pulmonary nodules - Patient electing no further work-up.     Anemia - Check AM CBC    Azotemia - Check AM CMP    Hypothyroidism - Patient on Levothyroxine 25 mcg    Obesity due to excess calories - BMI of 30.9 on admission, meets medical criteria.     Pain - Patient on APAP/Oxycodone PRN    Neurogenic bladder - Having urge incontinence, most likely neurogenic with limited voiding. Will check bladder scans and PVRs. May need to learn CIC    Skin - Patient at risk for skin breakdown due to debility in areas including  sacrum, achilles, elbows and head in addition to other sites. Nursing to assess skin daily.     GI Ppx - Patient on Prilosec for GERD prophylaxis. Patient on Senna-docusate for constipation prophylaxis.   Last BM: 12/31/23 (per report)    DVT Ppx - Patient Lovenox on transfer.    I personally performed a complete drug regimen review and no potential clinically significant medication issues were identified.     Goals/Expected Level of Function Based on Current Medical and Functional Status:  (may change based on patient's medical status and rate of impairment recovery)  Transfers:   Modified Independent  Mobility/Gait:   Modified Independent  ADL's:   Modified Independent    DISPOSITION: Discharge to pre-morbid independent living setting with the supportive care of patient's family.    ELOS: 10-17 days  ____________________________________    T. René Xiao MD/PhD  [facility] - Physical Medicine & Rehabilitation   [facility] - Brain Injury Medicine   ____________________________________    Pt was seen today for 75 min, and entire time spent in face-to-face contact was >50% in counseling and coordination of care as detailed in A/P above.

## 2024-01-04 PROBLEM — D72.829 LEUKOCYTOSIS: Status: ACTIVE | Noted: 2024-01-01

## 2024-01-04 NOTE — THERAPY
Physical Therapy   Daily Treatment     Patient Name: Regine Bryant  Age:  89 y.o., Sex:  female  Medical Record #: [id number]  Today's Date: 1/4/2024     Precautions  Precautions: (P) Fall Risk  Comments: (P) 2L O2 wean (nighttime); RA (daytime)    Subjective    Patient seated in w/c and agreeable to therapy, requesting to use the restroom due to bloating and need for bowel movement.     Objective       01/04/24 0931   PT Charge Group   PT Gait Training (Units) 1   PT Therapeutic Activities (Units) 1   PT Total Time Spent   PT Individual Total Time Spent (Mins) 30   Precautions   Precautions Fall Risk   Comments 2L O2 wean (nighttime); RA (daytime)   Pain 0 - 10 Group   Location Neck   Location Orientation Left   Gait Functional Level of Assist    Gait Level Of Assist Contact Guard Assist   Assistive Device Front Wheel Walker   Distance (Feet) 30   # of Times Distance was Traveled 1   Deviation Antalgic;Step To;Decreased Base Of Support  (several standing rest breaks to manage shortness of breath and neck pain)   Transfer Functional Level of Assist   Bed, Chair, Wheelchair Transfer Contact Guard Assist   Bed Chair Wheelchair Transfer Description Adaptive equipment;Increased time;Initial preparation for task;Supervision for safety  (stand step transfer with FWW)   Toilet Transfers Contact Guard Assist   Toilet Transfer Description Adaptive equipment;Grab bar;Increased time;Initial preparation for task;Supervision for safety;Verbal cueing  (FWW approach with grab bar)   Bed Mobility    Sit to Stand Contact Guard Assist   PT DME Recommendations   Assistive Device Front Wheeled Walker     Ambulation requiring max extra time due to standing rest breaks required every 3-5 steps due to neck pain and shortness of breath.    Assessment    Patient tolerated session fairly, limited by toileting needs. Patient appears anxious regarding neck/rotator cuff pain, bloating/constipation, and dry mouth, requiring reassurance  and support throughout session. May also benefit from squatty potty/step stool for toileting for optimal positioning for bowel movements.    Strengths: Able to follow instructions, Good insight into deficits/needs, Independent prior level of function, Motivated for self care and independence, Pleasant and cooperative, Supportive family, Willingly participates in therapeutic activities  Barriers: Generalized weakness, Fatigue, Decreased endurance, Home accessibility, Impaired activity tolerance, Impaired balance, Pain    Plan    Progress gait training with FWW, transfer training with FWW, pain management strategies, LE strengthening, activity tolerance, bed mobility training.     DME  PT DME Recommendations  Assistive Device:  (pt has SPC and FWW)  Additional Equipment:  (LHS, reacher, sock aid)     Passport items to be completed:  Get in/out of bed safely, in/out of a vehicle, safely use mobility device, walk or wheel around home/community, navigate up and down stairs, show how to get up/down from the ground, ensure home is accessible, demonstrate HEP, complete caregiver training       Physical Therapy Problems (Active)       Problem: Mobility       Dates: Start:  01/03/24         Goal: STG-Within one week, patient will ambulate 100ft FWW CGA       Dates: Start:  01/03/24            Goal: STG-Within one week, patient will ambulate up/down a curb step with FWW Armando       Dates: Start:  01/03/24            Goal: STG-Within one week, patient will ascend and descend three stairs with R HR and CGA       Dates: Start:  01/03/24               Problem: Mobility Transfers       Dates: Start:  01/03/24         Goal: STG-Within one week, patient will perform bed mobility consistently with Armando       Dates: Start:  01/03/24            Goal: STG-Within one week, patient will transfer bed to chair SBA       Dates: Start:  01/03/24               Problem: PT-Long Term Goals       Dates: Start:  01/03/24         Goal: LTG-By  discharge, patient will ambulate 150ft LRAD [place]       Dates: Start:  01/03/24            Goal: LTG-By discharge, patient will transfer one surface to another [place]       Dates: Start:  01/03/24            Goal: LTG-By discharge, patient will perform home exercise program with SPV       Dates: Start:  01/03/24            Goal: LTG-By discharge, patient will ambulate up/down 3 stairs R HR vs curb step LRAD SBA       Dates: Start:  01/03/24            Goal: LTG-By discharge, patient will transfer in/out of a car CGA       Dates: Start:  01/03/24            Goal: LTG-By discharge, patient will perform bed mobility independently       Dates: Start:  01/03/24

## 2024-01-04 NOTE — THERAPY
Occupational Therapy  Daily Treatment     Patient Name: eRgine Bryant  Age:  89 y.o., Sex:  female  Medical Record #: [id number]  Today's Date: 1/4/2024     Precautions  Precautions: (P) Fall Risk  Comments: (P) 2L O2 wean (nighttime); RA (daytime)         Subjective    Pt encountered for OT sitting in  visiting with daughter-in-law. Pt c/o pain in neck and abdominal discomfort. RN and MD aware.      Objective       01/04/24 1431   OT Charge Group   OT Self Care / ADL (Units) 2   OT Total Time Spent   OT Individual Total Time Spent (Mins) 30   Precautions   Precautions Fall Risk   Comments 2L O2 wean (nighttime); RA (daytime)   Functional Level of Assist   Lower Body Dressing Moderate Assist   Lower Body Dressing Description Sock aid;Dressing stick;Increased time;Supervision for safety;Verbal cueing;Set-up of equipment  (Instructed in use of sock aid for LBD to increase independence d/t limited mobility. SBA for doffing socks using dressing stick. ModA for donning socks using sock aide in 1/2 trials. Pt indicated pain in neck following pulling on sock aide to don socks.)   Toileting Moderate Assist   Toileting Description Assist to pull pants up;Assist to pull pants down;Assist for hygiene;Grab bar;Increased time;Supervision for safety;Verbal cueing  (CGA for standing balance using GB. Pt able to engage in perineal care following BM in toilet; VC needed to initiate. Brief slightly wet. Assistance needed to aquire TP d/t limited shoulder mobility.)   Toilet Transfers Contact Guard Assist   Toilet Transfer Description Adaptive equipment;Grab bar;Increased time;Set-up of equipment;Supervision for safety  (STS from  using GB)   Interdisciplinary Plan of Care Collaboration   IDT Collaboration with  Nursing;Physician;Family / Caregiver   Patient Position at End of Therapy In Bed;Bed Alarm On;Call Light within Reach;Phone within Reach;Tray Table within Reach;Family / Friend in Room   Collaboration Comments  "Daughter-in-law present throughout session, able to anser DC question; Nursing RE BM     Applied ice pack (wrapped with 1 towel) for 15 min along the L shoulder to decrease nerve pain. Pt reported, \"some\" relief in pain following it's application.     Discussed with daughter-in-law and pt this OTR's recommendations for assistance with ADLs and 24-7 SPV d/t risk for falls. Both daughter-in-law and pt verbalized understanding and agreement with this POC.     Assessment    Overall, fair tolerance to use of AE for LBD secondary to neck pain. May benefit to cont to explore AE for ADLs as pt currently lives alone w/ limited assistance at this time. Pt progressed towards CGA for functional transfers using AE.      Strengths: Able to follow instructions, Alert and oriented, Good insight into deficits/needs, Independent prior level of function, Motivated for self care and independence, Pleasant and cooperative, Supportive family, Willingly participates in therapeutic activities  Barriers: Bladder incontinence, Decreased endurance, Fatigue, Generalized weakness, Impaired activity tolerance, Impaired balance, Pain, Limited mobility, Visual impairment    Plan    Cont ADLs, functional transfers, and thera act/ex to maximize functional recovery for safe DC home.       DME  OT DME Recommendations  Bathroom Equipment:  (Pt owns shower chair; family looking to install fixated GB in shower)  Additional Equipment:  (LHS, reacher, sock aid)    Passport items to be completed:  Perform bathroom transfers, complete dressing, complete feeding, get ready for the day, prepare a simple meal, participate in household tasks, adapt home for safety needs, demonstrate home exercise program, complete caregiver training     Occupational Therapy Goals (Active)       Problem: Bathing       Dates: Start:  01/03/24         Goal: STG-Within one week, patient will bathe at [facility] using LRD       Dates: Start:  01/03/24               Problem: Dressing       " Dates: Start:  01/03/24         Goal: STG-Within one week, patient will dress UB at CGA       Dates: Start:  01/03/24            Goal: STG-Within one week, patient will dress LB at [facility] using LRD       Dates: Start:  01/03/24               Problem: Eating       Dates: Start:  01/03/24         Goal: STG-Within one week, patient will feed self at CGA using LRD       Dates: Start:  01/03/24               Problem: Grooming       Dates: Start:  01/03/24         Goal: STG-Within one week, patient will complete grooming, seated at EOS, using LRD       Dates: Start:  01/03/24               Problem: OT Long Term Goals       Dates: Start:  01/03/24         Goal: LTG-By discharge, patient will complete basic self care tasks at SBA to [facility]       Dates: Start:  01/03/24            Goal: LTG-By discharge, patient will perform bathroom transfers at [facility]       Dates: Start:  01/03/24

## 2024-01-04 NOTE — THERAPY
"Physical Therapy   Daily Treatment     Patient Name: Regine Bryant  Age:  89 y.o., Sex:  female  Medical Record #: [id number]  Today's Date: 1/4/2024     Precautions  Precautions: Fall Risk  Comments: 2L O2 wean (nighttime); RA (daytime)    Subjective    \"I have so much gas\"     Objective       01/04/24 1031   PT Charge Group   PT Therapeutic Activities (Units) 4   Supervising Physical Therapist Soniya Redman   PT Total Time Spent   PT Individual Total Time Spent (Mins) 60   Pain   Intervention Education;Emotional Support   Pain 0 - 10 Group   Location Neck   Location Orientation Left   Description Aching;Sore   Comfort Goal Stay Alert;Comfort with Movement;Perform Activity   Therapist Pain Assessment Post Activity Pain Same as Prior to Activity;Nurse Notified   Non Verbal Descriptors   Non Verbal Scale  Moaning   Stairs Functional Level of Assist   Level of Assist with Stairs Minimal Assist   # of Stairs Climbed 6   Stairs Description Extra time;Hand rails;Limited by fatigue;Supervision for safety;Verbal cueing  (reciprocal ascending, step to descending)   Bed Mobility    Sit to Stand Contact Guard Assist   Interdisciplinary Plan of Care Collaboration   IDT Collaboration with  Nursing;Physical Therapist;Occupational Therapist   Patient Position at End of Therapy Seated;Chair Alarm On;Self Releasing Lap Belt Applied;Call Light within Reach;Tray Table within Reach   Collaboration Comments RN meds, PT/OT CLOF, living situation       4\" curb with FWW [place]    Additional collab with nsg re: pt needed brief change, pt has yellow discharge coming from L eye, R eye with clear drainage     Placed 9\" squatty potty in pt's BR, labeled with pt name and room number    Assessment    Pt requires significant amount of extra time to complete functional mobility tasks, able to navigate 4\" curb step with FWW [place] for walker management, limited by impaired balance, generalized weakness, fear of falling, anxiety with transitional " movements  Strengths: Able to follow instructions, Good insight into deficits/needs, Independent prior level of function, Motivated for self care and independence, Pleasant and cooperative, Supportive family, Willingly participates in therapeutic activities  Barriers: Generalized weakness, Fatigue, Decreased endurance, Home accessibility, Impaired activity tolerance, Impaired balance, Pain    Plan    Progress gait training with FWW, transfer training with FWW, pain management strategies, LE strengthening, activity tolerance, bed mobility training.     DME  PT DME Recommendations  Assistive Device: Front Wheeled Walker  Additional Equipment:  (LHS, reacher, sock aid)    Passport items to be completed:  Get in/out of bed safely, in/out of a vehicle, safely use mobility device, walk or wheel around home/community, navigate up and down stairs, show how to get up/down from the ground, ensure home is accessible, demonstrate HEP, complete caregiver training    Physical Therapy Problems (Active)       Problem: Mobility       Dates: Start:  01/03/24         Goal: STG-Within one week, patient will ascend and descend three stairs with R HR and CGA       Dates: Start:  01/03/24               Problem: Mobility Transfers       Dates: Start:  01/03/24         Goal: STG-Within one week, patient will perform bed mobility consistently with [facility]       Dates: Start:  01/03/24            Goal: STG-Within one week, patient will transfer bed to chair SBA       Dates: Start:  01/03/24               Problem: PT-Long Term Goals       Dates: Start:  01/03/24         Goal: LTG-By discharge, patient will ambulate 150ft LRAD [place]       Dates: Start:  01/03/24            Goal: LTG-By discharge, patient will transfer one surface to another [place]       Dates: Start:  01/03/24            Goal: LTG-By discharge, patient will perform home exercise program with SPV       Dates: Start:  01/03/24            Goal: LTG-By discharge, patient will ambulate  up/down 3 stairs R HR vs curb step LRAD SBA       Dates: Start:  01/03/24            Goal: LTG-By discharge, patient will transfer in/out of a car CGA       Dates: Start:  01/03/24            Goal: LTG-By discharge, patient will perform bed mobility independently       Dates: Start:  01/03/24

## 2024-01-04 NOTE — CARE PLAN
Problem: Mobility  Goal: STG-Within one week, patient will ambulate 100ft FWW CGA  Outcome: Met  Goal: STG-Within one week, patient will ambulate up/down a curb step with FWW [place]  Outcome: Met     Problem: Mobility  Goal: STG-Within one week, patient will ascend and descend three stairs with R HR and CGA  Outcome: Not Met     Problem: Mobility Transfers  Goal: STG-Within one week, patient will perform bed mobility consistently with [facility]  Outcome: Not Met  Goal: STG-Within one week, patient will transfer bed to chair SBA  Outcome: Not Met

## 2024-01-04 NOTE — PROGRESS NOTES
NURSING DAILY NOTE    Name: Regine Bryant  Date of Admission: 1/2/2024  Admitting Diagnosis: Central stenosis of spinal canal  Attending Physician: Sushma Xiao M.d.  Allergies: Atorvastatin    Safety  Patient Assist  omod  Patient Precautions  oFall Risk  Precaution Comments  o2L O2 wean  Bed Transfer Status  oMinimal Assist  Toilet Transfer Status  oMinimal Assist  Assistive Devices  oRails  Oxygen  oSilicone Nasal Cannula  Diet/Therapeutic Dining  o  Current Diet Order   Procedures    Diet Order Diet: Cardiac     Pill Administration  owhole and one at a time   Agitated Behavioral Scale  o   ABS Level of Severity  o     Fall Risk  Has the patient had a fall this admission?  Coreen  Marquita Butcher Fall Risk Scoring  o20, HIGH RISK  Fall Risk Safety Measures  ronald alarm and chair alarm    Vitals  Temperature: 36.6 °C (97.8 °F)  Temp src: Oral  Pulse: 60  Respiration: 20  Blood Pressure : (!) 168/75  Blood Pressure MAP (Calculated): 106 MM HG  BP Location: Right, Upper Arm  Patient BP Position: Supine    Oxygen  Pulse Oximetry: 97 %  O2 (LPM): 2  O2 Delivery Device: Silicone Nasal Cannula    Bowel and Bladder  Last Bowel Movement  o01/03/24  Stool Type  oType 1: Separate hard lumps (hard to pass)  Bowel Device  oDiaper, Bathroom  Continent  oBladder: Stress incontinence  oBowel:    Bladder Function  oUrine Void (mL):  (Incontinence)  Number of Times Voided: 1  Urine Color: Yellow  Number of Times Incontinent of Urine: 1  Genitourinary Assessment  oBladder Assessment (WDL):  WDL Except  Santo Catheter: Not Applicable  Urinary Elimination: Incontinence  Urine Color: Yellow  Number of Times Incontinent of Urine: 1  Bladder Device: Diaper  Bladder Scan: Post Void  $ Bladder Scan Results (mL): 251    Skin  Mayco Score  o 17  Sensory Interventions  o Bed Types: Standard/Trauma Mattress with Overlay  Skin Preventative Measures: Pillows in Use for Support / Positioning, Gray Foam for Oxygen Tubing (Pad ear  protector), Silicone Oxygen Tubing in Use, Waffle Overlay  Moisture Interventions  oMoisturizers/Barriers: Barrier Wipes, Barrier Paste      Pain  Pain Rating Scale  o7 - Focus of attention, prevents doing daily activities  Pain Location  oNeck  Pain Location Orientation  oLateral, Left  Pain Interventions  oMedication (see MAR)    ADLs    Bathing  o   Linen Change  o   Personal Hygiene  oPerineal Care, Moist Crystal Wipes  Chlorhexidine Bath  o   Oral Care  o   Teeth/Dentures  o   Shave  o   Nutrition Percentage Eaten  oBreakfast, Less than 25% Consumed  Environmental Precautions  o   Patient Turns/Positioning  oPatient Turns Self from Side to Side  Patient Turns Assistance/Tolerance  o   Bed Positions  Dontrell Controls On  Head of Bed Elevated  oSelf regulated      Psychosocial/Neurologic Assessment  Psychosocial Assessment  oPsychosocial (WDL):  WDL Except  Patient Behaviors: Anxious, Fatigue  Family Behaviors: No Family Present  Neurologic Assessment  oNeuro (WDL): Exceptions to WDL  Level of Consciousness: Alert  Orientation Level: Oriented to person, Oriented to place, Oriented to situation  Cognition: Follows commands  Speech: Clear  Pupil Assesment: No  Motor Function/Sensation Assessment: Motor strength  Muscle Strength Right Arm: Good Strength Against Gravity and Moderate Resistance  Muscle Strength Left Arm: Good Strength Against Gravity and Moderate Resistance  Muscle Strength Right Leg: Good Strength Against Gravity and Moderate Resistance  Muscle Strength Left Leg: Good Strength Against Gravity and Moderate Resistance  oEENT (WDL):  WDL Except    Cardio/Pulmonary Assessment  Edema  o   Respiratory Breath Sounds  oRUL Breath Sounds: Clear  RML Breath Sounds: Clear  RLL Breath Sounds: Diminished  SERVANDO Breath Sounds: Clear  LLL Breath Sounds: Diminished  Cardiac Assessment  oCardiac (WDL):  WDL Except

## 2024-01-04 NOTE — PROGRESS NOTES
Hospital Medicine Daily Progress Note      Chief Complaint  Acute Kidney Injury  Hypertension    Interval Problem Update  Pt c/o chronic bilateral shoulder pain--defer to Physiatry.  Lab and imaging results reviewed and discussed.    Review of Systems  Review of Systems   Constitutional:  Negative for chills and fever.   HENT: Negative.     Eyes: Negative.    Respiratory:  Negative for cough and shortness of breath.    Cardiovascular:  Negative for chest pain and palpitations.   Gastrointestinal:  Positive for constipation. Negative for abdominal pain, nausea and vomiting.   Musculoskeletal:  Positive for joint pain.   Skin:  Negative for itching and rash.   Endo/Heme/Allergies:  Negative for polydipsia. Does not bruise/bleed easily.        Physical Exam  Temp:  [36 °C (96.8 °F)-36.7 °C (98 °F)] 36.7 °C (98 °F)  Pulse:  [60-80] 80  Resp:  [18-20] 18  BP: (132-168)/(68-80) 152/68  SpO2:  [96 %-99 %] 96 %    Physical Exam  Vitals reviewed.   Constitutional:       General: She is not in acute distress.     Appearance: Normal appearance. She is not ill-appearing.   HENT:      Head: Normocephalic and atraumatic.      Right Ear: External ear normal.      Left Ear: External ear normal.      Nose: Nose normal.      Mouth/Throat:      Pharynx: Oropharynx is clear.   Eyes:      General:         Right eye: No discharge.         Left eye: No discharge.      Extraocular Movements: Extraocular movements intact.      Conjunctiva/sclera: Conjunctivae normal.   Cardiovascular:      Rate and Rhythm: Normal rate and regular rhythm.   Pulmonary:      Effort: Pulmonary effort is normal. No respiratory distress.      Breath sounds: Normal breath sounds. No wheezing.   Abdominal:      General: Bowel sounds are normal. There is distension.      Palpations: Abdomen is soft.      Tenderness: There is no abdominal tenderness. There is no guarding or rebound.   Musculoskeletal:      Cervical back: Normal range of motion and neck supple.       Right lower leg: No edema.      Left lower leg: No edema.      Comments: Bilateral shoulders decreased ROM   Skin:     General: Skin is warm and dry.   Neurological:      Mental Status: She is alert and oriented to person, place, and time.         Fluids    Intake/Output Summary (Last 24 hours) at 1/4/2024 1120  Last data filed at 1/4/2024 0308  Gross per 24 hour   Intake 420 ml   Output --   Net 420 ml       Laboratory  Recent Labs     01/03/24  0525 01/03/24  1310 01/04/24  0526   WBC 13.3* 13.4* 9.3   RBC 3.76* 3.74* 3.62*   HEMOGLOBIN 11.8* 11.7* 11.2*   HEMATOCRIT 36.3* 36.6* 35.3*   MCV 96.5 97.9* 97.5   MCH 31.4 31.3 30.9   MCHC 32.5 32.0* 31.7*   RDW 49.5 49.2 49.6   PLATELETCT 182 173 163*   MPV 11.2 10.8 10.9     Recent Labs     01/02/24  0130 01/03/24  0525 01/04/24  0526   SODIUM 136 137 138   POTASSIUM 4.5 4.2 5.1   CHLORIDE 100 101 103   CO2 23 20 24   GLUCOSE 182* 162* 136*   BUN 35* 45* 40*   CREATININE 1.41* 1.13 0.97   CALCIUM 10.0 10.1 9.8                   Assessment/Plan  * Cervical central stenosis of spinal canal- (present on admission)  Assessment & Plan  Non-surgical management  On Decadron  Pain control per Physiatry    Leukocytosis  Assessment & Plan  Likely 2/2 steroids  WBC spontaneously resolving    Abdominal distension  Assessment & Plan  KUB moderate gaseous distention of the stomach, nonspecific bowel gas pattern with a moderate colonic stool burden  High dose Simethicone started  Bowel regimen per Physiatry    Chest pain- (present on admission)  Assessment & Plan    Trop 16 x 2  EKG sinus deven  CXR negative acute  Symptoms spontaneously resolved    Stage 3a chronic kidney disease (HCC)- (present on admission)  Assessment & Plan  Also has GERARD  FENa <1%  BUN/Cr improving w/ IVF  Closely monitor K+    Thrombocytopenia (HCC)- (present on admission)  Assessment & Plan  Follow PLT    Lung mass- (present on admission)  Assessment & Plan  Reportedly, family desires no further work  up    Hypothyroid  Assessment & Plan  Euthyroid on Synthroid    HTN (hypertension)- (present on admission)  Assessment & Plan  Echo 4/26/23 EF 60%, grade I DD, RVSP 24 mmHg  Blood pressures still high and heart rates trending low on Coreg  Will add Norvasc       DNR

## 2024-01-04 NOTE — PROGRESS NOTES
Physical Medicine & Rehabilitation Progress Note    Encounter Date: 1/4/2024    Chief Complaint: Decreased mobility, weakness    Interval Events (Subjective):  Patient sitting up in room. She reports therapy is going poorly today. She reports distention and bloating. She reports it's new from yesterday. She reports she only had a small BM. Reviewed work-up and troponin negative. Discussed bowel clean out. Discussed would have IDT later today.    _____________________________________  Interdisciplinary Team Conference   Most recent IDT on 1/4/2024    ISushma M.D./Ph.D., was present and led the interdisciplinary team conference on 1/4/2024.  I led the IDT conference and agree with the IDT conference documentation and plan of care as noted below.     Nursing:  Diet Current Diet Order   Procedures    Diet Order Diet: Cardiac       Eating ADL Minimal Assist (requried assist to drink water from a cup    unable to manage a straw  requried therapist to hold cup)      % of Last Meal  Oral Nutrition: Breakfast, Less than 25% Consumed   Sleep    Bowel Last BM: 01/04/24   Bladder    Barriers to Discharge Home:  Anxiety   Eye discharge from right    Physical Therapy:  Bed Mobility Arm[place]   Transfers Contact Guard Assist  Adaptive equipment, Increased time, Initial preparation for task, Supervision for safety (stand step transfer with FWW)   Mobility Contact Guard Assist   Stairs    Barriers to Discharge Home:  Anxiety limited  Standing rest breaks needed    Occupational Therapy:  Grooming     Bathing Moderate Assist   UB Dressing Moderate Assist   LB Dressing Maximal Assist   Toileting Moderate Assist   Shower & Transfer CGA   Barriers to Discharge Home:      espiratory Therapy:  O2 (LPM): 2  O2 Delivery Device: Silicone Nasal Cannula    Case Management:  Continues to work on disposition and DME needs.      Discharge Date/Disposition:  1/16/24  _____________________________________      Objective:  VITAL  "SIGNS: BP (!) 152/68   Pulse 80   Temp 36.7 °C (98 °F) (Oral)   Resp 18   Ht 1.448 m (4' 9\")   Wt 62.1 kg (137 lb)   SpO2 96%   BMI 29.65 kg/m²   Gen: NAD  Psych: Mood and affect appropriate  CV: RRR, 0 edema  Resp: CTAB, no upper airway sounds  Abd: NT, mild distention  Neuro: AOx4, following commands      Laboratory Values:  Recent Results (from the past 72 hour(s))   CBC WITHOUT DIFFERENTIAL    Collection Time: 01/02/24  1:30 AM   Result Value Ref Range    WBC 7.7 4.8 - 10.8 K/uL    RBC 3.49 (L) 4.20 - 5.40 M/uL    Hemoglobin 10.9 (L) 12.0 - 16.0 g/dL    Hematocrit 33.2 (L) 37.0 - 47.0 %    MCV 95.1 81.4 - 97.8 fL    MCH 31.2 27.0 - 33.0 pg    MCHC 32.8 32.2 - 35.5 g/dL    RDW 48.3 35.9 - 50.0 fL    Platelet Count 152 (L) 164 - 446 K/uL    MPV 10.6 9.0 - 12.9 fL   Basic Metabolic Panel    Collection Time: 01/02/24  1:30 AM   Result Value Ref Range    Sodium 136 135 - 145 mmol/L    Potassium 4.5 3.6 - 5.5 mmol/L    Chloride 100 96 - 112 mmol/L    Co2 23 20 - 33 mmol/L    Glucose 182 (H) 65 - 99 mg/dL    Bun 35 (H) 8 - 22 mg/dL    Creatinine 1.41 (H) 0.50 - 1.40 mg/dL    Calcium 10.0 8.5 - 10.5 mg/dL    Anion Gap 13.0 7.0 - 16.0   MAGNESIUM    Collection Time: 01/02/24  1:30 AM   Result Value Ref Range    Magnesium 2.1 1.5 - 2.5 mg/dL   ESTIMATED GFR    Collection Time: 01/02/24  1:30 AM   Result Value Ref Range    GFR (CKD-EPI) 34 (A) >60 mL/min/1.73 m 2   CBC with Differential    Collection Time: 01/03/24  5:25 AM   Result Value Ref Range    WBC 13.3 (H) 4.8 - 10.8 K/uL    RBC 3.76 (L) 4.20 - 5.40 M/uL    Hemoglobin 11.8 (L) 12.0 - 16.0 g/dL    Hematocrit 36.3 (L) 37.0 - 47.0 %    MCV 96.5 81.4 - 97.8 fL    MCH 31.4 27.0 - 33.0 pg    MCHC 32.5 32.2 - 35.5 g/dL    RDW 49.5 35.9 - 50.0 fL    Platelet Count 182 164 - 446 K/uL    MPV 11.2 9.0 - 12.9 fL    Neutrophils-Polys 82.30 (H) 44.00 - 72.00 %    Lymphocytes 10.10 (L) 22.00 - 41.00 %    Monocytes 6.60 0.00 - 13.40 %    Eosinophils 0.00 0.00 - 6.90 %    " Basophils 0.20 0.00 - 1.80 %    Immature Granulocytes 0.80 0.00 - 0.90 %    Nucleated RBC 0.00 0.00 - 0.20 /100 WBC    Neutrophils (Absolute) 10.95 (H) 1.82 - 7.42 K/uL    Lymphs (Absolute) 1.34 1.00 - 4.80 K/uL    Monos (Absolute) 0.88 (H) 0.00 - 0.85 K/uL    Eos (Absolute) 0.00 0.00 - 0.51 K/uL    Baso (Absolute) 0.02 0.00 - 0.12 K/uL    Immature Granulocytes (abs) 0.10 0.00 - 0.11 K/uL    NRBC (Absolute) 0.00 K/uL   Comp Metabolic Panel (CMP)    Collection Time: 01/03/24  5:25 AM   Result Value Ref Range    Sodium 137 135 - 145 mmol/L    Potassium 4.2 3.6 - 5.5 mmol/L    Chloride 101 96 - 112 mmol/L    Co2 20 20 - 33 mmol/L    Anion Gap 16.0 7.0 - 16.0    Glucose 162 (H) 65 - 99 mg/dL    Bun 45 (H) 8 - 22 mg/dL    Creatinine 1.13 0.50 - 1.40 mg/dL    Calcium 10.1 8.5 - 10.5 mg/dL    Correct Calcium 10.3 8.5 - 10.5 mg/dL    AST(SGOT) 18 12 - 45 U/L    ALT(SGPT) 17 2 - 50 U/L    Alkaline Phosphatase 94 30 - 99 U/L    Total Bilirubin 0.3 0.1 - 1.5 mg/dL    Albumin 3.8 3.2 - 4.9 g/dL    Total Protein 6.3 6.0 - 8.2 g/dL    Globulin 2.5 1.9 - 3.5 g/dL    A-G Ratio 1.5 g/dL   TSH with Reflex to FT4    Collection Time: 01/03/24  5:25 AM   Result Value Ref Range    TSH 0.550 0.380 - 5.330 uIU/mL   Vitamin D, 25-hydroxy (blood)    Collection Time: 01/03/24  5:25 AM   Result Value Ref Range    25-Hydroxy   Vitamin D 25 31 30 - 100 ng/mL   ESTIMATED GFR    Collection Time: 01/03/24  5:25 AM   Result Value Ref Range    GFR (CKD-EPI) 45 (A) >60 mL/min/1.73 m 2   HEMOGLOBIN A1C    Collection Time: 01/03/24  1:10 PM   Result Value Ref Range    Glycohemoglobin 5.6 4.0 - 5.6 %    Est Avg Glucose 114 mg/dL   TROPONIN    Collection Time: 01/03/24  1:10 PM   Result Value Ref Range    Troponin T 16 6 - 19 ng/L   CBC WITH DIFFERENTIAL    Collection Time: 01/03/24  1:10 PM   Result Value Ref Range    WBC 13.4 (H) 4.8 - 10.8 K/uL    RBC 3.74 (L) 4.20 - 5.40 M/uL    Hemoglobin 11.7 (L) 12.0 - 16.0 g/dL    Hematocrit 36.6 (L) 37.0 - 47.0 %     MCV 97.9 (H) 81.4 - 97.8 fL    MCH 31.3 27.0 - 33.0 pg    MCHC 32.0 (L) 32.2 - 35.5 g/dL    RDW 49.2 35.9 - 50.0 fL    Platelet Count 173 164 - 446 K/uL    MPV 10.8 9.0 - 12.9 fL    Neutrophils-Polys 82.60 (H) 44.00 - 72.00 %    Lymphocytes 10.00 (L) 22.00 - 41.00 %    Monocytes 6.80 0.00 - 13.40 %    Eosinophils 0.00 0.00 - 6.90 %    Basophils 0.10 0.00 - 1.80 %    Immature Granulocytes 0.50 0.00 - 0.90 %    Nucleated RBC 0.00 0.00 - 0.20 /100 WBC    Neutrophils (Absolute) 11.10 (H) 1.82 - 7.42 K/uL    Lymphs (Absolute) 1.35 1.00 - 4.80 K/uL    Monos (Absolute) 0.91 (H) 0.00 - 0.85 K/uL    Eos (Absolute) 0.00 0.00 - 0.51 K/uL    Baso (Absolute) 0.01 0.00 - 0.12 K/uL    Immature Granulocytes (abs) 0.07 0.00 - 0.11 K/uL    NRBC (Absolute) 0.00 K/uL   EKG (IP)    Collection Time: 24  1:21 PM   Result Value Ref Range    Report       [facility]    Test Date:  2024  Pt Name:    SHAHZAD CACERES             Department: [facility]  MRN:        [id number]                      Room:       [facility]  Gender:     Female                       Technician: [id number] WT  :        [id number]                   Requested By:JOANNA SALVADOR  Order #:    [id number]                    Reading MD: Ken Pruitt MD    Measurements  Intervals                                Axis  Rate:       58                           P:          36  OK:         230                          QRS:        17  QRSD:       98                           T:          22  QT:         409  QTc:        402    Interpretive Statements  Sinus bradycardia  Prolonged OK interval  Left ventricular hypertrophy  Anterior Q waves, possibly due to LVH, consider prior anterior infarct  Electronically Signed On 2024 13:57:59 PST by Ken Pruitt MD     Urine Sodium Random    Collection Time: 24  1:45 PM   Result Value Ref Range    Sodium, Urine -per volume 21 mmol/L   Urine Creatinine Random    Collection Time: 24  1:45 PM   Result Value Ref Range     Creatinine, Random Urine 38.67 mg/dL   POCT urinalysis device results    Collection Time: 01/03/24  5:16 PM   Result Value Ref Range    POC Color Yellow     POC Appearance Clear     POC Glucose Negative Negative mg/dL    POC Ketones Negative Negative mg/dL    POC Specific Gravity 1.020 1.005 - 1.030    POC Blood Trace-intact (A) Negative    POC Urine PH 5.5 5.0 - 8.0    POC Protein Negative Negative mg/dL    POC Nitrites Negative Negative    POC Leukocyte Esterase Negative Negative   PHOSPHORUS    Collection Time: 01/04/24  5:26 AM   Result Value Ref Range    Phosphorus 3.8 2.5 - 4.5 mg/dL   CBC WITHOUT DIFFERENTIAL    Collection Time: 01/04/24  5:26 AM   Result Value Ref Range    WBC 9.3 4.8 - 10.8 K/uL    RBC 3.62 (L) 4.20 - 5.40 M/uL    Hemoglobin 11.2 (L) 12.0 - 16.0 g/dL    Hematocrit 35.3 (L) 37.0 - 47.0 %    MCV 97.5 81.4 - 97.8 fL    MCH 30.9 27.0 - 33.0 pg    MCHC 31.7 (L) 32.2 - 35.5 g/dL    RDW 49.6 35.9 - 50.0 fL    Platelet Count 163 (L) 164 - 446 K/uL    MPV 10.9 9.0 - 12.9 fL   Basic Metabolic Panel    Collection Time: 01/04/24  5:26 AM   Result Value Ref Range    Sodium 138 135 - 145 mmol/L    Potassium 5.1 3.6 - 5.5 mmol/L    Chloride 103 96 - 112 mmol/L    Co2 24 20 - 33 mmol/L    Glucose 136 (H) 65 - 99 mg/dL    Bun 40 (H) 8 - 22 mg/dL    Creatinine 0.97 0.50 - 1.40 mg/dL    Calcium 9.8 8.5 - 10.5 mg/dL    Anion Gap 11.0 7.0 - 16.0   MAGNESIUM    Collection Time: 01/04/24  5:26 AM   Result Value Ref Range    Magnesium 2.3 1.5 - 2.5 mg/dL   TROPONIN    Collection Time: 01/04/24  5:26 AM   Result Value Ref Range    Troponin T 16 6 - 19 ng/L   proBrain Natriuretic Peptide, NT    Collection Time: 01/04/24  5:26 AM   Result Value Ref Range    NT-proBNP 756 (H) 0 - 125 pg/mL   ESTIMATED GFR    Collection Time: 01/04/24  5:26 AM   Result Value Ref Range    GFR (CKD-EPI) 54 (A) >60 mL/min/1.73 m 2       Medications:  Scheduled Medications   Medication Dose Frequency    amLODIPine  5 mg Q DAY    diclofenac  sodium  4 g TID    simethicone  125 mg 4X/DAY WITH MEALS + NIGHTLY    senna-docusate  2 Tablet BID    omeprazole  20 mg DAILY    acetaminophen  650 mg Q6HRS    aspirin  81 mg DAILY    carvedilol  6.25 mg BID WITH MEALS    enoxaparin (LOVENOX) injection  40 mg DAILY AT 1800    levothyroxine  25 mcg AM ES    lidocaine  2 Patch Q24HR    dexamethasone  4 mg Q8HRS     PRN medications: Respiratory Therapy Consult, hydrALAZINE, acetaminophen, senna-docusate **AND** polyethylene glycol/lytes **AND** magnesium hydroxide **AND** bisacodyl, mag hydrox-al hydrox-simeth, ondansetron **OR** ondansetron, traZODone, sodium chloride, oxyCODONE immediate-release **OR** oxyCODONE immediate-release, traMADol    Diet:  Current Diet Order   Procedures    Diet Order Diet: Cardiac       Medical Decision Making and Plan:  Cervical Myelopathy - Patient with cervical stenosis as well as paratracheal mass with left sided weakness and neck/head pain concerning for myelopathy. Patient elected conservative management  -PT and OT for mobility and ADLs. Per guidelines, 15 hours per week between PT, OT and/or SLP.  -Follow-up PCP     HTN/CHF - Patient on ASA. Patient on Coreg 6.25 mg, Lasix 20 mg (on hold on transfer). Consult hospitalist into 150s    Abdominal distention - Started on simethicone per Hospitalist. Will give Miralax hourly until BM     HLD - Patient not on statin on transfer. Had reaction to atorvastatin in the past.      CKD3a - Avoid nephrotoxic agents. Check AM CMP - Cr pending but had increased > 20%, will consult hospitalist     Pulmonary nodules - Patient electing no further work-up.      Anemia - Check AM CBC - 11.2, will monitor     Azotemia - Check AM CMP - BUN 40, will monitor     Hypothyroidism - Patient on Levothyroxine 25 mcg     Obesity due to excess calories - BMI of 30.9 on admission, meets medical criteria.      Pain - Patient on APAP/Oxycodone PRN     Neurogenic bladder - Having urge incontinence, most likely  neurogenic with limited voiding. Will check bladder scans and PVRs. May need to learn CIC     Skin - Patient at risk for skin breakdown due to debility in areas including sacrum, achilles, elbows and head in addition to other sites. Nursing to assess skin daily.      GI Ppx - Patient on Prilosec for GERD prophylaxis. Patient on Senna-docusate for constipation prophylaxis.      DVT Ppx - Patient Lovenox on transfer.  ____________________________________    T. René Xiao MD/PhD  [facility] - Physical Medicine & Rehabilitation   [facility] - Brain Injury Medicine   ____________________________________    Total time:  50 minutes. Time spent included pre-rounding review of vitals and tests, unit/floor time, face-to-face time with the patient including physical examination, care coordination, counseling of patient and/or family, ordering medications/procedures/tests, discussion with CM, PT, OT, SLP and/or other healthcare providers, and documentation in the electronic medical record. Topics discussed included discharge planning, abdominal discomfort, simethicone for gas, Miralax for large colonic stool, and consider enema. Patient was discussed separately in IDT today; please see details above

## 2024-01-04 NOTE — ASSESSMENT & PLAN NOTE
Has diminished BMs  Has some gas also  KUB 1/3/24 moderate gaseous distention of the stomach, nonspecific bowel gas pattern with a moderate colonic stool burden  KUB 1/4/24 unremarkable but did not comment on stomach  KUB 1/5/24 improved gastric distension  Cont Simethicone  Cont Miralax daily --> will increase to bid

## 2024-01-04 NOTE — CARE PLAN
Problem: Eating  Goal: STG-Within one week, patient will feed self at CGA using LRD  Outcome: Not Met     Problem: Grooming  Goal: STG-Within one week, patient will complete grooming, seated at EOS, using LRD  Outcome: Not Met     Problem: Bathing  Goal: STG-Within one week, patient will bathe at [place] using LRD  Outcome: Not Met     Problem: Dressing  Goal: STG-Within one week, patient will dress UB at CGA  Outcome: Not Met  Goal: STG-Within one week, patient will dress LB at [place] using LRD  Outcome: Not Met

## 2024-01-04 NOTE — FLOWSHEET NOTE
01/04/24 0658   Events/Summary/Plan   Events/Summary/Plan 02 pulse ox check   Vital Signs   Pulse 76   Respiration 18   Pulse Oximetry 99 %   $ Pulse Oximetry (Spot Check) Yes   Respiratory Assessment   Level of Consciousness Alert   Chest Exam   Work Of Breathing / Effort Within Normal Limits   Oxygen   O2 (LPM) 2   O2 Delivery Device Silicone Nasal Cannula

## 2024-01-04 NOTE — ASSESSMENT & PLAN NOTE
Bun: stable at 41  Cr: wnl  Around baseline - but baseline wanders  BNP: 756 --> 327 (1/9)  S/P recent IVF's (for GERARD)  Cont to monitor

## 2024-01-04 NOTE — PROGRESS NOTES
NURSING DAILY NOTE    Name: Regine Bryant   Date of Admission: 1/2/2024   Admitting Diagnosis: Central stenosis of spinal canal  Attending Physician: Sushma Xiao M.d.  Allergies: Atorvastatin    Safety  Patient Assist  max  Patient Precautions  Fall Risk  Precaution Comments  2L O2 wean  Bed Transfer Status  Minimal Assist  Toilet Transfer Status   Minimal Assist  Assistive Devices  Rails, Wheelchair  Oxygen  Room air w/o2 available  Diet/Therapeutic Dining  Current Diet Order   Procedures    Diet Order Diet: Cardiac     Pill Administration  whole and one at a time   Agitated Behavioral Scale     ABS Level of Severity       Fall Risk  Has the patient had a fall this admission?   No  Marquita Butcher Fall Risk Scoring  18, HIGH RISK  Fall Risk Safety Measures  bed alarm and chair alarm, poor balance    Vitals  Temperature: 36 °C (96.8 °F)  Temp src: Temporal  Pulse: 70  Respiration: 18  Blood Pressure : 132/80  Blood Pressure MAP (Calculated): 97 MM HG  BP Location: Right, Upper Arm  Patient BP Position: Sitting     Oxygen  Pulse Oximetry: 96 %  O2 (LPM): 2  O2 Delivery Device: Room air w/o2 available    Bowel and Bladder  Last Bowel Movement  12/31/23 (per report)  Stool Type     Bowel Device   (no BM since admit)  Continent  Bladder: Stress incontinence   Bowel:    Bladder Function  Urine Void (mL):  (Incontinence)  Urine Color: Yellow  Number of Times Incontinent of Urine: 1  Genitourinary Assessment   Bladder Assessment (WDL):  WDL Except  Santo Catheter: Not Applicable  Urinary Elimination: Incontinence  Urine Color: Yellow  Number of Times Incontinent of Urine: 1  Bladder Device: Diaper  Bladder Scan: Post Void  $ Bladder Scan Results (mL): 251    Skin  Mayco Score   17  Sensory Interventions   Bed Types: Standard/Trauma Mattress  Skin Preventative Measures: Pillows in Use for Support / Positioning, Waffle Overlay  Moisture  Interventions  Moisturizers/Barriers: Barrier Wipes      Pain  Pain Rating Scale  6 - Hard to ignore, avoid usual activities  Pain Location  Neck  Pain Location Orientation  Lateral, Left  Pain Interventions   Medication (see MAR)    ADLs    Bathing      Linen Change      Personal Hygiene  Change Crystal Pads, Moist Crystal Wipes, Perineal Care  Chlorhexidine Bath      Oral Care     Teeth/Dentures     Shave     Nutrition Percentage Eaten  Breakfast, Less than 25% Consumed  Environmental Precautions     Patient Turns/Positioning  Patient Turns Self from Side to Side  Patient Turns Assistance/Tolerance     Bed Positions  Bed Controls On  Head of Bed Elevated  Self regulated      Psychosocial/Neurologic Assessment  Psychosocial Assessment  Psychosocial (WDL):  WDL Except  Patient Behaviors: Fatigue  Family Behaviors: No Family Present  Neurologic Assessment  Neuro (WDL): Exceptions to WDL  Level of Consciousness: Alert (though fatigued)  Orientation Level: Oriented X4  Cognition: Follows commands  Speech: Clear  Pupil Assesment: No  Muscle Strength Right Arm: Good Strength Against Gravity and Moderate Resistance  Muscle Strength Left Arm: Good Strength Against Gravity and Moderate Resistance  Muscle Strength Right Leg: Good Strength Against Gravity and Moderate Resistance  Muscle Strength Left Leg: Good Strength Against Gravity and Moderate Resistance  EENT (WDL):  WDL Except    Cardio/Pulmonary Assessment  Edema      Respiratory Breath Sounds  RUL Breath Sounds: Clear  RML Breath Sounds: Clear  RLL Breath Sounds: Diminished  SERVANDO Breath Sounds: Clear  LLL Breath Sounds: Diminished  Cardiac Assessment   Cardiac (WDL):  WDL Except (hx chf, htn)

## 2024-01-04 NOTE — CARE PLAN
The patient is Stable - Low risk of patient condition declining or worsening    Shift Goals  Clinical Goals: safety  Patient Goals: sleep well, pain control    Progress made toward(s) clinical / shift goals:    Problem: Fall Risk - Rehab  Goal: Patient will remain free from falls  Outcome: Progressing. Patient bed in lowest locked position with bed alarm on and call light within reach. Patient does not call appropriately over shift with call light. Patient has not attempted to self transfer over shift.      Problem: Pain - Standard  Goal: Alleviation of pain or a reduction in pain to the patient’s comfort goal  Outcome: Progressing. Patient stated 8/10 left sided chronic neck pain, sharp, achy, has severe cervical stenosis. Patient has prn tramadol, oxycodone and scheduled tylenol for pain, alongside scheduled voltaren gel and lidocaine patches, patient states oxycodone seems to work better for pain control over shift.

## 2024-01-04 NOTE — CARE PLAN
The patient is Stable - Low risk of patient condition declining or worsening    Shift Goals  Clinical Goals: safety  Patient Goals: safety    Progress made toward(s) clinical / shift goals:  Safe transfers performed throughout day shift.    Problem: Psychosocial  Goal: Patient's level of anxiety will decrease  Outcome: Not Progressing  Flowsheets  Taken 1/4/2024 1331  Decrease Anxiety Level:   Collaborated with patient to identify and develop coping strategies   Implemented stimuli reduction, calming techniques  Taken 1/4/2024 0800  Patient Behaviors:   Anxious   Fatigue  Note: Patient continues to be anxious, requires continuous reassurance, and speaks in a repetitive evan.     Problem: Hemodynamics  Goal: Patient's hemodynamics, fluid balance and neurologic status will be stable or improve  Outcome: Progressing  Note:    Latest Reference Range & Units 01/04/24 05:26   WBC 4.8 - 10.8 K/uL 9.3   RBC 4.20 - 5.40 M/uL 3.62 (L)   Hemoglobin 12.0 - 16.0 g/dL 11.2 (L)   Hematocrit 37.0 - 47.0 % 35.3 (L)   MCV 81.4 - 97.8 fL 97.5   MCH 27.0 - 33.0 pg 30.9   MCHC 32.2 - 35.5 g/dL 31.7 (L)   RDW 35.9 - 50.0 fL 49.6   Platelet Count 164 - 446 K/uL 163 (L)   MPV 9.0 - 12.9 fL 10.9   Sodium 135 - 145 mmol/L 138   Potassium 3.6 - 5.5 mmol/L 5.1   Chloride 96 - 112 mmol/L 103   Co2 20 - 33 mmol/L 24   Anion Gap 7.0 - 16.0  11.0   Glucose 65 - 99 mg/dL 136 (H)   Bun 8 - 22 mg/dL 40 (H)   Creatinine 0.50 - 1.40 mg/dL 0.97   GFR (CKD-EPI) >60 mL/min/1.73 m 2 54 !   Calcium 8.5 - 10.5 mg/dL 9.8   Phosphorus 2.5 - 4.5 mg/dL 3.8   Magnesium 1.5 - 2.5 mg/dL 2.3   Troponin T 6 - 19 ng/L 16   NT-proBNP 0 - 125 pg/mL 756 (H)

## 2024-01-05 NOTE — PROGRESS NOTES
NURSING DAILY NOTE    Name: Regine Bryant   Date of Admission: 1/2/2024   Admitting Diagnosis: Central stenosis of spinal canal  Attending Physician: Sushma Xiao M.d.  Allergies: Atorvastatin    Safety  Patient Assist  min assist  Patient Precautions  Fall Risk  Precaution Comments  2L O2 wean (nighttime); RA (daytime)  Bed Transfer Status  Contact Guard Assist  Toilet Transfer Status   Contact Guard Assist  Assistive Devices  Rails, Walker - front wheel, Wheelchair  Oxygen  Silicone Nasal Cannula  Diet/Therapeutic Dining  Current Diet Order   Procedures    Diet Order Diet: Cardiac     Pill Administration  whole and one at a time   Agitated Behavioral Scale     ABS Level of Severity       Fall Risk  Has the patient had a fall this admission?   No  Marquita Butcher Fall Risk Scoring  18, HIGH RISK  Fall Risk Safety Measures  bed alarm, chair alarm, seatbelt alarm, poor balance, and low vision/ hearing    Vitals  Temperature: 36.7 °C (98.1 °F)  Temp src: Oral  Pulse: 80  Respiration: 18  Blood Pressure : (!) 166/78  Blood Pressure MAP (Calculated): 107 MM HG  BP Location: Left, Upper Arm  Patient BP Position: Supine     Oxygen  Pulse Oximetry: 98 %  O2 (LPM): 2  O2 Delivery Device: Silicone Nasal Cannula    Bowel and Bladder  Last Bowel Movement  01/04/24  Stool Type  Type 6: Fluffy pieces with ragged edges, a mushy stool  Bowel Device  Diaper, Bathroom  Continent  Bladder: Stress incontinence   Bowel:    Bladder Function  Urine Void (mL):  (moderate)  Number of Times Voided: 1  Urine Color: Yellow  Number of Times Incontinent of Urine: 1  Genitourinary Assessment   Bladder Assessment (WDL):  WDL Except  Santo Catheter: Not Applicable  Urinary Elimination: Incontinence  Urine Color: Yellow  Number of Times Incontinent of Urine: 1  Bladder Device: Diaper, Bathroom  Time Void: Yes  Bladder Scan: Post Void  $ Bladder Scan Results (mL):  135    Skin  Mayco Score   15  Sensory Interventions   Bed Types: Standard/Trauma Mattress  Skin Preventative Measures: Pillows in Use for Support / Positioning  Moisture Interventions  Moisturizers/Barriers: Barrier Wipes      Pain  Pain Rating Scale  8 - Awful, hard to do anything  Pain Location  Neck  Pain Location Orientation  Mid  Pain Interventions   Medication (see MAR)    ADLs    Bathing      Linen Change      Personal Hygiene  Moist Crystal Wipes, Perineal Care  Chlorhexidine Bath      Oral Care     Teeth/Dentures     Shave     Nutrition Percentage Eaten  Breakfast, Less than 25% Consumed  Environmental Precautions  Bed in Low Position, Treaded Slipper Socks on Patient  Patient Turns/Positioning  Patient Turns Self from Side to Side  Patient Turns Assistance/Tolerance     Bed Positions  Bed Controls On  Head of Bed Elevated  Self regulated      Psychosocial/Neurologic Assessment  Psychosocial Assessment  Psychosocial (WDL):  WDL Except  Patient Behaviors: Anxious, Fatigue  Family Behaviors: No Family Present  Neurologic Assessment  Neuro (WDL): Exceptions to WDL  Level of Consciousness: Alert  Orientation Level: Oriented to person, Oriented to place, Oriented to situation  Cognition: Follows commands  Speech: Clear  Pupil Assesment: No  Motor Function/Sensation Assessment: Motor strength  Muscle Strength Right Arm: Good Strength Against Gravity and Moderate Resistance  Muscle Strength Left Arm: Good Strength Against Gravity and Moderate Resistance  Muscle Strength Right Leg: Good Strength Against Gravity and Moderate Resistance  Muscle Strength Left Leg: Good Strength Against Gravity and Moderate Resistance  EENT (WDL):  WDL Except    Cardio/Pulmonary Assessment  Edema      Respiratory Breath Sounds  RUL Breath Sounds: Clear  RML Breath Sounds: Clear  RLL Breath Sounds: Diminished  SERVANDO Breath Sounds: Clear  LLL Breath Sounds: Diminished  Cardiac Assessment   Cardiac (WDL):  WDL Except

## 2024-01-05 NOTE — THERAPY
Physical Therapy   Daily Treatment     Patient Name: Regine Bryant  Age:  89 y.o., Sex:  female  Medical Record #: [id number]  Today's Date: 1/5/2024     Precautions  Precautions: (P) Fall Risk  Comments: (P) 2L O2 wean (nighttime); RA (daytime); anxious    Subjective    Patient seated in w/c with OT, agreeable to therapy.     Objective       01/05/24 1331   PT Charge Group   PT Therapeutic Exercise (Units) 1   PT Therapeutic Activities (Units) 1   PT Total Time Spent   PT Individual Total Time Spent (Mins) 30   Precautions   Precautions Fall Risk   Comments 2L O2 wean (nighttime); RA (daytime); anxious   Pain 0 - 10 Group   Location Neck   Location Orientation Left   Gait Functional Level of Assist    Gait Level Of Assist Contact Guard Assist  (to SBA)   Assistive Device Front Wheel Walker   Distance (Feet) 20   # of Times Distance was Traveled 1   Deviation Antalgic;Decreased Base Of Support;Shuffled Gait;Trendelenberg  (standing rest breaks to manage shortness of breath and neck pain, L [place])   Transfer Functional Level of Assist   Bed, Chair, Wheelchair Transfer Contact Guard Assist   Bed Chair Wheelchair Transfer Description Adaptive equipment;Increased time;Initial preparation for task;Supervision for safety;Verbal cueing  (stand step transfer with FWW)   Bed Mobility    Sit to Supine Contact Guard Assist   Sit to Stand Standby Assist  (to CGA)   Interdisciplinary Plan of Care Collaboration   IDT Collaboration with  Occupational Therapist   Patient Position at End of Therapy In Bed;Call Light within Reach;Tray Table within Reach;Phone within Reach;Family / Friend in Room   Collaboration Comments received patient from OT     BLE Motomed level 0 for 10:15 minutes, 0.83 miles. Moist heat pad applied to L neck for pain management during activity.    Bed mobility on hospital bed including bridging and scooting to position self in bed.    Assessment    Patient tolerated session well, receptive to  education regarding home evaluation. Will schedule next week as appropriate.    Strengths: Able to follow instructions, Good insight into deficits/needs, Independent prior level of function, Motivated for self care and independence, Pleasant and cooperative, Supportive family, Willingly participates in therapeutic activities  Barriers: Generalized weakness, Fatigue, Decreased endurance, Home accessibility, Impaired activity tolerance, Impaired balance, Pain    Plan    Progress gait training with FWW, transfer training with FWW, pain management strategies, LE strengthening, activity tolerance, bed mobility training.     DME  PT DME Recommendations  Assistive Device:  (pt has SPC and FWW)  Additional Equipment:  (LHS, reacher, sock aid)     Passport items to be completed:  Get in/out of bed safely, in/out of a vehicle, safely use mobility device, walk or wheel around home/community, navigate up and down stairs, show how to get up/down from the ground, ensure home is accessible, demonstrate HEP, complete caregiver training    Physical Therapy Problems (Active)       Problem: Mobility       Dates: Start:  01/03/24         Goal: STG-Within one week, patient will ascend and descend three stairs with R HR and CGA       Dates: Start:  01/03/24               Problem: Mobility Transfers       Dates: Start:  01/03/24         Goal: STG-Within one week, patient will perform bed mobility consistently with [place]       Dates: Start:  01/03/24            Goal: STG-Within one week, patient will transfer bed to chair SBA       Dates: Start:  01/03/24               Problem: PT-Long Term Goals       Dates: Start:  01/03/24         Goal: LTG-By discharge, patient will ambulate 150ft LRAD [place]       Dates: Start:  01/03/24            Goal: LTG-By discharge, patient will transfer one surface to another [place]       Dates: Start:  01/03/24            Goal: LTG-By discharge, patient will perform home exercise program with SPV       Dates:  Start:  01/03/24            Goal: LTG-By discharge, patient will ambulate up/down 3 stairs R HR vs curb step LRAD SBA       Dates: Start:  01/03/24            Goal: LTG-By discharge, patient will transfer in/out of a car CGA       Dates: Start:  01/03/24            Goal: LTG-By discharge, patient will perform bed mobility independently       Dates: Start:  01/03/24

## 2024-01-05 NOTE — THERAPY
Physical Therapy   Daily Treatment     Patient Name: Regine Bryant  Age:  89 y.o., Sex:  female  Medical Record #: [id number]  Today's Date: 1/5/2024     Precautions  Precautions: (P) Fall Risk  Comments: (P) 2L O2 wean (nighttime); RA (daytime); anxious    Subjective    Patient in bed and agreeable to therapy, tendency to perseverate on neck pain, dry mouth, and bloating.     Objective       01/05/24 0701   PT Charge Group   PT Gait Training (Units) 2   PT Therapeutic Activities (Units) 2   PT Total Time Spent   PT Individual Total Time Spent (Mins) 60   Precautions   Precautions Fall Risk   Comments 2L O2 wean (nighttime); RA (daytime); anxious   Vitals   Pulse Oximetry 96 %   O2 Delivery Device None - Room Air   Pain 0 - 10 Group   Location Neck   Location Orientation Left   Gait Functional Level of Assist    Gait Level Of Assist Contact Guard Assist   Assistive Device Front Wheel Walker   Distance (Feet)   (100 ftx1, 20 ftx1)   Deviation Antalgic;Step To;Decreased Base Of Support  (several standing rest breaks to manage shortness of breath and neck pain)   Transfer Functional Level of Assist   Bed, Chair, Wheelchair Transfer Contact Guard Assist   Bed Chair Wheelchair Transfer Description Adaptive equipment;Increased time;Initial preparation for task;Supervision for safety  (stand step transfer with FWW)   Bed Mobility    Supine to Sit Contact Guard Assist  (with HOB elevated)   Sit to Stand Standby Assist  (to CGA)   Scooting Standby Assist   Interdisciplinary Plan of Care Collaboration   IDT Collaboration with  Certified Nursing Assistant;Nursing;Occupational Therapist   Patient Position at End of Therapy Seated;Chair Alarm On;Self Releasing Lap Belt Applied  (in dining room)   Collaboration Comments CLOF     LB dressing seated EOB and standing with intermittent UE support on FWW and CGA, assist to thread pants and pull over hips.     Toileting at FWW level with CGA and assist for hygiene in standing  "and LB dressing. Max extra time to complete, as well as for standing hand hygiene with intermittent UE support.    Assessment    Patient tolerated session well, continues to be limited by anxiety and perseverations, requiring reassurance and encouragement. Repetition of cues/instructions required, patient stating that \"things here are just so different from at home\". One posterior LOB while standing at EOB requiring Min A and seat on bed to regain balance.    Strengths: Able to follow instructions, Good insight into deficits/needs, Independent prior level of function, Motivated for self care and independence, Pleasant and cooperative, Supportive family, Willingly participates in therapeutic activities  Barriers: Generalized weakness, Fatigue, Decreased endurance, Home accessibility, Impaired activity tolerance, Impaired balance, Pain    Plan    Progress gait training with FWW, transfer training with FWW, pain management strategies, LE strengthening, activity tolerance, bed mobility training.     DME  PT DME Recommendations  Assistive Device:  (pt has SPC and FWW)  Additional Equipment:  (LHS, reacher, sock aid)     Passport items to be completed:  Get in/out of bed safely, in/out of a vehicle, safely use mobility device, walk or wheel around home/community, navigate up and down stairs, show how to get up/down from the ground, ensure home is accessible, demonstrate HEP, complete caregiver training    Physical Therapy Problems (Active)       Problem: Mobility       Dates: Start:  01/03/24         Goal: STG-Within one week, patient will ascend and descend three stairs with R HR and CGA       Dates: Start:  01/03/24               Problem: Mobility Transfers       Dates: Start:  01/03/24         Goal: STG-Within one week, patient will perform bed mobility consistently with [place]       Dates: Start:  01/03/24            Goal: STG-Within one week, patient will transfer bed to chair SBA       Dates: Start:  01/03/24          "      Problem: PT-Long Term Goals       Dates: Start:  01/03/24         Goal: LTG-By discharge, patient will ambulate 150ft LRAD [place]       Dates: Start:  01/03/24            Goal: LTG-By discharge, patient will transfer one surface to another [place]       Dates: Start:  01/03/24            Goal: LTG-By discharge, patient will perform home exercise program with SPV       Dates: Start:  01/03/24            Goal: LTG-By discharge, patient will ambulate up/down 3 stairs R HR vs curb step LRAD SBA       Dates: Start:  01/03/24            Goal: LTG-By discharge, patient will transfer in/out of a car CGA       Dates: Start:  01/03/24            Goal: LTG-By discharge, patient will perform bed mobility independently       Dates: Start:  01/03/24

## 2024-01-05 NOTE — THERAPY
"Occupational Therapy  Daily Treatment     Patient Name: Regine Bryant  Age:  89 y.o., Sex:  female  Medical Record #: [id number]  Today's Date: 1/5/2024     Precautions  Precautions: (P) Fall Risk  Comments: (P) 2L O2 wean (nighttime); RA (daytime); anxious         Subjective    Pt encountered for OT sitting in WC. Pleasant and agreeable to participate in ADLs. \"I don't know what I'm suppose to do during these therapy sessions?\"     Objective       01/05/24 1001   OT Charge Group   OT Self Care / ADL (Units) 4   OT Total Time Spent   OT Individual Total Time Spent (Mins) 60   Precautions   Precautions Fall Risk   Comments 2L O2 wean (nighttime); RA (daytime); anxious   Functional Level of Assist   Bathing Minimal Assist   Bathing Description Grab bar;Hand held shower;Tub bench;Assit with back;Increased time;Supervision for safety;Verbal cueing  (Assistance needed to dry back and to dry BLE. VC to initate and sequence bathing tasks. Pt declined washing hair d/t limited B AROM at the shoulders. BASELINE: pt gets hair done weekly)   Upper Body Dressing Minimal Assist   Upper Body Dressing Description Assist with pulling shirt over head;Set-up of equipment;Supervision for safety  (Assistance to pull shirt over head d/t B shoulder/neck pain and limited AROM.)   Lower Body Dressing Minimal Assist   Lower Body Dressing Description Grab bar;Sock aid;Dressing stick;Assist with threading into pant leg;Increased time;Initial preparation for task;Set-up of equipment;Supervision for safety;Verbal cueing  (Marissa for LBD using AE. Pt able to doff pants/brief/socks using dressing stick with VC for sequencing (new task). Marissa to thread  1/2 legs into pants seated on shower bench. SBA to thread legs into brief. Marissa to pull up/down pants standing using the FWW.)   Toileting Minimal Assist   Toileting Description Assist to pull pants up;Assist to pull pants down;Grab bar;Supervision for safety;Verbal cueing  (CGA for standing " balance using GB. Pt able to engage in perineal care following urination in toilet; VC needed to initiate. Brief dry. Minimal assistance needed to aquire TP.)   Toilet Transfers Contact Guard Assist   Toilet Transfer Description Adaptive equipment;Grab bar;Increased time;Set-up of equipment;Supervision for safety  (STS from WC using GB)   Tub / Shower Transfers Contact Guard Assist   Tub Shower Transfer Description Grab bar;Shower bench;Increased time;Supervision for safety;Set-up of equipment  (Stand step from WC <> shower bench; x2)   Interdisciplinary Plan of Care Collaboration   Patient Position at End of Therapy Seated;Chair Alarm On;Call Light within Reach;Tray Table within Reach;Phone within Reach     Pt presented with some self-limiting behaviors throughout session, but responded well to VC to initiate self-care tasks.     Re-discussed with pt purpose of OT/PT to increase pt's functional performance for safe DC home as well as to determine the level of assistance pt may need to safety DC home. Pt verbalized understanding.     Assessment    Overall, pt progressed towards [place] for U/LB dressing use AE with moderate VC for sequencing and initiation. Good tolerance during functional mobility at the FWW (bed <> bathroom) at CGA (no RB needed). Pt remains limited by BUE AROM, neck pain, and impaired sequencing.      Strengths: Able to follow instructions, Alert and oriented, Good insight into deficits/needs, Independent prior level of function, Motivated for self care and independence, Pleasant and cooperative, Supportive family, Willingly participates in therapeutic activities  Barriers: Bladder incontinence, Decreased endurance, Fatigue, Generalized weakness, Impaired activity tolerance, Impaired balance, Pain, Limited mobility, Visual impairment    Plan    Cont ADLs (using AE), functional transfers (cont functional mobility at the FWW level), and thera act/ex to maximize functional recovery for safe DC home  with recommended support/SPV with ADLs.       DME  OT DME Recommendations  Bathroom Equipment:  (Pt owns shower chair; family looking to install fixated GB in shower)  Additional Equipment:  (LHS, reacher, sock aid)    Passport items to be completed:  Perform bathroom transfers, complete dressing, complete feeding, get ready for the day, prepare a simple meal, participate in household tasks, adapt home for safety needs, demonstrate home exercise program, complete caregiver training     Occupational Therapy Goals (Active)       Problem: Bathing       Dates: Start:  01/03/24         Goal: STG-Within one week, patient will bathe at [place] using LRD       Dates: Start:  01/03/24               Problem: Dressing       Dates: Start:  01/03/24         Goal: STG-Within one week, patient will dress UB at CGA       Dates: Start:  01/03/24            Goal: STG-Within one week, patient will dress LB at [place] using LRD       Dates: Start:  01/03/24               Problem: Eating       Dates: Start:  01/03/24         Goal: STG-Within one week, patient will feed self at CGA using LRD       Dates: Start:  01/03/24               Problem: Grooming       Dates: Start:  01/03/24         Goal: STG-Within one week, patient will complete grooming, seated at EOS, using LRD       Dates: Start:  01/03/24               Problem: OT Long Term Goals       Dates: Start:  01/03/24         Goal: LTG-By discharge, patient will complete basic self care tasks at SBA to [place]       Dates: Start:  01/03/24            Goal: LTG-By discharge, patient will perform bathroom transfers at [place]       Dates: Start:  01/03/24

## 2024-01-05 NOTE — CARE PLAN
The patient is Stable - Low risk of patient condition declining or worsening    Shift Goals  Clinical Goals: Safety  Patient Goals: Safety    Progress made toward(s) clinical / shift goals:  Safety maintained.    Patient is not progressing towards the following goals:      Problem: Psychosocial  Goal: Patient's level of anxiety will decrease  Outcome: Not Progressing  Flowsheets  Taken 1/5/2024 1303  Decrease Anxiety Level: Encouraged support system participation  Taken 1/5/2024 0900  Patient Behaviors:   Anxious   Fatigue  Note: Patient understands the call light system, ate breakfast in the dining room and lunch in her room where she feels more relaxed and less rushed.      Problem: Respiratory  Goal: Patient will understand use and administration of respiratory medications to improve respiratory function  Outcome: Not Progressing  Note: Patient continues to use oxygen therapy.     Problem: Respiratory  Goal: Patient will understand use and administration of respiratory medications to improve respiratory function  Outcome: Not Progressing  Note: Patient continues to use oxygen therapy.

## 2024-01-05 NOTE — THERAPY
"Occupational Therapy  Daily Treatment     Patient Name: Regine Bryant  Age:  89 y.o., Sex:  female  Medical Record #: [id number]  Today's Date: 1/5/2024     Precautions  Precautions: Fall Risk  Comments: 2L O2 wean (nighttime); RA (daytime); anxious         Subjective    \"Isn't lunch part of my therapy\" Pt was finishing lunch upon arrival Ind. Pt was edu on OT goals, however unable to confirm understanding.      Objective       01/05/24 1301   OT Charge Group   OT Self Care / ADL (Units) 2   OT Total Time Spent   OT Individual Total Time Spent (Mins) 30   Functional Level of Assist   Grooming Supervision  (washing hands, using tooth pick, and brushing teeth)   Grooming Description Increased time;Seated in wheelchair at sink;Supervision for safety   Toileting Minimal Assist  (urinate and attempting to have a BM (unsuccessful))   Toileting Description Assist for standing balance;Grab bar;Increased time;Set-up of equipment   Toilet Transfers Contact Guard Assist   Toilet Transfer Description Grab bar;Increased time;Verbal cueing   Interdisciplinary Plan of Care Collaboration   IDT Collaboration with  Physical Therapist   Patient Position at End of Therapy Seated;Chair Alarm On   Collaboration Comments collaberated w/ PT for next session.         Assessment    Pt tolerated session fairly w/ focus on ADLs and energy conservation. Needed Min A for STS using GB w/ CGA for stand pivot transfer w/ VC for LB sequencing from w/c<>toilet. Pt needed toilet step stool d/t short stature w/ increase time to attempt BM; pt was able to complete chente care w/out assistance. Pt was c/o of neck pain and observed discomfort moaning while reaching to washing hands.      Strengths: Able to follow instructions, Alert and oriented, Good insight into deficits/needs, Independent prior level of function, Motivated for self care and independence, Pleasant and cooperative, Supportive family, Willingly participates in therapeutic " activities  Barriers: Bladder incontinence, Decreased endurance, Fatigue, Generalized weakness, Impaired activity tolerance, Impaired balance, Pain, Limited mobility, Visual impairment    Plan    Cont ADLs (using AE), functional transfers (cont functional mobility at the FWW level), and thera act/ex to maximize functional recovery for safe DC home with recommended support/SPV with ADLs.       DME  OT DME Recommendations  Bathroom Equipment:  (Pt owns shower chair; family looking to install fixated GB in shower)  Additional Equipment:  (LHS, reacher, sock aid)    Passport items to be completed:  Perform bathroom transfers, complete dressing, complete feeding, get ready for the day, prepare a simple meal, participate in household tasks, adapt home for safety needs, demonstrate home exercise program, complete caregiver training     Occupational Therapy Goals (Active)       Problem: Bathing       Dates: Start:  01/03/24         Goal: STG-Within one week, patient will bathe at [place] using LRD       Dates: Start:  01/03/24               Problem: Dressing       Dates: Start:  01/03/24         Goal: STG-Within one week, patient will dress UB at CGA       Dates: Start:  01/03/24            Goal: STG-Within one week, patient will dress LB at [place] using LRD       Dates: Start:  01/03/24               Problem: Eating       Dates: Start:  01/03/24         Goal: STG-Within one week, patient will feed self at CGA using LRD       Dates: Start:  01/03/24               Problem: Grooming       Dates: Start:  01/03/24         Goal: STG-Within one week, patient will complete grooming, seated at EOS, using LRD       Dates: Start:  01/03/24               Problem: OT Long Term Goals       Dates: Start:  01/03/24         Goal: LTG-By discharge, patient will complete basic self care tasks at SBA to [place]       Dates: Start:  01/03/24            Goal: LTG-By discharge, patient will perform bathroom transfers at [place]       Dates: Start:   01/03/24

## 2024-01-05 NOTE — PROGRESS NOTES
Hospital Medicine Daily Progress Note      Chief Complaint  Acute Kidney Injury  Hypertension    Interval Problem Update  Blood pressures high; pt asymptomatic.  Imaging reviewed.    Review of Systems  Review of Systems   Constitutional:  Negative for chills and fever.   HENT: Negative.     Eyes: Negative.    Respiratory:  Negative for cough and shortness of breath.    Cardiovascular:  Negative for chest pain and palpitations.   Gastrointestinal:  Positive for constipation. Negative for abdominal pain, nausea and vomiting.   Musculoskeletal:  Positive for joint pain.   Skin:  Negative for itching and rash.   Endo/Heme/Allergies:  Negative for polydipsia. Does not bruise/bleed easily.        Physical Exam  Temp:  [36.6 °C (97.8 °F)-36.8 °C (98.2 °F)] 36.7 °C (98.1 °F)  Pulse:  [71-80] 80  Resp:  [16-18] 18  BP: (132-166)/(59-78) 132/67  SpO2:  [96 %-98 %] 96 %    Physical Exam  Vitals reviewed.   Constitutional:       General: She is not in acute distress.     Appearance: Normal appearance. She is not ill-appearing.   HENT:      Head: Normocephalic and atraumatic.      Right Ear: External ear normal.      Left Ear: External ear normal.      Nose: Nose normal.      Mouth/Throat:      Pharynx: Oropharynx is clear.   Eyes:      General:         Right eye: No discharge.         Left eye: No discharge.      Extraocular Movements: Extraocular movements intact.      Conjunctiva/sclera: Conjunctivae normal.   Cardiovascular:      Rate and Rhythm: Normal rate and regular rhythm.   Pulmonary:      Effort: Pulmonary effort is normal. No respiratory distress.      Breath sounds: Normal breath sounds. No wheezing.   Abdominal:      General: Bowel sounds are normal. There is distension.      Palpations: Abdomen is soft.      Tenderness: There is no abdominal tenderness. There is no guarding or rebound.   Musculoskeletal:      Cervical back: Normal range of motion and neck supple.      Right lower leg: No edema.      Left lower  leg: No edema.      Comments: Bilateral shoulders decreased ROM   Skin:     General: Skin is warm and dry.   Neurological:      Mental Status: She is alert and oriented to person, place, and time.         Fluids    Intake/Output Summary (Last 24 hours) at 1/5/2024 1111  Last data filed at 1/5/2024 0800  Gross per 24 hour   Intake 840 ml   Output --   Net 840 ml       Laboratory  Recent Labs     01/03/24  0525 01/03/24  1310 01/04/24  0526   WBC 13.3* 13.4* 9.3   RBC 3.76* 3.74* 3.62*   HEMOGLOBIN 11.8* 11.7* 11.2*   HEMATOCRIT 36.3* 36.6* 35.3*   MCV 96.5 97.9* 97.5   MCH 31.4 31.3 30.9   MCHC 32.5 32.0* 31.7*   RDW 49.5 49.2 49.6   PLATELETCT 182 173 163*   MPV 11.2 10.8 10.9     Recent Labs     01/03/24  0525 01/04/24  0526   SODIUM 137 138   POTASSIUM 4.2 5.1   CHLORIDE 101 103   CO2 20 24   GLUCOSE 162* 136*   BUN 45* 40*   CREATININE 1.13 0.97   CALCIUM 10.1 9.8                   Assessment/Plan  * Cervical central stenosis of spinal canal- (present on admission)  Assessment & Plan  Non-surgical management  On Decadron  Pain control per Physiatry    Leukocytosis  Assessment & Plan  Likely 2/2 steroids  WBC spontaneously resolving  Check F/U labs in AM    Abdominal distension  Assessment & Plan  KUB 1/3/24 moderate gaseous distention of the stomach, nonspecific bowel gas pattern with a moderate colonic stool burden  Continue Simethicone  Bowel regimen per Physiatry  KUB 1/4/24 unremarkable but did not capture stomach  Repeat KUB F/U gastric distension    Chest pain- (present on admission)  Assessment & Plan    Trop 16 x 2  EKG sinus deven  CXR negative acute  Symptoms spontaneously resolved    Stage 3a chronic kidney disease (HCC)- (present on admission)  Assessment & Plan  Also has GERARD  FENa <1%  BUN/Cr improving w/ IVF  Closely monitor K+  Check F/U labs in AM    Thrombocytopenia (HCC)- (present on admission)  Assessment & Plan  Follow PLT on ASA  Check F/U labs in AM    Lung mass- (present on  admission)  Assessment & Plan  Reportedly, family desires no further work up    Hypothyroid  Assessment & Plan  Euthyroid on Synthroid    HTN (hypertension)- (present on admission)  Assessment & Plan  Echo 4/26/23 EF 60%, grade I DD, RVSP 24 mmHg  Blood pressures still high and heart rates trending low on Coreg  Norvasc added, will increase dose       DNR    Discussed w/ pt and family

## 2024-01-05 NOTE — CARE PLAN
"The patient is Stable - Low risk of patient condition declining or worsening    Shift Goals  Clinical Goals: safety, pain management  Patient Goals: safety, pain management      Problem: Fall Risk - Rehab  Goal: Patient will remain free from falls  Outcome: Progressing  Note: Marquita Butcher Fall risk Assessment Score: 18    High fall risk Interventions   - Alarming seatbelt  - Bed and strip alarm   - Yellow sign by the door   - Yellow wrist band \"Fall risk\"  - Room near to the nurse station  - Do not leave patient unattended in the bathroom  - Fall risk education provided     Patient uses call light consistently and appropriately this shift.  Waits for assistance when needed and does not attempt self transfer.  Able to verbalize needs.  Will continue to monitor.     Problem: Pain - Standard  Goal: Alleviation of pain or a reduction in pain to the patient’s comfort goal  Outcome: Progressing  Note: Routine Tramadol for pain management given with help. Patient able to verbalize pain level and verbalize an acceptable level of pain.          "

## 2024-01-05 NOTE — PROGRESS NOTES
NURSING DAILY NOTE    Name: Regine Bryant   Date of Admission: 1/2/2024   Admitting Diagnosis: Central stenosis of spinal canal  Attending Physician: Sushma Xiao M.d.  Allergies: Atorvastatin    Safety  Patient Assist  mod  Patient Precautions  Fall Risk  Precaution Comments  2L O2 wean (nighttime); RA (daytime)  Bed Transfer Status  Contact Guard Assist  Toilet Transfer Status   Contact Guard Assist  Assistive Devices  Rails  Oxygen  Silicone Nasal Cannula  Diet/Therapeutic Dining  Current Diet Order   Procedures    Diet Order Diet: Cardiac     Pill Administration  whole  Agitated Behavioral Scale     ABS Level of Severity       Fall Risk  Has the patient had a fall this admission?   No  Marquita Butcher Fall Risk Scoring  20, HIGH RISK  Fall Risk Safety Measures  bed alarm, chair alarm, poor balance, and low vision/ hearing    Vitals  Temperature: 36.8 °C (98.2 °F)  Temp src: Oral  Pulse: 80  Respiration: 18  Blood Pressure : (!) 164/68  Blood Pressure MAP (Calculated): 100 MM HG  BP Location: Right, Upper Arm  Patient BP Position: Supine     Oxygen  Pulse Oximetry: 96 %  O2 (LPM): 2  O2 Delivery Device: Silicone Nasal Cannula    Bowel and Bladder  Last Bowel Movement  01/04/24  Stool Type  Type 6: Fluffy pieces with ragged edges, a mushy stool  Bowel Device  Diaper, Bathroom  Continent  Bladder: Stress incontinence   Bowel:    Bladder Function  Urine Void (mL):  (Incontinence)  Number of Times Voided: 1  Urine Color: Yellow  Number of Times Incontinent of Urine: 1  Genitourinary Assessment   Bladder Assessment (WDL):  WDL Except  Santo Catheter: Not Applicable  Urinary Elimination: Incontinence  Urine Color: Yellow  Number of Times Incontinent of Urine: 1  Bladder Device: Diaper, Bathroom  Time Void: Yes  Bladder Scan: Post Void  $ Bladder Scan Results (mL): 150    Skin  Mayco Score   16  Sensory Interventions   Bed Types:  Standard/Trauma Mattress  Skin Preventative Measures: Pillows in Use to Float Heels  Moisture Interventions  Moisturizers/Barriers: Barrier Wipes      Pain  Pain Rating Scale  9 - Can't bear the pain, unable to do anything  Pain Location  Neck  Pain Location Orientation  Left  Pain Interventions   Education, Emotional Support    ADLs    Bathing      Linen Change      Personal Hygiene  Perineal Care, Moist Crystal Wipes  Chlorhexidine Bath      Oral Care     Teeth/Dentures     Shave     Nutrition Percentage Eaten  Breakfast, Less than 25% Consumed  Environmental Precautions     Patient Turns/Positioning  Patient Turns Self from Side to Side  Patient Turns Assistance/Tolerance     Bed Positions  Bed Controls On  Head of Bed Elevated  Self regulated      Psychosocial/Neurologic Assessment  Psychosocial Assessment  Psychosocial (WDL):  WDL Except  Patient Behaviors: Anxious, Fatigue  Family Behaviors: No Family Present  Neurologic Assessment  Neuro (WDL): Exceptions to WDL  Level of Consciousness: Alert  Orientation Level: Oriented to person, Oriented to place, Oriented to situation  Cognition: Follows commands  Speech: Clear  Pupil Assesment: No  Motor Function/Sensation Assessment: Motor strength  Muscle Strength Right Arm: Good Strength Against Gravity and Moderate Resistance  Muscle Strength Left Arm: Good Strength Against Gravity and Moderate Resistance  Muscle Strength Right Leg: Good Strength Against Gravity and Moderate Resistance  Muscle Strength Left Leg: Good Strength Against Gravity and Moderate Resistance  EENT (WDL):  WDL Except    Cardio/Pulmonary Assessment  Edema      Respiratory Breath Sounds  RUL Breath Sounds: Clear  RML Breath Sounds: Clear  RLL Breath Sounds: Diminished  SERVANDO Breath Sounds: Clear  LLL Breath Sounds: Diminished  Cardiac Assessment   Cardiac (WDL):  WDL Except

## 2024-01-06 NOTE — THERAPY
Physical Therapy   Daily Treatment     Patient Name: Regine Bryant  Age:  89 y.o., Sex:  female  Medical Record #: [id number]  Today's Date: 1/6/2024     Precautions  Precautions: Fall Risk  Comments: RA during daytime    Subjective    Pt received finishing breakfast but agreeable. Fixated on neck pain throughout session. Frustrated by bloating and lack of BM in recent days     Objective       01/06/24 0830   PT Charge Group   PT Gait Training (Units) 1   PT Therapeutic Activities (Units) 1   PT Total Time Spent   PT Individual Total Time Spent (Mins) 30   Pain   Intervention Heat Applied  (moist hot pack to neck and B upper traps x10 min)   Pain 0 - 10 Group   Location Neck;Shoulder   Location Orientation Left;Lateral   Pain Rating Scale (NPRS) 8   Gait Functional Level of Assist    Gait Level Of Assist Contact Guard Assist   Assistive Device Front Wheel Walker   Distance (Feet) 100   # of Times Distance was Traveled 1  (as well as 20ftx2)   Deviation Antalgic;Decreased Base Of Support;Shuffled Gait;Trendelenberg  (L trendelenberg)   Bed Mobility    Sit to Stand Standby Assist   Interdisciplinary Plan of Care Collaboration   IDT Collaboration with  Nursing   Patient Position at End of Therapy Seated;Chair Alarm On;Self Releasing Lap Belt Applied;Call Light within Reach;Tray Table within Reach;Phone within Reach   Collaboration Comments pt requesting lidocaine patch to neck     Obtained and modified hospital pants for improved fit, modA to stephen for sake of time  Applied moist heat with gentle cervical rotation and lateral flexion AROM for 10 min  Extra time during gait training due to need for standing rest break every 20ft 2/2 neck pain    Assessment    Pt is making slow but steady progress toward her goals. She is anxious regarding shortness of breath with activity despite stable oxygen sats on room air and her neck pain. Pt is redirectable to task after expressing her concerns.  Strengths: Able to follow  instructions, Good insight into deficits/needs, Independent prior level of function, Motivated for self care and independence, Pleasant and cooperative, Supportive family, Willingly participates in therapeutic activities  Barriers: Generalized weakness, Fatigue, Decreased endurance, Home accessibility, Impaired activity tolerance, Impaired balance, Pain    Plan    Progress gait training with FWW, transfer training with FWW, pain management strategies, LE strengthening, activity tolerance, bed mobility training.      DME  PT DME Recommendations  Assistive Device: Front Wheeled Walker  Additional Equipment:  (LHS, reacher, sock aid)    Passport items to be completed:  Get in/out of bed safely, in/out of a vehicle, safely use mobility device, walk or wheel around home/community, navigate up and down stairs, show how to get up/down from the ground, ensure home is accessible, demonstrate HEP, complete caregiver training    Physical Therapy Problems (Active)       Problem: Mobility       Dates: Start:  01/03/24         Goal: STG-Within one week, patient will ascend and descend three stairs with R HR and CGA       Dates: Start:  01/03/24               Problem: Mobility Transfers       Dates: Start:  01/03/24         Goal: STG-Within one week, patient will perform bed mobility consistently with Armando       Dates: Start:  01/03/24            Goal: STG-Within one week, patient will transfer bed to chair SBA       Dates: Start:  01/03/24               Problem: PT-Long Term Goals       Dates: Start:  01/03/24         Goal: LTG-By discharge, patient will ambulate 150ft LRAD [place]       Dates: Start:  01/03/24            Goal: LTG-By discharge, patient will transfer one surface to another [place]       Dates: Start:  01/03/24            Goal: LTG-By discharge, patient will perform home exercise program with SPV       Dates: Start:  01/03/24            Goal: LTG-By discharge, patient will ambulate up/down 3 stairs R HR vs curb step  LRAD SBA       Dates: Start:  01/03/24            Goal: LTG-By discharge, patient will transfer in/out of a car CGA       Dates: Start:  01/03/24            Goal: LTG-By discharge, patient will perform bed mobility independently       Dates: Start:  01/03/24

## 2024-01-06 NOTE — PROGRESS NOTES
Hospital Medicine Daily Progress Note      Chief Complaint  Acute Kidney Injury  Hypertension    Interval Problem Update  Blood pressures still elevated.  Labs and KUB reviewed.    Review of Systems  Review of Systems   Constitutional:  Negative for chills and fever.   HENT: Negative.     Eyes: Negative.    Respiratory:  Negative for cough and shortness of breath.    Cardiovascular:  Negative for chest pain and palpitations.   Gastrointestinal:  Positive for constipation. Negative for abdominal pain, nausea and vomiting.   Musculoskeletal:  Positive for joint pain.   Skin:  Negative for itching and rash.   Endo/Heme/Allergies:  Negative for polydipsia. Does not bruise/bleed easily.        Physical Exam  Temp:  [36.3 °C (97.3 °F)-36.7 °C (98.1 °F)] 36.7 °C (98.1 °F)  Pulse:  [72-78] 75  Resp:  [14-18] 16  BP: (124-173)/(56-91) 124/81  SpO2:  [93 %-99 %] 93 %    Physical Exam  Vitals reviewed.   Constitutional:       General: She is not in acute distress.     Appearance: Normal appearance. She is not ill-appearing.   HENT:      Head: Normocephalic and atraumatic.      Right Ear: External ear normal.      Left Ear: External ear normal.      Nose: Nose normal.      Mouth/Throat:      Pharynx: Oropharynx is clear.   Eyes:      General:         Right eye: No discharge.         Left eye: No discharge.      Extraocular Movements: Extraocular movements intact.      Conjunctiva/sclera: Conjunctivae normal.   Cardiovascular:      Rate and Rhythm: Normal rate and regular rhythm.   Pulmonary:      Effort: Pulmonary effort is normal. No respiratory distress.      Breath sounds: Normal breath sounds. No wheezing.   Abdominal:      General: Bowel sounds are normal. There is distension.      Palpations: Abdomen is soft.      Tenderness: There is no abdominal tenderness. There is no guarding or rebound.   Musculoskeletal:      Cervical back: Normal range of motion and neck supple.      Right lower leg: No edema.      Left lower leg:  No edema.      Comments: Bilateral shoulders decreased ROM   Skin:     General: Skin is warm and dry.   Neurological:      Mental Status: She is alert and oriented to person, place, and time.         Fluids    Intake/Output Summary (Last 24 hours) at 1/6/2024 1120  Last data filed at 1/6/2024 0526  Gross per 24 hour   Intake 360 ml   Output --   Net 360 ml       Laboratory  Recent Labs     01/03/24  1310 01/04/24  0526 01/06/24  0526   WBC 13.4* 9.3 9.0   RBC 3.74* 3.62* 3.97*   HEMOGLOBIN 11.7* 11.2* 12.3   HEMATOCRIT 36.6* 35.3* 38.1   MCV 97.9* 97.5 96.0   MCH 31.3 30.9 31.0   MCHC 32.0* 31.7* 32.3   RDW 49.2 49.6 49.0   PLATELETCT 173 163* 177   MPV 10.8 10.9 11.3     Recent Labs     01/04/24  0526 01/06/24  0526   SODIUM 138 136   POTASSIUM 5.1 4.9   CHLORIDE 103 101   CO2 24 25   GLUCOSE 136* 141*   BUN 40* 39*   CREATININE 0.97 0.91   CALCIUM 9.8 10.1                   Assessment/Plan  * Cervical central stenosis of spinal canal- (present on admission)  Assessment & Plan  Non-surgical management  On Decadron  Pain control per Physiatry    Leukocytosis  Assessment & Plan  Likely 2/2 steroids  WBC spontaneously resolved    Abdominal distension  Assessment & Plan  KUB 1/3/24 moderate gaseous distention of the stomach, nonspecific bowel gas pattern with a moderate colonic stool burden  KUB 1/4/24 unremarkable but did not comment on stomach  KUB 1/5/24 improved gastric distension  Continue Simethicone  Bowel regimen per Physiatry    Chest pain- (present on admission)  Assessment & Plan    Trop 16 x 2  EKG sinus deven  CXR negative acute  Symptoms spontaneously resolved    Stage 3a chronic kidney disease (HCC)- (present on admission)  Assessment & Plan  Also has GERARD  FENa <1%  BUN/Cr improving w/ IVF, continue same  Closely monitor K+    Thrombocytopenia (HCC)- (present on admission)  Assessment & Plan  PLT resolved    Lung mass- (present on admission)  Assessment & Plan  Reportedly, family desires no  further work up    Hypothyroid  Assessment & Plan  Euthyroid on Synthroid    HTN (hypertension)- (present on admission)  Assessment & Plan  Echo 4/26/23 EF 60%, grade I DD, RVSP 24 mmHg  On Coreg and Norvasc  Observe blood pressure trends on increased CCB       DNR

## 2024-01-06 NOTE — THERAPY
Physical Therapy   Daily Treatment     Patient Name: Regine Bryant  Age:  89 y.o., Sex:  female  Medical Record #: [id number]  Today's Date: 1/6/2024     Precautions  Precautions: Fall Risk  Comments: RA during daytime    Subjective    Patient pleasant and agreeable to participate with encouragement. Requests hot pack for neck prior to therapy.      Objective       01/06/24 0931   PT Charge Group   PT Gait Training (Units) 2   PT Therapeutic Exercise (Units) 2   PT Total Time Spent   PT Individual Total Time Spent (Mins) 60   Gait Functional Level of Assist    Gait Level Of Assist Contact Guard Assist   Assistive Device Front Wheel Walker   Distance (Feet) 75   # of Times Distance was Traveled 3   Deviation Antalgic;Trendelenberg;Bradykinetic;Shuffled Gait   Transfer Functional Level of Assist   Bed, Chair, Wheelchair Transfer Contact Guard Assist   Bed Chair Wheelchair Transfer Description Adaptive equipment;Increased time;Initial preparation for task;Set-up of equipment;Supervision for safety  (FWW)   Sitting Lower Body Exercises   Comments LE motomed x10 minutes (0 resistance)  -with hot pack on L neck for pain relief   Bed Mobility    Sit to Stand Standby Assist   Neuro-Muscular Treatments   Comments   Standing balance in // bars  -narrow ANNA 3 x 15 seconds (min A, patient reaching for bar to maintain balance)  -eyes closed 3 x 5 seconds (min A for balance)     Interdisciplinary Plan of Care Collaboration   IDT Collaboration with  Family / Caregiver   Patient Position at End of Therapy Seated;Self Releasing Lap Belt Applied  (handoff to OT in therapy gym)   Collaboration Comments Patient's daughter present at end of session         Assessment    Patient limited by pain and decreased activity tolerance this morning, requiring frequent redirection and distraction to participate. She demonstrates impaired balance in narrow ANNA as well as with eyes closed, relying on UE to regain balance.     Strengths: Able  to follow instructions, Good insight into deficits/needs, Independent prior level of function, Motivated for self care and independence, Pleasant and cooperative, Supportive family, Willingly participates in therapeutic activities  Barriers: Generalized weakness, Fatigue, Decreased endurance, Home accessibility, Impaired activity tolerance, Impaired balance, Pain    Plan    Progress gait training with FWW, transfer training with FWW, pain management strategies, LE strengthening, activity tolerance, bed mobility training.     DME  PT DME Recommendations  Assistive Device: Front Wheeled Walker  Additional Equipment:  (LHS, reacher, sock aid)    Passport items to be completed:  Get in/out of bed safely, in/out of a vehicle, safely use mobility device, walk or wheel around home/community, navigate up and down stairs, show how to get up/down from the ground, ensure home is accessible, demonstrate HEP, complete caregiver training    Physical Therapy Problems (Active)       Problem: Mobility       Dates: Start:  01/03/24         Goal: STG-Within one week, patient will ascend and descend three stairs with R HR and CGA       Dates: Start:  01/03/24               Problem: Mobility Transfers       Dates: Start:  01/03/24         Goal: STG-Within one week, patient will perform bed mobility consistently with Armando       Dates: Start:  01/03/24            Goal: STG-Within one week, patient will transfer bed to chair SBA       Dates: Start:  01/03/24               Problem: PT-Long Term Goals       Dates: Start:  01/03/24         Goal: LTG-By discharge, patient will ambulate 150ft LRAD [place]       Dates: Start:  01/03/24            Goal: LTG-By discharge, patient will transfer one surface to another [place]       Dates: Start:  01/03/24            Goal: LTG-By discharge, patient will perform home exercise program with SPV       Dates: Start:  01/03/24            Goal: LTG-By discharge, patient will ambulate up/down 3 stairs R HR vs curb  step LRAD SBA       Dates: Start:  01/03/24            Goal: LTG-By discharge, patient will transfer in/out of a car CGA       Dates: Start:  01/03/24            Goal: LTG-By discharge, patient will perform bed mobility independently       Dates: Start:  01/03/24

## 2024-01-06 NOTE — PROGRESS NOTES
NURSING DAILY NOTE    Name: Regine Bryant   Date of Admission: 1/2/2024   Admitting Diagnosis: Central stenosis of spinal canal  Attending Physician: Sushma Xiao M.d.  Allergies: Atorvastatin    Safety  Patient Assist  Min/Mod Assist  Patient Precautions  Fall Risk  Precaution Comments  RA during daytime  Bed Transfer Status  Contact Guard Assist  Toilet Transfer Status   Contact Guard Assist  Assistive Devices  Rails, Wheelchair  Oxygen  Nasal Cannula  Diet/Therapeutic Dining  Current Diet Order   Procedures    Diet Order Diet: Cardiac     Pill Administration  whole and one at a time   Agitated Behavioral Scale     ABS Level of Severity       Fall Risk  Has the patient had a fall this admission?   No  Marquita Butcher Fall Risk Scoring  18, HIGH RISK  Fall Risk Safety Measures  bed alarm, chair alarm, seatbelt alarm, poor balance, and low vision/ hearing    Vitals  Temperature: 36.3 °C (97.3 °F)  Temp src: Oral  Pulse: 73  Respiration: 16  Blood Pressure : 124/81  Blood Pressure MAP (Calculated): 95 MM HG  BP Location: Left, Upper Arm  Patient BP Position: Supine     Oxygen  Pulse Oximetry: 97 %  O2 (LPM): 2  O2 Delivery Device: Nasal Cannula    Bowel and Bladder  Last Bowel Movement  01/04/24  Stool Type  Type 6: Fluffy pieces with ragged edges, a mushy stool  Bowel Device  Diaper, Bathroom  Continent  Bladder: Stress incontinence   Bowel:    Bladder Function  Urine Void (mL):  (moderate)  Number of Times Voided: 1  Urine Color: Yellow  Number of Times Incontinent of Urine: 1  Genitourinary Assessment   Bladder Assessment (WDL):  WDL Except  Santo Catheter: Not Applicable  Urinary Elimination: Incontinence  Urine Color: Yellow  Number of Times Incontinent of Urine: 1  Bladder Device: Bathroom, Diaper  Time Void: Yes  Bladder Scan: Post Void  $ Bladder Scan Results (mL): 135    Skin  Mayco Score   15  Sensory Interventions   Bed Types:  Standard/Trauma Mattress  Skin Preventative Measures: Pillows in Use for Support / Positioning  Moisture Interventions  Moisturizers/Barriers: Barrier Wipes      Pain  Pain Rating Scale  6 - Hard to ignore, avoid usual activities  Pain Location  Neck, Shoulder  Pain Location Orientation  Left, Mid, Posterior  Pain Interventions   Medication (see MAR)    ADLs    Bathing   Shower, * * With Assistance from, Staff  Linen Change      Personal Hygiene  Moist Crystal Wipes, Perineal Care  Chlorhexidine Bath      Oral Care     Teeth/Dentures     Shave     Nutrition Percentage Eaten  Breakfast, Between 50-75% Consumed  Environmental Precautions  Treaded Slipper Socks on Patient, Bed in Low Position  Patient Turns/Positioning  Patient Turns Self from Side to Side  Patient Turns Assistance/Tolerance     Bed Positions  Bed Controls On, Bed Locked  Head of Bed Elevated  Greater or equal to 30 degrees      Psychosocial/Neurologic Assessment  Psychosocial Assessment  Psychosocial (WDL):  WDL Except  Patient Behaviors: Anxious, Fatigue  Family Behaviors: No Family Present  Neurologic Assessment  Neuro (WDL): Exceptions to WDL  Level of Consciousness: Alert  Orientation Level: Oriented to person, Oriented to place, Oriented to situation  Cognition: Follows commands  Speech: Clear  Pupil Assesment: No  Motor Function/Sensation Assessment: Motor strength  Muscle Strength Right Arm: Good Strength Against Gravity and Moderate Resistance  Muscle Strength Left Arm: Good Strength Against Gravity and Moderate Resistance  Muscle Strength Right Leg: Good Strength Against Gravity and Moderate Resistance  Muscle Strength Left Leg: Good Strength Against Gravity and Moderate Resistance  EENT (WDL):  WDL Except    Cardio/Pulmonary Assessment  Edema      Respiratory Breath Sounds  RUL Breath Sounds: Clear  RML Breath Sounds: Clear  RLL Breath Sounds: Diminished  SERVANDO Breath Sounds: Clear  LLL Breath Sounds: Diminished  Cardiac Assessment   Cardiac  (WDL):  WDL Except

## 2024-01-06 NOTE — THERAPY
"Occupational Therapy  Daily Treatment     Patient Name: Regine Bryant  Age:  89 y.o., Sex:  female  Medical Record #: [id number]  Today's Date: 1/5/2024     Precautions  Precautions: (P) Fall Risk  Comments: (P) RA during daytime         Subjective    \"I just don't understand why I'm so short of breath.\"   (Start of session with patient in bed, SOB decreased in standing and from w/c level.  O2 sats >90% throughout tx on RA)      Objective       01/05/24 1400   OT Charge Group   Charges Yes   OT Self Care / ADL (Units) 2   OT Therapy Activity (Units) 1   OT Total Time Spent   OT Individual Total Time Spent (Mins) 45   Precautions   Precautions Fall Risk   Comments RA during daytime   Cognition    Orientation Level Oriented x 4   Level of Consciousness Alert   Functional Level of Assist   Grooming Standing;Supervision   Grooming Description Increased time;Standing at sink   Toileting Contact Guard Assist   Toileting Description Adaptive equipment   Bed, Chair, Wheelchair Transfer Contact Guard Assist   Bed Chair Wheelchair Transfer Description Adaptive equipment   Toilet Transfers Contact Guard Assist   Toilet Transfer Description Grab bar;Increased time   Standing Upper Body Exercises   Standing Upper Body Exercises Yes   Other Exercises Standing box breathing activity   Balance   Sitting Balance (Static) Fair   Sitting Balance (Dynamic) Fair   Standing Balance (Static) Fair -   Standing Balance (Dynamic) Fair -   Weight Shift Sitting Fair   Weight Shift Standing Fair   Skilled Intervention Compensatory Strategies;Verbal Cuing;Tactile Cuing   Bed Mobility    Supine to Sit Contact Guard Assist   Scooting Standby Assist   Interdisciplinary Plan of Care Collaboration   IDT Collaboration with  Family / Caregiver;Nursing   Patient Position at End of Therapy Seated;Chair Alarm On;Call Light within Reach;Family / Friend in Room   Collaboration Comments CLOF         Assessment    Patient tolerated treatment well with " initial c/o SOB and neck pain.  This improved with OOB activity and distraction.     Strengths: Able to follow instructions, Alert and oriented, Good insight into deficits/needs, Independent prior level of function, Motivated for self care and independence, Pleasant and cooperative, Supportive family, Willingly participates in therapeutic activities  Barriers: Bladder incontinence, Decreased endurance, Fatigue, Generalized weakness, Impaired activity tolerance, Impaired balance, Pain, Limited mobility, Visual impairment    Plan    Continue OT POC.     DME  OT DME Recommendations  Bathroom Equipment:  (Pt owns shower chair; family looking to install fixated GB in shower)  Additional Equipment:  (LHS, reacher, sock aid)    Passport items to be completed:  Perform bathroom transfers, complete dressing, complete feeding, get ready for the day, prepare a simple meal, participate in household tasks, adapt home for safety needs, demonstrate home exercise program, complete caregiver training     Occupational Therapy Goals (Active)       Problem: Bathing       Dates: Start:  01/03/24         Goal: STG-Within one week, patient will bathe at [facility] using LRD       Dates: Start:  01/03/24               Problem: Dressing       Dates: Start:  01/03/24         Goal: STG-Within one week, patient will dress UB at [facility]       Dates: Start:  01/03/24            Goal: STG-Within one week, patient will dress LB at [facility] using LRD       Dates: Start:  01/03/24               Problem: Eating       Dates: Start:  01/03/24         Goal: STG-Within one week, patient will feed self at [facility] using LRD       Dates: Start:  01/03/24               Problem: Grooming       Dates: Start:  01/03/24         Goal: STG-Within one week, patient will complete grooming, seated at EOS, using LRD       Dates: Start:  01/03/24               Problem: OT Long Term Goals       Dates: Start:  01/03/24         Goal: LTG-By discharge, patient will complete basic self care  tasks at [facility] to [facility]       Dates: Start:  01/03/24            Goal: LTG-By discharge, patient will perform bathroom transfers at [facility]       Dates: Start:  01/03/24

## 2024-01-06 NOTE — PROGRESS NOTES
NURSING DAILY NOTE    Name: Regine Bryant   Date of Admission: 1/2/2024   Admitting Diagnosis: Central stenosis of spinal canal  Attending Physician: Sushma Xiao M.d.  Allergies: Atorvastatin    Safety  Patient Assist  Min/Mod Assist  Patient Precautions  Fall Risk  Precaution Comments  RA during daytime  Bed Transfer Status  Contact Guard Assist  Toilet Transfer Status   Contact Guard Assist  Assistive Devices  Rails  Oxygen  None - Room Air  Diet/Therapeutic Dining  Current Diet Order   Procedures    Diet Order Diet: Cardiac     Pill Administration  whole  Agitated Behavioral Scale     ABS Level of Severity       Fall Risk  Has the patient had a fall this admission?   No  Marquita Butcher Fall Risk Scoring  18, HIGH RISK  Fall Risk Safety Measures  bed alarm, chair alarm, poor balance, and low vision/ hearing    Vitals  Temperature: 36.4 °C (97.6 °F)  Temp src: Oral  Pulse: 72  Respiration: 14  Blood Pressure : (!) 172/91  Blood Pressure MAP (Calculated): 118 MM HG  BP Location: Right, Upper Arm  Patient BP Position: Supine     Oxygen  Pulse Oximetry: 96 %  O2 (LPM): 2  O2 Delivery Device: None - Room Air    Bowel and Bladder  Last Bowel Movement  01/04/24  Stool Type  Type 6: Fluffy pieces with ragged edges, a mushy stool  Bowel Device  Diaper, Bathroom  Continent  Bladder: Stress incontinence   Bowel:    Bladder Function  Urine Void (mL):  (moderate)  Number of Times Voided: 1  Urine Color: Yellow  Number of Times Incontinent of Urine: 1  Genitourinary Assessment   Bladder Assessment (WDL):  WDL Except  Santo Catheter: Not Applicable  Urinary Elimination: Incontinence  Urine Color: Yellow  Number of Times Incontinent of Urine: 1  Bladder Device: Diaper, Bathroom  Time Void: Yes  Bladder Scan: Post Void  $ Bladder Scan Results (mL): 135    Skin  Mayco Score   15  Sensory Interventions   Bed Types: Standard/Trauma Mattress with  Overlay  Skin Preventative Measures: Pillows in Use to Float Heels, Pillows in Use for Support / Positioning  Moisture Interventions  Moisturizers/Barriers: Barrier Wipes      Pain  Pain Rating Scale  6 - Hard to ignore, avoid usual activities  Pain Location  Neck  Pain Location Orientation  Left  Pain Interventions   Medication (see MAR)    ADLs    Bathing   Shower, * * With Assistance from, Staff  Linen Change      Personal Hygiene  Moist Crystal Wipes, Perineal Care  Chlorhexidine Bath      Oral Care     Teeth/Dentures     Shave     Nutrition Percentage Eaten  Breakfast, Between 50-75% Consumed  Environmental Precautions  Bed in Low Position, Treaded Slipper Socks on Patient  Patient Turns/Positioning  Patient Turns Self from Side to Side  Patient Turns Assistance/Tolerance     Bed Positions  Bed Controls On  Head of Bed Elevated  Self regulated      Psychosocial/Neurologic Assessment  Psychosocial Assessment  Psychosocial (WDL):  WDL Except  Patient Behaviors: Anxious, Fatigue  Family Behaviors: No Family Present  Neurologic Assessment  Neuro (WDL): Exceptions to WDL  Level of Consciousness: Alert  Orientation Level: Oriented to person, Oriented to place, Oriented to situation  Cognition: Follows commands  Speech: Clear  Pupil Assesment: No  Motor Function/Sensation Assessment: Motor strength  Muscle Strength Right Arm: Good Strength Against Gravity and Moderate Resistance  Muscle Strength Left Arm: Good Strength Against Gravity and Moderate Resistance  Muscle Strength Right Leg: Good Strength Against Gravity and Moderate Resistance  Muscle Strength Left Leg: Good Strength Against Gravity and Moderate Resistance  EENT (WDL):  WDL Except    Cardio/Pulmonary Assessment  Edema      Respiratory Breath Sounds  RUL Breath Sounds: Clear  RML Breath Sounds: Clear  RLL Breath Sounds: Diminished  SERVANDO Breath Sounds: Clear  LLL Breath Sounds: Diminished  Cardiac Assessment   Cardiac (WDL):  WDL Except

## 2024-01-06 NOTE — FLOWSHEET NOTE
01/06/24 0705   Events/Summary/Plan   Events/Summary/Plan 02 pulse ox check   Vital Signs   Pulse 78   Respiration 16   Pulse Oximetry 99 %   $ Pulse Oximetry (Spot Check) Yes   Respiratory Assessment   Level of Consciousness Alert   Chest Exam   Work Of Breathing / Effort Within Normal Limits   Oxygen   O2 (LPM) 2  (titrated to 1L)   O2 Delivery Device Silicone Nasal Cannula

## 2024-01-06 NOTE — CARE PLAN
"The patient is Stable - Low risk of patient condition declining or worsening    Shift Goals  Clinical Goals: safety  Patient Goals: safety, pain management      Problem: Fall Risk - Rehab  Goal: Patient will remain free from falls  Outcome: Progressing  Note: Marquita Butcher Fall risk Assessment Score: 18     High fall risk Interventions   - Alarming seatbelt  - Bed and strip alarm   - Yellow sign by the door   - Yellow wrist band \"Fall risk\"  - Room near to the nurse station  - Do not leave patient unattended in the bathroom  - Fall risk education provided   Patient uses call light consistently and appropriately this shift.  Waits for assistance when needed and does not attempt self transfer.  Able to verbalize needs.  Will continue to monitor.     Problem: Pain - Standard  Goal: Alleviation of pain or a reduction in pain to the patient’s comfort goal  Flowsheets (Taken 1/5/2024 2975)  OB Pain Intervention: Medication - See MAR  Note: Routine Tramadol for pain management given with help. Patient able to verbalize pain level and verbalize an acceptable level of pain.      "

## 2024-01-06 NOTE — THERAPY
Occupational Therapy  Daily Treatment     Patient Name: Regine Bryant  Age:  89 y.o., Sex:  female  Medical Record #: [id number]  Today's Date: 1/6/2024     Precautions  Precautions: (P) Fall Risk  Comments: (P) RA during daytime    Subjective    Received patient from PT w/ daughter Kika present. Agreeable to participate in OT however endorsed significant neck pain. Heat pack applied with limited results.      Objective     01/06/24 1031   OT Charge Group   OT Self Care / ADL (Units) 2   OT Total Time Spent   OT Individual Total Time Spent (Mins) 30   Precautions   Precautions Fall Risk   Comments RA during daytime   Cognition    Level of Consciousness Alert   Functional Level of Assist   Grooming Standby Assist;Seated  (to wash hands)   Lower Body Dressing Minimal Assist  (to don scrub bottoms w/ AE; incorporated sitting in w/c and standing w/ FWW)   Toileting Contact Guard Assist   Toilet Transfers Contact Guard Assist  (wc <> toilet w/ GB)   Interdisciplinary Plan of Care Collaboration   Patient Position at End of Therapy Seated;Self Releasing Lap Belt Applied;Call Light within Reach       Assessment    Patient with poor-fair activity tolerance this session w/ focus on progression with ADLs; limited significantly by neck pain (w/ limited results w/ heat application). Encouragement required to maximize active participation in LB dressing task w/ ongoing blocked practice advised to maximize independence, safety and carryover of strategies. Tolerated room air well (93%).   Strengths: Able to follow instructions, Alert and oriented, Good insight into deficits/needs, Independent prior level of function, Motivated for self care and independence, Pleasant and cooperative, Supportive family, Willingly participates in therapeutic activities  Barriers: Bladder incontinence, Decreased endurance, Fatigue, Generalized weakness, Impaired activity tolerance, Impaired balance, Pain, Limited mobility, Visual  impairment    Plan    Cont ADLs (using AE), functional transfers (cont functional mobility at the FWW level), and thera act/ex to maximize functional recovery for safe DC home with recommended support/SPV with ADLs.        DME  OT DME Recommendations  Bathroom Equipment:  (Pt owns shower chair; family looking to install fixated GB in shower)  Additional Equipment:  (LHS, reacher, sock aid)    Passport items to be completed:  Perform bathroom transfers, complete dressing, complete feeding, get ready for the day, prepare a simple meal, participate in household tasks, adapt home for safety needs, demonstrate home exercise program, complete caregiver training     Occupational Therapy Goals (Active)       Problem: Bathing       Dates: Start:  01/03/24         Goal: STG-Within one week, patient will bathe at [facility] using LRD       Dates: Start:  01/03/24               Problem: Dressing       Dates: Start:  01/03/24         Goal: STG-Within one week, patient will dress UB at [facility]       Dates: Start:  01/03/24            Goal: STG-Within one week, patient will dress LB at [facility] using LRD       Dates: Start:  01/03/24               Problem: Eating       Dates: Start:  01/03/24         Goal: STG-Within one week, patient will feed self at [facility] using LRD       Dates: Start:  01/03/24               Problem: Grooming       Dates: Start:  01/03/24         Goal: STG-Within one week, patient will complete grooming, seated at [facility], using LRD       Dates: Start:  01/03/24               Problem: OT Long Term Goals       Dates: Start:  01/03/24         Goal: LTG-By discharge, patient will complete basic self care tasks at [facility]       Dates: Start:  01/03/24            Goal: LTG-By discharge, patient will perform bathroom transfers at [facility]       Dates: Start:  01/03/24

## 2024-01-06 NOTE — FLOWSHEET NOTE
01/06/24 0725   Events/Summary/Plan   Events/Summary/Plan RA pulse ox check   Vital Signs   Pulse 75   Respiration 16   Pulse Oximetry 93 %   $ Pulse Oximetry (Spot Check) Yes   Respiratory Assessment   Level of Consciousness Alert   Chest Exam   Work Of Breathing / Effort Within Normal Limits   Oxygen   O2 Delivery Device Room air w/o2 available

## 2024-01-06 NOTE — FLOWSHEET NOTE
01/06/24 0710   Events/Summary/Plan   Events/Summary/Plan 02 pulse ox check   Vital Signs   Pulse 78   Respiration 16   Pulse Oximetry 98 %   $ Pulse Oximetry (Spot Check) Yes   Respiratory Assessment   Level of Consciousness Alert   Chest Exam   Work Of Breathing / Effort Within Normal Limits   Oxygen   O2 (LPM) 1  (placed on RA)   O2 Delivery Device Silicone Nasal Cannula

## 2024-01-07 NOTE — PROGRESS NOTES
Hospital Medicine Daily Progress Note      Chief Complaint  Acute Kidney Injury  Hypertension    Interval Problem Update  No chest pain, shortness of breath, or palpitations.  Blood pressure remains uncontrolled.    Review of Systems  Review of Systems   Constitutional:  Negative for chills and fever.   HENT: Negative.     Eyes: Negative.    Respiratory:  Negative for cough and shortness of breath.    Cardiovascular:  Negative for chest pain and palpitations.   Gastrointestinal:  Positive for constipation. Negative for abdominal pain, nausea and vomiting.   Musculoskeletal:  Positive for joint pain.   Skin:  Negative for itching and rash.   Endo/Heme/Allergies:  Negative for polydipsia. Does not bruise/bleed easily.        Physical Exam  Temp:  [36.3 °C (97.3 °F)-36.8 °C (98.2 °F)] 36.8 °C (98.2 °F)  Pulse:  [72-87] 74  Resp:  [16-20] 16  BP: (141-189)/(59-80) 150/59  SpO2:  [92 %-98 %] 92 %    Physical Exam  Vitals reviewed.   Constitutional:       General: She is not in acute distress.     Appearance: Normal appearance. She is not ill-appearing.   HENT:      Head: Normocephalic and atraumatic.      Right Ear: External ear normal.      Left Ear: External ear normal.      Nose: Nose normal.      Mouth/Throat:      Pharynx: Oropharynx is clear.   Eyes:      General:         Right eye: No discharge.         Left eye: No discharge.      Extraocular Movements: Extraocular movements intact.      Conjunctiva/sclera: Conjunctivae normal.   Cardiovascular:      Rate and Rhythm: Normal rate and regular rhythm.   Pulmonary:      Effort: Pulmonary effort is normal. No respiratory distress.      Breath sounds: Normal breath sounds. No wheezing.   Abdominal:      General: Bowel sounds are normal. There is distension.      Palpations: Abdomen is soft.      Tenderness: There is no abdominal tenderness. There is no guarding or rebound.   Musculoskeletal:      Cervical back: Normal range of motion and neck supple.      Right lower  leg: No edema.      Left lower leg: No edema.      Comments: Bilateral shoulders decreased ROM   Skin:     General: Skin is warm and dry.   Neurological:      Mental Status: She is alert and oriented to person, place, and time.         Fluids    Intake/Output Summary (Last 24 hours) at 1/7/2024 1026  Last data filed at 1/7/2024 0526  Gross per 24 hour   Intake 600 ml   Output --   Net 600 ml       Laboratory  Recent Labs     01/06/24  0526   WBC 9.0   RBC 3.97*   HEMOGLOBIN 12.3   HEMATOCRIT 38.1   MCV 96.0   MCH 31.0   MCHC 32.3   RDW 49.0   PLATELETCT 177   MPV 11.3     Recent Labs     01/06/24  0526   SODIUM 136   POTASSIUM 4.9   CHLORIDE 101   CO2 25   GLUCOSE 141*   BUN 39*   CREATININE 0.91   CALCIUM 10.1                   Assessment/Plan  * Cervical central stenosis of spinal canal- (present on admission)  Assessment & Plan  Non-surgical management  On Decadron  Pain control per Physiatry    Leukocytosis  Assessment & Plan  Likely 2/2 steroids  WBC spontaneously resolved    Abdominal distension  Assessment & Plan  KUB 1/3/24 moderate gaseous distention of the stomach, nonspecific bowel gas pattern with a moderate colonic stool burden  KUB 1/4/24 unremarkable but did not comment on stomach  KUB 1/5/24 improved gastric distension  Continue Simethicone  Bowel regimen per Physiatry    Chest pain- (present on admission)  Assessment & Plan    Trop 16 x 2  EKG sinus deven  CXR negative acute  Symptoms spontaneously resolved    Stage 3a chronic kidney disease (HCC)- (present on admission)  Assessment & Plan  Also has GERARD  FENa <1%  BUN/Cr improving w/ IVF  Closely monitor K+ and renal function    Thrombocytopenia (HCC)- (present on admission)  Assessment & Plan  PLT resolved    Lung mass- (present on admission)  Assessment & Plan  Reportedly, family desires no further work up    Hypothyroid  Assessment & Plan  Euthyroid on Synthroid    HTN (hypertension)- (present on admission)  Assessment & Plan  Echo 4/26/23  EF 60%, grade I DD, RVSP 24 mmHg  On Coreg and Norvasc  Will increase Coreg to optimize blood pressure control       DNR

## 2024-01-07 NOTE — CARE PLAN
The patient is Stable - Low risk of patient condition declining or worsening    Shift Goals  Clinical Goals: pain, safety  Patient Goals: pain, hydration    Progress made toward(s) clinical / shift goals:      Problem: Fall Risk - Rehab  Goal: Patient will remain free from falls  Outcome: Progressing     Problem: Hemodynamics  Goal: Patient's hemodynamics, fluid balance and neurologic status will be stable or improve  Outcome: Progressing   --pt ordered NS @50 mL x 1 liter-IV infusing via Left AC-tolerating well.    Problem: Mobility  Goal: Patient's capacity to carry out activities will improve  Outcome: Progressing     Problem: Pain - Standard  Goal: Alleviation of pain or a reduction in pain to the patient’s comfort goal  Outcome: Progressing   Pain 7-9/10 today-Tramadol given along with voltaren to neck and warmth.  Oxy 5mg given at 1730 with pain relief down to a 2/10.    Patient is not progressing towards the following goals:none

## 2024-01-07 NOTE — FLOWSHEET NOTE
01/07/24 0710   Events/Summary/Plan   Events/Summary/Plan 02 pulse ox check   Vital Signs   Pulse 72   Respiration 16   Pulse Oximetry 98 %   $ Pulse Oximetry (Spot Check) Yes   Respiratory Assessment   Level of Consciousness Alert   Chest Exam   Work Of Breathing / Effort Within Normal Limits   Oxygen   O2 (LPM) 1  (placed on RA)   O2 Delivery Device Silicone Nasal Cannula

## 2024-01-07 NOTE — PROGRESS NOTES
NURSING DAILY NOTE    Name: Regine Bryant   Date of Admission: 1/2/2024   Admitting Diagnosis: Central stenosis of spinal canal  Attending Physician: Sushma Xiao M.d.  Allergies: Atorvastatin    Safety  Patient Assist  Min/Mod Assist  Patient Precautions  Fall Risk  Precaution Comments  RA during daytime  Bed Transfer Status  Contact Guard Assist  Toilet Transfer Status   Contact Guard Assist (wc <> toilet w/ GB)  Assistive Devices  Rails, Wheelchair  Oxygen  Nasal Cannula  Diet/Therapeutic Dining  Current Diet Order   Procedures    Diet Order Diet: Cardiac     Pill Administration  whole and one at a time   Agitated Behavioral Scale     ABS Level of Severity       Fall Risk  Has the patient had a fall this admission?   No  Marquita Butcher Fall Risk Scoring  18, HIGH RISK  Fall Risk Safety Measures  bed alarm, chair alarm, seatbelt alarm, poor balance, and low vision/ hearing    Vitals  Temperature: 36.4 °C (97.5 °F)  Temp src: Oral  Pulse: 86  Respiration: 18  Blood Pressure : (!) 182/73 (RN notified)  Blood Pressure MAP (Calculated): 109 MM HG  BP Location: Right, Upper Arm  Patient BP Position: Supine     Oxygen  Pulse Oximetry: 92 %  O2 (LPM): 1  O2 Delivery Device: Nasal Cannula    Bowel and Bladder  Last Bowel Movement  01/04/24  Stool Type  Type 6: Fluffy pieces with ragged edges, a mushy stool  Bowel Device  Diaper, Bathroom  Continent  Bladder: Stress incontinence   Bowel:    Bladder Function  Urine Void (mL):  (Modeerate)  Number of Times Voided: 1  Urine Color: Yellow  Number of Times Incontinent of Urine: 1  Genitourinary Assessment   Bladder Assessment (WDL):  WDL Except  Santo Catheter: Not Applicable  Urinary Elimination: Incontinence  Urine Color: Yellow  Number of Times Incontinent of Urine: 1  Bladder Device: Bathroom, Diaper  Time Void: Yes  Bladder Scan: Post Void  $ Bladder Scan Results (mL): 135    Skin  Mayco Score    15  Sensory Interventions   Bed Types: Standard/Trauma Mattress with Overlay  Skin Preventative Measures: Pillows in Use for Support / Positioning  Moisture Interventions  Moisturizers/Barriers: Barrier Wipes      Pain  Pain Rating Scale  2 - Notice Pain, does not interfere with activities  Pain Location  Neck  Pain Location Orientation  Left, Lateral  Pain Interventions   Medication (see MAR)    ADLs    Bathing   Shower, * * With Assistance from, Staff  Linen Change      Personal Hygiene  Moist Crystal Wipes, Perineal Care  Chlorhexidine Bath      Oral Care     Teeth/Dentures     Shave     Nutrition Percentage Eaten  Breakfast, Between 50-75% Consumed  Environmental Precautions  Treaded Slipper Socks on Patient, Bed in Low Position  Patient Turns/Positioning  Patient Turns Self from Side to Side  Patient Turns Assistance/Tolerance     Bed Positions  Bed Controls On, Bed Locked  Head of Bed Elevated  Greater or equal to 30 degrees      Psychosocial/Neurologic Assessment  Psychosocial Assessment  Psychosocial (WDL):  WDL Except  Patient Behaviors: Anxious, Fatigue  Family Behaviors: No Family Present  Neurologic Assessment  Neuro (WDL): Exceptions to WDL  Level of Consciousness: Alert  Orientation Level: Oriented to person, Oriented to place, Oriented to situation  Cognition: Follows commands  Speech: Clear  Pupil Assesment: No  Motor Function/Sensation Assessment: Motor strength  Muscle Strength Right Arm: Good Strength Against Gravity and Moderate Resistance  Muscle Strength Left Arm: Good Strength Against Gravity and Moderate Resistance  Muscle Strength Right Leg: Good Strength Against Gravity and Moderate Resistance  Muscle Strength Left Leg: Good Strength Against Gravity and Moderate Resistance  EENT (WDL):  WDL Except    Cardio/Pulmonary Assessment  Edema      Respiratory Breath Sounds  RUL Breath Sounds: Clear  RML Breath Sounds: Clear  RLL Breath Sounds: Diminished  SERVANDO Breath Sounds: Clear  LLL Breath Sounds:  Diminished  Cardiac Assessment   Cardiac (WDL):  WDL Except

## 2024-01-07 NOTE — FLOWSHEET NOTE
01/07/24 0719   Events/Summary/Plan   Events/Summary/Plan RA pulse ox check   Vital Signs   Pulse 74   Respiration 16   Pulse Oximetry 92 %   $ Pulse Oximetry (Spot Check) Yes   Respiratory Assessment   Level of Consciousness Responds to voice  (sleeping)   Chest Exam   Work Of Breathing / Effort Within Normal Limits   Oxygen   O2 Delivery Device Room air w/o2 available

## 2024-01-07 NOTE — PROGRESS NOTES
NURSING DAILY NOTE    Name: Regine Bryant   Date of Admission: 1/2/2024   Admitting Diagnosis: Central stenosis of spinal canal  Attending Physician: Sushma Xiao M.d.  Allergies: Atorvastatin    Safety  Patient Assist  min assist  Patient Precautions  Fall Risk  Precaution Comments  RA during daytime  Bed Transfer Status  Contact Guard Assist  Toilet Transfer Status   Contact Guard Assist (wc <> toilet w/ GB)  Assistive Devices  Rails, Wheelchair  Oxygen  Nasal Cannula  Diet/Therapeutic Dining  Current Diet Order   Procedures    Diet Order Diet: Cardiac     Pill Administration  whole and one at a time   Agitated Behavioral Scale     ABS Level of Severity       Fall Risk  Has the patient had a fall this admission?   No  Marquita Butcher Fall Risk Scoring  18, HIGH RISK  Fall Risk Safety Measures  bed alarm, chair alarm, seatbelt alarm, poor balance, and low vision/ hearing    Vitals  Temperature: 36.3 °C (97.3 °F)  Temp src: Oral  Pulse: 81  Respiration: 16  Blood Pressure : (!) 141/68  Blood Pressure MAP (Calculated): 92 MM HG  BP Location: Right, Upper Arm  Patient BP Position: Supine     Oxygen  Pulse Oximetry: 94 %  O2 (LPM): 1  O2 Delivery Device: Nasal Cannula    Bowel and Bladder  Last Bowel Movement  01/06/24  Stool Type  Type 5: Soft blob with clear cut edges (passed easily)  Bowel Device  Diaper, Bathroom  Continent  Bladder: Stress incontinence   Bowel:    Bladder Function  Urine Void (mL):  (moderate)  Number of Times Voided: 1  Urine Color: Yellow  Number of Times Incontinent of Urine: 1  Genitourinary Assessment   Bladder Assessment (WDL):  WDL Except  Santo Catheter: Not Applicable  Urinary Elimination: Incontinence  Urine Color: Yellow  Number of Times Incontinent of Urine: 1  Bladder Device: Bathroom, Diaper  Time Void: Yes  Bladder Scan: Post Void  $ Bladder Scan Results (mL): 135    Skin  Mayco Score   15  Sensory  Interventions   Bed Types: Standard/Trauma Mattress  Skin Preventative Measures: Pillows in Use for Support / Positioning  Moisture Interventions  Moisturizers/Barriers: Barrier Wipes      Pain  Pain Rating Scale  6 - Hard to ignore, avoid usual activities  Pain Location  Generalized  Pain Location Orientation  Right, Left  Pain Interventions   Medication (see MAR)    ADLs    Bathing   Shower, * * With Assistance from, Staff  Linen Change      Personal Hygiene  Change Crystal Pads, Moist Crystal Wipes, Perineal Care  Chlorhexidine Bath      Oral Care     Teeth/Dentures     Shave     Nutrition Percentage Eaten  Breakfast, Between 50-75% Consumed  Environmental Precautions  Bed in Low Position, Treaded Slipper Socks on Patient  Patient Turns/Positioning  Patient Turns Self from Side to Side  Patient Turns Assistance/Tolerance     Bed Positions  Bed Controls On  Head of Bed Elevated  Self regulated      Psychosocial/Neurologic Assessment  Psychosocial Assessment  Psychosocial (WDL):  WDL Except  Patient Behaviors: Anxious, Fatigue  Family Behaviors: No Family Present  Neurologic Assessment  Neuro (WDL): Exceptions to WDL  Level of Consciousness: Alert  Orientation Level: Oriented to person, Oriented to place, Oriented to situation  Cognition: Follows commands  Speech: Clear  Pupil Assesment: No  Motor Function/Sensation Assessment: Motor strength  Muscle Strength Right Arm: Good Strength Against Gravity and Moderate Resistance  Muscle Strength Left Arm: Good Strength Against Gravity and Moderate Resistance  Muscle Strength Right Leg: Good Strength Against Gravity and Moderate Resistance  Muscle Strength Left Leg: Good Strength Against Gravity and Moderate Resistance  EENT (WDL):  WDL Except    Cardio/Pulmonary Assessment  Edema      Respiratory Breath Sounds  RUL Breath Sounds: Clear  RML Breath Sounds: Clear  RLL Breath Sounds: Diminished  SERVANDO Breath Sounds: Clear  LLL Breath Sounds: Diminished  Cardiac Assessment   Cardiac  (WDL):  WDL Except

## 2024-01-07 NOTE — CARE PLAN
The patient is Stable - Low risk of patient condition declining or worsening    Shift Goals  Clinical Goals: safety, pain  Patient Goals: safety, pain    Progress made toward(s) clinical / shift goals:      Problem: Fall Risk - Rehab  Goal: Patient will remain free from falls  Outcome: Progressing     Problem: Mobility  Goal: Patient's capacity to carry out activities will improve  Outcome: Progressing     Problem: Pain - Standard  Goal: Alleviation of pain or a reduction in pain to the patient’s comfort goal  Outcome: Progressing       Patient is not progressing towards the following goals:none

## 2024-01-07 NOTE — CARE PLAN
"The patient is Stable - Low risk of patient condition declining or worsening    Shift Goals  Clinical Goals: safety, pain management  Patient Goals: safety, pain management    Problem: Fall Risk - Rehab  Goal: Patient will remain free from falls  Outcome: Progressing  Note: Marquita Butcher Fall risk Assessment Score: 18    High fall risk Interventions   - Alarming seatbelt  - Bed and strip alarm   - Yellow sign by the door   - Yellow wrist band \"Fall risk\"  - Room near to the nurse station  - Do not leave patient unattended in the bathroom  - Fall risk education provided   Patient uses call light consistently and appropriately this shift.  Waits for assistance when needed and does not attempt self transfer.  Able to verbalize needs.  Will continue to monitor.     Problem: Pain - Standard  Goal: Alleviation of pain or a reduction in pain to the patient’s comfort goal  Outcome: Progressing  Note: Routine pain management given and Voltaren gel applied to neck and left shoulder.  Patient able to verbalize pain level and verbalize an acceptable level of pain.      "

## 2024-01-08 NOTE — CARE PLAN
Problem: Fall Risk - Rehab  Goal: Patient will remain free from falls  Outcome: Progressing     Problem: Pain - Standard  Goal: Alleviation of pain or a reduction in pain to the patient’s comfort goal  Outcome: Progressing       The patient is Stable - Low risk of patient condition declining or worsening    Shift Goals  Clinical Goals: Safety  Patient Goals: Rest

## 2024-01-08 NOTE — PROGRESS NOTES
"  Physical Medicine & Rehabilitation Progress Note    Encounter Date: 1/8/2024    Chief Complaint: Decreased mobility, weakness    Interval Events (Subjective):  Patient sitting up in room. She reports she is doing OK. She reports she has neck pain which the heat from the shower improved. She denies NVD.     _____________________________________  Interdisciplinary Team Conference   Most recent IDT on 1/4/2024    Discharge Date/Disposition:  1/16/24  _____________________________________      Objective:  VITAL SIGNS: /56   Pulse 70   Temp 36.4 °C (97.5 °F) (Oral)   Resp 16   Ht 1.448 m (4' 9\")   Wt 62.1 kg (137 lb)   SpO2 95%   BMI 29.65 kg/m²   Gen: NAD  Psych: Mood and affect appropriate  CV: RRR, 0 edema  Resp: CTAB, no upper airway sounds  Abd: NTND  Neuro: AOx3, following commands    Laboratory Values:  Recent Results (from the past 72 hour(s))   CBC WITHOUT DIFFERENTIAL    Collection Time: 01/06/24  5:26 AM   Result Value Ref Range    WBC 9.0 4.8 - 10.8 K/uL    RBC 3.97 (L) 4.20 - 5.40 M/uL    Hemoglobin 12.3 12.0 - 16.0 g/dL    Hematocrit 38.1 37.0 - 47.0 %    MCV 96.0 81.4 - 97.8 fL    MCH 31.0 27.0 - 33.0 pg    MCHC 32.3 32.2 - 35.5 g/dL    RDW 49.0 35.9 - 50.0 fL    Platelet Count 177 164 - 446 K/uL    MPV 11.3 9.0 - 12.9 fL   Basic Metabolic Panel    Collection Time: 01/06/24  5:26 AM   Result Value Ref Range    Sodium 136 135 - 145 mmol/L    Potassium 4.9 3.6 - 5.5 mmol/L    Chloride 101 96 - 112 mmol/L    Co2 25 20 - 33 mmol/L    Glucose 141 (H) 65 - 99 mg/dL    Bun 39 (H) 8 - 22 mg/dL    Creatinine 0.91 0.50 - 1.40 mg/dL    Calcium 10.1 8.5 - 10.5 mg/dL    Anion Gap 10.0 7.0 - 16.0   PROCALCITONIN    Collection Time: 01/06/24  5:26 AM   Result Value Ref Range    Procalcitonin 0.06 <0.25 ng/mL   ESTIMATED GFR    Collection Time: 01/06/24  5:26 AM   Result Value Ref Range    GFR (CKD-EPI) 58 (A) >60 mL/min/1.73 m 2       Medications:  Scheduled Medications   Medication Dose Frequency    " carvedilol  12.5 mg BID WITH MEALS    dexamethasone  2 mg Q12HRS    amLODIPine  10 mg Q DAY    traMADol  50 mg 4X/DAY    diclofenac sodium  4 g TID    simethicone  125 mg 4X/DAY WITH MEALS + NIGHTLY    senna-docusate  2 Tablet BID    omeprazole  20 mg DAILY    aspirin  81 mg DAILY    enoxaparin (LOVENOX) injection  40 mg DAILY AT 1800    levothyroxine  25 mcg AM ES    lidocaine  2 Patch Q24HR     PRN medications: Respiratory Therapy Consult, hydrALAZINE, acetaminophen, senna-docusate **AND** polyethylene glycol/lytes **AND** magnesium hydroxide **AND** bisacodyl, mag hydrox-al hydrox-simeth, ondansetron **OR** ondansetron, traZODone, sodium chloride, oxyCODONE immediate-release **OR** oxyCODONE immediate-release, traMADol    Diet:  Current Diet Order   Procedures    Diet Order Diet: Cardiac       Medical Decision Making and Plan:  Cervical Myelopathy - Patient with cervical stenosis as well as paratracheal mass with left sided weakness and neck/head pain concerning for myelopathy. Patient elected conservative management  -PT and OT for mobility and ADLs. Per guidelines, 15 hours per week between PT, OT and/or SLP.  -Follow-up PCP. Reduced steroids to BID, will reduce to 2 mg BID -> 1 mg BID on 1/8     HTN/CHF - Patient on ASA. Patient on Coreg 6.25 mg, Lasix 20 mg (on hold on transfer). Consult hospitalist into 150s. Started on Amlodipine. Continue Amlodipine 10 mg and Coreg 12.5 mg BID    Abdominal distention - Started on simethicone per Hospitalist. Repeat KUB 1/4 per my read moderate constipation, improved gas.      HLD - Patient not on statin on transfer. Had reaction to atorvastatin in the past.      CKD3a - Avoid nephrotoxic agents. Check AM CMP - Cr pending but had increased > 20%, will consult hospitalist     Pulmonary nodules - Patient electing no further work-up.      Anemia - Check AM CBC - 11.2, will monitor     Azotemia - Check AM CMP - BUN 40, will monitor     Hypothyroidism - Patient on Levothyroxine  25 mcg     Obesity due to excess calories - BMI of 30.9 on admission, meets medical criteria.      Pain - Patient on APAP/Oxycodone PRN     Neurogenic bladder - Having urge incontinence, most likely neurogenic with limited voiding. Will check bladder scans and PVRs. May need to learn CIC     Skin - Patient at risk for skin breakdown due to debility in areas including sacrum, achilles, elbows and head in addition to other sites. Nursing to assess skin daily.      GI Ppx - Patient on Prilosec for GERD prophylaxis. Patient on Senna-docusate for constipation prophylaxis.      DVT Ppx - Patient Lovenox on transfer. Continue Lovenox as limited mobility  ____________________________________    T. René Xiao MD/PhD  [facility] - Physical Medicine & Rehabilitation   [facility] - Brain Injury Medicine   ____________________________________

## 2024-01-08 NOTE — FLOWSHEET NOTE
01/08/24 1122   Events/Summary/Plan   Events/Summary/Plan SpO2 check   Vital Signs   Pulse 70   Respiration 16   Pulse Oximetry 95 %   $ Pulse Oximetry (Spot Check) Yes   Respiratory Assessment   Respiratory Pattern Within Normal Limits   Level of Consciousness Alert   Chest Exam   Work Of Breathing / Effort Within Normal Limits   Oxygen   O2 Delivery Device Room air w/o2 available

## 2024-01-08 NOTE — PROGRESS NOTES
Hospital Medicine Daily Progress Note      Chief Complaint  Acute Kidney Injury  Hypertension    Interval Problem Update  Elevated blood pressures likely worsened by steroids; pt asymptomatic.    Review of Systems  Review of Systems   Constitutional:  Negative for chills and fever.   HENT: Negative.     Eyes: Negative.    Respiratory:  Negative for cough and shortness of breath.    Cardiovascular:  Negative for chest pain and palpitations.   Gastrointestinal:  Positive for constipation. Negative for abdominal pain, nausea and vomiting.   Musculoskeletal:  Positive for joint pain.   Skin:  Negative for itching and rash.   Endo/Heme/Allergies:  Negative for polydipsia. Does not bruise/bleed easily.        Physical Exam  Temp:  [36.7 °C (98.1 °F)-37 °C (98.6 °F)] 36.7 °C (98.1 °F)  Pulse:  [69-82] 69  Resp:  [16-18] 18  BP: (137-177)/(48-77) 157/63  SpO2:  [93 %-94 %] 94 %    Physical Exam  Vitals reviewed.   Constitutional:       General: She is not in acute distress.     Appearance: Normal appearance. She is not ill-appearing.   HENT:      Head: Normocephalic and atraumatic.      Right Ear: External ear normal.      Left Ear: External ear normal.      Nose: Nose normal.      Mouth/Throat:      Pharynx: Oropharynx is clear.   Eyes:      General:         Right eye: No discharge.         Left eye: No discharge.      Extraocular Movements: Extraocular movements intact.      Conjunctiva/sclera: Conjunctivae normal.   Cardiovascular:      Rate and Rhythm: Normal rate and regular rhythm.   Pulmonary:      Effort: Pulmonary effort is normal. No respiratory distress.      Breath sounds: Normal breath sounds. No wheezing.   Abdominal:      General: Bowel sounds are normal. There is distension.      Palpations: Abdomen is soft.      Tenderness: There is no abdominal tenderness. There is no guarding or rebound.      Comments: Decreased distension   Musculoskeletal:      Cervical back: Normal range of motion and neck supple.       Right lower leg: No edema.      Left lower leg: No edema.      Comments: Bilateral shoulders decreased ROM   Skin:     General: Skin is warm and dry.   Neurological:      Mental Status: She is alert and oriented to person, place, and time.         Fluids    Intake/Output Summary (Last 24 hours) at 1/8/2024 1049  Last data filed at 1/8/2024 0852  Gross per 24 hour   Intake 600 ml   Output --   Net 600 ml       Laboratory  Recent Labs     01/06/24  0526   WBC 9.0   RBC 3.97*   HEMOGLOBIN 12.3   HEMATOCRIT 38.1   MCV 96.0   MCH 31.0   MCHC 32.3   RDW 49.0   PLATELETCT 177   MPV 11.3     Recent Labs     01/06/24  0526   SODIUM 136   POTASSIUM 4.9   CHLORIDE 101   CO2 25   GLUCOSE 141*   BUN 39*   CREATININE 0.91   CALCIUM 10.1                   Assessment/Plan  * Cervical central stenosis of spinal canal- (present on admission)  Assessment & Plan  Non-surgical management  On Decadron  Pain control per Physiatry    Leukocytosis  Assessment & Plan  Likely 2/2 steroids  PCT 0.06  WBC spontaneously resolved    Abdominal distension  Assessment & Plan  KUB 1/3/24 moderate gaseous distention of the stomach, nonspecific bowel gas pattern with a moderate colonic stool burden  KUB 1/4/24 unremarkable but did not comment on stomach  KUB 1/5/24 improved gastric distension  Continue Simethicone  Bowel regimen per Physiatry    Chest pain- (present on admission)  Assessment & Plan    Trop 16 x 2  EKG sinus deven  CXR negative acute  Symptoms spontaneously resolved    Stage 3a chronic kidney disease (HCC)- (present on admission)  Assessment & Plan  Also has GERARD  FENa <1%  BUN/Cr improved w/ IVF  Closely monitor K+ and renal function  Check F/U labs in AM    Thrombocytopenia (HCC)- (present on admission)  Assessment & Plan  PLT resolved    Lung mass- (present on admission)  Assessment & Plan  Reportedly, family desires no further work up    Hypothyroid  Assessment & Plan  Euthyroid on Synthroid    HTN (hypertension)- (present on  admission)  Assessment & Plan  Echo 4/26/23 EF 60%, grade I DD, RVSP 24 mmHg  On Coreg and Norvasc  Observe blood pressure trends on increased Coreg       DNR

## 2024-01-08 NOTE — THERAPY
Physical Therapy   Daily Treatment     Patient Name: Regine Bryant  Age:  89 y.o., Sex:  female  Medical Record #: [id number]  Today's Date: 1/8/2024     Precautions  Precautions: (P) Fall Risk  Comments: (P) room air during daytime    Subjective    Patient seated in w/c and agreeable to therapy.     Objective       01/08/24 0831   PT Charge Group   PT Gait Training (Units) 1   PT Therapeutic Exercise (Units) 1   PT Therapeutic Activities (Units) 2   PT Total Time Spent   PT Individual Total Time Spent (Mins) 60   Precautions   Precautions Fall Risk   Comments room air during daytime   Pain 0 - 10 Group   Location Neck   Location Orientation Posterior;Left   Pain Rating Scale (NPRS) 6   Gait Functional Level of Assist    Gait Level Of Assist Standby Assist  (to CGA)   Assistive Device Front Wheel Walker   Distance (Feet) 30   # of Times Distance was Traveled 4   Deviation Antalgic;Trendelenberg;Shuffled Gait   Transfer Functional Level of Assist   Bed, Chair, Wheelchair Transfer Standby Assist  (to CGA)   Bed Chair Wheelchair Transfer Description Adaptive equipment;Increased time;Initial preparation for task;Supervision for safety  (stand step transfer with FWW)   Standing Lower Body Exercises   Other Exercises BLE mini squat 1x10; lateral toe taps 1x10 each leg. BUE support on FWW with SBA   Bed Mobility    Sit to Stand Standby Assist   Interdisciplinary Plan of Care Collaboration   IDT Collaboration with  Occupational Therapist;Therapy Tech;Family / Caregiver  (therapy )   Patient Position at End of Therapy Seated;Chair Alarm On;Self Releasing Lap Belt Applied;Call Light within Reach;Tray Table within Reach;Phone within Reach   Collaboration Comments CLOF, scheduling home evaluation     Discussion with patient and daughter, Yoni, regarding home evaluation. In agreement and scheduled for Thursday, 1/11/24.    Hot pack applied to L neck with soft collar to keep in place.    Assessment    Patient  tolerated session well, did not require hands on assistance this session for mobility, SBA with FWW. Continues to perseverate however improved ability to be redirected to tasks.    Strengths: Able to follow instructions, Good insight into deficits/needs, Independent prior level of function, Motivated for self care and independence, Pleasant and cooperative, Supportive family, Willingly participates in therapeutic activities  Barriers: Generalized weakness, Fatigue, Decreased endurance, Home accessibility, Impaired activity tolerance, Impaired balance, Pain    Plan    Progress gait training with FWW, transfer training with FWW, pain management strategies, LE strengthening, activity tolerance, bed mobility training.      DME  PT DME Recommendations  Assistive Device: Front Wheeled Walker  Additional Equipment:  (LHS, reacher, sock aid)     Passport items to be completed:  Get in/out of bed safely, in/out of a vehicle, safely use mobility device, walk or wheel around home/community, navigate up and down stairs, show how to get up/down from the ground, ensure home is accessible, demonstrate HEP, complete caregiver training    Physical Therapy Problems (Active)       Problem: Mobility       Dates: Start:  01/03/24         Goal: STG-Within one week, patient will ascend and descend three stairs with R HR and CGA       Dates: Start:  01/03/24               Problem: Mobility Transfers       Dates: Start:  01/03/24         Goal: STG-Within one week, patient will perform bed mobility consistently with Armando       Dates: Start:  01/03/24            Goal: STG-Within one week, patient will transfer bed to chair SBA       Dates: Start:  01/03/24               Problem: PT-Long Term Goals       Dates: Start:  01/03/24         Goal: LTG-By discharge, patient will ambulate 150ft LRAD [place]       Dates: Start:  01/03/24            Goal: LTG-By discharge, patient will transfer one surface to another [place]       Dates: Start:  01/03/24             Goal: LTG-By discharge, patient will perform home exercise program with SPV       Dates: Start:  01/03/24            Goal: LTG-By discharge, patient will ambulate up/down 3 stairs R HR vs curb step LRAD SBA       Dates: Start:  01/03/24            Goal: LTG-By discharge, patient will transfer in/out of a car CGA       Dates: Start:  01/03/24            Goal: LTG-By discharge, patient will perform bed mobility independently       Dates: Start:  01/03/24

## 2024-01-08 NOTE — THERAPY
Occupational Therapy  Daily Treatment     Patient Name: Regine Bryant  Age:  89 y.o., Sex:  female  Medical Record #: [id number]  Today's Date: 1/8/2024     Precautions  Precautions: (P) Fall Risk  Comments: (P) room air during daytime         Subjective    Pt encountered for OT sitting in WC. Pt c/o of neck pain from brace and dry mouth impeding ability to speak. Pleasant and agreeable to participate.      Objective       01/08/24 1001   OT Charge Group   OT Self Care / ADL (Units) 4   OT Total Time Spent   OT Individual Total Time Spent (Mins) 60   Precautions   Precautions Fall Risk   Comments room air during daytime   Functional Level of Assist   Bathing Contact Guard Assist   Bathing Description Grab bar;Hand held shower;Tub bench;Increased time;Set-up of equipment;Supervision for safety;Verbal cueing  (Pt demo ability to dry back and BLE with extra time. VC needed for sequencing shower routine as it differs from her own. Pt able to apply hair cover at Armando d/t limited B shoulder AROM. Trialed LHS at SBA.)   Upper Body Dressing Minimal Assist   Upper Body Dressing Description Assist with pulling shirt over head;Increased time;Set-up of equipment;Verbal cueing  (Assistance to pull shirt over head d/t B shoulder/neck pain and limited AROM.)   Lower Body Dressing Minimal Assist   Lower Body Dressing Description Grab bar;Dressing stick;Sock aid;Assist with threading into pant leg;Increased time;Supervision for safety;Verbal cueing  (Armando for LBD using AE. Pt able to doff pants/brief/socks using dressing stick w/o VC. Assistance needed to thread legs and don socks d/t time constraints. Previously SBA to thread legs into brief. Armando to pull up/down pants standing using the FWW.)   Toileting Contact Guard Assist   Toileting Description Assist to pull pants up;Assist to pull pants down;Grab bar;Increased time;Supervision for safety  (Armando to pull up/down brief)   Toilet Transfers Contact Guard Assist   Toilet  Transfer Description Grab bar;Increased time;Set-up of equipment;Supervision for safety;Verbal cueing  (Stand step from WC <> toilet using GB)   Tub / Shower Transfers Contact Guard Assist   Tub Shower Transfer Description Grab bar;Shower bench;Increased time;Supervision for safety;Set-up of equipment  (Stand step from WC <> shower bench using GB; x2)   Interdisciplinary Plan of Care Collaboration   IDT Collaboration with  Physical Therapist   Patient Position at End of Therapy Seated;Chair Alarm On;Call Light within Reach;Tray Table within Reach;Phone within Reach   Collaboration Comments CLOF     Functional mobility at the FWW from toilet to shower bench at [facility].     Increased assistance with LBD d/t time constraints.     Assessment    Overall, steady progress with functional mobility at the FWW. Pt appears to have plateau in her UBD limited by BUE AROM (baseline). Would benefit from cont practice with AE for LBD as practice is limited d/t pt requiring extra time to complete morning routines.     Strengths: Able to follow instructions, Alert and oriented, Good insight into deficits/needs, Independent prior level of function, Motivated for self care and independence, Pleasant and cooperative, Supportive family, Willingly participates in therapeutic activities  Barriers: Bladder incontinence, Decreased endurance, Fatigue, Generalized weakness, Impaired activity tolerance, Impaired balance, Pain, Limited mobility, Visual impairment    Plan    Cont ADLs (Ae for LBD), functional transfers (using FWW), and thera act/ex to maximize functional recovery for safe DC home.       DME  OT DME Recommendations  Bathroom Equipment:  (Pt owns shower chair; family looking to install fixated GB in shower)  Additional Equipment:  (LHS, reacher, sock aid)    Passport items to be completed:  Perform bathroom transfers, complete dressing, complete feeding, get ready for the day, prepare a simple meal, participate in household tasks,  adapt home for safety needs, demonstrate home exercise program, complete caregiver training     Occupational Therapy Goals (Active)       Problem: Bathing       Dates: Start:  01/03/24         Goal: STG-Within one week, patient will bathe at [place] using LRD       Dates: Start:  01/03/24               Problem: Dressing       Dates: Start:  01/03/24         Goal: STG-Within one week, patient will dress UB at CGA       Dates: Start:  01/03/24            Goal: STG-Within one week, patient will dress LB at Armando using LRD       Dates: Start:  01/03/24               Problem: Eating       Dates: Start:  01/03/24         Goal: STG-Within one week, patient will feed self at CGA using LRD       Dates: Start:  01/03/24               Problem: Grooming       Dates: Start:  01/03/24         Goal: STG-Within one week, patient will complete grooming, seated at EOS, using LRD       Dates: Start:  01/03/24               Problem: OT Long Term Goals       Dates: Start:  01/03/24         Goal: LTG-By discharge, patient will complete basic self care tasks at SBA to [place]       Dates: Start:  01/03/24            Goal: LTG-By discharge, patient will perform bathroom transfers at [place]       Dates: Start:  01/03/24

## 2024-01-08 NOTE — CARE PLAN
"  Problem: Fall Risk - Rehab  Goal: Patient will remain free from falls  Outcome: Progressing     Juárez Hadley Fall risk Assessment Score: 17    High fall risk Interventions   - Alarming seatbelt  - Wander guard  - Bed and strip alarm   - Yellow sign by the door   - Yellow wrist band \"Fall risk\"  - Room near to the nurse station  - Do not leave patient unattended in the bathroom  - Fall risk education provided          Problem: Pain - Standard  Goal: Alleviation of pain or a reduction in pain to the patient’s comfort goal  Outcome: Progressing   Patient is able to rate pain on a scale of 1-10.       "

## 2024-01-08 NOTE — THERAPY
"Physical Therapy   Daily Treatment     Patient Name: Regine Bryant  Age:  89 y.o., Sex:  female  Medical Record #: [id number]  Today's Date: 1/8/2024     Precautions  Precautions: (P) Fall Risk  Comments: (P) room air during daytime    Subjective    Patient seated in w/c and agreeable to therapy; daughter, Yoni, present for session.     Objective       01/08/24 1301   PT Charge Group   PT Gait Training (Units) 1   PT Therapeutic Activities (Units) 3   PT Total Time Spent   PT Individual Total Time Spent (Mins) 60   Precautions   Precautions Fall Risk   Comments room air during daytime   Pain 0 - 10 Group   Location Neck   Location Orientation Posterior;Left   Gait Functional Level of Assist    Gait Level Of Assist Standby Assist   Assistive Device Front Wheel Walker   Distance (Feet)   (50 ftx1, 20 ftx1, 10 ftx1)   Deviation Antalgic;Trendelenberg;Shuffled Gait;Step To   Stairs Functional Level of Assist   Level of Assist with Stairs Contact Guard Assist   # of Stairs Climbed 4  (6\" steps with B rails)   Stairs Description Extra time;Hand rails;Limited by fatigue;Supervision for safety  (step to pattern ascending and descending)   Transfer Functional Level of Assist   Bed, Chair, Wheelchair Transfer Standby Assist   Bed Chair Wheelchair Transfer Description Adaptive equipment;Increased time;Initial preparation for task;Supervision for safety  (stand step transfer with FWW)   Bed Mobility    Sit to Supine Standby Assist   Sit to Stand Standby Assist   Scooting Standby Assist   Interdisciplinary Plan of Care Collaboration   IDT Collaboration with  Occupational Therapist;Family / Caregiver;Nursing   Patient Position at End of Therapy In Bed;Call Light within Reach;Tray Table within Reach;Family / Friend in Room;Phone within Reach   Collaboration Comments CLOF, POC       Patient and family education regarding d/c planning and home evaluation. Discussed home set up and plan for family to have someone stay with " her so she will have 24-hour supervision available. Daughter observing patient mobility and verbalized understanding of providing SBA-CGA with FWW. Declined hands on training as daughter reports patient is doing better than baseline and feels comfortable with patient's current mobility level.     Simulated home environment stepping over entry threshold with FWW and CGA. Performed x2 with cues for technique.    Assessment    Patient tolerated session well, pacing required due to fatigue from busy schedule however able to perform all activities well. Home evaluation confirmed for Thursday.    Strengths: Able to follow instructions, Good insight into deficits/needs, Independent prior level of function, Motivated for self care and independence, Pleasant and cooperative, Supportive family, Willingly participates in therapeutic activities  Barriers: Generalized weakness, Fatigue, Decreased endurance, Home accessibility, Impaired activity tolerance, Impaired balance, Pain    Plan    Progress gait training with FWW, transfer training with FWW, pain management strategies, LE strengthening, activity tolerance, bed mobility training, stair/curb negotiation with L handrail. Home evaluation scheduled for Thursday 9:30-12.     DME  PT DME Recommendations  Assistive Device: Front Wheeled Walker  Additional Equipment:  (LHS, reacher, sock aid)     Passport items to be completed:  Get in/out of bed safely, in/out of a vehicle, safely use mobility device, walk or wheel around home/community, navigate up and down stairs, show how to get up/down from the ground, ensure home is accessible, demonstrate HEP, complete caregiver training    Physical Therapy Problems (Active)       Problem: Mobility       Dates: Start:  01/03/24         Goal: STG-Within one week, patient will ascend and descend three stairs with R HR and CGA       Dates: Start:  01/03/24               Problem: Mobility Transfers       Dates: Start:  01/03/24         Goal:  STG-Within one week, patient will perform bed mobility consistently with Armando       Dates: Start:  01/03/24            Goal: STG-Within one week, patient will transfer bed to chair SBA       Dates: Start:  01/03/24               Problem: PT-Long Term Goals       Dates: Start:  01/03/24         Goal: LTG-By discharge, patient will ambulate 150ft LRAD [place]       Dates: Start:  01/03/24            Goal: LTG-By discharge, patient will transfer one surface to another [place]       Dates: Start:  01/03/24            Goal: LTG-By discharge, patient will perform home exercise program with SPV       Dates: Start:  01/03/24            Goal: LTG-By discharge, patient will ambulate up/down 3 stairs R HR vs curb step LRAD SBA       Dates: Start:  01/03/24            Goal: LTG-By discharge, patient will transfer in/out of a car CGA       Dates: Start:  01/03/24            Goal: LTG-By discharge, patient will perform bed mobility independently       Dates: Start:  01/03/24

## 2024-01-09 PROBLEM — K59.00 CONSTIPATION: Status: ACTIVE | Noted: 2024-01-01

## 2024-01-09 NOTE — PROGRESS NOTES
NURSING DAILY NOTE    Name: Regine Bryant   Date of Admission: 1/2/2024   Admitting Diagnosis: Central stenosis of spinal canal  Attending Physician: Sushma Xiao M.d.  Allergies: Atorvastatin    Safety  Patient Assist  min assist  Patient Precautions  Fall Risk  Precaution Comments  room air during daytime  Bed Transfer Status  Standby Assist  Toilet Transfer Status   Contact Guard Assist  Assistive Devices  Wheelchair  Oxygen  Nasal Cannula  Diet/Therapeutic Dining  Current Diet Order   Procedures    Diet Order Diet: Cardiac     Pill Administration  whole and one at a time   Agitated Behavioral Scale     ABS Level of Severity       Fall Risk  Has the patient had a fall this admission?   No  Marquita Butcher Fall Risk Scoring  17, HIGH RISK  Fall Risk Safety Measures  bed alarm and chair alarm    Vitals  Temperature: 36.7 °C (98 °F)  Temp src: Oral  Pulse: 77  Respiration: 18  Blood Pressure : (!) 146/68  Blood Pressure MAP (Calculated): 94 MM HG  BP Location: Right, Upper Arm  Patient BP Position: Supine     Oxygen  Pulse Oximetry: 96 %  O2 (LPM): 2  O2 Delivery Device: Nasal Cannula    Bowel and Bladder  Last Bowel Movement  01/07/24  Stool Type  Type 5: Soft blob with clear cut edges (passed easily)  Bowel Device  Diaper, Bathroom  Continent  Bladder: Stress incontinence   Bowel:    Bladder Function  Urine Void (mL):  (large)  Number of Times Voided: 1  Urine Color: Unable To Evaluate  Number of Times Incontinent of Urine: 1  Genitourinary Assessment   Bladder Assessment (WDL):  WDL Except  Santo Catheter: Not Applicable  Urinary Elimination: Incontinence  Urine Color: Unable To Evaluate  Number of Times Incontinent of Urine: 1  Bladder Device: Bathroom, Diaper  Time Void: Yes  Bladder Scan: Post Void  $ Bladder Scan Results (mL): 135    Skin  Mayco Score   15  Sensory Interventions   Bed Types: Standard/Trauma Mattress  Skin  Preventative Measures: Pillows in Use for Support / Positioning  Moisture Interventions  Moisturizers/Barriers: Barrier Paste      Pain  Pain Rating Scale  0 - No Pain  Pain Location  Neck  Pain Location Orientation  Left, Posterior  Pain Interventions   Medication (see MAR) (schedule and wants it now.)    ADLs    Bathing   Shower, * * With Assistance from, Staff  Linen Change      Personal Hygiene  Change Crystal Pads, Moist Crystal Wipes, Perineal Care  Chlorhexidine Bath      Oral Care     Teeth/Dentures     Shave     Nutrition Percentage Eaten  Between 50-75% Consumed  Environmental Precautions  Bed in Low Position  Patient Turns/Positioning  Patient Turns Self from Side to Side  Patient Turns Assistance/Tolerance     Bed Positions  Bed Controls On  Head of Bed Elevated  Self regulated      Psychosocial/Neurologic Assessment  Psychosocial Assessment  Psychosocial (WDL):  WDL Except  Patient Behaviors: Anxious, Fatigue  Family Behaviors: No Family Present  Neurologic Assessment  Neuro (WDL): Exceptions to WDL  Level of Consciousness: Responds to voice  Orientation Level: Oriented to person, Oriented to place, Oriented to situation  Cognition: Follows commands  Speech: Clear  Pupil Assesment: No  Motor Function/Sensation Assessment: Motor strength  Muscle Strength Right Arm: Good Strength Against Gravity and Moderate Resistance  Muscle Strength Left Arm: Good Strength Against Gravity and Moderate Resistance  Muscle Strength Right Leg: Good Strength Against Gravity and Moderate Resistance  Muscle Strength Left Leg: Good Strength Against Gravity and Moderate Resistance  EENT (WDL):  WDL Except    Cardio/Pulmonary Assessment  Edema      Respiratory Breath Sounds  RUL Breath Sounds: Clear  RML Breath Sounds: Clear  RLL Breath Sounds: Diminished  SERVANDO Breath Sounds: Clear  LLL Breath Sounds: Diminished  Cardiac Assessment   Cardiac (WDL):  WDL Except

## 2024-01-09 NOTE — CARE PLAN
Problem: Fall Risk - Rehab  Goal: Patient will remain free from falls  Outcome: Progressing     Problem: Self Care  Goal: Patient will have the ability to perform ADLs independently or with assistance  Outcome: Progressing     Problem: Infection  Goal: Patient will remain free from infection  Outcome: Progressing       The patient is Stable - Low risk of patient condition declining or worsening    Shift Goals  Clinical Goals: Safety  Patient Goals: Rest

## 2024-01-09 NOTE — THERAPY
Occupational Therapy  Daily Treatment     Patient Name: Regine Bryant  Age:  89 y.o., Sex:  female  Medical Record #: [id number]  Today's Date: 1/9/2024     Precautions  Precautions: (P) Fall Risk  Comments: (P) room air during daytime         Subjective    Pt encountered 2x this session. Both encountered pt pleasant and agreeable to participate.      Objective       01/09/24 0701 01/09/24 1101   OT Charge Group   OT Self Care / ADL (Units) 4 1   OT Therapy Activity (Units)  --  1   OT Total Time Spent   OT Individual Total Time Spent (Mins) 60 30   Precautions   Precautions Fall Risk Fall Risk   Comments room air during daytime room air during daytime   Functional Level of Assist   Grooming Standby Assist  --    Grooming Description Increased time;Supervision for safety;Standing at sink  (Handwashing standing at EOS at SBA for safety with balance.)  --    Upper Body Dressing Contact Guard Assist  --    Upper Body Dressing Description Increased time;Set-up of equipment  (Extra time needed to pull shirt over head d/t  limited B shoulder AROM)  --    Lower Body Dressing Contact Guard Assist  --    Lower Body Dressing Description Increased time;Set-up of equipment;Verbal cueing  (seated at EOB. Pt able to thread legs into pants and don socks using a hip hike approach. SBA for doffing U/LB clothing. ModA for donning briefs. Minor LOB while attempting to pull up pants.)  --    Toileting Standby Assist Standby Assist   Toileting Description Assist to pull pants up;Grab bar;Increased time;Supervision for safety  (SBA to pull up/down pants. ModA to don/doff bried. Incontient of urine.) Grab bar;Increased time;Supervision for safety  (SBA to pull up/down pants. SBA for hygiene following BM.)   Bed, Chair, Wheelchair Transfer Contact Guard Assist  --    Bed Chair Wheelchair Transfer Description Adaptive equipment;Increased time;Set-up of equipment;Supervision for safety  (stand step from bed <> WC/toilet using FWW at  CGA for safety)  --    Toilet Transfers Contact Guard Assist Standby Assist   Toilet Transfer Description Grab bar;Adaptive equipment;Increased time;Supervision for safety;Verbal cueing  (Functional mobility from bed <> toilet using FWW) Grab bar;Adaptive equipment;Increased time;Supervision for safety  (Functional mobility from bed <> toilet using FWW)   Interdisciplinary Plan of Care Collaboration   IDT Collaboration with  Certified Nursing Assistant  --    Patient Position at End of Therapy Seated;Chair Alarm On;Self Releasing Lap Belt Applied;Other (Comments)  (in cafeteria for breakfast.) Seated;Chair Alarm On  (placed in cafeteria for lunch)   Collaboration Comments CLOF  --      1101 session: attempted laundry tasks; however, laundry room occupied. Pt engaged in functional mobility task in main gym to increase strength and endurance needed to perform ADLs at the FWW level. Pt able to walk from main gym to cafeteria w/o seated RB at [facility] for safety.     Assessment    Overall, good tolerance to ADLs and thera act with minimal c/o of pain. Pt consistent at CGA for functional transfers.     Strengths: Able to follow instructions, Alert and oriented, Good insight into deficits/needs, Independent prior level of function, Motivated for self care and independence, Pleasant and cooperative, Supportive family, Willingly participates in therapeutic activities  Barriers: Bladder incontinence, Decreased endurance, Fatigue, Generalized weakness, Impaired activity tolerance, Impaired balance, Pain, Limited mobility, Visual impairment    Plan    Cont ADLs, functional transfers, and thera act/ex to maximize functional recovery for safe DC home.       DME  OT DME Recommendations  Bathroom Equipment:  (Pt owns shower chair; family looking to install fixated GB in shower)  Additional Equipment:  (LHS, reacher, sock aid)    Passport items to be completed:  Perform bathroom transfers, complete dressing, complete feeding, get ready  for the day, prepare a simple meal, participate in household tasks, adapt home for safety needs, demonstrate home exercise program, complete caregiver training     Occupational Therapy Goals (Active)       Problem: Bathing       Dates: Start:  01/03/24         Goal: STG-Within one week, patient will bathe at [place] using LRD       Dates: Start:  01/03/24               Problem: Dressing       Dates: Start:  01/03/24         Goal: STG-Within one week, patient will dress UB at CGA       Dates: Start:  01/03/24            Goal: STG-Within one week, patient will dress LB at [place] using LRD       Dates: Start:  01/03/24               Problem: Eating       Dates: Start:  01/03/24         Goal: STG-Within one week, patient will feed self at CGA using LRD       Dates: Start:  01/03/24               Problem: Grooming       Dates: Start:  01/03/24         Goal: STG-Within one week, patient will complete grooming, seated at EOS, using LRD       Dates: Start:  01/03/24               Problem: OT Long Term Goals       Dates: Start:  01/03/24         Goal: LTG-By discharge, patient will complete basic self care tasks at SBA to [place]       Dates: Start:  01/03/24            Goal: LTG-By discharge, patient will perform bathroom transfers at [place]       Dates: Start:  01/03/24

## 2024-01-09 NOTE — PROGRESS NOTES
Hospital Medicine Daily Progress Note      Chief Complaint  Hypertension  CKD/GERARD    Interval Problem Update  Discussed about her BP trending better.  Had a BM today and feels better.    Review of Systems  Review of Systems   Constitutional:  Negative for chills and fever.   Respiratory:  Negative for shortness of breath.    Cardiovascular:  Negative for chest pain.   Gastrointestinal:  Negative for abdominal pain, constipation, diarrhea, nausea and vomiting.   Psychiatric/Behavioral:  The patient is not nervous/anxious.         Physical Exam  Temp:  [36.4 °C (97.5 °F)-36.7 °C (98 °F)] 36.7 °C (98 °F)  Pulse:  [70-80] 71  Resp:  [16-18] 16  BP: (130-150)/(56-68) 146/68  SpO2:  [95 %-96 %] 96 %    Physical Exam  Vitals and nursing note reviewed.   Constitutional:       Appearance: Normal appearance.   HENT:      Head: Atraumatic.   Eyes:      Conjunctiva/sclera: Conjunctivae normal.      Pupils: Pupils are equal, round, and reactive to light.   Cardiovascular:      Rate and Rhythm: Normal rate and regular rhythm.      Heart sounds: No murmur heard.  Pulmonary:      Effort: Pulmonary effort is normal.      Breath sounds: No stridor. No wheezing or rales.   Abdominal:      General: There is no distension.      Palpations: Abdomen is soft.      Tenderness: There is no abdominal tenderness.   Musculoskeletal:      Cervical back: Normal range of motion and neck supple.      Right lower leg: No edema.      Left lower leg: No edema.   Skin:     General: Skin is warm and dry.      Findings: No rash.   Neurological:      Mental Status: She is alert and oriented to person, place, and time.   Psychiatric:         Mood and Affect: Mood normal.         Behavior: Behavior normal.         Fluids    Intake/Output Summary (Last 24 hours) at 1/9/2024 0825  Last data filed at 1/9/2024 0536  Gross per 24 hour   Intake 920 ml   Output --   Net 920 ml         Laboratory        Recent Labs     01/09/24  0530   SODIUM 135   POTASSIUM 4.8    CHLORIDE 101   CO2 24   GLUCOSE 116*   BUN 41*   CREATININE 0.87   CALCIUM 10.0                     Assessment/Plan  * Cervical central stenosis of spinal canal- (present on admission)  Assessment & Plan  Non-surgical management  On Decadron    Constipation  Assessment & Plan  Has diminished BMs  Has some gas also  KUB 1/3/24 moderate gaseous distention of the stomach, nonspecific bowel gas pattern with a moderate colonic stool burden  KUB 1/4/24 unremarkable but did not comment on stomach  KUB 1/5/24 improved gastric distension  Continue Simethicone  Will add Miralax daily  Bowel regimen per Physiatry    Stage 3a chronic kidney disease (HCC)- (present on admission)  Assessment & Plan  Bun: stable at 41  Cr: wnl  Arounf baseline - but baseline wanders  BNP: 756 --> 327 (1/9)  S/P recent IVF's (for GERARD)  Cont to monitor    Lung mass- (present on admission)  Assessment & Plan  Reportedly, family desires no further work up    Hypothyroid  Assessment & Plan  Euthyroid on Synthroid    HTN (hypertension)- (present on admission)  Assessment & Plan  BP trending better recently  Cont Coreg  Cont Norvasc  Will monitor another day before adjusting meds

## 2024-01-09 NOTE — PROGRESS NOTES
"  Physical Medicine & Rehabilitation Progress Note    Encounter Date: 1/9/2024    Chief Complaint: Decreased mobility, weakness    Interval Events (Subjective):  Patient sitting up in room. She reports therapy is going OK. She reports left shoulder pain. She is using opiates. Discussed will continue steroids. Denies NVD.     _____________________________________  Interdisciplinary Team Conference   Most recent IDT on 1/4/2024    Discharge Date/Disposition:  1/16/24  _____________________________________      Objective:  VITAL SIGNS: /56   Pulse 66   Temp 36.5 °C (97.7 °F) (Oral)   Resp 20   Ht 1.448 m (4' 9\")   Wt 62.1 kg (137 lb)   SpO2 96%   BMI 29.65 kg/m²   Gen: NAD  Psych: Mood and affect appropriate  CV: RRR, 0 edema  Resp: CTAB, no upper airway sounds  Abd: NTND  Neuro: AOx3, following commands  Unchanged from 1/8/24    Laboratory Values:  Recent Results (from the past 72 hour(s))   Basic Metabolic Panel    Collection Time: 01/09/24  5:30 AM   Result Value Ref Range    Sodium 135 135 - 145 mmol/L    Potassium 4.8 3.6 - 5.5 mmol/L    Chloride 101 96 - 112 mmol/L    Co2 24 20 - 33 mmol/L    Glucose 116 (H) 65 - 99 mg/dL    Bun 41 (H) 8 - 22 mg/dL    Creatinine 0.87 0.50 - 1.40 mg/dL    Calcium 10.0 8.5 - 10.5 mg/dL    Anion Gap 10.0 7.0 - 16.0   proBrain Natriuretic Peptide, NT    Collection Time: 01/09/24  5:30 AM   Result Value Ref Range    NT-proBNP 327 (H) 0 - 125 pg/mL   ESTIMATED GFR    Collection Time: 01/09/24  5:30 AM   Result Value Ref Range    GFR (CKD-EPI) 61 >60 mL/min/1.73 m 2       Medications:  Scheduled Medications   Medication Dose Frequency    dexamethasone  1 mg Q12HRS    carvedilol  12.5 mg BID WITH MEALS    amLODIPine  10 mg Q DAY    traMADol  50 mg 4X/DAY    diclofenac sodium  4 g TID    simethicone  125 mg 4X/DAY WITH MEALS + NIGHTLY    senna-docusate  2 Tablet BID    omeprazole  20 mg DAILY    aspirin  81 mg DAILY    enoxaparin (LOVENOX) injection  40 mg DAILY AT 1800    " levothyroxine  25 mcg AM ES    lidocaine  2 Patch Q24HR     PRN medications: Respiratory Therapy Consult, hydrALAZINE, acetaminophen, senna-docusate **AND** polyethylene glycol/lytes **AND** magnesium hydroxide **AND** bisacodyl, mag hydrox-al hydrox-simeth, ondansetron **OR** ondansetron, traZODone, sodium chloride, oxyCODONE immediate-release **OR** oxyCODONE immediate-release, traMADol    Diet:  Current Diet Order   Procedures    Diet Order Diet: Cardiac       Medical Decision Making and Plan:  Cervical Myelopathy - Patient with cervical stenosis as well as paratracheal mass with left sided weakness and neck/head pain concerning for myelopathy. Patient elected conservative management  -PT and OT for mobility and ADLs. Per guidelines, 15 hours per week between PT, OT and/or SLP.  -Follow-up PCP. Reduced steroids to BID, will reduce to 2 mg BID -> 1 mg BID on 1/8     HTN/CHF - Patient on ASA. Patient on Coreg 6.25 mg, Lasix 20 mg (on hold on transfer). Consult hospitalist into 150s. Started on Amlodipine. Continue Amlodipine 10 mg and Coreg 6.25 mg BID    Abdominal distention - Started on simethicone per Hospitalist. Repeat KUB 1/4 per my read moderate constipation, improved gas.      HLD - Patient not on statin on transfer. Had reaction to atorvastatin in the past.      CKD3a - Avoid nephrotoxic agents. Check AM CMP - Cr pending but had increased > 20%, will consult hospitalist. Stable     Pulmonary nodules - Patient electing no further work-up.      Anemia - Check AM CBC - 11.2, will monitor     Azotemia - Check AM CMP - BUN 40, will monitor     Hypothyroidism - Patient on Levothyroxine 25 mcg     Obesity due to excess calories - BMI of 30.9 on admission, meets medical criteria.      Pain - Patient on APAP/Oxycodone PRN. Add flexeril 10 mg TID     Neurogenic bladder - Having urge incontinence, most likely neurogenic with limited voiding. Will check bladder scans and PVRs. May need to learn CIC     Skin - Patient  at risk for skin breakdown due to debility in areas including sacrum, achilles, elbows and head in addition to other sites. Nursing to assess skin daily.      GI Ppx - Patient on Prilosec for GERD prophylaxis. Patient on Senna-docusate for constipation prophylaxis.      DVT Ppx - Patient Lovenox on transfer. Continue Lovenox as limited mobility  ____________________________________    T. René Xiao MD/PhD  [facility] - Physical Medicine & Rehabilitation   [facility] - Brain Injury Medicine   ____________________________________

## 2024-01-09 NOTE — THERAPY
Physical Therapy   Daily Treatment     Patient Name: Regine Bryant  Age:  89 y.o., Sex:  female  Medical Record #: [id number]  Today's Date: 1/9/2024     Precautions  Precautions: (P) Fall Risk  Comments: (P) room air during daytime    Subjective    Patient seen from 8:30-9:30am and 1:00-1:30pm; agreeable to both sessions.     Objective       01/09/24 0831 01/09/24 1301   PT Charge Group   PT Gait Training (Units)  --  1   PT Therapeutic Activities (Units) 4 1   PT Total Time Spent   PT Individual Total Time Spent (Mins) 60 30   Precautions   Precautions Fall Risk Fall Risk   Comments room air during daytime room air during daytime   Gait Functional Level of Assist    Gait Level Of Assist Standby Assist Standby Assist   Assistive Device Front Wheel Walker Front Wheel Walker   Distance (Feet)   (short distances in gym to perform car transfers) 30   # of Times Distance was Traveled  --  2   Deviation Antalgic;Trendelenberg;Step To;Shuffled Gait Antalgic;Trendelenberg;Step To;Shuffled Gait   Transfer Functional Level of Assist   Bed, Chair, Wheelchair Transfer Standby Assist Standby Assist   Bed Chair Wheelchair Transfer Description Adaptive equipment;Increased time;Initial preparation for task;Supervision for safety;Verbal cueing  (stand step transfer with FWW) Adaptive equipment;Increased time;Initial preparation for task;Supervision for safety  (stand step transfer with FWW)   Toilet Transfers  --  Standby Assist   Toilet Transfer Description  --  Adaptive equipment;Grab bar;Increased time;Supervision for safety  (FWW approach)   Bed Mobility    Sit to Supine  --  Standby Assist   Sit to Stand Standby Assist Standby Assist   Scooting  --  Standby Assist   Rolling  --  Standby Assist   Interdisciplinary Plan of Care Collaboration   IDT Collaboration with  Occupational Therapist ;Physician   Patient Position at End of Therapy Seated;Chair Alarm On;Self Releasing Lap Belt Applied;Call Light within  "Reach;Tray Table within Reach;Phone within Reach In Bed;Call Light within Reach;Tray Table within Reach;Phone within Reach   Collaboration Comments CLOF, POC CLOF; home evaluation on Thursday and POC     8:30am session: performed car transfer x4 (x1 using patient's method, x1 using 4\" green step stool and FWW, x1 using 4\" step stool and patient's technique, then x1 with patient's technique again). Required several minutes rest breaks between each attempt due to fatigue and shortness of breath. Patient approaches passenger seat from the side and lifts LLE in, then scoots laterally into seat; requires extra time and several attempts however was able to perform with SBA.    1:00pm session: toileting at FWW level including standing hand hygiene with SBA; bed mobility as noted above with extra time for self-positioning.      Assessment    Patient tolerated sessions well, no LOBs noted during sessions and performing mobility at SBA level with FWW. Appears less anxious with mobility however does require pacing of activity and seated rest breaks.    Strengths: Able to follow instructions, Good insight into deficits/needs, Independent prior level of function, Motivated for self care and independence, Pleasant and cooperative, Supportive family, Willingly participates in therapeutic activities  Barriers: Generalized weakness, Fatigue, Decreased endurance, Home accessibility, Impaired activity tolerance, Impaired balance, Pain    Plan    Progress gait training with FWW, transfer training with FWW, pain management strategies, LE strengthening, activity tolerance, bed mobility training, stair/curb negotiation with L handrail. Home evaluation scheduled for Thursday 9:30-12.      DME  PT DME Recommendations  Assistive Device: Front Wheeled Walker  Additional Equipment:  (LHS, reacher, sock aid)     Passport items to be completed:  Get in/out of bed safely, in/out of a vehicle, safely use mobility device, walk or wheel around " home/community, navigate up and down stairs, show how to get up/down from the ground, ensure home is accessible, demonstrate HEP, complete caregiver training       Physical Therapy Problems (Active)       Problem: Mobility       Dates: Start:  01/03/24         Goal: STG-Within one week, patient will ascend and descend three stairs with R HR and CGA       Dates: Start:  01/03/24               Problem: Mobility Transfers       Dates: Start:  01/03/24         Goal: STG-Within one week, patient will perform bed mobility consistently with [place]       Dates: Start:  01/03/24            Goal: STG-Within one week, patient will transfer bed to chair SBA       Dates: Start:  01/03/24               Problem: PT-Long Term Goals       Dates: Start:  01/03/24         Goal: LTG-By discharge, patient will ambulate 150ft LRAD [place]       Dates: Start:  01/03/24            Goal: LTG-By discharge, patient will transfer one surface to another [place]       Dates: Start:  01/03/24            Goal: LTG-By discharge, patient will perform home exercise program with SPV       Dates: Start:  01/03/24            Goal: LTG-By discharge, patient will ambulate up/down 3 stairs R HR vs curb step LRAD SBA       Dates: Start:  01/03/24            Goal: LTG-By discharge, patient will transfer in/out of a car CGA       Dates: Start:  01/03/24            Goal: LTG-By discharge, patient will perform bed mobility independently       Dates: Start:  01/03/24

## 2024-01-09 NOTE — PROGRESS NOTES
NURSING DAILY NOTE    Name: Regine Bryant   Date of Admission: 1/2/2024   Admitting Diagnosis: Central stenosis of spinal canal  Attending Physician: Sushma Xiao M.d.  Allergies: Atorvastatin    Safety  Patient Assist  min assist  Patient Precautions  Fall Risk  Precaution Comments  room air during daytime  Bed Transfer Status  Standby Assist  Toilet Transfer Status   Contact Guard Assist  Assistive Devices  Wheelchair  Oxygen  None - Room Air  Diet/Therapeutic Dining  Current Diet Order   Procedures    Diet Order Diet: Cardiac     Pill Administration  whole and one at a time   Agitated Behavioral Scale     ABS Level of Severity       Fall Risk  Has the patient had a fall this admission?   No  Marquita Butcher Fall Risk Scoring  17, HIGH RISK  Fall Risk Safety Measures  bed alarm and chair alarm    Vitals  Temperature: 36.4 °C (97.6 °F)  Temp src: Oral  Pulse: 80  Respiration: 18  Blood Pressure : (!) 150/58  Blood Pressure MAP (Calculated): 89 MM HG  BP Location: Right, Upper Arm  Patient BP Position: Supine     Oxygen  Pulse Oximetry: 95 %  O2 (LPM): 0  O2 Delivery Device: None - Room Air    Bowel and Bladder  Last Bowel Movement  01/07/24  Stool Type  Type 5: Soft blob with clear cut edges (passed easily)  Bowel Device  Diaper, Bathroom  Continent  Bladder: Stress incontinence   Bowel:    Bladder Function  Urine Void (mL):  (moderate)  Number of Times Voided: 1  Urine Color: Unable To Evaluate  Number of Times Incontinent of Urine: 1  Genitourinary Assessment   Bladder Assessment (WDL):  WDL Except  Santo Catheter: Not Applicable  Urinary Elimination: Incontinence  Urine Color: Unable To Evaluate  Number of Times Incontinent of Urine: 1  Bladder Device: Bathroom, Diaper  Time Void: Yes  Bladder Scan: Post Void  $ Bladder Scan Results (mL): 135    Skin  Mayco Score   15  Sensory Interventions   Bed Types: Standard/Trauma Mattress  Skin  Preventative Measures: Pillows in Use for Support / Positioning  Moisture Interventions  Moisturizers/Barriers: Barrier Paste      Pain  Pain Rating Scale  5 - Interrupts some activities  Pain Location  Neck  Pain Location Orientation  Posterior, Left  Pain Interventions   Medication (see MAR)    ADLs    Bathing   Shower, * * With Assistance from, Staff  Linen Change      Personal Hygiene  Change Crystal Pads, Moist Crystal Wipes, Perineal Care  Chlorhexidine Bath      Oral Care     Teeth/Dentures     Shave     Nutrition Percentage Eaten  Between 50-75% Consumed  Environmental Precautions  Bed in Low Position  Patient Turns/Positioning  Patient Turns Self from Side to Side  Patient Turns Assistance/Tolerance     Bed Positions  Bed Controls On  Head of Bed Elevated  Self regulated      Psychosocial/Neurologic Assessment  Psychosocial Assessment  Psychosocial (WDL):  WDL Except  Patient Behaviors: Anxious, Fatigue  Family Behaviors: No Family Present  Neurologic Assessment  Neuro (WDL): Exceptions to WDL  Level of Consciousness: Alert  Orientation Level: Oriented to person, Oriented to place, Oriented to situation  Cognition: Follows commands  Speech: Clear  Pupil Assesment: No  Motor Function/Sensation Assessment: Motor strength  Muscle Strength Right Arm: Good Strength Against Gravity and Moderate Resistance  Muscle Strength Left Arm: Good Strength Against Gravity and Moderate Resistance  Muscle Strength Right Leg: Good Strength Against Gravity and Moderate Resistance  Muscle Strength Left Leg: Good Strength Against Gravity and Moderate Resistance  EENT (WDL):  WDL Except    Cardio/Pulmonary Assessment  Edema      Respiratory Breath Sounds  RUL Breath Sounds: Clear  RML Breath Sounds: Clear  RLL Breath Sounds: Diminished  SERVANDO Breath Sounds: Clear  LLL Breath Sounds: Diminished  Cardiac Assessment   Cardiac (WDL):  WDL Except

## 2024-01-09 NOTE — CARE PLAN
"The patient is Watcher - Medium risk of patient condition declining or worsening    Shift Goals  Clinical Goals: Safety  Patient Goals: Rest    Progress made toward(s) clinical / shift goals:    Problem: Fall Risk - Rehab  Goal: Patient will remain free from falls  Note: Marquita Butcher Fall risk Assessment Score: 17    High fall risk Interventions   - Alarming seatbelt  - Bed and strip alarm   - Yellow sign by the door   - Yellow wrist band \"Fall risk\"  - Room near to the nurse station  - Do not leave patient unattended in the bathroom  - Fall risk education provided       Problem: Psychosocial  Goal: Patient's level of anxiety will decrease  Note: Calm and quiet, sleeping soundly during the night.        Patient is not progressing towards the following goals:      Problem: Bladder / Voiding  Goal: Patient will establish and maintain regular urinary output  Outcome: Not Progressing  Note: Incontinent of bladder. Kept clean and dry.     "

## 2024-01-09 NOTE — THERAPY
"Physical Therapy   Daily Treatment     Patient Name: Regine Bryant  Age:  89 y.o., Sex:  female  Medical Record #: [id number]  Today's Date: 1/9/2024     Precautions  Precautions: Fall Risk  Comments: room air during daytime    Subjective    Pt agreeable to tx. \"I wish I didn't have this neck pain\" \"I keep closing my eyes so I can't see what you're doing\"     Objective       01/09/24 1001   PT Charge Group   PT Gait Training (Units) 1   PT Therapeutic Exercise (Units) 1   PT Total Time Spent   PT Individual Total Time Spent (Mins) 30   Pain   Intervention Heat Applied   Pain 0 - 10 Group   Location Neck   Location Orientation Left;Posterior   Pain Rating Scale (NPRS) 5   Gait Functional Level of Assist    Gait Level Of Assist Standby Assist   Assistive Device Front Wheel Walker   Distance (Feet) 154   # of Times Distance was Traveled 1   Deviation Antalgic;Trendelenberg;Shuffled Gait;Step To;Bradykinetic   Transfer Functional Level of Assist   Bed, Chair, Wheelchair Transfer Standby Assist   Bed Chair Wheelchair Transfer Description Adaptive equipment;Set-up of equipment;Supervision for safety;Verbal cueing  (stand step FWW)   Sitting Lower Body Exercises   Other Exercises chin tucks x10, gentle cervical AROM rotation and lateral flexion   Standing Lower Body Exercises   Hip Extension 2 sets of 10   Hip Abduction 2 sets of 10   Marching 2 sets of 10   Comments B UE support on // bars   Bed Mobility    Sit to Stand Standby Assist   Interdisciplinary Plan of Care Collaboration   Patient Position at End of Therapy Seated;Chair Alarm On;Self Releasing Lap Belt Applied;Call Light within Reach;Tray Table within Reach;Phone within Reach         Assessment    Pt was able to ambulate 150ft with FWW with 2 standing rest breaks and verbal cues for path finding from room to gym. She benefits from frequent redirection to task and encouragement to continue moving despite neck pain.  Strengths: Able to follow instructions, " Good insight into deficits/needs, Independent prior level of function, Motivated for self care and independence, Pleasant and cooperative, Supportive family, Willingly participates in therapeutic activities  Barriers: Generalized weakness, Fatigue, Decreased endurance, Home accessibility, Impaired activity tolerance, Impaired balance, Pain    Plan    Progress gait training with FWW, transfer training with FWW, pain management strategies, LE strengthening, activity tolerance, bed mobility training, stair/curb negotiation with L handrail. Home evaluation scheduled for Thursday 9:30-12.     DME  PT DME Recommendations  Assistive Device: Front Wheeled Walker  Additional Equipment:  (LHS, reacher, sock aid)    Passport items to be completed:  Get in/out of bed safely, in/out of a vehicle, safely use mobility device, walk or wheel around home/community, navigate up and down stairs, show how to get up/down from the ground, ensure home is accessible, demonstrate HEP, complete caregiver training    Physical Therapy Problems (Active)       Problem: Mobility       Dates: Start:  01/03/24         Goal: STG-Within one week, patient will ascend and descend three stairs with R HR and CGA       Dates: Start:  01/03/24               Problem: Mobility Transfers       Dates: Start:  01/03/24         Goal: STG-Within one week, patient will perform bed mobility consistently with Armando       Dates: Start:  01/03/24            Goal: STG-Within one week, patient will transfer bed to chair SBA       Dates: Start:  01/03/24               Problem: PT-Long Term Goals       Dates: Start:  01/03/24         Goal: LTG-By discharge, patient will ambulate 150ft LRAD [place]       Dates: Start:  01/03/24            Goal: LTG-By discharge, patient will transfer one surface to another [place]       Dates: Start:  01/03/24            Goal: LTG-By discharge, patient will perform home exercise program with SPV       Dates: Start:  01/03/24            Goal:  LTG-By discharge, patient will ambulate up/down 3 stairs R HR vs curb step LRAD SBA       Dates: Start:  01/03/24            Goal: LTG-By discharge, patient will transfer in/out of a car CGA       Dates: Start:  01/03/24            Goal: LTG-By discharge, patient will perform bed mobility independently       Dates: Start:  01/03/24

## 2024-01-09 NOTE — DISCHARGE PLANNING
Cm  Discussed @ huddle; home evaluation scheduled for 1/16 with dc changed to 1/17. Dtrs in agreement. Order for home health given to DPA; choice / [facility]

## 2024-01-09 NOTE — FLOWSHEET NOTE
01/09/24 0655   Events/Summary/Plan   Events/Summary/Plan RA pulse ox check   Vital Signs   Pulse 71   Respiration 16   Pulse Oximetry 96 %   $ Pulse Oximetry (Spot Check) Yes   Respiratory Assessment   Level of Consciousness Alert   Chest Exam   Work Of Breathing / Effort Within Normal Limits   Oxygen   O2 Delivery Device Room air w/o2 available

## 2024-01-09 NOTE — THERAPY
Physical Therapy   Daily Treatment     Patient Name: Regine Bryant  Age:  89 y.o., Sex:  female  Medical Record #: [id number]  Today's Date: 1/8/2024     Precautions  Precautions: Fall Risk  Comments: room air during daytime    Subjective    My pain is a little better today.     Objective       01/08/24 1231   PT Charge Group   PT Gait Training (Units) 1   PT Therapeutic Activities (Units) 1   PT Total Time Spent   PT Individual Total Time Spent (Mins) 30   Precautions   Precautions Fall Risk   Comments room air during daytime   Pain   Intervention Medication (see MAR)   Pain 0 - 10 Group   Location Neck   Location Orientation Left   Pain Rating Scale (NPRS) 5   Description Aching   Therapist Pain Assessment Prior to Activity   Non Verbal Descriptors   Non Verbal Scale  Calm   Cognition    Orientation Level Oriented x 4   Level of Consciousness Alert   ABS (Agitated Behavior Scale)   Agitated Behavior Scale Performed No   Sleep/Wake Cycle   Sleep & Rest Awake;Out of bed   Gait Functional Level of Assist    Gait Level Of Assist Contact Guard Assist   Assistive Device Front Wheel Walker   Distance (Feet) 110   # of Times Distance was Traveled 2   Deviation Antalgic;Trendelenberg;Decreased Heel Strike;Decreased Toe Off   Transfer Functional Level of Assist   Bed, Chair, Wheelchair Transfer Standby Assist   Bed Chair Wheelchair Transfer Description Adaptive equipment;Initial preparation for task   Toilet Transfers Contact Guard Assist   Toilet Transfer Description Grab bar;Set-up of equipment;Initial preparation for task;Verbal cueing;Supervision for safety  (minor LOB with trying to pull up pants)   Neuro-Muscular Treatments   Neuro-Muscular Treatments Sequencing;Postural Facilitation   Interdisciplinary Plan of Care Collaboration   IDT Collaboration with  Physical Therapist   Patient Position at End of Therapy Seated;Self Releasing Lap Belt Applied   Collaboration Comments transfer of care to primary therapist          Assessment    Improved gait endurance this afternoon with FWW- patient tends to decrease R LE step height with fatigue. Patient requiring assist in bathroom with clothing. Home eval set up for later this week to review DME needs and safety with household functional mobility. Pleasant and cooperative throughout session.    Strengths: Able to follow instructions, Good insight into deficits/needs, Independent prior level of function, Motivated for self care and independence, Pleasant and cooperative, Supportive family, Willingly participates in therapeutic activities  Barriers: Generalized weakness, Fatigue, Decreased endurance, Home accessibility, Impaired activity tolerance, Impaired balance, Pain    Plan    Progress gait training with FWW, transfer training with FWW, pain management strategies, LE strengthening, activity tolerance, bed mobility training, stair/curb negotiation with L handrail. Home evaluation scheduled for Thursday 9:30-12.     DME  PT DME Recommendations  Assistive Device: Front Wheeled Walker  Additional Equipment:  (LHS, reacher, sock aid)    Passport items to be completed:  Get in/out of bed safely, in/out of a vehicle, safely use mobility device, walk or wheel around home/community, navigate up and down stairs, show how to get up/down from the ground, ensure home is accessible, demonstrate HEP, complete caregiver training    Physical Therapy Problems (Active)       Problem: Mobility       Dates: Start:  01/03/24         Goal: STG-Within one week, patient will ascend and descend three stairs with R HR and CGA       Dates: Start:  01/03/24               Problem: Mobility Transfers       Dates: Start:  01/03/24         Goal: STG-Within one week, patient will perform bed mobility consistently with Armando       Dates: Start:  01/03/24            Goal: STG-Within one week, patient will transfer bed to chair SBA       Dates: Start:  01/03/24               Problem: PT-Long Term Goals       Dates:  Start:  01/03/24         Goal: LTG-By discharge, patient will ambulate 150ft LRAD [place]       Dates: Start:  01/03/24            Goal: LTG-By discharge, patient will transfer one surface to another Arcadio       Dates: Start:  01/03/24            Goal: LTG-By discharge, patient will perform home exercise program with SPV       Dates: Start:  01/03/24            Goal: LTG-By discharge, patient will ambulate up/down 3 stairs R HR vs curb step LRAD SBA       Dates: Start:  01/03/24            Goal: LTG-By discharge, patient will transfer in/out of a car CGA       Dates: Start:  01/03/24            Goal: LTG-By discharge, patient will perform bed mobility independently       Dates: Start:  01/03/24

## 2024-01-10 NOTE — PROGRESS NOTES
NURSING DAILY NOTE    Name: Regine Bryant   Date of Admission: 1/2/2024   Admitting Diagnosis: Central stenosis of spinal canal  Attending Physician: Sushma Xiao M.d.  Allergies: Atorvastatin and Lactose intolerance (gi) [lactose]    Safety  Patient Assist  min assist  Patient Precautions  Fall Risk  Precaution Comments  room air during daytime  Bed Transfer Status  Standby Assist  Toilet Transfer Status   Standby Assist  Assistive Devices  Rails, Wheelchair  Oxygen  None - Room Air  Diet/Therapeutic Dining  Current Diet Order   Procedures    Diet Order Diet: Cardiac     Pill Administration  whole and one at a time   Agitated Behavioral Scale     ABS Level of Severity       Fall Risk  Has the patient had a fall this admission?   No  Marquita Butcher Fall Risk Scoring  17, HIGH RISK  Fall Risk Safety Measures  bed alarm and chair alarm    Vitals  Temperature: 36.6 °C (97.8 °F)  Temp src: Oral  Pulse: 70  Respiration: 20  Blood Pressure : 130/62  Blood Pressure MAP (Calculated): 85 MM HG  BP Location: Right, Upper Arm  Patient BP Position: Supine     Oxygen  Pulse Oximetry: 92 %  O2 (LPM): 0  O2 Delivery Device: None - Room Air    Bowel and Bladder  Last Bowel Movement  01/07/24  Stool Type  Type 5: Soft blob with clear cut edges (passed easily)  Bowel Device  Diaper, Bathroom  Continent  Bladder: Stress incontinence   Bowel:    Bladder Function  Urine Void (mL):  (moderate)  Number of Times Voided: 1  Urine Color: Unable To Evaluate  Number of Times Incontinent of Urine: 1  Genitourinary Assessment   Bladder Assessment (WDL):  WDL Except  Santo Catheter: Not Applicable  Urinary Elimination: Incontinence  Urine Color: Unable To Evaluate  Number of Times Incontinent of Urine: 1  Bladder Device: Bathroom, Diaper  Time Void: Yes  Bladder Scan: Post Void  $ Bladder Scan Results (mL): 135    Skin  Mayco Score   15  Sensory Interventions   Bed  Types: Standard/Trauma Mattress  Skin Preventative Measures: Pillows in Use for Support / Positioning  Moisture Interventions  Moisturizers/Barriers: Barrier Paste      Pain  Pain Rating Scale  0 - No Pain  Pain Location  Neck  Pain Location Orientation  Left  Pain Interventions   Medication (see MAR), Relaxation Technique, Other (Comments) (Voltaren cream)    ADLs    Bathing   Shower, * * With Assistance from, Staff  Linen Change      Personal Hygiene  Change Crystal Pads, Moist Crystal Wipes, Perineal Care  Chlorhexidine Bath      Oral Care     Teeth/Dentures     Shave     Nutrition Percentage Eaten  Lunch, Between % Consumed  Environmental Precautions  Bed in Low Position, Treaded Slipper Socks on Patient  Patient Turns/Positioning  Patient Turns Self from Side to Side  Patient Turns Assistance/Tolerance     Bed Positions  Bed Controls On  Head of Bed Elevated  Self regulated      Psychosocial/Neurologic Assessment  Psychosocial Assessment  Psychosocial (WDL):  WDL Except  Patient Behaviors: Anxious, Fatigue  Family Behaviors: No Family Present  Neurologic Assessment  Neuro (WDL): Exceptions to WDL  Level of Consciousness: Responds to voice  Orientation Level: Oriented to person, Oriented to place, Oriented to situation  Cognition: Follows commands  Speech: Clear  Pupil Assesment: No  Motor Function/Sensation Assessment: Motor strength  Muscle Strength Right Arm: Good Strength Against Gravity and Moderate Resistance  Muscle Strength Left Arm: Good Strength Against Gravity and Moderate Resistance  Muscle Strength Right Leg: Good Strength Against Gravity and Moderate Resistance  Muscle Strength Left Leg: Good Strength Against Gravity and Moderate Resistance  EENT (WDL):  WDL Except    Cardio/Pulmonary Assessment  Edema      Respiratory Breath Sounds  RUL Breath Sounds: Clear  RML Breath Sounds: Clear  RLL Breath Sounds: Diminished  SERVANDO Breath Sounds: Clear  LLL Breath Sounds: Diminished  Cardiac Assessment    Cardiac (WDL):  WDL Except

## 2024-01-10 NOTE — PROGRESS NOTES
Hospital Medicine Daily Progress Note      Chief Complaint  Hypertension  CKD/GERARD    Interval Problem Update  Discussed about her BP elevated and have started Hydralazine.    Review of Systems  Review of Systems   Constitutional:  Negative for fever.   Eyes:  Negative for blurred vision.   Respiratory:  Negative for shortness of breath.    Cardiovascular:  Negative for palpitations.   Gastrointestinal:  Negative for nausea and vomiting.   Neurological:  Negative for dizziness and headaches.   Psychiatric/Behavioral:  Negative for hallucinations.         Physical Exam  Temp:  [36.5 °C (97.7 °F)-36.6 °C (97.9 °F)] 36.6 °C (97.9 °F)  Pulse:  [66-76] 76  Resp:  [] 118  BP: (124-179)/(56-82) 164/82  SpO2:  [92 %-96 %] 96 %    Physical Exam  Vitals and nursing note reviewed.   Constitutional:       General: She is not in acute distress.  HENT:      Mouth/Throat:      Mouth: Mucous membranes are moist.      Pharynx: Oropharynx is clear.   Eyes:      General: No scleral icterus.  Neck:      Vascular: No JVD.   Cardiovascular:      Rate and Rhythm: Normal rate and regular rhythm.      Heart sounds: Normal heart sounds. No murmur heard.  Pulmonary:      Effort: Pulmonary effort is normal.      Breath sounds: Normal breath sounds. No stridor.   Abdominal:      General: There is no distension.      Palpations: Abdomen is soft.      Tenderness: There is no abdominal tenderness.   Musculoskeletal:      Cervical back: No rigidity.      Right lower leg: No edema.      Left lower leg: No edema.   Skin:     General: Skin is warm and dry.      Findings: No rash.   Neurological:      Mental Status: She is alert and oriented to person, place, and time.   Psychiatric:         Mood and Affect: Mood normal.         Behavior: Behavior normal.         Fluids    Intake/Output Summary (Last 24 hours) at 1/10/2024 0856  Last data filed at 1/10/2024 0455  Gross per 24 hour   Intake 1320 ml   Output --   Net 1320 ml         Laboratory         Recent Labs     01/09/24  0530   SODIUM 135   POTASSIUM 4.8   CHLORIDE 101   CO2 24   GLUCOSE 116*   BUN 41*   CREATININE 0.87   CALCIUM 10.0                     Assessment/Plan  * Cervical central stenosis of spinal canal- (present on admission)  Assessment & Plan  Non-surgical management  On Decadron    Constipation  Assessment & Plan  Has diminished BMs  Has some gas also  KUB 1/3/24 moderate gaseous distention of the stomach, nonspecific bowel gas pattern with a moderate colonic stool burden  KUB 1/4/24 unremarkable but did not comment on stomach  KUB 1/5/24 improved gastric distension  Cont Simethicone  Cont Miralax daily    Stage 3a chronic kidney disease (HCC)- (present on admission)  Assessment & Plan  Bun: stable at 41  Cr: wnl  Around baseline - but baseline wanders  BNP: 756 --> 327 (1/9)  S/P recent IVF's (for GERARD)  Cont to monitor    Lung mass- (present on admission)  Assessment & Plan  Reportedly, family desires no further work up    Hypothyroid  Assessment & Plan  Euthyroid on Synthroid    HTN (hypertension)- (present on admission)  Assessment & Plan  BP labile and elevated  Cont Coreg  Cont Norvasc  Will start Hydralazine  Cont to monitor

## 2024-01-10 NOTE — CARE PLAN
The patient is Watcher - Medium risk of patient condition declining or worsening    Shift Goals  Clinical Goals: Safety  Patient Goals: Rest    Progress made toward(s) clinical / shift goals:    Problem: Skin Integrity  Goal: Skin integrity is maintained or improved  Outcome: Progressing     Problem: Fall Risk - Rehab  Goal: Patient will remain free from falls  Outcome: Progressing  Note: Pt uses call light consistently and appropriately. Waits for assistance does not attempt self transfer this shift. Able to verbalize needs.

## 2024-01-10 NOTE — THERAPY
"Physical Therapy   Daily Treatment     Patient Name: Regine Bryant  Age:  89 y.o., Sex:  female  Medical Record #: [id number]  Today's Date: 1/10/2024     Precautions  Precautions: (P) Fall Risk  Comments: (P) room air during daytime    Subjective    Patient seated in w/c with daughter Kika present, agreeable to therapy.     Objective       01/10/24 0831   PT Charge Group   PT Gait Training (Units) 2   PT Therapeutic Activities (Units) 4   PT Total Time Spent   PT Individual Total Time Spent (Mins) 90   Precautions   Precautions Fall Risk   Comments room air during daytime   Gait Functional Level of Assist    Gait Level Of Assist Standby Assist   Assistive Device Front Wheel Walker   Distance (Feet)   (150 ftx1, 20 ftx5, 50 ftx1)   Deviation Antalgic;Trendelenberg;Shuffled Gait  (slow evan)   Stairs Functional Level of Assist   Level of Assist with Stairs Contact Guard Assist   # of Stairs Climbed 8  (6\" steps with BUE support on L rail)   Stairs Description Extra time;Hand rails;Supervision for safety  (step to pattern ascending and descending)   Transfer Functional Level of Assist   Bed, Chair, Wheelchair Transfer Standby Assist   Bed Chair Wheelchair Transfer Description Adaptive equipment;Initial preparation for task;Increased time;Supervision for safety  (stand step transfer with FWW)   Bed Mobility    Supine to Sit Supervised   Sit to Supine Supervised   Sit to Stand Standby Assist   Scooting Supervised   Rolling Supervised   Interdisciplinary Plan of Care Collaboration   IDT Collaboration with  Family / Caregiver;Occupational Therapist   Patient Position at End of Therapy Seated;Chair Alarm On;Self Releasing Lap Belt Applied;Call Light within Reach;Tray Table within Reach;Phone within Reach   Collaboration Comments CLOF, family training     Family training completed with patient and daughter Kika; reviewed recommendations for 24/7 supervision with FWW and gait belt. Daughter observed and performed " "hands on training for transfers and ambulation with FWW, bed mobility on standard bed with bed rail, 6\" steps x4 with L rail, and 6\" curb negotiation with FWW. Patient and daughter verbalizing and demonstrating understanding of all recommendations. Also discussed car transfer and step set up at home (R sided vertical grab bars to negotiate 6\" curb step in bedroom and kitchen; kitchen also has L side counter support). Also observed patient toileting at w/c level with grab bar; two posterior LOBs requiring Min A, daughter able to provide adequate support and verbalized understanding of patient's tendency for posterior LOBs and proper positioning to protect against falls.      Assessment    Patient tolerated session well, reviewed functional mobility with patient and daughter. Kika verbalizing and demonstrating understanding of recommendations and in agreement with plan.    Strengths: Able to follow instructions, Good insight into deficits/needs, Independent prior level of function, Motivated for self care and independence, Pleasant and cooperative, Supportive family, Willingly participates in therapeutic activities  Barriers: Generalized weakness, Fatigue, Decreased endurance, Home accessibility, Impaired activity tolerance, Impaired balance, Pain    Plan    Progress gait training with FWW, transfer training with FWW, pain management strategies, LE strengthening, activity tolerance, bed mobility training, stair/curb negotiation with L handrail. Home evaluation scheduled for Thursday 9:30-12.      DME  PT DME Recommendations  Assistive Device: Front Wheeled Walker  Additional Equipment:  (LHS, reacher, sock aid)     Passport items to be completed:  Get in/out of bed safely, in/out of a vehicle, safely use mobility device, walk or wheel around home/community, navigate up and down stairs, show how to get up/down from the ground, ensure home is accessible, demonstrate HEP, complete caregiver training    Physical Therapy " Problems (Active)       Problem: Mobility       Dates: Start:  01/03/24         Goal: STG-Within one week, patient will ascend and descend three stairs with R HR and CGA       Dates: Start:  01/03/24               Problem: Mobility Transfers       Dates: Start:  01/03/24         Goal: STG-Within one week, patient will perform bed mobility consistently with Armando       Dates: Start:  01/03/24            Goal: STG-Within one week, patient will transfer bed to chair SBA       Dates: Start:  01/03/24               Problem: PT-Long Term Goals       Dates: Start:  01/03/24         Goal: LTG-By discharge, patient will ambulate 150ft LRAD [place]       Dates: Start:  01/03/24            Goal: LTG-By discharge, patient will transfer one surface to another [place]       Dates: Start:  01/03/24            Goal: LTG-By discharge, patient will perform home exercise program with SPV       Dates: Start:  01/03/24            Goal: LTG-By discharge, patient will ambulate up/down 3 stairs R HR vs curb step LRAD SBA       Dates: Start:  01/03/24            Goal: LTG-By discharge, patient will transfer in/out of a car CGA       Dates: Start:  01/03/24            Goal: LTG-By discharge, patient will perform bed mobility independently       Dates: Start:  01/03/24

## 2024-01-10 NOTE — PROGRESS NOTES
NURSING DAILY NOTE    Name: Regine Bryant   Date of Admission: 1/2/2024   Admitting Diagnosis: Central stenosis of spinal canal  Attending Physician: Sushma Xiao M.d.  Allergies: Atorvastatin and Lactose intolerance (gi) [lactose]    Safety  Patient Assist  min assist  Patient Precautions  Fall Risk  Precaution Comments  room air during daytime  Bed Transfer Status  Standby Assist  Toilet Transfer Status   Standby Assist  Assistive Devices  Wheelchair  Oxygen  None - Room Air  Diet/Therapeutic Dining  Current Diet Order   Procedures    Diet Order Diet: Cardiac     Pill Administration  whole and one at a time   Agitated Behavioral Scale     ABS Level of Severity       Fall Risk  Has the patient had a fall this admission?   No  Marquita Butcher Fall Risk Scoring  17, HIGH RISK  Fall Risk Safety Measures  bed alarm and chair alarm    Vitals  Temperature: 36.6 °C (97.8 °F)  Temp src: Oral  Pulse: 70  Respiration: 20  Blood Pressure : 130/62  Blood Pressure MAP (Calculated): 85 MM HG  BP Location: Right, Upper Arm  Patient BP Position: Supine     Oxygen  Pulse Oximetry: 92 %  O2 (LPM): 0  O2 Delivery Device: None - Room Air    Bowel and Bladder  Last Bowel Movement  01/07/24  Stool Type  Type 5: Soft blob with clear cut edges (passed easily)  Bowel Device  Diaper, Bathroom  Continent  Bladder: Stress incontinence   Bowel:    Bladder Function  Urine Void (mL):  (large)  Number of Times Voided: 1  Urine Color: Unable To Evaluate  Number of Times Incontinent of Urine: 1  Genitourinary Assessment   Bladder Assessment (WDL):  WDL Except  Santo Catheter: Not Applicable  Urinary Elimination: Incontinence  Urine Color: Unable To Evaluate  Number of Times Incontinent of Urine: 1  Bladder Device: Bathroom, Diaper  Time Void: Yes  Bladder Scan: Post Void  $ Bladder Scan Results (mL): 135    Skin  Mayco Score   15  Sensory Interventions   Bed Types:  Standard/Trauma Mattress  Skin Preventative Measures: Pillows in Use for Support / Positioning  Moisture Interventions  Moisturizers/Barriers: Barrier Paste      Pain  Pain Rating Scale  6 - Hard to ignore, avoid usual activities  Pain Location  Neck  Pain Location Orientation  Left  Pain Interventions   Heat Applied    ADLs    Bathing   Shower, * * With Assistance from, Staff  Linen Change      Personal Hygiene  Change Crystal Pads, Moist Crystal Wipes, Perineal Care  Chlorhexidine Bath      Oral Care     Teeth/Dentures     Shave     Nutrition Percentage Eaten  Lunch, Between % Consumed  Environmental Precautions  Bed in Low Position  Patient Turns/Positioning  Patient Turns Self from Side to Side  Patient Turns Assistance/Tolerance     Bed Positions  Bed Controls On  Head of Bed Elevated  Self regulated      Psychosocial/Neurologic Assessment  Psychosocial Assessment  Psychosocial (WDL):  WDL Except  Patient Behaviors: Anxious, Fatigue  Family Behaviors: No Family Present  Neurologic Assessment  Neuro (WDL): Exceptions to WDL  Level of Consciousness: Responds to voice  Orientation Level: Oriented to person, Oriented to place, Oriented to situation  Cognition: Follows commands  Speech: Clear  Pupil Assesment: No  Motor Function/Sensation Assessment: Motor strength  Muscle Strength Right Arm: Good Strength Against Gravity and Moderate Resistance  Muscle Strength Left Arm: Good Strength Against Gravity and Moderate Resistance  Muscle Strength Right Leg: Good Strength Against Gravity and Moderate Resistance  Muscle Strength Left Leg: Good Strength Against Gravity and Moderate Resistance  EENT (WDL):  WDL Except    Cardio/Pulmonary Assessment  Edema      Respiratory Breath Sounds  RUL Breath Sounds: Clear  RML Breath Sounds: Clear  RLL Breath Sounds: Diminished  SERVANDO Breath Sounds: Clear  LLL Breath Sounds: Diminished  Cardiac Assessment   Cardiac (WDL):  WDL Except

## 2024-01-10 NOTE — THERAPY
Occupational Therapy  Daily Treatment     Patient Name: Regine Bryant  Age:  89 y.o., Sex:  female  Medical Record #: [id number]  Today's Date: 1/10/2024     Precautions  Precautions: (P) Fall Risk  Comments: (P) room air during daytime         Subjective  Pt encountered for OT seated in room with RN finishing med pass. Pt's daughter Kika present throughout session for FT prior to possible DC on Friday (01/12).     Objective       01/10/24 1031   OT Charge Group   OT Self Care / ADL (Units) 3   OT Therapeutic Exercise (Units) 1   OT Total Time Spent   OT Individual Total Time Spent (Mins) 60   Precautions   Precautions Fall Risk   Comments room air during daytime   Functional Level of Assist   Grooming Contact Guard Assist;Standing  (CGA to SBA for standing at EOS using FWW)   Grooming Description Increased time;Standing at sink;Supervision for safety   Bed, Chair, Wheelchair Transfer Standby Assist   Bed Chair Wheelchair Transfer Description Adaptive equipment;Initial preparation for task;Increased time;Supervision for safety  (Stand step using FWW)   Toilet Transfers Standby Assist   Toilet Transfer Description Adaptive equipment;Grab bar;Increased time;Supervision for safety  (Stand step using FWW)   Tub / Shower Transfers Contact Guard Assist   Tub Shower Transfer Description Grab bar;Shower bench;Increased time;Supervision for safety;Set-up of equipment  (Dry shower transfer into step-in shower with 4-inch threshold. Stand step from WC <> shower bench using GB; x2)   Sitting Lower Body Exercises   Sitting Lower Body Exercises Yes   Other Exercises motomed; LE only to increase strength and endurance for functional mobility. 10 min.   Interdisciplinary Plan of Care Collaboration   IDT Collaboration with  Physical Therapist;Family / Caregiver   Patient Position at End of Therapy Seated;Chair Alarm On;Self Releasing Lap Belt Applied  (in cafeteria for lunch)   Collaboration Comments CLOF; FT in  shower/toilet/dressing needs     Functional mobility at the FWW from ADL bathroom to middle of main gym at CGA for safety.1 seated RB needed d/t c/o neck pain.     Overall, session focused on FT in functional transfers and level of assistance pt needs upon DC home with 24-7 assistance from family. Discussed DME recommendations and provided handouts for where to purchase (e.g., bedside rails).       Assessment    Overall, pt remains at CGA to SBA for functional mobility at the FWW level. Daughter verbalized understanding of level assistance pt requires to be safe upon DC home. FT will cont tomorrow during home evel.      Strengths: Able to follow instructions, Alert and oriented, Good insight into deficits/needs, Independent prior level of function, Motivated for self care and independence, Pleasant and cooperative, Supportive family, Willingly participates in therapeutic activities  Barriers: Bladder incontinence, Decreased endurance, Fatigue, Generalized weakness, Impaired activity tolerance, Impaired balance, Pain, Limited mobility, Visual impairment    Plan    Cont ADLs, functional transfers, and thera act/ex to maximize functional recovery for safe DC home.   Home eval tomorrow 01/11 with daughter Kika present.     DME  OT DME Recommendations  Bathroom Equipment:  (Pt owns shower chair; family looking to install fixated GB in shower)  Additional Equipment:  (LHS, reacher, sock aid)    Passport items to be completed:  Perform bathroom transfers, complete dressing, complete feeding, get ready for the day, prepare a simple meal, participate in household tasks, adapt home for safety needs, demonstrate home exercise program, complete caregiver training     Occupational Therapy Goals (Active)       Problem: Bathing       Dates: Start:  01/03/24         Goal: STG-Within one week, patient will bathe at [place] using LRD       Dates: Start:  01/03/24               Problem: Dressing       Dates: Start:  01/03/24          Goal: STG-Within one week, patient will dress UB at CGA       Dates: Start:  01/03/24            Goal: STG-Within one week, patient will dress LB at [place] using LRD       Dates: Start:  01/03/24               Problem: Eating       Dates: Start:  01/03/24         Goal: STG-Within one week, patient will feed self at CGA using LRD       Dates: Start:  01/03/24               Problem: Grooming       Dates: Start:  01/03/24         Goal: STG-Within one week, patient will complete grooming, seated at EOS, using LRD       Dates: Start:  01/03/24               Problem: OT Long Term Goals       Dates: Start:  01/03/24         Goal: LTG-By discharge, patient will complete basic self care tasks at SBA to [place]       Dates: Start:  01/03/24            Goal: LTG-By discharge, patient will perform bathroom transfers at [place]       Dates: Start:  01/03/24

## 2024-01-10 NOTE — THERAPY
"Occupational Therapy  Daily Treatment     Patient Name: Regine Bryant  Age:  89 y.o., Sex:  female  Medical Record #: [id number]  Today's Date: 1/10/2024     Precautions  Precautions: (P) Fall Risk  Comments: room air during daytime         Subjective    \" Well My neck really hurts .\"     Objective       01/10/24 1331   OT Charge Group   OT Self Care / ADL (Units) 1   OT Therapeutic Exercise (Units) 1   OT Total Time Spent   OT Individual Total Time Spent (Mins) 30   Precautions   Precautions Fall Risk   Functional Level of Assist   Toileting Standby Assist   Toilet Transfers Standby Assist   Sitting Upper Body Exercises   Upper Extremity Bike Minutes / Rest Breaks (See Comments)  (motomed level 1 x 5 minutes   and  2 mintues   followed by level 0 x 3 minutes)   Interdisciplinary Plan of Care Collaboration   Patient Position at End of Therapy Seated;Call Light within Reach;Tray Table within Reach;Self Releasing Lap Belt Applied;Chair Alarm On        01/10/24 1331   OT Charge Group   OT Self Care / ADL (Units) 1   OT Therapeutic Exercise (Units) 1   OT Total Time Spent   OT Individual Total Time Spent (Mins) 30   Precautions   Precautions Fall Risk   Functional Level of Assist   Toileting Standby Assist   Toilet Transfers Standby Assist   Sitting Upper Body Exercises   Upper Extremity Bike Minutes / Rest Breaks (See Comments)  (motomed level 1 x 5 minutes   and  2 mintues   followed by level 0 x 3 minutes)   Interdisciplinary Plan of Care Collaboration   Patient Position at End of Therapy Seated;Call Light within Reach;Tray Table within Reach;Self Releasing Lap Belt Applied;Chair Alarm On         Assessment    Reports continued neck pain but did not limit ability to participate in tx activity     Strengths: Able to follow instructions, Alert and oriented, Good insight into deficits/needs, Independent prior level of function, Motivated for self care and independence, Pleasant and cooperative, Supportive family, " Willingly participates in therapeutic activities  Barriers: Bladder incontinence, Decreased endurance, Fatigue, Generalized weakness, Impaired activity tolerance, Impaired balance, Pain, Limited mobility, Visual impairment    Plan    Cont ADLs, functional transfers, and thera act/ex to maximize functional recovery for safe DC home.   Home eval tomorrow 01/11 with daughter Kika present.        DME  OT DME Recommendations  Bathroom Equipment:  (Pt owns shower chair; family looking to install fixated GB in shower)  Additional Equipment:  (LHS, reacher, sock aid)      Occupational Therapy Goals (Active)       Problem: Bathing       Dates: Start:  01/03/24         Goal: STG-Within one week, patient will bathe at [place] using LRD       Dates: Start:  01/03/24               Problem: Dressing       Dates: Start:  01/03/24         Goal: STG-Within one week, patient will dress UB at CGA       Dates: Start:  01/03/24            Goal: STG-Within one week, patient will dress LB at [place] using LRD       Dates: Start:  01/03/24               Problem: Eating       Dates: Start:  01/03/24         Goal: STG-Within one week, patient will feed self at CGA using LRD       Dates: Start:  01/03/24               Problem: Grooming       Dates: Start:  01/03/24         Goal: STG-Within one week, patient will complete grooming, seated at EOS, using LRD       Dates: Start:  01/03/24               Problem: OT Long Term Goals       Dates: Start:  01/03/24         Goal: LTG-By discharge, patient will complete basic self care tasks at SBA to [place]       Dates: Start:  01/03/24            Goal: LTG-By discharge, patient will perform bathroom transfers at [place]       Dates: Start:  01/03/24

## 2024-01-10 NOTE — PROGRESS NOTES
"  Physical Medicine & Rehabilitation Progress Note    Encounter Date: 1/10/2024    Chief Complaint: Decreased mobility, weakness    Interval Events (Subjective):  Patient sitting up in room. She reports therapy is going OK. SBP is elevated this morning. She denies headache.     _____________________________________  Interdisciplinary Team Conference   Most recent IDT on 1/4/2024    Discharge Date/Disposition:  1/16/24  _____________________________________      Objective:  VITAL SIGNS: BP (!) 152/54   Pulse 80   Temp 36.4 °C (97.6 °F) (Oral)   Resp 18   Ht 1.448 m (4' 9\")   Wt 62.1 kg (137 lb)   SpO2 96%   BMI 29.65 kg/m²   Gen: NAD  Psych: Mood and affect appropriate  CV: RRR, 0 edema  Resp: CTAB, no upper airway sounds  Abd: NTND  Neuro: AOx4, following commands    Laboratory Values:  Recent Results (from the past 72 hour(s))   Basic Metabolic Panel    Collection Time: 01/09/24  5:30 AM   Result Value Ref Range    Sodium 135 135 - 145 mmol/L    Potassium 4.8 3.6 - 5.5 mmol/L    Chloride 101 96 - 112 mmol/L    Co2 24 20 - 33 mmol/L    Glucose 116 (H) 65 - 99 mg/dL    Bun 41 (H) 8 - 22 mg/dL    Creatinine 0.87 0.50 - 1.40 mg/dL    Calcium 10.0 8.5 - 10.5 mg/dL    Anion Gap 10.0 7.0 - 16.0   proBrain Natriuretic Peptide, NT    Collection Time: 01/09/24  5:30 AM   Result Value Ref Range    NT-proBNP 327 (H) 0 - 125 pg/mL   ESTIMATED GFR    Collection Time: 01/09/24  5:30 AM   Result Value Ref Range    GFR (CKD-EPI) 61 >60 mL/min/1.73 m 2       Medications:  Scheduled Medications   Medication Dose Frequency    senna-docusate  2 Tablet BID    And    [START ON 1/11/2024] polyethylene glycol/lytes  1 Packet DAILY    hydrALAZINE  10 mg Q8HRS    cyclobenzaprine  10 mg TID    dexamethasone  1 mg Q12HRS    carvedilol  12.5 mg BID WITH MEALS    amLODIPine  10 mg Q DAY    traMADol  50 mg 4X/DAY    diclofenac sodium  4 g TID    simethicone  125 mg 4X/DAY WITH MEALS + NIGHTLY    omeprazole  20 mg DAILY    aspirin  81 mg " DAILY    enoxaparin (LOVENOX) injection  40 mg DAILY AT 1800    levothyroxine  25 mcg AM ES    lidocaine  2 Patch Q24HR     PRN medications: senna-docusate **AND** [START ON 1/11/2024] polyethylene glycol/lytes **AND** magnesium hydroxide **AND** bisacodyl, Respiratory Therapy Consult, hydrALAZINE, acetaminophen, mag hydrox-al hydrox-simeth, ondansetron **OR** ondansetron, traZODone, sodium chloride, oxyCODONE immediate-release **OR** oxyCODONE immediate-release, traMADol    Diet:  Current Diet Order   Procedures    Diet Order Diet: Cardiac       Medical Decision Making and Plan:  Cervical Myelopathy - Patient with cervical stenosis as well as paratracheal mass with left sided weakness and neck/head pain concerning for myelopathy. Patient elected conservative management  -PT and OT for mobility and ADLs. Per guidelines, 15 hours per week between PT, OT and/or SLP.  -Follow-up PCP. Reduced steroids to BID, will reduce to 2 mg BID -> 1 mg BID on 1/8 -> discontinue 1/11     HTN/CHF - Patient on ASA. Patient on Coreg 6.25 mg, Lasix 20 mg (on hold on transfer). Consult hospitalist into 150s. Started on Amlodipine. Elevated SBP, continue Amlodipine 10 mg and Coreg 12.5 mg BID    Abdominal distention - Started on simethicone per Hospitalist. Repeat KUB 1/4 per my read moderate constipation, improved gas.      HLD - Patient not on statin on transfer. Had reaction to atorvastatin in the past.      CKD3a - Avoid nephrotoxic agents. Check AM CMP - Cr pending but had increased > 20%, will consult hospitalist. Stable     Pulmonary nodules - Patient electing no further work-up.      Anemia - Check AM CBC - 11.2, will monitor     Azotemia - Check AM CMP - BUN 40, will monitor     Hypothyroidism - Patient on Levothyroxine 25 mcg     Obesity due to excess calories - BMI of 30.9 on admission, meets medical criteria.      Pain - Patient on APAP/Oxycodone PRN. Add flexeril 10 mg TID     Neurogenic bladder - Having urge incontinence,  most likely neurogenic with limited voiding. Will check bladder scans and PVRs. May need to learn CIC     Skin - Patient at risk for skin breakdown due to debility in areas including sacrum, achilles, elbows and head in addition to other sites. Nursing to assess skin daily.      GI Ppx - Patient on Prilosec for GERD prophylaxis. Patient on Senna-docusate for constipation prophylaxis.      DVT Ppx - Patient Lovenox on transfer. Continue Lovenox as limited mobility  ____________________________________    T. René Xiao MD/PhD  [facility] - Physical Medicine & Rehabilitation   [facility] - Brain Injury Medicine   ____________________________________

## 2024-01-11 NOTE — DISCHARGE PLANNING
dATTN: Case Management  RE: Referral for Home Health    As of 1.11.24, we have accepted the Home Health referral for the patient listed above.    A [facility] clinician will be out to see the patient within 48 hours. If you have any questions or concerns regarding the patient’s transition to Home Health, please do not hesitate to contact us at [phone].      We look forward to collaborating with you,  [facility] Team

## 2024-01-11 NOTE — PROGRESS NOTES
"  Physical Medicine & Rehabilitation Progress Note    Encounter Date: 1/11/2024    Chief Complaint: Decreased mobility, weakness    Interval Events (Subjective):  Patient sitting up in room. She reports therapy is going well. Discussed would have IDT later today. Denies pain. Home evaluation had to be cancelled.     _____________________________________  Interdisciplinary Team Conference   Most recent IDT on 1/11/2024    ISushma M.D./Ph.D., was present and led the interdisciplinary team conference on 1/11/2024.  I led the IDT conference and agree with the IDT conference documentation and plan of care as noted below.     Nursing:  Diet Current Diet Order   Procedures    Diet Order Diet: Cardiac       Eating ADL Minimal Assist (requried assist to drink water from a cup    unable to manage a straw  requried therapist to hold cup)      % of Last Meal  Oral Nutrition: *  * Meal *  *, Breakfast, Between % Consumed   Sleep    Bowel Last BM: 01/11/24   Bladder    Barriers to Discharge Home:      Physical Therapy:  Bed Mobility    Transfers Standby Assist  Adaptive equipment, Increased time, Initial preparation for task, Supervision for safety (stand step transfer with FWW)   Mobility Standby Assist   Stairs    Barriers to Discharge Home:      Occupational Therapy:  Grooming Contact Guard Assist, Standing (CGA to SBA for standing at EOS using FWW)   Bathing Contact Guard Assist   UB Dressing Contact Guard Assist   LB Dressing Contact Guard Assist   Toileting Standby Assist   Shower & Transfer    Barriers to Discharge Home:    Respiratory Therapy:  O2 (LPM): 0  O2 Delivery Device: None - Room Air    Case Management:  Continues to work on disposition and DME needs.      Discharge Date/Disposition:  1/16/24 -> 1/12/24  _____________________________________      Objective:  VITAL SIGNS: /66   Pulse 72   Temp 36.6 °C (97.8 °F) (Oral)   Resp 20   Ht 1.448 m (4' 9\")   Wt 62.1 kg (137 lb)   SpO2 " 93%   BMI 29.65 kg/m²   Gen: NAD  Psych: Mood and affect appropriate  CV: RRR, 0 edema  Resp: CTAB, no upper airway sounds  Abd: NTND  Neuro: AOx4, following commands  Unchanged from 1/10/24    Laboratory Values:  Recent Results (from the past 72 hour(s))   Basic Metabolic Panel    Collection Time: 01/09/24  5:30 AM   Result Value Ref Range    Sodium 135 135 - 145 mmol/L    Potassium 4.8 3.6 - 5.5 mmol/L    Chloride 101 96 - 112 mmol/L    Co2 24 20 - 33 mmol/L    Glucose 116 (H) 65 - 99 mg/dL    Bun 41 (H) 8 - 22 mg/dL    Creatinine 0.87 0.50 - 1.40 mg/dL    Calcium 10.0 8.5 - 10.5 mg/dL    Anion Gap 10.0 7.0 - 16.0   proBrain Natriuretic Peptide, NT    Collection Time: 01/09/24  5:30 AM   Result Value Ref Range    NT-proBNP 327 (H) 0 - 125 pg/mL   ESTIMATED GFR    Collection Time: 01/09/24  5:30 AM   Result Value Ref Range    GFR (CKD-EPI) 61 >60 mL/min/1.73 m 2       Medications:  Scheduled Medications   Medication Dose Frequency    senna-docusate  2 Tablet BID    And    polyethylene glycol/lytes  1 Packet TWICE DAILY    hydrALAZINE  10 mg Q8HRS    cyclobenzaprine  10 mg TID    carvedilol  12.5 mg BID WITH MEALS    amLODIPine  10 mg Q DAY    traMADol  50 mg 4X/DAY    diclofenac sodium  4 g TID    simethicone  125 mg 4X/DAY WITH MEALS + NIGHTLY    omeprazole  20 mg DAILY    aspirin  81 mg DAILY    enoxaparin (LOVENOX) injection  40 mg DAILY AT 1800    levothyroxine  25 mcg AM ES    lidocaine  2 Patch Q24HR     PRN medications: senna-docusate **AND** polyethylene glycol/lytes **AND** magnesium hydroxide **AND** bisacodyl, Respiratory Therapy Consult, hydrALAZINE, acetaminophen, mag hydrox-al hydrox-simeth, ondansetron **OR** ondansetron, traZODone, sodium chloride, oxyCODONE immediate-release **OR** oxyCODONE immediate-release, traMADol    Diet:  Current Diet Order   Procedures    Diet Order Diet: Cardiac       Medical Decision Making and Plan:  Cervical Myelopathy - Patient with cervical stenosis as well as  paratracheal mass with left sided weakness and neck/head pain concerning for myelopathy. Patient elected conservative management  -PT and OT for mobility and ADLs. Per guidelines, 15 hours per week between PT, OT and/or SLP.  -Follow-up PCP. Reduced steroids to BID, will reduce to 2 mg BID -> 1 mg BID on 1/8 -> discontinue 1/11     HTN/CHF - Patient on ASA. Patient on Coreg 6.25 mg, Lasix 20 mg (on hold on transfer). Consult hospitalist into 150s. Started on Amlodipine.. Patient started on Hydralazine, continue Hydralazine 10 mg, Coreg 12.5 mg and Amlodipine 10    Abdominal distention - Started on simethicone per Hospitalist. Repeat KUB 1/4 per my read moderate constipation, improved gas.      HLD - Patient not on statin on transfer. Had reaction to atorvastatin in the past.      CKD3a - Avoid nephrotoxic agents. Check AM CMP - Cr pending but had increased > 20%, will consult hospitalist. Stable     Pulmonary nodules - Patient electing no further work-up.      Anemia - Check AM CBC - 11.2, will monitor     Azotemia - Check AM CMP - BUN 40, will monitor     Hypothyroidism - Patient on Levothyroxine 25 mcg     Obesity due to excess calories - BMI of 30.9 on admission, meets medical criteria.      Pain - Patient on APAP/Oxycodone PRN. Add flexeril 10 mg TID     Neurogenic bladder - Having urge incontinence, most likely neurogenic with limited voiding. Will check bladder scans and PVRs. May need to learn CIC     Skin - Patient at risk for skin breakdown due to debility in areas including sacrum, achilles, elbows and head in addition to other sites. Nursing to assess skin daily.      GI Ppx - Patient on Prilosec for GERD prophylaxis. Patient on Senna-docusate for constipation prophylaxis.   -Discontinue senna-docusate     DVT Ppx - Patient Lovenox on transfer. Continue Lovenox as tolerated  ____________________________________    T. René Xiao MD/PhD  [facility] - Physical Medicine & Rehabilitation   [facility] - Brain Injury  Medicine   ____________________________________

## 2024-01-11 NOTE — CARE PLAN
Problem: Eating  Goal: STG-Within one week, patient will feed self at CGA using LRD  Outcome: Met     Problem: Bathing  Goal: STG-Within one week, patient will bathe at [place] using LRD  Outcome: Met     Problem: Dressing  Goal: STG-Within one week, patient will dress UB at CGA  Outcome: Met  Goal: STG-Within one week, patient will dress LB at [place] using LRD  Outcome: Met

## 2024-01-11 NOTE — FLOWSHEET NOTE
01/11/24 0703   Events/Summary/Plan   Events/Summary/Plan 02 pulse ox check   Vital Signs   Pulse 61   Respiration 16   Pulse Oximetry 98 %   $ Pulse Oximetry (Spot Check) Yes   Respiratory Assessment   Level of Consciousness Responds to voice  (sleeping)   Chest Exam   Work Of Breathing / Effort Within Normal Limits   Oxygen   O2 (LPM) 2  (titrated to 1)   O2 Delivery Device Silicone Nasal Cannula

## 2024-01-11 NOTE — CARE PLAN
"The patient is Watcher - Medium risk of patient condition declining or worsening    Shift Goals  Clinical Goals: Safety  Patient Goals: Rest      Problem: Skin Integrity  Goal: Skin integrity is maintained or improved  Outcome: Progressing     Patient has red pannus, but skin otherwise intact.  No signs of pressure injuries or breakdown noted.    Problem: Fall Risk - Rehab  Goal: Patient will remain free from falls  Outcome: Progressing     Marquita Butcher Fall risk Assessment Score: 19    High fall risk Interventions   - Alarming seatbelt  - Wander guard  - Bed and strip alarm   - Yellow sign by the door   - Yellow wrist band \"Fall risk\"  - Room near to the nurse station  - Do not leave patient unattended in the bathroom  - Fall risk education provided   "

## 2024-01-11 NOTE — THERAPY
"Occupational Therapy   Discharge Summary     Patient Name: Regine Bryant  Age:  89 y.o., Sex:  female  Medical Record #: [id number]  Today's Date: 1/11/2024     Precautions  Precautions: Fall Risk  Comments: room air during daytime         Subjective    Pt encountered for OT sitting in WC in room. Pleasant and agreeable to engage in ADLs. \"I want to go home tomorrow.\"    Pt reported 10/10 neck pain at the end of session. RN notified.    Home eval originally scheduled this day, cancelled d/t weather and unsafe driving conditions.      Objective       01/11/24 1001   OT Charge Group   OT Self Care / ADL (Units) 6   OT Total Time Spent   OT Individual Total Time Spent (Mins) 90   Precautions   Precautions Fall Risk   Comments room air during daytime   Functional Level of Assist   Grooming Standby Assist;Standing  (at EOS, FWW for support)   Grooming Description Increased time;Standing at sink   Bathing Contact Guard Assist   Bathing Description Grab bar;Hand held shower;Tub bench;Increased time;Set-up of equipment;Supervision for safety;Verbal cueing   Eating   Assistance Needed Set-up / clean-up   Physical Assistance Level No physical assistance   CARE Score - Eating 5   Oral Hygiene   Assistance Needed Set-up / clean-up   Physical Assistance Level No physical assistance   CARE Score - Oral Hygiene 5   Toileting Hygiene   Assistance Needed Set-up / clean-up   Physical Assistance Level No physical assistance   CARE Score - Toileting Hygiene 5   Shower/Bathe Self   Assistance Needed Set-up / clean-up   Physical Assistance Level No physical assistance   CARE Score - Shower/Bathe Self 5   Upper Body Dressing   Assistance Needed Set-up / clean-up   Physical Assistance Level No physical assistance   CARE Score - Upper Body Dressing 5   Lower Body Dressing   Assistance Needed Set-up / clean-up   Physical Assistance Level No physical assistance   CARE Score - Lower Body Dressing 5   Putting On/Taking Off Footwear " "  Assistance Needed Set-up / clean-up   Physical Assistance Level No physical assistance   CARE Score - Putting On/Taking Off Footwear 5   Toilet Transfer   Assistance Needed Set-up / clean-up   Physical Assistance Level No physical assistance   CARE Score - Toilet Transfer 5   Cognitive Pattern Assessment   Cognitive Pattern Assessment Used BIMS   Brief Interview for Mental Status (BIMS)   Repetition of Three Words (First Attempt) 3   Temporal Orientation: Year Correct   Temporal Orientation: Month Missed by more than 1 month   Temporal Orientation: Day Correct   Recall: \"Sock\" No, could not recall   Recall: \"Blue\" Yes, no cue required   Recall: \"Bed\" No, could not recall   BIMS Summary Score 9   Confusion Assessment Method (CAM)   Is there evidence of an acute change in mental status from the patient's baseline? Yes   Inattention Behavior present, fluctuates (comes and goes, changes in severity)   Disorganized thinking Behavior present, fluctuates (comes and goes, changes in severity)   Altered level of consciousness Behavior not present   Discharge Summary    Discharge Location  Home   Patient Discharging with Assist of Family    Level of Supervision Required 24 Hour Supervision   Recommended Equipment for Discharge Front-Wheeled Walker;Shower Chair;Grab Bars in Tub / Shower;Hand Held Shower Head;Reacher;Long Handled Shoe Horn;Grab Bars by Toilet   Recommended Services Upon Discharge Home Health Occupational Therapy   Long Term Goals Met 2   Long Term Goals Not Met 0   Criteria for Termination of Services Maximum Function Achieved for Inpatient Rehabilitation     Pt performed all ADLs this session at the FWW level. Shower was completed both seated on a shower bench (50%) and standing using shower GB (50%).     Assessment    Overall, pt met 2/2 goals. She is now at SBA for ADLs and functional mobility d/t risk of falls. Pt remains limited by neck pain in her ADLs and will require assistance with ADLs. "     Strengths: Able to follow instructions, Alert and oriented, Good insight into deficits/needs, Independent prior level of function, Motivated for self care and independence, Pleasant and cooperative, Supportive family, Willingly participates in therapeutic activities  Barriers: Bladder incontinence, Decreased endurance, Fatigue, Generalized weakness, Impaired activity tolerance, Impaired balance, Pain, Limited mobility, Visual impairment    Plan    DC home with 24/7 SPV from family. HHOT.     Passport items to be completed:  Perform bathroom transfers, complete dressing, complete feeding, get ready for the day, prepare a simple meal, participate in household tasks, adapt home for safety needs, demonstrate home exercise program, complete caregiver training     Occupational Therapy Goals (Active)       There are no active problems.

## 2024-01-11 NOTE — THERAPY
Physical Therapy   Daily Treatment     Patient Name: Regine Bryant  Age:  89 y.o., Sex:  female  Medical Record #: [id number]  Today's Date: 1/11/2024     Precautions  Precautions: Fall Risk  Comments: room air during daytime    Subjective    Pt resting in wc in seated, reports generalized fatigue and cont to have neck pain. Agreeable to walk      Objective       01/11/24 1301   PT Charge Group   PT Gait Training (Units) 1   PT Therapeutic Activities (Units) 1   Supervising Physical Therapist Soniya Redman   PT Total Time Spent   PT Individual Total Time Spent (Mins) 30   Pain   Intervention Warm Moist Pack;Therapeutic Touch   Pain 0 - 10 Group   Location Neck   Location Orientation Left   Gait Functional Level of Assist    Gait Level Of Assist Standby Assist   Assistive Device Front Wheel Walker   Distance (Feet) 80   # of Times Distance was Traveled 1   Deviation Antalgic;Trendelenberg;Shuffled Gait   Bed Mobility    Sit to Supine Minimal Assist  (due to increased fatigue and pain)   Sit to Stand Supervised   Interdisciplinary Plan of Care Collaboration   IDT Collaboration with  Physical Therapist   Patient Position at End of Therapy In Bed;Call Light within Reach;Bed Alarm On;Tray Table within Reach   Collaboration Comments POC, CLOF     Light STM to L upper trap and SCM x 10 minutes with MHP applied for 5' pre and 5/ post manual therapy    Assessment    Limited this afternoon by fatigue and neck pain, participated as able. Assisted pt BTB, Armando for sit > supine due to fatigue  Strengths: Able to follow instructions, Good insight into deficits/needs, Independent prior level of function, Motivated for self care and independence, Pleasant and cooperative, Supportive family, Willingly participates in therapeutic activities  Barriers: Generalized weakness, Fatigue, Decreased endurance, Home accessibility, Impaired activity tolerance, Impaired balance, Pain    Plan    Progress gait training with FWW, transfer  "training with FWW, pain management strategies, LE strengthening, activity tolerance, bed mobility training, stair/curb negotiation with L handrail. Home evaluation scheduled for Thursday 9:30-12.      DME  PT DME Recommendations  Assistive Device: Front Wheeled Walker (small size; patient is 4'9\")  Additional Equipment:  (LHS, reacher, sock aid)    Passport items to be completed:  Get in/out of bed safely, in/out of a vehicle, safely use mobility device, walk or wheel around home/community, navigate up and down stairs, show how to get up/down from the ground, ensure home is accessible, demonstrate HEP, complete caregiver training    Physical Therapy Problems (Active)       Problem: PT-Long Term Goals       Dates: Start:  01/03/24         Goal: LTG-By discharge, patient will ambulate 150ft LRAD [place]       Dates: Start:  01/03/24            Goal: LTG-By discharge, patient will transfer one surface to another [place]io       Dates: Start:  01/03/24            Goal: LTG-By discharge, patient will perform home exercise program with SPV       Dates: Start:  01/03/24            Goal: LTG-By discharge, patient will ambulate up/down 3 stairs R HR vs curb step LRAD SBA       Dates: Start:  01/03/24            Goal: LTG-By discharge, patient will transfer in/out of a car CGA       Dates: Start:  01/03/24            Goal: LTG-By discharge, patient will perform bed mobility independently       Dates: Start:  01/03/24              "

## 2024-01-11 NOTE — DISCHARGE SUMMARY
Physical Medicine & Rehabilitation Discharge Summary    Admission Date: 1/2/2024    Discharge Date: 1/12/2024    Attending Provider: Sushma Xiao MD/PhD    Admission Diagnosis:   Active Hospital Problems    Diagnosis     *Cervical central stenosis of spinal canal     Leukocytosis     Constipation     Mass in neck     Chronic congestive heart failure (HCC)     Stage 3a chronic kidney disease (HCC)     Dyslipidemia     HTN (hypertension)     Thrombocytopenia (HCC)     Lung mass        Discharge Diagnosis:  Active Hospital Problems    Diagnosis     *Cervical central stenosis of spinal canal     Leukocytosis     Constipation     Mass in neck     Chronic congestive heart failure (HCC)     Stage 3a chronic kidney disease (HCC)     Dyslipidemia     HTN (hypertension)     Thrombocytopenia (HCC)     Lung mass        HPI per Admission History & Physical:  Patient is a 89 y.o. female with a PMH of CHF, CKD, HTN, HLD, and known pulmonary masses who presented on 12/31/23 for left sided weakness, tingling, and lower face droop. CT head was negative, pain noted to be to the ear and posterior to occiput. C spine imaging showed paratracheal mass. 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 C spine MRI showed moderate to severe stenosis at C4/5 and felt to be most likely source of her pain and weakness from C3 down. Patient was managed non-operatively.     Patient was admitted to [facility] on 1/2/2024.     Hospital Course by Problem List:  Cervical Myelopathy - Patient with cervical stenosis as well as paratracheal mass with left sided weakness and neck/head pain concerning for myelopathy. Patient elected conservative management  -PT and OT for mobility and ADLs. Per guidelines, 15 hours per week between PT, OT and/or SLP.  -Follow-up PCP. Reduced steroids to BID, will reduce to 2 mg BID -> 1 mg BID on 1/8 -> discontinue 1/11     HTN/CHF - Patient on ASA.  Patient on Coreg 6.25 mg, Lasix 20 mg (on hold on transfer). Consult hospitalist into 150s. Started on Amlodipine.. Patient started on Hydralazine, continue Hydralazine 10 mg, Coreg 12.5 mg and Amlodipine 10     Abdominal distention - Started on simethicone per Hospitalist. Repeat KUB 1/4 per my read moderate constipation, improved gas.      HLD - Patient not on statin on transfer. Had reaction to atorvastatin in the past.      CKD3a - Avoid nephrotoxic agents. Check AM CMP - Cr pending but had increased > 20%, will consult hospitalist. Stable     Pulmonary nodules - Patient electing no further work-up.      Anemia - Check AM CBC - 11.2, will monitor     Azotemia - Check AM CMP - BUN 40, will monitor     Hypothyroidism - Patient on Levothyroxine 25 mcg     Obesity due to excess calories - BMI of 30.9 on admission, meets medical criteria.      Pain - Patient on APAP/Oxycodone PRN. Add flexeril 10 mg TID. Tramadol scheduled QID  I discussed the risks and benefits of using opiate medications for pain control.  I discussed the risk of addiction, potential for overdose, and respiratory depression (and the potential need for opiate antagonist therapy if this occurs).  I encouraged the patient to take this medication sparingly with the expressed goal of weaning off the medication as soon as is clinically appropriate.  I informed the patient that we are only able to provide up to a 14 day supply of these medications at discharge and that they will be responsible for requesting any refills needed from their primary care provider or their surgeon.  We discussed the need to safely secure these medications to prevent theft, inadvertent ingestion, or misuse.  Any unused medication should be immediately disposed of through a sanctioned medication disposal program.  We discussed adjunctive pain medications and conservative therapies at length.I answered the patient's questions regarding this treatment, and the patient indicated  understanding and willingness to proceed.     Neurogenic bladder - Having urge incontinence, most likely neurogenic with limited voiding. Will check bladder scans and PVRs. May need to learn CIC     Skin - Patient at risk for skin breakdown due to debility in areas including sacrum, achilles, elbows and head in addition to other sites. Nursing to assess skin daily.      GI Ppx - Patient on Prilosec for GERD prophylaxis. Patient on Senna-docusate for constipation prophylaxis.   -Discontinue senna-docusate     DVT Ppx - Patient Lovenox on transfer. Discontinue on discharge    Functional Status at Discharge  Eating:  Minimal Assist (requried assist to drink water from a cup    unable to manage a straw  requried therapist to hold cup)  Eating Description:     Grooming:  Standby Assist, Standing (at EOS, FWW for support)  Grooming Description:  Increased time, Standing at sink  Bathing:  Contact Guard Assist  Bathing Description:  Grab bar, Hand held shower, Tub bench, Increased time, Set-up of equipment, Supervision for safety, Verbal cueing  Upper Body Dressing:  Contact Guard Assist  Upper Body Dressing Description:  Increased time, Set-up of equipment (Extra time needed to pull shirt over head d/t  limited B shoulder AROM)  Lower Body Dressing:  Contact Guard Assist  Lower Body Dressing Description:  Increased time, Set-up of equipment, Verbal cueing (seated at EOB. Pt able to thread legs into pants and don socks using a hip hike approach. SBA for doffing U/LB clothing. ModA for donning briefs. Minor LOB while attempting to pull up pants.)     Walk:  Standby Assist  Distance Walked:   (10 ftx2, 110 ftx1)  Number of Times Distance Was Traveled:  2  Assistive Device:  Front Wheel Walker  Gait Deviation:  Antalgic, Trendelenberg, Shuffled Gait (slow evan)  Wheelchair:     Distance Propelled:      Wheelchair Description:     Stairs Contact Guard Assist  Stairs Description Extra time, Hand rails, Supervision for safety  (step to pattern ascending and descending)       Sushma VILLALBA M.D., personally performed a complete drug regimen review and no potential clinically significant medication issues were identified.   Discharge Medication:     Medication List        START taking these medications        Instructions   amLODIPine 10 MG Tabs  Commonly known as: Norvasc   Take 1 Tablet by mouth every day.  Dose: 10 mg     cyclobenzaprine 10 mg Tabs  Commonly known as: Flexeril   Take 1 Tablet by mouth in the morning, at noon, and at bedtime for 14 days.  Dose: 10 mg     hydrALAZINE 10 MG Tabs  Commonly known as: Apresoline   Take 1 Tablet by mouth every 8 hours.  Dose: 10 mg     traMADol 50 MG Tabs  Commonly known as: Ultram   Take 1 Tablet by mouth 4 times a day for 14 days.  Dose: 50 mg            CHANGE how you take these medications        Instructions   carvedilol 12.5 MG Tabs  What changed:   medication strength  how much to take  Commonly known as: Coreg   Doctor's comments: DX Code Needed  PATIENT OUT OF REFILLS & MEDICINE, PLEASE SEND NEW RX.  Take 1 Tablet by mouth 2 times a day with meals. Indications: High Blood Pressure Disorder  Dose: 12.5 mg            CONTINUE taking these medications        Instructions   acetaminophen 500 MG Tabs  Commonly known as: Tylenol   Take 1,000 mg by mouth every 8 hours. 500 mg x 2 tablets = 1000 mg  Indications: Fever, Pain  Dose: 1,000 mg     aspirin 81 MG Chew chewable tablet  Commonly known as: Asa   Chew 1 Tablet every day. Indications: blood thinner  Dose: 81 mg     Centrum Silver Tabs   Take 1 Tablet by mouth every day.  Dose: 1 Tablet     levothyroxine 25 MCG Tabs  Commonly known as: Synthroid   Take 1 Tablet by mouth every morning on an empty stomach.  Dose: 25 mcg     vitamin B-12 1000 MCG Tabs   Take 1,000 mcg by mouth every day. Indications: Inadequate Vitamin B12  Dose: 1,000 mcg     Vitamin C 1000 MG Tabs   Take 1,000 mg by mouth every day. Indications: Inadequate  Vitamin C  Dose: 1,000 mg            STOP taking these medications      enoxaparin 40 MG/0.4ML Sosy inj  Commonly known as: Lovenox     lidocaine 4 % Ptch  Commonly known as: Asperflex     ondansetron 4 MG Tbdp  Commonly known as: Zofran ODT     oxyCODONE immediate-release 5 MG Tabs  Commonly known as: Roxicodone     senna-docusate 8.6-50 MG Tabs  Commonly known as: Pericolace Or Senokot S              Discharge Diet:  Current Diet Order   Procedures    Diet Order Diet: Cardiac       Discharge Activity:  As tolerated     Disposition:  Patient to discharge home with family support and community resources.    Equipment:  FWW    Follow-up & Discharge Instructions:  Follow up with your primary care provider (PCP) within 7-10 days of discharge to review your medications and take over your care.     If you develop chest pain, fever, chills, change in neurologic function (weakness, sensation changes, vision changes), or other concerning sxs, seek immediate medical attention or call 911.      Future Appointments   Date Time Provider Department Center   1/11/2024  1:00 PM Priscila Kasper PTA [facility] None   1/17/2024  1:00 PM MANAV Lu [facility] Karey Toro   1/18/2024  2:15 PM MANAV Giron [facility] None   1/25/2024  3:15 PM SULEMAN MCCABE [facility]   2/28/2024  1:40 PM MANAV Lu [facility] Karey Toro       Condition on Discharge:  Good    More than 31 minutes was spent on discharging this patient, including face-to-face time, prescription management, and the dictation of this note.    Sushma Xiao M.D.    Date of Service: 1/12/2024

## 2024-01-11 NOTE — PROGRESS NOTES
NURSING DAILY NOTE    Name: Regine Bryant   Date of Admission: 1/2/2024   Admitting Diagnosis: Central stenosis of spinal canal  Attending Physician: Sushma Xiao M.d.  Allergies: Atorvastatin and Lactose intolerance (gi) [lactose]    Safety  Patient Assist  Min assist  Patient Precautions  Fall Risk  Precaution Comments  room air during daytime  Bed Transfer Status  Standby Assist  Toilet Transfer Status   Standby Assist  Assistive Devices  Gait Belt, Rails, Wheelchair  Oxygen  None - Room Air  Diet/Therapeutic Dining  Current Diet Order   Procedures    Diet Order Diet: Cardiac     Pill Administration  whole  Agitated Behavioral Scale     ABS Level of Severity       Fall Risk  Has the patient had a fall this admission?   No  Marquita Butcher Fall Risk Scoring  19, HIGH RISK  Fall Risk Safety Measures  bed alarm and chair alarm    Vitals  Temperature: 36.3 °C (97.3 °F)  Temp src: Oral  Pulse: 76  Respiration: 18  Blood Pressure : 130/70  Blood Pressure MAP (Calculated): 90 MM HG  BP Location: Right, Upper Arm  Patient BP Position: Supine     Oxygen  Pulse Oximetry: 94 %  O2 (LPM): 2  O2 Delivery Device: None - Room Air    Bowel and Bladder  Last Bowel Movement  01/07/24  Stool Type  Type 5: Soft blob with clear cut edges (passed easily)  Bowel Device  Diaper, Bathroom  Continent  Bladder: Stress incontinence   Bowel:    Bladder Function  Urine Void (mL):  (large)  Number of Times Voided: 1  Urine Color: Yellow  Number of Times Incontinent of Urine: 1  Genitourinary Assessment   Bladder Assessment (WDL):  WDL Except  Santo Catheter: Not Applicable  Urinary Elimination: Incontinence  Urine Color: Yellow  Number of Times Incontinent of Urine: 1  Bladder Device: Bathroom, Diaper  Time Void: Yes  Bladder Scan: Post Void  $ Bladder Scan Results (mL): 135    Skin  Mayco Score   15  Sensory Interventions   Bed Types: Standard/Trauma Mattress  Skin  Preventative Measures: Pillows in Use for Support / Positioning  Moisture Interventions  Moisturizers/Barriers: Barrier Paste      Pain  Pain Rating Scale  6 - Hard to ignore, avoid usual activities  Pain Location  Neck  Pain Location Orientation  Left  Pain Interventions   Medication (see MAR)    ADLs    Bathing   Shower, * * With Assistance from, Staff  Linen Change      Personal Hygiene  Change Crystal Pads, Moist Crystal Wipes, Perineal Care  Chlorhexidine Bath      Oral Care     Teeth/Dentures     Shave     Nutrition Percentage Eaten  Between 50-75% Consumed  Environmental Precautions  Bed in Low Position, Treaded Slipper Socks on Patient  Patient Turns/Positioning  Patient Turns Self from Side to Side  Patient Turns Assistance/Tolerance     Bed Positions  Bed Controls On, Bed Locked  Head of Bed Elevated  Self regulated      Psychosocial/Neurologic Assessment  Psychosocial Assessment  Psychosocial (WDL):  WDL Except  Patient Behaviors: Fatigue  Family Behaviors: No Family Present  Neurologic Assessment  Neuro (WDL): Within Defined Limits  Level of Consciousness: Alert  Orientation Level: Oriented X4  Cognition: Follows commands  Speech: Clear  Pupil Assesment: No  Motor Function/Sensation Assessment: Motor strength  Muscle Strength Right Arm: Good Strength Against Gravity and Moderate Resistance  Muscle Strength Left Arm: Good Strength Against Gravity and Moderate Resistance  Muscle Strength Right Leg: Good Strength Against Gravity and Moderate Resistance  Muscle Strength Left Leg: Good Strength Against Gravity and Moderate Resistance  EENT (WDL):  WDL Except    Cardio/Pulmonary Assessment  Edema      Respiratory Breath Sounds  RUL Breath Sounds: Clear  RML Breath Sounds: Clear  RLL Breath Sounds: Diminished  SERVANDO Breath Sounds: Clear  LLL Breath Sounds: Diminished  Cardiac Assessment   Cardiac (WDL):  WDL Except

## 2024-01-11 NOTE — CARE PLAN
Problem: Mobility  Goal: STG-Within one week, patient will ascend and descend three stairs with R HR and CGA  Outcome: Met     Problem: Mobility Transfers  Goal: STG-Within one week, patient will perform bed mobility consistently with Armando  Outcome: Met  Goal: STG-Within one week, patient will transfer bed to chair SBA  Outcome: Met

## 2024-01-11 NOTE — CARE PLAN
Problem: Mobility  Goal: STG-Within one week, patient will ascend and descend three stairs with R HR and CGA  Outcome: Met     Problem: Mobility Transfers  Goal: STG-Within one week, patient will perform bed mobility consistently with Armando  Outcome: Met  Goal: STG-Within one week, patient will transfer bed to chair SBA  Outcome: Met     Problem: PT-Long Term Goals  Goal: LTG-By discharge, patient will perform home exercise program with SPV  Outcome: Met  Goal: LTG-By discharge, patient will transfer in/out of a car CGA  Outcome: Met  Goal: LTG-By discharge, patient will perform bed mobility independently  Outcome: Met     Problem: PT-Long Term Goals  Goal: LTG-By discharge, patient will ambulate 150ft LRAD [place]  Outcome: Discharged - Not Met  Goal: LTG-By discharge, patient will transfer one surface to another [place]  Outcome: Discharged - Not Met  Goal: LTG-By discharge, patient will ambulate up/down 3 stairs R HR vs curb step LRAD SBA  Outcome: Discharged - Not Met

## 2024-01-11 NOTE — FLOWSHEET NOTE
01/10/24 1010   Events/Summary/Plan   Events/Summary/Plan SpO2 check   Vital Signs   Pulse 70   Respiration 18   Pulse Oximetry 95 %   $ Pulse Oximetry (Spot Check) Yes   Oxygen   O2 Delivery Device Room air w/o2 available

## 2024-01-11 NOTE — DISCHARGE PLANNING
Cm  Home evaluation cancelled due to weather/snow     Addendum 1/11 0879  Tc to pt's dtr Yoni who is in agreement w/ dc tomorrow without completing home evaluation; family training completed; referral sent to [facility]. Pt has all dme.     Reviewed signed copy of IMM and answered all questions.    Dc date /disposition: Dc home on 1/12 w/ home health; dtrs to rotate staying w/ pt @ her home;

## 2024-01-11 NOTE — CARE PLAN
"  Problem: Knowledge Deficit - Standard  Goal: Patient and family/care givers will demonstrate understanding of plan of care, disease process/condition, diagnostic tests and medications  Outcome: Progressing   Patient verbalized understanding plan of care.       Problem: Fall Risk - Rehab  Goal: Patient will remain free from falls  Outcome: Progressing   Marquita Buthcer Fall risk Assessment Score: 19    High fall risk Interventions   - Alarming seatbelt  - Wander guard  - Bed and strip alarm   - Yellow sign by the door   - Yellow wrist band \"Fall risk\"  - Room near to the nurse station  - Do not leave patient unattended in the bathroom  - Fall risk education provided           "

## 2024-01-11 NOTE — PROGRESS NOTES
Hospital Medicine Daily Progress Note      Chief Complaint  Hypertension  CKD/GERARD    Interval Problem Update  Had 1 small BM today.  Discussed about increasing her Miralax to bid.    Review of Systems  Review of Systems   Constitutional:  Negative for fever.   Eyes:  Negative for blurred vision.   Respiratory:  Negative for cough.    Cardiovascular:  Negative for chest pain.   Gastrointestinal:  Negative for diarrhea.   Musculoskeletal:  Negative for joint pain.   Neurological:  Negative for dizziness.   Psychiatric/Behavioral:  The patient is not nervous/anxious.         Physical Exam  Temp:  [36.3 °C (97.3 °F)-36.6 °C (97.9 °F)] 36.6 °C (97.9 °F)  Pulse:  [61-80] 76  Resp:  [16-18] 16  BP: (118-152)/(54-96) 118/96  SpO2:  [94 %-98 %] 98 %    Physical Exam  Vitals and nursing note reviewed.   Constitutional:       Appearance: She is not diaphoretic.   HENT:      Mouth/Throat:      Pharynx: No oropharyngeal exudate or posterior oropharyngeal erythema.   Eyes:      Extraocular Movements: Extraocular movements intact.   Neck:      Vascular: No carotid bruit or JVD.   Cardiovascular:      Rate and Rhythm: Normal rate and regular rhythm.      Heart sounds: Normal heart sounds. No murmur heard.  Pulmonary:      Effort: Pulmonary effort is normal.      Breath sounds: Normal breath sounds. No stridor.   Abdominal:      General: Bowel sounds are normal.      Palpations: Abdomen is soft.   Musculoskeletal:      Right lower leg: No edema.      Left lower leg: No edema.   Skin:     General: Skin is warm and dry.      Findings: No rash.   Neurological:      Mental Status: She is alert and oriented to person, place, and time.   Psychiatric:         Mood and Affect: Mood normal.         Behavior: Behavior normal.         Fluids    Intake/Output Summary (Last 24 hours) at 1/11/2024 0917  Last data filed at 1/11/2024 0854  Gross per 24 hour   Intake 580 ml   Output --   Net 580 ml         Laboratory        Recent Labs      01/09/24  0530   SODIUM 135   POTASSIUM 4.8   CHLORIDE 101   CO2 24   GLUCOSE 116*   BUN 41*   CREATININE 0.87   CALCIUM 10.0                     Assessment/Plan  * Cervical central stenosis of spinal canal- (present on admission)  Assessment & Plan  Non-surgical management  On Decadron    Constipation  Assessment & Plan  Has diminished BMs  Has some gas also  KUB 1/3/24 moderate gaseous distention of the stomach, nonspecific bowel gas pattern with a moderate colonic stool burden  KUB 1/4/24 unremarkable but did not comment on stomach  KUB 1/5/24 improved gastric distension  Cont Simethicone  Cont Miralax daily --> will increase to bid    Stage 3a chronic kidney disease (HCC)- (present on admission)  Assessment & Plan  Bun: stable at 41  Cr: wnl  Around baseline - but baseline wanders  BNP: 756 --> 327 (1/9)  S/P recent IVF's (for GERARD)  Cont to monitor    Lung mass- (present on admission)  Assessment & Plan  Reportedly, family desires no further work up    Hypothyroid  Assessment & Plan  Euthyroid on Synthroid    HTN (hypertension)- (present on admission)  Assessment & Plan  BP trending better recently  Cont Coreg  Cont Norvasc  Cont Hydralazine (1/10)  Cont to monitor

## 2024-01-11 NOTE — THERAPY
"Physical Therapy   Daily Treatment     Patient Name: Regine Bryant  Age:  89 y.o., Sex:  female  Medical Record #: [id number]  Today's Date: 1/11/2024     Precautions  Precautions: (P) Fall Risk  Comments: (P) room air during daytime    Subjective    Patient asleep in bed, agreeable to therapy.     Objective       01/11/24 0701   PT Charge Group   PT Gait Training (Units) 1   PT Therapeutic Exercise (Units) 1   PT Therapeutic Activities (Units) 2   PT Total Time Spent   PT Individual Total Time Spent (Mins) 60   Precautions   Precautions Fall Risk   Comments room air during daytime   Pain 0 - 10 Group   Location Neck   Location Orientation Left   Gait Functional Level of Assist    Gait Level Of Assist Standby Assist   Assistive Device Front Wheel Walker   Distance (Feet)   (10 ftx2, 110 ftx1)   Deviation Antalgic;Trendelenberg;Shuffled Gait  (slow evan)   Transfer Functional Level of Assist   Bed, Chair, Wheelchair Transfer Standby Assist   Bed Chair Wheelchair Transfer Description Adaptive equipment;Increased time;Initial preparation for task;Supervision for safety  (stand step transfer with FWW)   Bed Mobility    Supine to Sit Supervised   Sit to Stand Supervised   Interdisciplinary Plan of Care Collaboration   Patient Position at End of Therapy Seated;Self Releasing Lap Belt Applied  (in dining room)   PT DME Recommendations   Assistive Device Front Wheeled Walker  (small size; patient is 4'9\")     UB dressing donning sweater EOB with supervision; donning of socks and shoes EOB with SBA and extra time using crossed leg technique.    BLE Motomed level 0 for 17 minutes, 1.64 miles.    Assessment    Patient tolerated session well, activities modified to ensure adequate energy/strength for home evaluation later this morning. Patient reporting no significant difference with mobility with shoes donned. **Addendum: home evaluation cancelled due to weather; patient reports she still feels prepared to discharge " home tomorrow.    Strengths: Able to follow instructions, Good insight into deficits/needs, Independent prior level of function, Motivated for self care and independence, Pleasant and cooperative, Supportive family, Willingly participates in therapeutic activities  Barriers: Generalized weakness, Fatigue, Decreased endurance, Home accessibility, Impaired activity tolerance, Impaired balance, Pain    Plan    Progress gait training with FWW, transfer training with FWW, pain management strategies, LE strengthening, activity tolerance, bed mobility training, stair/curb negotiation with L handrail. Home evaluation scheduled for Thursday 9:30-12.      DME  PT DME Recommendations  Assistive Device: Front Wheeled Walker  Additional Equipment:  (LHS, reacher, sock aid)     Passport items to be completed:  Get in/out of bed safely, in/out of a vehicle, safely use mobility device, walk or wheel around home/community, navigate up and down stairs, show how to get up/down from the ground, ensure home is accessible, demonstrate HEP, complete caregiver training       Physical Therapy Problems (Active)       Problem: Mobility       Dates: Start:  01/03/24         Goal: STG-Within one week, patient will ascend and descend three stairs with R HR and CGA       Dates: Start:  01/03/24               Problem: Mobility Transfers       Dates: Start:  01/03/24         Goal: STG-Within one week, patient will perform bed mobility consistently with Armando       Dates: Start:  01/03/24            Goal: STG-Within one week, patient will transfer bed to chair SBA       Dates: Start:  01/03/24               Problem: PT-Long Term Goals       Dates: Start:  01/03/24         Goal: LTG-By discharge, patient will ambulate 150ft LRAD [place]       Dates: Start:  01/03/24            Goal: LTG-By discharge, patient will transfer one surface to another [place]       Dates: Start:  01/03/24            Goal: LTG-By discharge, patient will perform home exercise  program with SPV       Dates: Start:  01/03/24            Goal: LTG-By discharge, patient will ambulate up/down 3 stairs R HR vs curb step LRAD SBA       Dates: Start:  01/03/24            Goal: LTG-By discharge, patient will transfer in/out of a car CGA       Dates: Start:  01/03/24            Goal: LTG-By discharge, patient will perform bed mobility independently       Dates: Start:  01/03/24

## 2024-01-11 NOTE — CARE PLAN
Problem: OT Long Term Goals  Goal: LTG-By discharge, patient will complete basic self care tasks at SBA to [place]  Outcome: Met     Problem: OT Long Term Goals  Goal: LTG-By discharge, patient will perform bathroom transfers at [place]  Outcome: Met

## 2024-01-11 NOTE — CARE PLAN
"The patient is Watcher - Medium risk of patient condition declining or worsening    Shift Goals  Clinical Goals: Safety  Patient Goals: Pain management      Problem: Fall Risk - Rehab  Goal: Patient will remain free from falls  Outcome: Progressing     Marquita Butcher Fall risk Assessment Score: 19    High fall risk Interventions   - Alarming seatbelt  - Wander guard  - Bed and strip alarm   - Yellow sign by the door   - Yellow wrist band \"Fall risk\"  - Room near to the nurse station  - Do not leave patient unattended in the bathroom  - Fall risk education provided     Problem: Psychosocial  Goal: Patient's level of anxiety will decrease  Outcome: Progressing    Patient is calm with no s/s of anxiety or distress.     "

## 2024-01-11 NOTE — PROGRESS NOTES
NURSING DAILY NOTE    Name: Regine Bryant   Date of Admission: 1/2/2024   Admitting Diagnosis: Central stenosis of spinal canal  Attending Physician: Sushma Xiao M.d.  Allergies: Atorvastatin and Lactose intolerance (gi) [lactose]    Safety  Patient Assist  min assist  Patient Precautions  Fall Risk  Precaution Comments  room air during daytime  Bed Transfer Status  Standby Assist  Toilet Transfer Status   Standby Assist  Assistive Devices  Wheelchair  Oxygen  None - Room Air  Diet/Therapeutic Dining  Current Diet Order   Procedures    Diet Order Diet: Cardiac     Pill Administration  whole  Agitated Behavioral Scale     ABS Level of Severity       Fall Risk  Has the patient had a fall this admission?   No  Marquita Butcher Fall Risk Scoring  19, HIGH RISK  Fall Risk Safety Measures  bed alarm and chair alarm    Vitals  Temperature: 36.3 °C (97.3 °F)  Temp src: Oral  Pulse: 80  Respiration: 18  Blood Pressure : (!) 144/66  Blood Pressure MAP (Calculated): 92 MM HG  BP Location: Right, Upper Arm  Patient BP Position: Supine     Oxygen  Pulse Oximetry: 94 %  O2 (LPM): 0  O2 Delivery Device: None - Room Air    Bowel and Bladder  Last Bowel Movement  01/07/24  Stool Type  Type 5: Soft blob with clear cut edges (passed easily)  Bowel Device  Diaper, Bathroom  Continent  Bladder: Stress incontinence   Bowel:    Bladder Function  Urine Void (mL):  (moderate)  Number of Times Voided: 1  Urine Color: Yellow  Number of Times Incontinent of Urine: 1  Genitourinary Assessment   Bladder Assessment (WDL):  WDL Except  Santo Catheter: Not Applicable  Urinary Elimination: Incontinence  Urine Color: Yellow  Number of Times Incontinent of Urine: 1  Bladder Device: Bathroom, Diaper  Time Void: Yes  Bladder Scan: Post Void  $ Bladder Scan Results (mL): 135    Skin  Mayco Score   15  Sensory Interventions   Bed Types: Standard/Trauma Mattress  Skin Preventative  Measures: Pillows in Use for Support / Positioning  Moisture Interventions  Moisturizers/Barriers: Barrier Paste      Pain  Pain Rating Scale  3 - Sometimes distracts me  Pain Location  Neck  Pain Location Orientation  Left  Pain Interventions   Relaxation Technique, Other (Comments), Declines    ADLs    Bathing   Shower, * * With Assistance from, Staff  Linen Change      Personal Hygiene  Change Crystal Pads, Moist Crystal Wipes, Perineal Care  Chlorhexidine Bath      Oral Care     Teeth/Dentures     Shave     Nutrition Percentage Eaten  Between 50-75% Consumed  Environmental Precautions  Bed in Low Position, Treaded Slipper Socks on Patient  Patient Turns/Positioning  Patient Turns Self from Side to Side  Patient Turns Assistance/Tolerance     Bed Positions  Bed Controls On  Head of Bed Elevated  Self regulated      Psychosocial/Neurologic Assessment  Psychosocial Assessment  Psychosocial (WDL):  WDL Except  Patient Behaviors: Fatigue  Family Behaviors: No Family Present  Neurologic Assessment  Neuro (WDL): Within Defined Limits  Level of Consciousness: Alert  Orientation Level: Oriented X4  Cognition: Follows commands  Speech: Clear  Pupil Assesment: No  Motor Function/Sensation Assessment: Motor strength  Muscle Strength Right Arm: Good Strength Against Gravity and Moderate Resistance  Muscle Strength Left Arm: Good Strength Against Gravity and Moderate Resistance  Muscle Strength Right Leg: Good Strength Against Gravity and Moderate Resistance  Muscle Strength Left Leg: Good Strength Against Gravity and Moderate Resistance  EENT (WDL):  WDL Except    Cardio/Pulmonary Assessment  Edema      Respiratory Breath Sounds  RUL Breath Sounds: Clear  RML Breath Sounds: Clear  RLL Breath Sounds: Diminished  SERVANDO Breath Sounds: Clear  LLL Breath Sounds: Diminished  Cardiac Assessment   Cardiac (WDL):  WDL Except

## 2024-01-11 NOTE — DISCHARGE INSTRUCTIONS
[facility] NURSING DISCHARGE INSTRUCTIONS    Blood Pressure : 137/52 (notified RN Angelica)  Weight: 62.1 kg (137 lb)  Nursing recommendations for Regine Bryant at time of discharge are as follows:  Client verbalized understanding of all discharge instructions and prescriptions.     Review all your home medications and newly ordered medications with your doctor and/or pharmacist. Follow medication instructions as directed by your doctor and/or pharmacist.    Pain Management:   Discharge Pain Medication Instructions:  Comfort Goal: 1  Notify your primary care provider if pain is unrelieved with these measures, if the pain is new, or increased in intensity.    Discharge Skin Characteristics:  (fragil skin)  Discharge Skin Exam: Bruise (Indicate Location In Comment) (right hand)  Moisture Associated Skin Damage 01/02/24 Lateral Panus (Active)   Wound Image   01/02/24 1730   NEXT Weekly Photo (Inpatient Only) 01/15/24 01/12/24 0300   Drainage Amount None 01/12/24 0300   Periwound Assessment Clean;Dry;Intact 01/12/24 0300   Periwound Protectant Barrier Paste 01/12/24 0300   IAD Containment Device None 01/11/24 0900     Skin / Wound Care Instructions: Please contact your primary care physician for any change in skin integrity.     If You Have Surgical Incisions / Wounds:  Monitor surgical site(s) for signs of increased swelling, redness or symptoms of drainage from the site or fever as this could indicate signs and symptoms of infection. If these symptoms are noted, notifiy your primary care provider.      Discharge Safety Instructions: Should Have ADULT SUPERVISION     Discharge Safety Concerns: History Of Falls, Impaired Judgement, Weakness, Unsteady Gait  The interdisciplinary team has made recommendation that you should have adult supervision in the house due to weakness  Anti-embolic stockings are required during the day and off at night to increase circulation to the lower  extremities.    Discharge Diet: cardiac     Discharge Liquids: thin  Discharge Bowel Function: Incontinent  Please contact your primary care physician for any changes in bowel habits.  Discharge Bowel Program:    Discharge Bladder Function: Incontinent  Discharge Urinary Devices: Brief      Nursing Discharge Plan:   Influenza Vaccine Indication: Not indicated: Previously immunized this influenza season and > 8 years of age    Case Management Discharge Instructions:   Discharge Location:    Agency Name/Address/Phone:    Home Health:    Outpatient Services:    DME Provider/Phone:    Medical Equipment Ordered:    Prescription Faxed to:        Discharge Medication Instructions:  Below are the medications your physician expects you to take upon discharge:                                                                     NURSING DISCHARGE EDUCATION         Hypertension, Adult  Hypertension is another name for high blood pressure. High blood pressure forces your heart to work harder to pump blood. This can cause problems over time.  There are two numbers in a blood pressure reading. There is a top number (systolic) over a bottom number (diastolic). It is best to have a blood pressure that is below 120/80.  What are the causes?  The cause of this condition is not known. Some other conditions can lead to high blood pressure.  What increases the risk?  Some lifestyle factors can make you more likely to develop high blood pressure:  Smoking.  Not getting enough exercise or physical activity.  Being overweight.  Having too much fat, sugar, calories, or salt (sodium) in your diet.  Drinking too much alcohol.  Other risk factors include:  Having any of these conditions:  Heart disease.  Diabetes.  High cholesterol.  Kidney disease.  Obstructive sleep apnea.  Having a family history of high blood pressure and high cholesterol.  Age. The risk increases with age.  Stress.  What are the signs or symptoms?  High blood pressure may  not cause symptoms. Very high blood pressure (hypertensive crisis) may cause:  Headache.  Fast or uneven heartbeats (palpitations).  Shortness of breath.  Nosebleed.  Vomiting or feeling like you may vomit (nauseous).  Changes in how you see.  Very bad chest pain.  Feeling dizzy.  Seizures.  How is this treated?  This condition is treated by making healthy lifestyle changes, such as:  Eating healthy foods.  Exercising more.  Drinking less alcohol.  Your doctor may prescribe medicine if lifestyle changes do not help enough and if:  Your top number is above 130.  Your bottom number is above 80.  Your personal target blood pressure may vary.  Follow these instructions at home:  Eating and drinking    If told, follow the DASH eating plan. To follow this plan:  Fill one half of your plate at each meal with fruits and vegetables.  Fill one fourth of your plate at each meal with whole grains. Whole grains include whole-wheat pasta, brown rice, and whole-grain bread.  Eat or drink low-fat dairy products, such as skim milk or low-fat yogurt.  Fill one fourth of your plate at each meal with low-fat (lean) proteins. Low-fat proteins include fish, chicken without skin, eggs, beans, and tofu.  Avoid fatty meat, cured and processed meat, or chicken with skin.  Avoid pre-made or processed food.  Limit the amount of salt in your diet to less than 1,500 mg each day.  Do not drink alcohol if:  Your doctor tells you not to drink.  You are pregnant, may be pregnant, or are planning to become pregnant.  If you drink alcohol:  Limit how much you have to:  0-1 drink a day for women.  0-2 drinks a day for men.  Know how much alcohol is in your drink. In the [place] one drink equals one 12 oz bottle of beer (355 mL), one 5 oz glass of wine (148 mL), or one 1½ oz glass of hard liquor (44 mL).  Lifestyle    Work with your doctor to stay at a healthy weight or to lose weight. Ask your doctor what the best weight is for you.  Get at least 30  minutes of exercise that causes your heart to beat faster (aerobic exercise) most days of the week. This may include walking, swimming, or biking.  Get at least 30 minutes of exercise that strengthens your muscles (resistance exercise) at least 3 days a week. This may include lifting weights or doing Pilates.  Do not smoke or use any products that contain nicotine or tobacco. If you need help quitting, ask your doctor.  Check your blood pressure at home as told by your doctor.  Keep all follow-up visits.  Medicines  Take over-the-counter and prescription medicines only as told by your doctor. Follow directions carefully.  Do not skip doses of blood pressure medicine. The medicine does not work as well if you skip doses. Skipping doses also puts you at risk for problems.  Ask your doctor about side effects or reactions to medicines that you should watch for.  Contact a doctor if:  You think you are having a reaction to the medicine you are taking.  You have headaches that keep coming back.  You feel dizzy.  You have swelling in your ankles.  You have trouble with your vision.  Get help right away if:  You get a very bad headache.  You start to feel mixed up (confused).  You feel weak or numb.  You feel faint.  You have very bad pain in your:  Chest.  Belly (abdomen).  You vomit more than once.  You have trouble breathing.  These symptoms may be an emergency. Get help right away. Call 911.  Do not wait to see if the symptoms will go away.  Do not drive yourself to the hospital.  Summary  Hypertension is another name for high blood pressure.  High blood pressure forces your heart to work harder to pump blood.  For most people, a normal blood pressure is less than 120/80.  Making healthy choices can help lower blood pressure. If your blood pressure does not get lower with healthy choices, you may need to take medicine.  This information is not intended to replace advice given to you by your health care provider. Make  "sure you discuss any questions you have with your health care provider.  Document Revised: 10/06/2022 Document Reviewed: 10/06/2022  Elsevier Patient Education © 2023 [employer]  High Cholesterol    High cholesterol is a condition in which the blood has high levels of a white, waxy substance similar to fat (cholesterol). The liver makes all the cholesterol that the body needs. The human body needs small amounts of cholesterol to help build cells. A person gets extra or excess cholesterol from the food that he or she eats.  The blood carries cholesterol from the liver to the rest of the body. If you have high cholesterol, deposits (plaques) may build up on the walls of your arteries. Arteries are the blood vessels that carry blood away from your heart. These plaques make the arteries narrow and stiff.  Cholesterol plaques increase your risk for heart attack and stroke. Work with your health care provider to keep your cholesterol levels in a healthy range.  What increases the risk?  The following factors may make you more likely to develop this condition:  Eating foods that are high in animal fat (saturated fat) or cholesterol.  Being overweight.  Not getting enough exercise.  A family history of high cholesterol (familial hypercholesterolemia).  Use of tobacco products.  Having diabetes.  What are the signs or symptoms?  In most cases, high cholesterol does not usually cause any symptoms.  In severe cases, very high cholesterol levels can cause:  Fatty bumps under the skin (xanthomas).  A white or gray ring around the black center (pupil) of the eye.  How is this diagnosed?  This condition may be diagnosed based on the results of a blood test.  If you are older than 20 years of age, your health care provider may check your cholesterol levels every 4-6 years.  You may be checked more often if you have high cholesterol or other risk factors for heart disease.  The blood test for cholesterol measures:  \"Bad\" " "cholesterol, or LDL cholesterol. This is the main type of cholesterol that causes heart disease. The desired level is less than 100 mg/dL (2.59 mmol/L).  \"Good\" cholesterol, or HDL cholesterol. HDL helps protect against heart disease by cleaning the arteries and carrying the LDL to the liver for processing. The desired level for HDL is 60 mg/dL (1.55 mmol/L) or higher.  Triglycerides. These are fats that your body can store or burn for energy. The desired level is less than 150 mg/dL (1.69 mmol/L).  Total cholesterol. This measures the total amount of cholesterol in your blood and includes LDL, HDL, and triglycerides. The desired level is less than 200 mg/dL (5.17 mmol/L).  How is this treated?  Treatment for high cholesterol starts with lifestyle changes, such as diet and exercise.  Diet changes. You may be asked to eat foods that have more fiber and less saturated fats or added sugar.  Lifestyle changes. These may include regular exercise, maintaining a healthy weight, and quitting use of tobacco products.  Medicines. These are given when diet and lifestyle changes have not worked. You may be prescribed a statin medicine to help lower your cholesterol levels.  Follow these instructions at home:  Eating and drinking    Eat a healthy, balanced diet. This diet includes:  Daily servings of a variety of fresh, frozen, or canned fruits and vegetables.  Daily servings of whole grain foods that are rich in fiber.  Foods that are low in saturated fats and trans fats. These include poultry and fish without skin, lean cuts of meat, and low-fat dairy products.  A variety of fish, especially oily fish that contain omega-3 fatty acids. Aim to eat fish at least 2 times a week.  Avoid foods and drinks that have added sugar.  Use healthy cooking methods, such as roasting, grilling, broiling, baking, poaching, steaming, and stir-frying. Do not akers your food except for stir-frying.  If you drink alcohol:  Limit how much you have " "to:  0-1 drink a day for women who are not pregnant.  0-2 drinks a day for men.  Know how much alcohol is in a drink. In the [place] one drink equals one 12 oz bottle of beer (355 mL), one 5 oz glass of wine (148 mL), or one 1½ oz glass of hard liquor (44 mL).  Lifestyle    Get regular exercise. Aim to exercise for a total of 150 minutes a week. Increase your activity level by doing activities such as gardening, walking, and taking the stairs.  Do not use any products that contain nicotine or tobacco. These products include cigarettes, chewing tobacco, and vaping devices, such as e-cigarettes. If you need help quitting, ask your health care provider.  General instructions  Take over-the-counter and prescription medicines only as told by your health care provider.  Keep all follow-up visits. This is important.  Where to find more information  American Heart Association: www.heart.org  National Heart, Lung, and Blood [place]: www.nhlbi.nih.gov  Contact a health care provider if:  You have trouble achieving or maintaining a healthy diet or weight.  You are starting an exercise program.  You are unable to stop smoking.  Get help right away if:  You have chest pain.  You have trouble breathing.  You have discomfort or pain in your jaw, neck, back, shoulder, or arm.  You have any symptoms of a stroke. \"BE FAST\" is an easy way to remember the main warning signs of a stroke:  B - Balance. Signs are dizziness, sudden trouble walking, or loss of balance.  E - Eyes. Signs are trouble seeing or a sudden change in vision.  F - Face. Signs are sudden weakness or numbness of the face, or the face or eyelid drooping on one side.  A - Arms. Signs are weakness or numbness in an arm. This happens suddenly and usually on one side of the body.  S - Speech. Signs are sudden trouble speaking, slurred speech, or trouble understanding what people say.  T - Time. Time to call emergency services. Write down what time symptoms started.  You " have other signs of a stroke, such as:  A sudden, severe headache with no known cause.  Nausea or vomiting.  Seizure.  These symptoms may represent a serious problem that is an emergency. Do not wait to see if the symptoms will go away. Get medical help right away. Call your local emergency services (911 in the [place] Do not drive yourself to the hospital.  Summary  Cholesterol plaques increase your risk for heart attack and stroke. Work with your health care provider to keep your cholesterol levels in a healthy range.  Eat a healthy, balanced diet, get regular exercise, and maintain a healthy weight.  Do not use any products that contain nicotine or tobacco. These products include cigarettes, chewing tobacco, and vaping devices, such as e-cigarettes.  Get help right away if you have any symptoms of a stroke.  This information is not intended to replace advice given to you by your health care provider. Make sure you discuss any questions you have with your health care provider.  Document Revised: 03/03/2022 Document Reviewed: 02/21/2022  [employer] Patient Education © 2023 [employer]  Chronic Kidney Disease, Adult  Chronic kidney disease is when lasting damage happens to the kidneys slowly over a long time. The kidneys help to:  Make pee (urine).  Make hormones.  Keep the right amount of fluids and chemicals in the body.  Most often, this disease does not go away. You must take steps to help keep the kidney damage from getting worse. If steps are not taken, the kidneys might stop working forever.  What are the causes?  Diabetes.  High blood pressure.  Diseases that affect the heart and blood vessels.  Other kidney diseases.  Diseases of the body's disease-fighting system.  A problem with the flow of pee.  Infections of the organs that make pee, store it, and take it out of the body.  Swelling or irritation of your blood vessels.  What increases the risk?  Getting older.  Having someone in your family who has kidney  disease or kidney failure.  Having a disease caused by genes.  Taking medicines often that harm the kidneys.  Being near or having contact with harmful substances.  Being very overweight.  Using tobacco now or in the past.  What are the signs or symptoms?  Feeling very tired.  Having a swollen face, legs, ankles, or feet.  Feeling like you may vomit or vomiting.  Not feeling hungry.  Being confused or not able to focus.  Twitches and cramps in the leg muscles or other muscles.  Dry, itchy skin.  A taste of metal in your mouth.  Making less pee, or making more pee.  Shortness of breath.  Trouble sleeping.  You may also become anemic or get weak bones. Anemic means there is not enough red blood cells or hemoglobin in your blood.  You may get symptoms slowly. You may not notice them until the kidney damage gets very bad.  How is this treated?  Often, there is no cure for this disease. Treatment can help with symptoms and help keep the disease from getting worse. You may need to:  Avoid alcohol.  Avoid foods that are high in salt, potassium, phosphorous, and protein.  Take medicines for symptoms and to help control other conditions.  Have dialysis. This treatment gets harmful waste out of your body.  Treat other problems that cause your kidney disease or make it worse.  Follow these instructions at home:  Medicines  Take over-the-counter and prescription medicines only as told by your doctor.  Do not take any new medicines, vitamins, or supplements unless your doctor says it is okay.  Lifestyle    Do not smoke or use any products that contain nicotine or tobacco. If you need help quitting, ask your doctor.  If you drink alcohol:  Limit how much you use to:  0-1 drink a day for women who are not pregnant.  0-2 drinks a day for men.  Know how much alcohol is in your drink. In the [place] one drink equals one 12 oz bottle of beer (355 mL), one 5 oz glass of wine (148 mL), or one 1½ oz glass of hard liquor (44 mL).  Stay at a  healthy weight. If you need help losing weight, ask your doctor.  General instructions    Follow instructions from your doctor about what you cannot eat or drink.  Track your blood pressure at home. Tell your doctor about any changes.  If you have diabetes, track your blood sugar.  Exercise at least 30 minutes a day, 5 days a week.  Keep your shots (vaccinations) up to date.  Keep all follow-up visits.  Where to find more information  American Association of Kidney Patients: www.aakp.org  National Kidney Foundation: www.kidney.org  American Kidney Fund: www.akfinc.org  Life Options: www.lifeoptions.org  Kidney School: www.kidneyschool.org  Contact a doctor if:  Your symptoms get worse.  You get new symptoms.  Get help right away if:  You get symptoms of end-stage kidney disease. These include:  Headaches.  Losing feeling in your hands or feet.  Easy bruising.  Having hiccups often.  Chest pain.  Shortness of breath.  Lack of menstrual periods, in women.  You have a fever.  You make less pee than normal.  You have pain or you bleed when you pee or poop.  These symptoms may be an emergency. Get help right away. Call your local emergency services (911 in the [place]  Do not wait to see if the symptoms will go away.  Do not drive yourself to the hospital.  Summary  Chronic kidney disease is when lasting damage happens to the kidneys slowly over a long time.  Causes of this disease include diabetes and high blood pressure.  Often, there is no cure for this disease. Treatment can help symptoms and help keep the disease from getting worse.  Treatment may involve lifestyle changes, medicines, and dialysis.  This information is not intended to replace advice given to you by your health care provider. Make sure you discuss any questions you have with your health care provider.  Document Revised: 03/24/2021 Document Reviewed: 03/24/2021  Elsevier Patient Education © 2023 [employer] Inc[employer]                      HEART FAILURE     Medication/Action Reasons   Evaluation of left ventricular systolic function (LVSF) LVSF assessment by echocardiogram or other method must be documented.  No requirement for repeating.  Results from outside facility acceptable. LVSF assessment  after discharge or plan to not assess LVSF must be documented.    ACEI or ARB prescribed on discharge for pts with EF < 40% unless contraindicated ACE inhibitors reduce mortality and morbidity in patients with LVSD. ARB therapy is an acceptable alternative. Reason for not prescribing (contraindication) must be documented.    EB Beta Blocker prescribed on discharge for pts with EF < 40% unless contraindicated Beta blockers have been demonstrated to reduce mortality, remodeling and symptomatic HF.  Include bisoprolol, carvedilol (Coreg), metoprolol succinate (Toprol XL).  Reason for not prescribing (contraindication) must be documented.    Aldosterone receptor antagonists  for pts with NYHA class II-IV and  LVEF < 35%   unless contraindicated Consider adding aldosterone receptor antagonists spironolactone or eplerenone for  patients with HFrEF who are already on ACEI or ARB and beta blockers.  Reason for not prescribing (contraindication) must be documented.    Anticoagulant prescribed on discharge for pts with HF, persistent Afib & additional risk factor (CVA/TIA, DM, HTN, >75yrs)  Anticoagulants include warfarin, dabigatran, apixaban, or rivaroxaban and should be individualized on the basis of risk factors, cost, tolerability, patient preference, potential drug interactions, etc.    Reason for not prescribing (contraindication) must be documented.    Advanced HF Stage D    HF team can help patients / families explore treatment options, prognosis, when end-of-life care is appropriate.  Palliative care interventions delivered early have a positive impact on survival and HRQOL.  Consider Palliative Care Consult, Hospice Consult & / or Advance Directives.   Discharge  instructions 1. Activity level   (all must be addressed) 2. Diet (low salt)    3. Discharge medications (must match discharge medication reconciliation and physician's discharge summary)    4.  Follow up appointment (date & time)    5.  Weight monitoring (daily)    6.  What to do if symptoms worsen   Follow-Up Appointment within 7 calendar days Pts are to be discharged with a written date & time for FU appt with PCP or Cardiology; therefore appts must be made prior to DC.  Walk-in clinics - indicate date & time to arrive.  Home Health / [facility] visit will qualify IF an appt is set and the pt is given this written information.  SNF is excluded or per MD order.        Revised  7/2014     Kayleigh Xiao [id number]  or Quality [id number]

## 2024-01-12 NOTE — CARE PLAN
"  Problem: Knowledge Deficit - Standard  Goal: Patient and family/care givers will demonstrate understanding of plan of care, disease process/condition, diagnostic tests and medications  Outcome: Progressing   Patient verbalized understanding plan of care.         Problem: Fall Risk - Rehab  Goal: Patient will remain free from falls  Outcome: Progressing   Marquita Butcher Fall risk Assessment Score: 19    High fall risk Interventions   - Alarming seatbelt  - Wander guard  - Bed and strip alarm   - Yellow sign by the door   - Yellow wrist band \"Fall risk\"  - Room near to the nurse station  - Do not leave patient unattended in the bathroom  - Fall risk education provided             "

## 2024-01-12 NOTE — PROGRESS NOTES
Patient discharged to home per order.  Discharge instructions reviewed with patient and daughter; they verbalize understanding and signed copies placed in chart.  Patient has all belongings; signed copy of form in chart.  Patient left facility at 1100 via wheelchair accompanied by rehab staff and relative.

## 2024-01-12 NOTE — PROGRESS NOTES
NURSING DAILY NOTE    Name: Regine Bryant   Date of Admission: 1/2/2024   Admitting Diagnosis: Central stenosis of spinal canal  Attending Physician: Sushma Xiao M.d.  Allergies: Atorvastatin and Lactose intolerance (gi) [lactose]    Safety  Patient Assist  Min assist  Patient Precautions  Fall Risk  Precaution Comments  room air during daytime  Bed Transfer Status  Standby Assist  Toilet Transfer Status   Standby Assist  Assistive Devices  Gait Belt, Rails, Wheelchair  Oxygen  None - Room Air  Diet/Therapeutic Dining  Current Diet Order   Procedures    Diet Order Diet: Cardiac     Pill Administration  whole  Agitated Behavioral Scale     ABS Level of Severity       Fall Risk  Has the patient had a fall this admission?   No  Marquita Butcher Fall Risk Scoring  19, HIGH RISK  Fall Risk Safety Measures  bed alarm, chair alarm, and poor balance    Vitals  Temperature: 36.5 °C (97.7 °F)  Temp src: Oral  Pulse: 68  Respiration: 20  Blood Pressure : 128/58  Blood Pressure MAP (Calculated): 81 MM HG  BP Location: Right, Upper Arm  Patient BP Position: Supine     Oxygen  Pulse Oximetry: 94 %  O2 (LPM): 0  O2 Delivery Device: None - Room Air    Bowel and Bladder  Last Bowel Movement  01/11/24  Stool Type  Type 1: Separate hard lumps (hard to pass)  Bowel Device  Bathroom  Continent  Bladder: Stress incontinence   Bowel:    Bladder Function  Urine Void (mL):  (large)  Number of Times Voided: 1  Urine Color: Yellow  Number of Times Incontinent of Urine: 1  Genitourinary Assessment   Bladder Assessment (WDL):  WDL Except  Santo Catheter: Not Applicable  Urinary Elimination: Incontinence  Urine Color: Yellow  Number of Times Incontinent of Urine: 1  Bladder Device: Bathroom, Diaper  Time Void: Yes  Bladder Scan: Post Void  $ Bladder Scan Results (mL): 135    Skin  Mayco Score   15  Sensory Interventions   Bed Types: Standard/Trauma Mattress  Skin  Preventative Measures: Pillows in Use for Support / Positioning  Moisture Interventions  Moisturizers/Barriers: Barrier Paste      Pain  Pain Rating Scale  7 - Focus of attention, prevents doing daily activities  Pain Location  Neck  Pain Location Orientation  Left  Pain Interventions   Warm Moist Pack, Therapeutic Touch    ADLs    Bathing   Shower, * * With Assistance from, Staff (OT)  Linen Change      Personal Hygiene  Change Crystal Pads  Chlorhexidine Bath      Oral Care     Teeth/Dentures     Shave     Nutrition Percentage Eaten  Between % Consumed  Environmental Precautions  Bed in Low Position, Treaded Slipper Socks on Patient  Patient Turns/Positioning  Patient Turns Self from Side to Side  Patient Turns Assistance/Tolerance     Bed Positions  Bed Controls On, Bed Locked  Head of Bed Elevated  Self regulated      Psychosocial/Neurologic Assessment  Psychosocial Assessment  Psychosocial (WDL):  WDL Except  Patient Behaviors: Fatigue  Family Behaviors: No Family Present  Neurologic Assessment  Neuro (WDL): Within Defined Limits  Level of Consciousness: Responds to voice  Orientation Level: Oriented X4  Cognition: Follows commands  Speech: Clear  Pupil Assesment: No  Motor Function/Sensation Assessment: Motor strength  Muscle Strength Right Arm: Good Strength Against Gravity and Moderate Resistance  Muscle Strength Left Arm: Good Strength Against Gravity and Moderate Resistance  Muscle Strength Right Leg: Good Strength Against Gravity and Moderate Resistance  Muscle Strength Left Leg: Good Strength Against Gravity and Moderate Resistance  EENT (WDL):  WDL Except    Cardio/Pulmonary Assessment  Edema      Respiratory Breath Sounds  RUL Breath Sounds: Clear  RML Breath Sounds: Clear  RLL Breath Sounds: Diminished  SERVANDO Breath Sounds: Clear  LLL Breath Sounds: Diminished  Cardiac Assessment   Cardiac (WDL):  WDL Except

## 2024-01-12 NOTE — PROGRESS NOTES
NURSING DAILY NOTE    Name: Regine Bryant   Date of Admission: 1/2/2024   Admitting Diagnosis: Central stenosis of spinal canal  Attending Physician: Sushma Xiao M.d.  Allergies: Atorvastatin and Lactose intolerance (gi) [lactose]    Safety  Patient Assist  Min assist  Patient Precautions  Fall Risk  Precaution Comments  room air during daytime  Bed Transfer Status  Standby Assist  Toilet Transfer Status   Standby Assist  Assistive Devices  Gait Belt, Rails, Wheelchair  Oxygen  Nasal Cannula  Diet/Therapeutic Dining  Current Diet Order   Procedures    Diet Order Diet: Cardiac     Pill Administration  whole  Agitated Behavioral Scale     ABS Level of Severity       Fall Risk  Has the patient had a fall this admission?   No  Marquita Butcher Fall Risk Scoring  19, HIGH RISK  Fall Risk Safety Measures  bed alarm and chair alarm    Vitals  Temperature: 36.5 °C (97.7 °F)  Temp src: Oral  Pulse: 70  Respiration: 20  Blood Pressure : 130/64  Blood Pressure MAP (Calculated): 86 MM HG  BP Location: Right, Upper Arm  Patient BP Position: Supine     Oxygen  Pulse Oximetry: 94 %  O2 (LPM): 1  O2 Delivery Device: Nasal Cannula    Bowel and Bladder  Last Bowel Movement  01/11/24  Stool Type  Type 1: Separate hard lumps (hard to pass)  Bowel Device  Bathroom  Continent  Bladder: Stress incontinence   Bowel:    Bladder Function  Urine Void (mL):  (moderate)  Number of Times Voided: 1  Urine Color: Yellow  Number of Times Incontinent of Urine: 1  Genitourinary Assessment   Bladder Assessment (WDL):  WDL Except  Santo Catheter: Not Applicable  Urinary Elimination: Incontinence  Urine Color: Yellow  Number of Times Incontinent of Urine: 1  Bladder Device: Bathroom, Diaper  Time Void: Yes  Bladder Scan: Post Void  $ Bladder Scan Results (mL): 135    Skin  Mayco Score   15  Sensory Interventions   Bed Types: Standard/Trauma Mattress  Skin Preventative Measures:  Pillows in Use to Float Heels, Pillows in Use for Support / Positioning  Moisture Interventions  Moisturizers/Barriers: Barrier Paste      Pain  Pain Rating Scale  7 - Focus of attention, prevents doing daily activities  Pain Location  Neck  Pain Location Orientation  Left  Pain Interventions   Medication (see MAR)    ADLs    Bathing   Shower, * * With Assistance from, Staff (OT)  Linen Change      Personal Hygiene  Change Crystal Pads  Chlorhexidine Bath      Oral Care     Teeth/Dentures     Shave     Nutrition Percentage Eaten  Between % Consumed  Environmental Precautions  Bed in Low Position, Treaded Slipper Socks on Patient  Patient Turns/Positioning  Patient Turns Self from Side to Side  Patient Turns Assistance/Tolerance     Bed Positions  Bed Controls On, Bed Locked  Head of Bed Elevated  Self regulated      Psychosocial/Neurologic Assessment  Psychosocial Assessment  Psychosocial (WDL):  WDL Except  Patient Behaviors: Fatigue  Family Behaviors: No Family Present  Neurologic Assessment  Neuro (WDL): Within Defined Limits  Level of Consciousness: Responds to voice  Orientation Level: Oriented X4  Cognition: Follows commands  Speech: Clear  Pupil Assesment: No  Motor Function/Sensation Assessment: Motor strength  Muscle Strength Right Arm: Good Strength Against Gravity and Moderate Resistance  Muscle Strength Left Arm: Good Strength Against Gravity and Moderate Resistance  Muscle Strength Right Leg: Good Strength Against Gravity and Moderate Resistance  Muscle Strength Left Leg: Good Strength Against Gravity and Moderate Resistance  EENT (WDL):  WDL Except    Cardio/Pulmonary Assessment  Edema      Respiratory Breath Sounds  RUL Breath Sounds: Clear  RML Breath Sounds: Clear  RLL Breath Sounds: Diminished  SERVANDO Breath Sounds: Clear  LLL Breath Sounds: Diminished  Cardiac Assessment   Cardiac (WDL):  WDL Except

## 2024-01-15 NOTE — PROGRESS NOTES
"Transitional Care Management  [facility] Date and Time: Filed (1/15/2024  8:58 AM)    Discharge Questions  Actual Discharge Date: 01/12/24  Now that you are home, how are you feeling?: Good (no longer using pain medication d/t feeling loopy w/use, doesn't feel like she needs it, instead using positioning, supportive measures for comfort)  Did you receive any new prescriptions?: Yes  Were you able to get them filled?: Yes  Meds to Bed or Pharmacy filled?: Pharmacy  Do you have any questions about your current medications or new medications (Review Med Rec)?: No  Did you have any durable medical equipment ordered?: Yes (pt using walker)  Did you receive it?: Yes  Do you have a follow up appointment scheduled with your PCP?: Yes  Appointment Date: 01/17/24  Appointment Time: 1300  Any issues or paperwork you wish to discuss with your PCP?: No  Are you (patient) able to get to the appointment?: Yes (daughter taking her)  If Home Health was ordered, have they contacted you (Patient): Yes  Did you have enough support after your last discharge?: Yes  Does this patient qualify for the CCM program?: No (currently has HH. once dc'd from , may be eligible for CCM)    Transitional Care  Number of attempts made to contact patient: 1  Current or previous attempts competed within two business days of discharge? : Yes  Provided education regarding treatment plan, medications, self-management, ADLs?: Yes  Has patient completed an Advanced Directive?: Yes  Is the patient's advanced directive on file?: Yes  Has the Care Manager's phone number provided?: No  Is there anything else I can help you with?: No    Discharge Summary  Chief Complaint: Neck Pain, L facial pain, Ear pain, Hypertension - Left neck behind ear.  Pt states \" nerve pain, feels like an electric shock - goes to the top of my head. \" Onset approximately 2 days ago.  Admitting Diagnosis: Cervical central stenosis of spinal canal (pt was initially dc'd to rehab " facility)  Discharge Diagnosis: Cervical central stenosis of spinal canal

## 2024-01-15 NOTE — PROGRESS NOTES
Medication chart review for [facility] services    Received referral from [facility].   Medications reviewed  compared with discharge summary if available.  Discharge summary date, if applicable:   1/12/24    Current medication list per [facility]     Medication list one, patient is currently taking    Current Outpatient Medications:     Magnesium, 1 Tablet, Oral, QDAY    non-formulary med, 1 Tablet, Oral, QDAY    Home Care Oxygen, 2 L/min, Inhalation, QHS    amLODIPine, 10 mg, Oral, DAILY    aspirin, 81 mg, Oral, DAILY    carvedilol, 12.5 mg, Oral, BID WITH MEALS    cyclobenzaprine, 10 mg, Oral, TID    hydrALAZINE, 10 mg, Oral, Q8HRS    levothyroxine, 25 mcg, Oral, AM ES    traMADol, 50 mg, Oral, 4X/DAY    Centrum Silver, 1 Tablet, Oral, DAILY    acetaminophen, 1,000 mg, Oral, Q8HRS    Vitamin C, 1,000 mg, Oral, DAILY    vitamin B-12, 1,000 mcg, Oral, DAILY      Medication list two, drugs that the patient has been prescribed or recommended to take by their healthcare provider on discharge summary       I, Sushma Xiao M.D., personally performed a complete drug regimen review and no potential clinically significant medication issues were identified.   Discharge Medication:      Medication List          START taking these medications         Instructions   amLODIPine 10 MG Tabs  Commonly known as: Norvasc    Take 1 Tablet by mouth every day.  Dose: 10 mg      cyclobenzaprine 10 mg Tabs  Commonly known as: Flexeril    Take 1 Tablet by mouth in the morning, at noon, and at bedtime for 14 days.  Dose: 10 mg      hydrALAZINE 10 MG Tabs  Commonly known as: Apresoline    Take 1 Tablet by mouth every 8 hours.  Dose: 10 mg      traMADol 50 MG Tabs  Commonly known as: Ultram    Take 1 Tablet by mouth 4 times a day for 14 days.  Dose: 50 mg                CHANGE how you take these medications         Instructions   carvedilol 12.5 MG Tabs  What changed:   medication strength  how much to take  Commonly  known as: Coreg    Doctor's comments: DX Code Needed  PATIENT OUT OF REFILLS & MEDICINE, PLEASE SEND NEW RX.  Take 1 Tablet by mouth 2 times a day with meals. Indications: High Blood Pressure Disorder  Dose: 12.5 mg                CONTINUE taking these medications         Instructions   acetaminophen 500 MG Tabs  Commonly known as: Tylenol    Take 1,000 mg by mouth every 8 hours. 500 mg x 2 tablets = 1000 mg  Indications: Fever, Pain  Dose: 1,000 mg      aspirin 81 MG Chew chewable tablet  Commonly known as: Asa    Chew 1 Tablet every day. Indications: blood thinner  Dose: 81 mg      Centrum Silver Tabs    Take 1 Tablet by mouth every day.  Dose: 1 Tablet      levothyroxine 25 MCG Tabs  Commonly known as: Synthroid    Take 1 Tablet by mouth every morning on an empty stomach.  Dose: 25 mcg      vitamin B-12 1000 MCG Tabs    Take 1,000 mcg by mouth every day. Indications: Inadequate Vitamin B12  Dose: 1,000 mcg      Vitamin C 1000 MG Tabs    Take 1,000 mg by mouth every day. Indications: Inadequate Vitamin C  Dose: 1,000 mg                STOP taking these medications       enoxaparin 40 MG/0.4ML Sosy inj  Commonly known as: Lovenox      lidocaine 4 % Ptch  Commonly known as: Asperflex      ondansetron 4 MG Tbdp  Commonly known as: Zofran ODT      oxyCODONE immediate-release 5 MG Tabs  Commonly known as: Roxicodone      senna-docusate 8.6-50 MG Tabs  Commonly known as: Pericolace Or Senokot S          Allergies   Allergen Reactions    Atorvastatin     Lactose Intolerance (Gi) [Lactose] Unspecified     gas       Labs     Lab Results   Component Value Date/Time    SODIUM 135 01/09/2024 05:30 AM    POTASSIUM 4.8 01/09/2024 05:30 AM    CHLORIDE 101 01/09/2024 05:30 AM    CO2 24 01/09/2024 05:30 AM    GLUCOSE 116 (H) 01/09/2024 05:30 AM    BUN 41 (H) 01/09/2024 05:30 AM    CREATININE 0.87 01/09/2024 05:30 AM     Lab Results   Component Value Date/Time    ALKPHOSPHAT 94 01/03/2024 05:25 AM    ASTSGOT 18 01/03/2024 05:25 AM     ALTSGPT 17 01/03/2024 05:25 AM    TBILIRUBIN 0.3 01/03/2024 05:25 AM    INR 1.5 01/18/2018 12:00 AM    ALBUMIN 3.8 01/03/2024 05:25 AM        Assessment for clinically significant drug interactions, drug omissions/additions, duplicative therapies.            CC   Jo Ann Diop A.P.R.NBelkys  [address] [place] [address]  Fax: [phone]    [facility]  Phone [phone] fax [phone]    This note was created using voice recognition software (Dragon). The accuracy of the dictation is limited by the abilities of the software. I have reviewed the note prior to signing, however some errors in grammar and context are still possible. If you have any questions related to this note please do not hesitate to contact our office.

## 2024-01-17 NOTE — DOCUMENTATION QUERY
DOCUMENTATION QUERY    PROVIDERS: Please select “Cosign w/ note”to reply to query.    To better represent the severity of illness of your patient, please review the following information and exercise your independent professional judgment in responding to this query.     Incomplete quadriplegia is noted for this account. Please clarify this diagnosis further.    - incomplete quadriplegia is present and a working diagnosis on this admission (please list clinical indicators)  - incomplete quadriplegia does not exist on this admission (please explain further)  - other explanation (please explain further)        The medical record reflects the following:   Clinical Findings  Patient with left sided weakness due to myelopathy and paratracheal mass   Treatment  Flexeril, Decadron, Tramadol, Roxicodone, Tylenol   Risk Factors  Myelopathy, cervical spinal stenosis, neurogenic bladder, paratracheal mass   Location within medical record  History and Physical, Progress Notes, and Discharge Summary     Thank you,       [email or web address]

## 2024-01-17 NOTE — PROGRESS NOTES
Subjective:     Chief Complaint   Patient presents with    Follow-Up     Follow up from hospital stay     Regine Bryant is a 89 y.o. female who presents for Hospital Follow-up.    Accompanied by daughter Kika, who is now living with her.   DNR (do not resuscitate) (POA: Yes) Yoni Chau, daughter has POA. POLST signed at home.       HPI:   Recently hospitalized from 12/31-1/2 for sudden onset of left sided neck, facial, and ear pain and hypertension.  MRI of the C-spine was consistent with degenerative changes with mild to moderate marginal osteophytosis and moderate central canal stenosis at C4.     Patient currently takes tylenol and tramadol PRN for pain which is helpful. However, she continues to have left facial and neck paresthesia. It was recommended that she get a referral to neurology for evaluation.     Mass in neck. CTA of the neck revealed hyperdense or hypervascular right paratracheal mass suspicious for neoplasm.  Patient and her daughters are aware and states that they are not interested in further invasive workup for this. Since, patient is not a surgical candidate, they opted out of any diagnostic testing or treatment. Patient and daughter are aware of complication.     Physical deconditioning, difficulty with activities of daily living, balance problems, and weakness. Patient current has home health RN, PT, OT, and  seeing her twice a week.   She is using a walker with ambulation. Denies recent falls.     Bilateral rotator cuff tear and degenerative disc disease at multiple sites. Patient has seen the [facility] for evaluation. Due to her condition, she is not a surgical candidate. Conservative therapy was recommended and was referred to physiatry. Pt and daughter admits that they have not established with physiatry due to her hospitalization. She is currently on Tramadol PRN which is helpful.     Hypertension.  Patient on amlodipine 10 mg daily, carvedilol 12.5 mg twice daily,  and hydralazine 10 mg every 8 hours.  BP at goal.  Patient tolerating well without side effects.  Patient is scheduled to follow-up with cardiology tomorrow.    Congestive heart failure.  Established condition. BNP elevated at 327, improved from 756. Patient was started on furosemide by cardiology; however, med was d/c during hospitalization due to worsening kidney function.  Patient's recent GFR is improving. She will f/u with cardiology tomorrow.  She denies SOB, chest pain, or difficulty breathing; however, she is developing bilateral leg swelling.          Current medicines (including reconciliation performed today)  Current Outpatient Medications   Medication Sig Dispense Refill    non-formulary med Take 1 Tablet by mouth every day. NERVIVE SUPPLEMENT  Indications: SUPPLEMENT      Home Care Oxygen Inhale 2 L/min at bedtime. Indications: bronchial spasm      amLODIPine (NORVASC) 10 MG Tab Take 1 Tablet by mouth every day. 90 Tablet 2    aspirin (ASA) 81 MG Chew Tab chewable tablet Chew 1 Tablet every day. Indications: blood thinner 100 Tablet 2    carvedilol (COREG) 12.5 MG Tab Take 1 Tablet by mouth 2 times a day with meals. Indications: High Blood Pressure Disorder 180 Tablet 2    cyclobenzaprine (FLEXERIL) 10 mg Tab Take 1 Tablet by mouth in the morning, at noon, and at bedtime for 14 days. 42 Tablet 0    hydrALAZINE (APRESOLINE) 10 MG Tab Take 1 Tablet by mouth every 8 hours. 90 Tablet 2    levothyroxine (SYNTHROID) 25 MCG Tab Take 1 Tablet by mouth every morning on an empty stomach. 100 Tablet 2    traMADol (ULTRAM) 50 MG Tab Take 1 Tablet by mouth 4 times a day for 14 days. 56 Tablet 0    Multiple Vitamins-Minerals (CENTRUM SILVER) Tab Take 1 Tablet by mouth every day. Indications: SUPPLEMENT      acetaminophen (TYLENOL) 500 MG Tab Take 1,000 mg by mouth every 8 hours. 500 mg x 2 tablets = 1000 mg  Indications: Fever, Pain      Ascorbic Acid (VITAMIN C) 1000 MG Tab Take 1,000 mg by mouth every day.  "Indications: Inadequate Vitamin C      Cyanocobalamin (VITAMIN B-12) 1000 MCG Tab Take 1,000 mcg by mouth every day. Indications: Inadequate Vitamin B12      Magnesium 500 MG Tab Take 1 Tablet by mouth every day. Indications: SUPPLEMENT (Patient not taking: Reported on 1/17/2024)       No current facility-administered medications for this visit.       Allergies:   Atorvastatin and Lactose intolerance (gi) [lactose]    Social History     Tobacco Use    Smoking status: Never    Smokeless tobacco: Never   Vaping Use    Vaping Use: Never used   Substance Use Topics    Alcohol use: Yes     Comment: OCC wine    Drug use: No     ROS:  Review of Systems   Constitutional:  Negative for chills, diaphoresis, fever, malaise/fatigue and weight loss.   HENT: Negative.     Eyes: Negative.    Respiratory: Negative.     Cardiovascular: Negative.    Gastrointestinal: Negative.    Genitourinary: Negative.    Musculoskeletal:  Positive for back pain and neck pain. Negative for falls and myalgias.   Skin: Negative.    Neurological:  Positive for tingling and sensory change. Negative for dizziness, tremors, speech change, focal weakness, seizures, loss of consciousness and weakness.        Left facial paresthesia   Endo/Heme/Allergies: Negative.    Psychiatric/Behavioral: Negative.           Objective:     Vitals:    01/17/24 1242   BP: 126/58   BP Location: Left arm   Patient Position: Sitting   BP Cuff Size: Adult   Pulse: 67   Resp: 16   Temp: 36.5 °C (97.7 °F)   SpO2: 98%   Weight: 60.3 kg (133 lb)   Height: 1.448 m (4' 9\")     Body mass index is 28.78 kg/m².    Physical Exam:  Constitutional: Alert, no distress, well-groomed.  Skin: Warm, dry, good turgor, no rashes in visible areas.  Eye: Equal, round and reactive, conjunctiva clear, lids normal.  ENMT: Lips without lesions, good dentition, moist mucous membranes.  Neck: Trachea midline, no thyromegaly.  Respiratory: Unlabored respiratory effort, no cough.  MSK: Normal gait, moves " all extremities.  Neuro: Grossly non-focal.   Psych: Alert and oriented x3, normal affect and mood.      Assessment and Plan:     1. Hospital discharge follow-up  Recently hospitalized from 12/31-1/2 for sudden onset of left sided neck, facial, and ear pain and hypertension.  Overall, her symptoms are improving. She feels well overall. Pain is controlled with Tylenol and Tramadol PRN. Discussed risk of constipation with pain meds. Recommend stool softeners.   Facial paresthesia-will refer to neurologist.  Continue with home health RN, PT, OT, and  as scheduled.   For worsening symptoms, recommend to go to ED.    2. Facial paresthesia  3. Cervical radiculopathy due to degenerative joint disease of spine  4. Cervical central stenosis of spinal canal  Chronic and ongoing condition. Currently stable on Tramadol PRN. Patient understands that she is not a surgical candidate. However, she is requesting referral to neurology for evaluation and management.     - Referral to Neurology  - Referral to Neurosurgery    5. Mass in neck  New, incidental finding. Concerning for neoplasm. However, pt and pt's daughters declined further diagnostic interventions or treatment.      6. Hyperparathyroidism (HCC)  Established, resolved condition. Labs therapeutic. Asymptomatic.   Will monitor     7. Chronic respiratory failure with hypoxia, on home oxygen therapy (HCC)  Chronic and stable condition on supplemental O2 at 2 L PRN. VSS. No s/s of hypoxia or respiratory distress. Continue with current regimen.   ED symptoms discussed.     8. Primary hypertension  Chronic and stable condition on amlodipine, carvedilol, and hydralazine.  Managed by cardiology.  She is scheduled to follow-up tomorrow.    Medical Decision Making/Course:  In the course of preparing for this visit with review of the pertinent past medical history, recent and past clinic visits, current medications, and performing chart, immunization, medical history and  medication reconciliation, and in the further course of obtaining the current history pertinent to the clinic visit today, performing an exam and evaluation, ordering and independently evaluating labs, tests, and/or procedures, prescribing any recommended new medications as noted above, providing any pertinent counseling and education and recommending further coordination of care. This was discussed with patient in a shared-decision making conversation, and they understand and agreed with plan of care.       [facility] Form    Diagnosis to address: E21.3 - Hyperparathyroidism (HCC)  Assessment and plan: Chronic, stable. Continue with current defined treatment plan: . Follow-up at least annually.  Diagnosis: J96.11, Z99.81 - Chronic respiratory failure with hypoxia, on home oxygen therapy (HCC)  Assessment and plan: Chronic, stable. Continue with current defined treatment plan: . Follow-up at least annually.  Last edited 01/17/24 14:30 PST by ENDY Lu.         - Chart and discharge summary were reviewed.   - Hospitalization and results reviewed with patient.   - Medications reviewed including instructions regarding high risk medications, dosing and side effects.  - Recommended Services: Referral to Home Health  - Advance directive/POLST on file?  Yes    Follow-up:Return in about 2 months (around 3/17/2024), or if symptoms worsen or fail to improve.    I spent a total of 47 minutes with record review, exam, communication with the patient, communication with other providers, and documentation of this encounter.    Thank you, Jo Ann GASPAR  [facility]

## 2024-01-18 PROBLEM — R07.9 CHEST PAIN: Status: RESOLVED | Noted: 2023-01-01 | Resolved: 2024-01-01

## 2024-01-18 PROBLEM — N17.9 AKI (ACUTE KIDNEY INJURY) (HCC): Status: RESOLVED | Noted: 2024-01-01 | Resolved: 2024-01-01

## 2024-01-18 PROBLEM — I50.9 CHRONIC CONGESTIVE HEART FAILURE (HCC): Status: RESOLVED | Noted: 2023-01-01 | Resolved: 2024-01-01

## 2024-01-18 PROBLEM — R60.0 LOCALIZED EDEMA: Status: ACTIVE | Noted: 2024-01-01

## 2024-01-18 NOTE — PROGRESS NOTES
"Chief Complaint   Patient presents with    CHF (Chronic)     Chronic congestive heart failure, unspecified heart failure type (HCC)       Subjective     Regine Bryant is a 89 y.o. female who presents today for follow-up. They have a history of CHF, hypertension, hyperlipidemia.  She was admitted from 12/31/2023 to 1/2/2024 with neck and facial pain, she had a CTA which was done and showed \"hypervascular right paratracheal mass suspicious for neoplasm \" she was discharged to rehab where she stayed until 1/12/2024.  According to discharge note she \"did not want further work-up as has known probable malignancy in lung \"    They are accompanied today by her daughter    Last seen by Dr. Royal on 12/20/2023, at that visit she was having WEST, she had a PET scan which showed no evidence of ischemia.  Echocardiogram showed normal EF, grade 1 diastolic dysfunction, mild MR, AI and TR. she appeared fluid overloaded on exam was started on Lasix and recommended to get BNP drawn    Today 1/18/2024 having pain in her neck and numbness along the left side.  Still having some WEST. Able to do her ADLs.  She does have some bilateral lower extremity edema      Past Medical History:   Diagnosis Date    Hypercholesterolemia     Hypertension     Hypertension     Thyroid disease      Past Surgical History:   Procedure Laterality Date    ABDOMINAL HYSTERECTOMY TOTAL      GYN SURGERY      hyst and bladder suspension     Family History   Problem Relation Age of Onset    Hypertension Brother      Social History     Socioeconomic History    Marital status:      Spouse name: Not on file    Number of children: Not on file    Years of education: Not on file    Highest education level: Not on file   Occupational History    Not on file   Tobacco Use    Smoking status: Never    Smokeless tobacco: Never   Vaping Use    Vaping Use: Never used   Substance and Sexual Activity    Alcohol use: Yes     Comment: OCC wine    Drug use: No "    Sexual activity: Not on file   Other Topics Concern    Not on file   Social History Narrative    , lives alone. Retired, worked in an "[employer]     Has 3 children, live near by and see's them fairly often.      Social Determinants of Health     Financial Resource Strain: Low Risk  (5/11/2023)    Overall Financial Resource Strain (CARDIA)     Difficulty of Paying Living Expenses: Not hard at all   Recent Concern: Financial Resource Strain - High Risk (3/8/2023)    Overall Financial Resource Strain (CARDIA)     Difficulty of Paying Living Expenses: Hard   Food Insecurity: No Food Insecurity (5/11/2023)    Hunger Vital Sign     Worried About Running Out of Food in the Last Year: Never true     Ran Out of Food in the Last Year: Never true   Transportation Needs: No Transportation Needs (1/10/2024)    PRAPARE - Transportation     Lack of Transportation (Medical): No     Lack of Transportation (Non-Medical): No   Physical Activity: Inactive (5/11/2023)    Exercise Vital Sign     Days of Exercise per Week: 0 days     Minutes of Exercise per Session: 0 min   Stress: No Stress Concern Present (5/11/2023)    [place] [employer] - Occupational Stress Questionnaire     Feeling of Stress : Not at all   Social Connections: Feeling Socially Integrated (1/13/2024)    OASIS : Social Isolation     Frequency of experiencing loneliness or isolation: Rarely   Intimate Partner Violence: Not on file   Housing Stability: Low Risk  (5/11/2023)    Housing Stability Vital Sign     Unable to Pay for Housing in the Last Year: No     Number of Places Lived in the Last Year: 1     Unstable Housing in the Last Year: No     Allergies   Allergen Reactions    Atorvastatin     Lactose Intolerance (Gi) [Lactose] Unspecified     gas     Outpatient Encounter Medications as of 1/18/2024   Medication Sig Dispense Refill    furosemide (LASIX) 20 MG Tab Take 1 Tablet by mouth 1 time a day as needed (for leg swelling or  "weight gain greater than 2 lb in 24 hours). 30 Tablet 11    Magnesium 500 MG Tab Take 1 Tablet by mouth every day. Indications: SUPPLEMENT      non-formulary med Take 1 Tablet by mouth every day. NERVIVE SUPPLEMENT  Indications: SUPPLEMENT      Home Care Oxygen Inhale 2 L/min at bedtime. Indications: bronchial spasm      aspirin (ASA) 81 MG Chew Tab chewable tablet Chew 1 Tablet every day. Indications: blood thinner 100 Tablet 2    carvedilol (COREG) 12.5 MG Tab Take 1 Tablet by mouth 2 times a day with meals. Indications: High Blood Pressure Disorder 180 Tablet 2    cyclobenzaprine (FLEXERIL) 10 mg Tab Take 1 Tablet by mouth in the morning, at noon, and at bedtime for 14 days. 42 Tablet 0    hydrALAZINE (APRESOLINE) 10 MG Tab Take 1 Tablet by mouth every 8 hours. 90 Tablet 2    levothyroxine (SYNTHROID) 25 MCG Tab Take 1 Tablet by mouth every morning on an empty stomach. 100 Tablet 2    traMADol (ULTRAM) 50 MG Tab Take 1 Tablet by mouth 4 times a day for 14 days. 56 Tablet 0    Multiple Vitamins-Minerals (CENTRUM SILVER) Tab Take 1 Tablet by mouth every day. Indications: SUPPLEMENT      acetaminophen (TYLENOL) 500 MG Tab Take 1,000 mg by mouth every 8 hours. 500 mg x 2 tablets = 1000 mg  Indications: Fever, Pain      Ascorbic Acid (VITAMIN C) 1000 MG Tab Take 1,000 mg by mouth every day. Indications: Inadequate Vitamin C      Cyanocobalamin (VITAMIN B-12) 1000 MCG Tab Take 1,000 mcg by mouth every day. Indications: Inadequate Vitamin B12      [DISCONTINUED] amLODIPine (NORVASC) 10 MG Tab Take 1 Tablet by mouth every day. 90 Tablet 2     No facility-administered encounter medications on file as of 1/18/2024.     ROS           Objective     /50 (BP Location: Left arm, Patient Position: Sitting, BP Cuff Size: Adult)   Pulse 78   Resp 16   Ht 1.448 m (4' 9\")   Wt 59.9 kg (132 lb)   SpO2 97%   BMI 28.56 kg/m²     Physical Exam       Lab Results   Component Value Date/Time    CHOLSTRLTOT 185 01/01/2024 12:56 " AM     (H) 01/01/2024 12:56 AM    HDL 39 (A) 01/01/2024 12:56 AM    TRIGLYCERIDE 140 01/01/2024 12:56 AM       Lab Results   Component Value Date/Time    SODIUM 135 01/09/2024 05:30 AM    POTASSIUM 4.8 01/09/2024 05:30 AM    CHLORIDE 101 01/09/2024 05:30 AM    CO2 24 01/09/2024 05:30 AM    GLUCOSE 116 (H) 01/09/2024 05:30 AM    BUN 41 (H) 01/09/2024 05:30 AM    CREATININE 0.87 01/09/2024 05:30 AM     Lab Results   Component Value Date/Time    ALKPHOSPHAT 94 01/03/2024 05:25 AM    ASTSGOT 18 01/03/2024 05:25 AM    ALTSGPT 17 01/03/2024 05:25 AM    TBILIRUBIN 0.3 01/03/2024 05:25 AM      Echocardiogram 4/26/2023  CONCLUSIONS  Normal left ventricular systolic function. The left ventricular   ejection fraction is visually estimated to be 60%.   Grade I diastolic dysfunction.  Normal right ventricular size and systolic function.  Mild mitral regurgitation.  Mild aortic insufficiency.  Mild tricuspid regurgitation. Right ventricular systolic pressure is   estimated to be 24 mmHg (normal).  No recent study for comparison.     Assessment & Plan     1. Dyslipidemia        2. Dyspnea on exertion        3. Primary hypertension        4. Localized edema  furosemide (LASIX) 20 MG Tab          Medical Decision Making: Today's Assessment/Status/Plan:        Lower extremity edema  -Possibly due to amlodipine, we will try discontinuing this for now  -Lasix 20 mg daily as needed  -Recommend compression socks    Hypertension  - good control  - continue continue current regimen minus amlodipine  - goal < 130/80  -I will check in with her in 4 weeks to see how her swelling and blood pressures are doing  -She will let our office know if her average blood pressures greater than 130/80     Hyperlipidemia  -Most recent   -Continue diet modification to lower cholesterol, she is unlikely to benefit from statin medications at her age  -Goal of less than 100  -Check lipid panel annually    Follow up with HUBERT Wiley in 3  months    This note was dictated using Dragon speech recognition software.

## 2024-01-22 NOTE — CASE COMMUNICATION
Quality Review for 1.13.24 OASIS performed on by FLORECITA Christy RN on 1.22.2024:     Edits completed by FLORECITA Christy RN:  1.  and  dx coding updated per chart review.   2. Added 02 template to the MAR  3. Changed  to 1.12.24 per the admitting form  4. Changed  to 4 per MAR pt wears at NOC  5. Changed  to D, pt has HF  6. Changed  to 3, [id number] A to 3 per rehab dc the day prior  7. Changed  to 3 per narrative pt  needs max assist with bathing. Changed  to 3 and  to 5 per [id number] E, I, response of 2. Changed [id number] J,K response to 88  8. Changed  to 3 per ambulation score   9. Changed  to H,I per the MAR pt is on ASA and ultram  10. Added incontinence to functional limitations. Added cardiac diet to nutritional requirements  11.  Updated F2F data  12. Changed  to 7

## 2024-01-23 NOTE — CASE COMMUNICATION
I agree with changes  ----- Message -----  From: Evelyn Christy R.N.  Sent: 1/22/2024   7:43 AM PST  To: Alisa Hernandez R.N.      Quality Review for 1.13.24 OASIS performed on by FLORECITA Christy RN on 1.22.2024:     Edits completed by FLORECITA Christy RN:  1.  and  dx coding updated per chart review.   2. Added 02 template to the MAR  3. Changed  to 1.12.24 per the admitting form  4. Changed  to 4 per MAR pt wears at  NOC  5. Changed  to D, pt has HF  6. Changed  to 3, [id number] A to 3 per rehab dc the day prior  7. Changed  to 3 per narrative pt needs max assist with bathing. Changed  to 3 and  to 5 per [id number] E, I, response of 2. Changed [id number] J,K response to 88  8. Changed  to 3 per ambulation score   9. Changed  to H,I per the MAR pt is on ASA and ultram  10. Added incontinence to functional limitations. Added car diac diet to nutritional requirements  11.  Updated F2F data  12. Changed  to 7

## 2024-01-24 NOTE — TELEPHONE ENCOUNTER
Caller Name: [facility]  Call Back Number:     How would the patient prefer to be contacted with a response: Phone call do NOT leave a detailed message    May called from [facility]  She saw pt and notice  redness on her belly.  May report fungus infection, tender to touch,  like to know if you can send some prescription for her infection.  Send to [facility] on [facility] [address]

## 2024-01-26 NOTE — ED PROVIDER NOTES
ED Provider Note    CHIEF COMPLAINT  Chief Complaint   Patient presents with    Neck Pain     Intermittent neck pain. Pain management appointment scheduled next week.    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 female who presents with neck pain.  Patient has had longstanding neck pain although has been worse over the last couple of days.  She reports it does shoot up behind her ear towards her head as well.  She also reports some chronic numbness to the left side of her face that has been present for at least the last month.  She was admitted for this, had extensive imaging with noted paratracheal mass as well as cervical stenosis and was discharged initially to rehab and then to home several days ago.  She reports no recent trauma or falls.  No headaches, no nausea or vomiting, no new focal weakness or numbness or tingling.  No visual symptoms, no speech disturbance.  No recent illness fevers chills cough or congestion    PAST MEDICAL HISTORY   has a past medical history of Hypercholesterolemia, Hypertension, Hypertension, and Thyroid disease.    SURGICAL HISTORY   has a past surgical history that includes gyn surgery and abdominal hysterectomy total.    FAMILY HISTORY  Family History   Problem Relation Age of Onset    Hypertension Brother        SOCIAL HISTORY  Social History     Tobacco Use    Smoking status: Never    Smokeless tobacco: Never   Vaping Use    Vaping Use: Never used   Substance and Sexual Activity    Alcohol use: Yes     Comment:  "OCC wine    Drug use: No    Sexual activity: Not on file       CURRENT MEDICATIONS  Home Medications       Reviewed by Elizabeth Yuan R.N. (Registered Nurse) on 01/26/24 at 1131  Med List Status: Not Addressed     Medication Last Dose Status   acetaminophen (TYLENOL) 500 MG Tab  Active   Ascorbic Acid (VITAMIN C) 1000 MG Tab  Active   aspirin (ASA) 81 MG Chew Tab chewable tablet  Active   betamethasone dipropionate (DEL-BETA) 0.05 % Ointment  Active   carvedilol (COREG) 12.5 MG Tab  Active   Cyanocobalamin (VITAMIN B-12) 1000 MCG Tab  Active   cyclobenzaprine (FLEXERIL) 10 mg Tab  Active   furosemide (LASIX) 20 MG Tab  Active   Home Care Oxygen  Active   hydrALAZINE (APRESOLINE) 10 MG Tab  Active   ketoconazole (NIZORAL) 2 % Cream  Active   levothyroxine (SYNTHROID) 25 MCG Tab  Active   Magnesium 500 MG Tab  Active   Multiple Vitamins-Minerals (CENTRUM SILVER) Tab  Active   non-formulary med  Active                    ALLERGIES  Allergies   Allergen Reactions    Atorvastatin     Lactose Intolerance (Gi) [Lactose] Unspecified     gas       PHYSICAL EXAM  VITAL SIGNS: BP (!) 179/77   Pulse 94   Temp 36.6 °C (97.8 °F) (Temporal)   Resp 18   Ht 1.448 m (4' 9\")   Wt 59.9 kg (132 lb)   SpO2 95%   BMI 28.56 kg/m²      Pulse ox interpretation: I interpret this pulse ox as normal.  Constitutional: Alert uncomfortable  HENT: No signs of trauma, Bilateral external ears normal, Nose normal.   Eyes: Pupils are equal and reactive, Conjunctiva normal, Non-icteric.   Neck: Normal range of motion, lidocaine patch is in place, no swelling, no erythema no tenderness, Supple, No stridor.   Cardiovascular: Regular rate and rhythm, no murmurs.   Thorax & Lungs: Normal breath sounds, No respiratory distress, No wheezing, No chest tenderness.   Abdomen: Bowel sounds normal, Soft, No tenderness, No masses, No pulsatile masses. No peritoneal signs.  Skin: Warm, Dry, No erythema, No rash.   Back: No bony tenderness, No CVA " tenderness.   Extremities: Intact distal pulses, No edema, No tenderness, No cyanosis,  Negative Олег's sign.   Musculoskeletal: Good range of motion in all major joints. No tenderness to palpation or major deformities noted.   Neurologic: Alert , speech is fluent, there is no facial droop and other than some decrease sensation to the left side of her face including the forehead, cheek and chin, there cranial nerves appear to be intact.  Equal  strength without drift in the upper extremities, no drift of the lower extremities, sensation is intact to light touch throughout the upper and lower extremities normal motor function, Normal sensory function, No focal deficits noted.   Psychiatric: Affect normal, Judgment normal, Mood normal.               DIAGNOSTIC STUDIES / PROCEDURES  Labs Reviewed   CBC WITH DIFFERENTIAL - Abnormal; Notable for the following components:       Result Value    RBC 3.48 (*)     Hemoglobin 10.7 (*)     Hematocrit 33.3 (*)     MCHC 32.1 (*)     Platelet Count 155 (*)     All other components within normal limits   COMP METABOLIC PANEL - Abnormal; Notable for the following components:    Glucose 105 (*)     Bun 24 (*)     Alkaline Phosphatase 104 (*)     All other components within normal limits   ESTIMATED GFR - Abnormal; Notable for the following components:    GFR (CKD-EPI) 58 (*)     All other components within normal limits           COURSE & MEDICAL DECISION MAKING      INITIAL ASSESSMENT, COURSE AND PLAN  Care Narrative: 125 PM  Patient is evaluated at the bedside, at this point appears to be acute exacerbation of chronic pain likely with her cervical stenosis and known paratracheal mass.  While there was consideration for CVA, patient has had the symptoms for a month and prior workup so this seems highly unlikely, no findings of infectious pathology although this was considered, no new trauma or falls to suggest fracture or new traumatic injury, consideration as well for  electrolyte or metabolic derangement.  I have ordered for IV ketorolac, oxycodone, diagnostic labs      415 PM  Patient is reevaluated, she reports she is much more comfortable feeling improved.  Comfortable with discharge      ADDITIONAL PROBLEM LIST  # Neck pain.  Patient was recently admitted for similar, had MRIs, CT CTAs and does have some cervical stenosis.  She was also noted to have a paratracheal mass.  At this points does not desire interventions and no plan for interventions but will focus on symptom relief.  There are no findings of acute pathology on exam, no findings of a infectious pathology or metabolic derangement.  Will start on a short course of oxycodone, Medrol Dosepak, Colace as a stool softener and she is scheduled to see pain management    # Left facial numbness, chronic, as above, prior admission with evaluation of this, no acute findings  DISPOSITION AND DISCUSSIONS    Barriers to care at this time, including but not limited to:  none .     Decision tools and prescription drugs considered including, but not limited to: Pain Medications new prescriptions as above .    I reviewed prescription monitoring program for patient's narcotic use before prescribing a scheduled drug.The patient will not drink alcohol nor drive with prescribed medications The patient will return for new or worsening symptoms and is stable at the time of discharge.    The patient is referred to a primary physician for blood pressure management, diabetic screening, and for all other preventative health concerns.    In prescribing controlled substances to this patient, I certify that I have obtained and reviewed the medical history of Regine Bryant. I have also made a good jessica effort to obtain applicable records from other providers who have treated the patient and no other records are available at this time.     I have conducted a physical exam and documented it. I have reviewed Ms. Bryant’s prescription  history as maintained by the [place] Prescription Monitoring Program.     I have assessed the patient’s risk for abuse, dependency, and addiction using the validated Opioid Risk Tool available at https://www.mdcalc.com/ugtuot-dtks-fayl-ort-narcotic-abuse.     Given the above, I believe the benefits of controlled substance therapy outweigh the risks. The reasons for prescribing controlled substances include non-narcotic, oral analgesic alternatives have been inadequate for pain control. Accordingly, I have discussed the risk and benefits, treatment plan, and alternative therapies with the patient.     DISPOSITION:  Patient will be discharged home in stable condition.    FOLLOW UP:  Jo Ann Diop A.P.R.N.  [address] [place] [address] [phone]            OUTPATIENT MEDICATIONS:  New Prescriptions    DOCUSATE SODIUM (COLACE) 100 MG CAP    Take 1 Capsule by mouth 2 times a day.    METHYLPREDNISOLONE (MEDROL DOSEPAK) 4 MG TABLET THERAPY PACK    Use as directed    OXYCODONE IMMEDIATE-RELEASE (ROXICODONE) 5 MG TAB    Take 1 Tablet by mouth every 6 hours as needed for Severe Pain for up to 5 days.         FINAL DIAGNOSIS  1. Neck pain    2. Cervical stenosis of spine           Electronically signed by: Gordon Barrera M.D., 1/26/2024 1:15 PM

## 2024-01-26 NOTE — ED TRIAGE NOTES
"Chief Complaint   Patient presents with    Neck Pain     Intermittent neck pain. Pain management appointment scheduled next week.    Numbness     L side of face ~1 month     BP (!) 181/79   Pulse 98   Temp 36.6 °C (97.8 °F) (Temporal)   Resp 16   Ht 1.448 m (4' 9\")   Wt 59.9 kg (132 lb)   SpO2 93%   BMI 28.56 kg/m²     Pt brought in by [facility] to triage in . Educated on triage process. Instructed to notify staff for any worsening symptoms.  "

## 2024-01-27 NOTE — ED NOTES
DC home with written and verbal instructions regarding f/u, activity and RX.  Verbalized understanding, WC out with RN and daughter to car.  All pt belongings carried by daughter.

## 2024-01-29 PROBLEM — S13.140A: Status: ACTIVE | Noted: 2024-01-01

## 2024-01-29 NOTE — ED NOTES
Medication history reviewed with pts daughter (Kika). Med rec is complete.  Allergies reviewed, per pts daughter (Kika)    Pts daughter reports that she was told to hold pts AMLODIPINE 10MG it's been about a week since pt took this medication     Patient has not had any outpatient antibiotics in the last 30 days.    Pt is not on any anticoagulants

## 2024-01-29 NOTE — ED NOTES
Pt lived at home with her daughter.    ERP bedside for evaluation at this time, daughter also at bedside.

## 2024-01-29 NOTE — ED PROVIDER NOTES
ER Provider Note    Scribed for Ritchie Abernathy M.D. by Arlette Mccoy. 1/29/2024   1:36 PM    Primary Care Provider: MANAV Lu    CHIEF COMPLAINT  Chief Complaint   Patient presents with    Neck Pain     Pt was recently seen on 1/18 for multiple falls that led to neck pain and was cleared to go back home with follow up  Pt was at physical therapy today where they were concerned that the pt was still having pain in her neck and now into her left shoulder without having repeat imaging   Pt reports that she had 7/10 neck pain prior to EMS giving 324 ASA and 50mcg Fent, pt now states pain is 5/10 and is in a soft cervical collar from home      EXTERNAL RECORDS REVIEWED  Outpatient Notes: The patient was seen here 3 days ago after a fall. She had an MRI on January 1, 2024 which showed degenerative changes but no fracture.    HPI/ROS  LIMITATION TO HISTORY   Select: : None  OUTSIDE HISTORIAN(S):  None noted    Regine Bryant is a 89 y.o. female who presents to the ED for evaluation following multiple falls. She notes she initially fell at the beginning of the month and then had a second fall on 1/17 with trauma to the back left of her head. She denies any loss of consciousness at that time. She notes that since the second fall she has had worsening neck pain. She states she has not had any imaging since her most recent fall. She adds she has chronic pain to the left shoulder related to prior rotator cuff injury. She does not feel she has a new new shoulder injury and only complains of her head and neck.     PAST MEDICAL HISTORY  Past Medical History:   Diagnosis Date    Hypercholesterolemia     Hypertension     Hypertension     Thyroid disease        SURGICAL HISTORY  Past Surgical History:   Procedure Laterality Date    ABDOMINAL HYSTERECTOMY TOTAL      GYN SURGERY      hyst and bladder suspension       FAMILY HISTORY  Family History   Problem Relation Age of Onset    Hypertension Brother         SOCIAL HISTORY   reports that she has never smoked. She has never used smokeless tobacco. She reports current alcohol use. She reports that she does not use drugs.    CURRENT MEDICATIONS  Previous Medications    ACETAMINOPHEN (TYLENOL) 500 MG TAB    Take 1,000 mg by mouth every 8 hours. 500 mg x 2 tablets = 1000 mg  Indications: Fever, Pain    ASCORBIC ACID (VITAMIN C) 1000 MG TAB    Take 1,000 mg by mouth every day. Indications: Inadequate Vitamin C    ASPIRIN (ASA) 81 MG CHEW TAB CHEWABLE TABLET    Chew 2 Tablets every day. Indications: blood thinner    BETAMETHASONE DIPROPIONATE (DEL-BETA) 0.05 % OINTMENT    Apply 1 Application topically 2 times a day.    CARVEDILOL (COREG) 12.5 MG TAB    Take 1 Tablet by mouth 2 times a day with meals. Indications: High Blood Pressure Disorder    CYANOCOBALAMIN (VITAMIN B-12) 1000 MCG TAB    Take 1,000 mcg by mouth every day. Indications: Inadequate Vitamin B12    CYCLOBENZAPRINE (FLEXERIL) 10 MG TAB    Take 1 Tablet by mouth 3 times a day as needed for Moderate Pain or Muscle Spasms for up to 14 days. Indications: Muscle Spasm    DOCUSATE SODIUM (COLACE) 100 MG CAP    Take 1 Capsule by mouth 2 times a day.    FUROSEMIDE (LASIX) 20 MG TAB    Take 1 Tablet by mouth 1 time a day as needed (for leg swelling or weight gain greater than 2 lb in 24 hours).    HOME CARE OXYGEN    Inhale 2 L/min at bedtime. Oxygen dose range: 2 L/min  Respiratory route via: Nasal Cannula   Oxygen supplier: Preferred      Indications: bronchial spasm    HYDRALAZINE (APRESOLINE) 10 MG TAB    Take 1 Tablet by mouth every 8 hours.    KETOCONAZOLE (NIZORAL) 2 % CREAM    Apply 1 Application topically 2 times a day.    LEVOTHYROXINE (SYNTHROID) 25 MCG TAB    Take 1 Tablet by mouth every morning on an empty stomach.    MAGNESIUM 500 MG TAB    Take 1 Tablet by mouth every day. Indications: SUPPLEMENT    METHYLPREDNISOLONE (MEDROL DOSEPAK) 4 MG TABLET THERAPY PACK    Use as directed    MULTIPLE  "VITAMINS-MINERALS (CENTRUM SILVER) TAB    Take 1 Tablet by mouth every day. Indications: SUPPLEMENT    NON-FORMULARY MED    Take 1 Tablet by mouth every day. NERVIVE SUPPLEMENT  Indications: SUPPLEMENT    OXYCODONE IMMEDIATE-RELEASE (ROXICODONE) 5 MG TAB    Take 1 Tablet by mouth every 6 hours as needed for Severe Pain for up to 5 days.       ALLERGIES  Allergies   Allergen Reactions    Atorvastatin Myalgia    Lactose Intolerance (Gi) [Lactose] Unspecified     gas        PHYSICAL EXAM  /57   Pulse 82   Temp 36.6 °C (97.9 °F) (Temporal)   Resp 18   Ht 1.448 m (4' 9\")   Wt 59 kg (130 lb)   SpO2 92%   BMI 28.13 kg/m²    Nursing note and vitals reviewed.  Constitutional: Mild distress secondary to pain. Elderly female  HENT: Head is normocephalic and atraumatic. Oropharynx is clear and moist without exudate or erythema.   Eyes: Pupils are equal, round, and reactive to light. Conjunctiva are normal.   Cardiovascular: Normal rate and regular rhythm. No murmur heard. Normal radial pulses.  Pulmonary/Chest: Breath sounds normal. No wheezes or rales.   Abdominal: Soft and non-tender. No distention    Musculoskeletal: Normal strength in bilateral upper and lower extremities. Diffuse arthritis changes, Mild diffuse tenderness over the cervical spine  Neurological: Awake, alert and oriented to person, place, and time. No focal deficits noted.  Skin: Skin is warm and dry. No rash.   Psychiatric: Normal mood and affect. Appropriate for clinical situation    DIAGNOSTIC STUDIES    Radiology:   This attending emergency physician has independently interpreted the diagnostic imaging associated with this visit and is awaiting the final reading from the radiologist.   Preliminary interpretation is a follows: ET head shows no intracranial hemorrhage.  CT C-spine shows no fracture    Radiologist interpretation:   CT-CSPINE WITHOUT PLUS RECONS   Final Result      1.  No evidence of cervical spine fracture.      2.  Again seen " prominent multilevel degenerative disc disease and facet degeneration.      3.  Enhancing anterior subluxation at C2-3 and C3-4.      4.  Degenerative disc disease and subluxation again results in a component of degenerative central canal narrowing at C3-4, similar to prior exam.      CT-HEAD W/O   Final Result      1.  No evidence of acute intracranial process.      2.  Cerebral atrophy as well as periventricular chronic small vessel ischemic change.            INITIAL ASSESSMENT AND PLAN    1:36 PM - Patient was evaluated at bedside for evaluation following multiple falls. Ordered for CT-Head without and CT- C spine without to evaluate. Patient verbalizes understanding and support with my plan of care.  The patient will undergo trauma evaluation.    ED Observation Status? No; Patient does not meet criteria for ED Observation.      COURSE AND MEDICAL DECISION MAKING  3:33 PM - Patient was seen at bedside. I updated her on all imaging results as detailed above. She was informed of the plan for discharge home and follow up with her primary care physician. She will be treated with Percocet here prior to discharge. She states she has a prescription for this at home as well.  Patient verbalizes understanding and agreement to this plan of care.     3:57 PM - Patient has failed discharge. She is having severe unrelatening pain and is unable to ambulate without falling. Will plan for hospitalization for intractable pain.     4:08 PM - I discussed the patient's case and the above findings with Dr. Metz (hospitalist) who will consult on the patient for hospitalization. Ordered for BMP and CBC without diff.        DISPOSITION AND DISCUSSIONS    I have discussed management of the patient with the following physicians and TORY's:  Dr. Metz (hospitalist)     Discussion of management with other [facility] or appropriate source(s): None     DISPOSITION:  Patient will be hospitalized by Dr. Metz (hospitalist) in [facility]  condition.    FINAL DIAGNOSIS  1. Neck pain    2. Strain of neck muscle, initial encounter    3. Intractable pain         IArlette (Scribe), am scribing for, and in the presence of, Ritchie Abernathy M.D..    Electronically signed by: Arlette Mccoy (Scribe), 1/29/2024    Ritchie VILLALBA M.D. personally performed the services described in this documentation, as scribed by Arlette Mccoy in my presence, and it is both accurate and complete.      The note accurately reflects work and decisions made by me.  Ritchie Abernathy M.D.  1/29/2024  5:21 PM

## 2024-01-29 NOTE — ED TRIAGE NOTES
"Chief Complaint   Patient presents with    Neck Pain     Pt was recently seen on 1/18 for multiple falls that led to neck pain and was cleared to go back home with follow up  Pt was at physical therapy today where they were concerned that the pt was still having pain in her neck and now into her left shoulder without having repeat imaging   Pt reports that she had 7/10 neck pain prior to EMS giving 324 ASA and 50mcg Fent, pt now states pain is 5/10 and is in a soft cervical collar from home      /57   Pulse 82   Temp 36.6 °C (97.9 °F) (Temporal)   Resp 18   Ht 1.448 m (4' 9\")   Wt 59 kg (130 lb)   SpO2 92%   BMI 28.13 kg/m²     "

## 2024-01-30 NOTE — ASSESSMENT & PLAN NOTE
[facility]  They told her there is not much they can do for her.  Optimize pain management    1/30: I discussed case with neurosurgery, at this point they believe this is most likely chronic.  They recommend Tylenol, local heat/ice pack, muscle relaxant and gentle PT.    1/31: It seems patient has been accepted to hospice.  I discussed case with hospice team, they would like to increase the patient's dose of morphine schedule and as needed morphine.  As per hospice recommendation I have started patient on 20 mg morphine immediate release in 4 hours as well's 20 mg morphine immediate release 1 hour as needed for severe pain.  Pending placement with hospice.

## 2024-01-30 NOTE — ED NOTES
Attempted to DC pt with assistance of tech. Pt was unable to get out of bed without max assistance of two people. Pt was screaming and shaking in pain. ERP updated and has decided not to DC pt due to safety concerns.

## 2024-01-30 NOTE — ASSESSMENT & PLAN NOTE
"As per previous hospitalist:  \"Patient wishes to be DO NOT RESUSCITATE DO NOT INTUBATE CODE STATUS this was discussed with her and her daughter at bedside.\"  "

## 2024-01-30 NOTE — PROGRESS NOTES
4 Eyes Skin Assessment Completed by VINCE Bunch and VINCE Lake.    Head Swelling and Redness- left side  Ears Redness and Blanching-more on left side  Nose WDL  Mouth WDL  Neck WDL  Breast/Chest Redness, Blanching, and Rash-under breast and panus w/ interdry  Shoulder Blades WDL  Spine bruising -left side  (R) Arm/Elbow/Hand Redness, Blanching, Bruising, and Swelling  (L) Arm/Elbow/Hand Redness, Blanching, Bruising, Scab, and Swelling  Abdomen Bruising  Groin Redness and Blanching  Scrotum/Coccyx/Buttocks Redness and Blanching, bruising w/ barrier paste  (R) Leg Bruising  (L) Leg Bruising  (R) Heel/Foot/Toe Redness, Blanching, and Edema, dry and flaky  (L) Heel/Foot/Toe Redness, Blanching, and Edema, dry and flaky           Devices In Places Pulse Ox, SCD's, and Nasal Cannula and purewick      Interventions In Place Gray Ear Foams, InterDry, Heel Mepilex, Waffle Overlay, TAP System, Pillows, Q2 Turns, Barrier Cream, and Heels Loaded W/Pillows    Possible Skin Injury No    Pictures Uploaded Into Epic N/A  Wound Consult Placed N/A  RN Wound Prevention Protocol Ordered Yes

## 2024-01-30 NOTE — PROGRESS NOTES
Bedside report received from night RN. Assumed care of patient. Alert and oriented X4, wakes easily to voice. ON 2LPM oxygen via NC, no SOB noted. Daily plan of care discussed. Pt complains of pain, medicated per MAR. No nausea and vomiting reported at this time. Safety precautions in place. Hourly rounding ongoing.

## 2024-01-30 NOTE — ASSESSMENT & PLAN NOTE
1/30: I discussed case with neurosurgery, at this point they believe this is most likely chronic.  They recommend Tylenol, local heat/ice pack, muscle relaxant and gentle PT. they also recommend removing the collar.    1/31: Hospice team has accepted the patient, we are still pending placement for hospice.  They are recommending change of pain medications.  As per the recommendation we have started the patient on morphine immediate release 20 mg every 4 hours with 20 mg every hour as needed's for severe pain also 20 mg.  We are still pending placement for hospice.

## 2024-01-30 NOTE — PROGRESS NOTES
4 Eyes Skin Assessment Completed by VINCE Ramon and Meg RN.    Head Swelling and Redness left side posterior  Ears Redness and Blanching bilateral more on the left  Nose WDL  Mouth WDL  Neck WDL  Breast/Chest Redness and Blanching under breasts interdry placed, redness, blanching, rash -panus.  Shoulder Blades WDL  Spine Bruising back left side  (R) Arm/Elbow/Hand Elbow Redness, Blanching, Bruising, and Swelling fingers  (L) Arm/Elbow/Hand Scab on elbow, redness blanching, bruising arm, swelling on fingers  Abdomen Bruising, distended, rounded  Groin Redness and Blanching barrier paste applied  Scrotum/Coccyx/Buttocks Bruising on hip Redness and Blanching  (R) Leg heel Bruising  (L) Leg Heel Bruising  (R) Heel/Foot/Toe Heel Redness, Blanching, and Edema, flaky heel  (L) Heel/Foot/Toe Heel Redness, Blanching, and Edema, flaky heel          Devices In Places Rigid C colar, Pulse Ox, SCD's, and Nasal Cannula      Interventions In Place Gray Ear Foams, InterDry, Heel Mepilex, Sacral Mepilex, Waffle Overlay, TAP System, Pillows, Q2 Turns, Barrier Cream, and Heels Loaded W/Pillows    Possible Skin Injury No    Pictures Uploaded Into Epic N/A  Wound Consult Placed N/A  RN Wound Prevention Protocol Ordered Yes

## 2024-01-30 NOTE — H&P
[facility] Medicine History & Physical Note    Date of Service  1/29/2024    Primary Care Physician  MANAV Lu    Consultants  None    Specialist Names: None    Code Status  DNAR/DNI    Chief Complaint  Chief Complaint   Patient presents with    Neck Pain     Pt was recently seen on 1/18 for multiple falls that led to neck pain and was cleared to go back home with follow up  Pt was at physical therapy today where they were concerned that the pt was still having pain in her neck and now into her left shoulder without having repeat imaging   Pt reports that she had 7/10 neck pain prior to EMS giving 324 ASA and 50mcg Fent, pt now states pain is 5/10 and is in a soft cervical collar from home        History of Presenting Illness  Regine Bryant is a 89 y.o. female who presented 1/29/2024 with neck pain.  The neck pain at this point is so severe that even 2 people helping her did not allow them to take her to the bathroom and she kept collapsing.  The patient apparently collapsed on January 31 and ended up in the hospital for about 5 days and then went to rehab.  She was discharged from rehab and on 117 again fell.  She was seen in the emergency room at [facility] at that time was able to be discharged home but continues at this point over time to have such severe pain that she is not able to function at home.  After discussing this with the emergency room physician and case management we will try to get the patient to a skilled facility for rehab.  For now we will try a c-collar that will stabilize her neck.  Patient does not want any kind of surgical intervention and she is DO NOT RESUSCITATE DO NOT INTUBATE CODE STATUS.    I discussed the plan of care with patient, family, bedside RN, , pharmacy, and emergency room physician Dr. Abernathy .    Review of Systems  Review of Systems   Constitutional:  Positive for malaise/fatigue and weight loss. Negative for chills, diaphoresis  and fever.   HENT: Negative.  Negative for nosebleeds and sinus pain.    Eyes: Negative.  Negative for double vision, photophobia, discharge and redness.   Respiratory: Negative.  Negative for cough, sputum production, shortness of breath, wheezing and stridor.    Cardiovascular: Negative.  Negative for chest pain, orthopnea, leg swelling and PND.   Gastrointestinal: Negative.  Negative for abdominal pain, blood in stool and heartburn.   Genitourinary: Negative.  Negative for dysuria, flank pain and frequency.   Musculoskeletal:  Positive for myalgias and neck pain. Negative for back pain and falls.   Skin: Negative.  Negative for itching.   Neurological:  Positive for tremors, sensory change, weakness and headaches. Negative for dizziness, tingling, focal weakness and seizures.   Endo/Heme/Allergies: Negative.  Negative for polydipsia. Does not bruise/bleed easily.   Psychiatric/Behavioral: Negative.  Negative for depression, substance abuse and suicidal ideas. The patient does not have insomnia.    All other systems reviewed and are negative.      Past Medical History   has a past medical history of Hypercholesterolemia, Hypertension, Hypertension, and Thyroid disease.    Surgical History   has a past surgical history that includes gyn surgery and abdominal hysterectomy total.     Family History  family history includes Hypertension in her brother.   Family history reviewed with patient. There is no family history that is pertinent to the chief complaint.     Social History   reports that she has never smoked. She has never used smokeless tobacco. She reports current alcohol use. She reports that she does not use drugs.    Allergies  Allergies   Allergen Reactions    Atorvastatin Myalgia    Lactose Intolerance (Gi) [Lactose] Unspecified     gas       Medications  Prior to Admission Medications   Prescriptions Last Dose Informant Patient Reported? Taking?   Ascorbic Acid (VITAMIN C) 1000 MG Tab > 2 days at AM  Family Member Yes No   Sig: Take 1,000 mg by mouth every day. Indications: Inadequate Vitamin C   Cyanocobalamin (VITAMIN B-12) 1000 MCG Tab > 2 days at Unknown Family Member Yes No   Sig: Take 1,000 mcg by mouth every day. Indications: Inadequate Vitamin B12   Home Care Oxygen 1/28/2024 at PM Family Member Yes No   Sig: Inhale 2 L/min at bedtime. Oxygen dose range: 2 L/min  Respiratory route via: Nasal Cannula   Oxygen supplier: Preferred      Indications: bronchial spasm   Magnesium 500 MG Tab > 2 days at AM Family Member Yes No   Sig: Take 1 Tablet by mouth every day. Indications: SUPPLEMENT   Multiple Vitamins-Minerals (CENTRUM SILVER) Tab > 2 days at AM Family Member Yes No   Sig: Take 1 Tablet by mouth every day. Indications: SUPPLEMENT   acetaminophen (TYLENOL) 500 MG Tab > 2 nights at Unknown Family Member Yes No   Sig: Take 1,000 mg by mouth every 8 hours. 500 mg x 2 tablets = 1000 mg  Indications: Fever, Pain   amLODIPine (NORVASC) 10 MG Tab > 1 week at HOLD Family Member Yes Yes   Sig: Take 10 mg by mouth every day.   aspirin (ASA) 81 MG Chew Tab chewable tablet 1/29/2024 at 1200 Family Member No No   Sig: Chew 2 Tablets every day. Indications: blood thinner   Patient taking differently: Chew 162 mg every evening. Pt took 162MG last night @ 1930, then took 4 tablets (324MG) today (1/29/2024) at 1200  Indications: blood thinner   betamethasone dipropionate (DEL-BETA) 0.05 % Ointment 1/29/2024 at 1100 Family Member No No   Sig: Apply 1 Application topically 2 times a day.   Patient taking differently: Apply 1 Application topically 2 times a day. Apply's under her stomach   carvedilol (COREG) 12.5 MG Tab 1/29/2024 at 1130 Family Member No No   Sig: Take 1 Tablet by mouth 2 times a day with meals. Indications: High Blood Pressure Disorder   cyclobenzaprine (FLEXERIL) 10 mg Tab 1/29/2024 at 0730 Family Member No No   Sig: Take 1 Tablet by mouth 3 times a day as needed for Moderate Pain or Muscle Spasms for up  to 14 days. Indications: Muscle Spasm   docusate sodium (COLACE) 100 MG Cap 1/28/2024 at 1930 Family Member Yes Yes   Sig: Take 100 mg by mouth every evening.   furosemide (LASIX) 20 MG Tab 1/29/2024 at 1130 Family Member No No   Sig: Take 1 Tablet by mouth 1 time a day as needed (for leg swelling or weight gain greater than 2 lb in 24 hours).   hydrALAZINE (APRESOLINE) 10 MG Tab 1/29/2024 at 1130 Family Member No No   Sig: Take 1 Tablet by mouth every 8 hours.   ipratropium (ATROVENT) 0.06 % Solution > 2 weeks at Unknown Family Member Yes Yes   Sig: Administer 2 Sprays into affected nostril(S) as needed (For congestion).   ketoconazole (NIZORAL) 2 % Cream 1/29/2024 at 1100 Family Member No No   Sig: Apply 1 Application topically 2 times a day.   Patient taking differently: Apply 1 Application topically 2 times a day. Apply's under her stomach   Indications: rash under abdominal folds   levothyroxine (SYNTHROID) 25 MCG Tab 1/29/2024 at 0730 Family Member No No   Sig: Take 1 Tablet by mouth every morning on an empty stomach.   methylPREDNISolone (MEDROL DOSEPAK) 4 MG Tablet Therapy Pack 1/29/2024 at 0730 Family Member No No   Sig: Use as directed   Patient taking differently: Take 4 mg by mouth see administration instructions. Use as directed, pt started taking on 1/26/2024 for 6 day course   non-formulary med > 2 days at AM Family Member Yes No   Sig: Take 1 Tablet by mouth every day. NERVIVE SUPPLEMENT  Indications: SUPPLEMENT   oxyCODONE immediate-release (ROXICODONE) 5 MG Tab 1/29/2024 at 1130 Family Member No No   Sig: Take 1 Tablet by mouth every 6 hours as needed for Severe Pain for up to 5 days.   traMADol (ULTRAM) 50 MG Tab > 1 week at Unknown Family Member Yes Yes   Sig: Take 50 mg by mouth every 6 hours as needed for Severe Pain.      Facility-Administered Medications: None       Physical Exam  Temp:  [36.6 °C (97.9 °F)-37.7 °C (99.8 °F)] 36.6 °C (97.9 °F)  Pulse:  [] 78  Resp:  [16-30] 16  BP:  (126-166)/(48-80) 138/63  SpO2:  [92 %-95 %] 94 %  Blood Pressure : 138/63   Temperature: 36.6 °C (97.9 °F)   Pulse: 78   Respiration: 16   Pulse Oximetry: 94 %       Physical Exam  Vitals and nursing note reviewed. Exam conducted with a chaperone present.   Constitutional:       General: She is awake.      Appearance: Normal appearance. She is well-developed, well-groomed and normal weight. She is ill-appearing.   HENT:      Head: Normocephalic and atraumatic.      Jaw: There is normal jaw occlusion. No trismus.      Salivary Glands: Right salivary gland is not tender. Left salivary gland is not tender.      Right Ear: External ear normal.      Left Ear: External ear normal.      Nose: Nose normal.      Mouth/Throat:      Mouth: Mucous membranes are dry.      Pharynx: Oropharynx is clear.   Eyes:      General: Lids are normal. Vision grossly intact.      Extraocular Movements: Extraocular movements intact.      Conjunctiva/sclera: Conjunctivae normal.      Right eye: Right conjunctiva is not injected. No exudate.     Left eye: Left conjunctiva is not injected. No exudate.     Pupils: Pupils are equal, round, and reactive to light.   Neck:      Thyroid: No thyroid mass.      Vascular: No carotid bruit, hepatojugular reflux or JVD.      Trachea: No abnormal tracheal secretions or tracheal deviation.   Cardiovascular:      Rate and Rhythm: Regular rhythm. Tachycardia present. Occasional Extrasystoles are present.     Pulses: Normal pulses.      Heart sounds: Murmur heard.      No friction rub.   Pulmonary:      Effort: Pulmonary effort is normal.      Breath sounds: Normal breath sounds. No wheezing or rhonchi.   Abdominal:      General: Abdomen is flat. Bowel sounds are normal.      Palpations: Abdomen is soft.      Tenderness: There is no abdominal tenderness. There is no right CVA tenderness or left CVA tenderness.      Hernia: No hernia is present.   Musculoskeletal:      Right shoulder: Tenderness present.  Decreased range of motion.      Left shoulder: Tenderness present. Decreased range of motion.      Cervical back: Full passive range of motion without pain, normal range of motion and neck supple. No rigidity. No muscular tenderness.      Right lower leg: No edema.      Left lower leg: No edema.      Comments: Range of motion decreased in the neck  Pain in the neck  Diminished sensation in the face on the left side as well as in the upper shoulders     Lymphadenopathy:      Head:      Right side of head: No submental adenopathy.      Left side of head: No submental adenopathy.      Cervical:      Right cervical: No superficial cervical adenopathy.     Left cervical: No superficial cervical adenopathy.      Upper Body:      Right upper body: No supraclavicular adenopathy.      Left upper body: No supraclavicular adenopathy.   Skin:     General: Skin is warm and dry.      Capillary Refill: Capillary refill takes 2 to 3 seconds.      Coloration: Skin is not cyanotic or pale.      Findings: No abrasion or bruising.   Neurological:      General: No focal deficit present.      Mental Status: She is alert and oriented to person, place, and time. Mental status is at baseline.      GCS: GCS eye subscore is 4. GCS verbal subscore is 5. GCS motor subscore is 6.      Cranial Nerves: No cranial nerve deficit.      Sensory: No sensory deficit.      Motor: Motor function is intact.      Deep Tendon Reflexes:      Reflex Scores:       Tricep reflexes are 2+ on the right side and 2+ on the left side.       Bicep reflexes are 2+ on the right side and 2+ on the left side.       Brachioradialis reflexes are 2+ on the right side and 2+ on the left side.       Patellar reflexes are 2+ on the right side and 2+ on the left side.       Achilles reflexes are 2+ on the right side and 2+ on the left side.  Psychiatric:         Attention and Perception: Perception normal. She is inattentive.         Mood and Affect: Mood normal.         Speech:  "Speech is delayed.         Behavior: Behavior is slowed. Behavior is cooperative.         Thought Content: Thought content normal.         Cognition and Memory: Cognition and memory normal.         Judgment: Judgment normal.         Laboratory:  Recent Labs     01/29/24  1557   WBC 11.4*   RBC 3.83*   HEMOGLOBIN 12.0   HEMATOCRIT 37.4   MCV 97.7   MCH 31.3   MCHC 32.1*   RDW 50.8*   PLATELETCT 220   MPV 10.0     Recent Labs     01/29/24  1557   SODIUM 140   POTASSIUM 4.9   CHLORIDE 102   CO2 22   GLUCOSE 119*   BUN 34*   CREATININE 1.06   CALCIUM 10.9*     Recent Labs     01/29/24  1557   GLUCOSE 119*         No results for input(s): \"NTPROBNP\" in the last 72 hours.      No results for input(s): \"TROPONINT\" in the last 72 hours.    Imaging:  CT-CSPINE WITHOUT PLUS RECONS   Final Result      1.  No evidence of cervical spine fracture.      2.  Again seen prominent multilevel degenerative disc disease and facet degeneration.      3.  Enhancing anterior subluxation at C2-3 and C3-4.      4.  Degenerative disc disease and subluxation again results in a component of degenerative central canal narrowing at C3-4, similar to prior exam.      CT-HEAD W/O   Final Result      1.  No evidence of acute intracranial process.      2.  Cerebral atrophy as well as periventricular chronic small vessel ischemic change.             X-Ray:  I have personally reviewed the images and compared with prior images.  EKG:  I have personally reviewed the images and compared with prior images.    Assessment/Plan:  Justification for Admission Status  I anticipate this patient will require at least two midnights for appropriate medical management, necessitating inpatient admission because patient at this point has been severe acute pain in the neck with significant changes but does not want surgery and thus conservative management will need to be applied and she will need at least 48 hours of inpatient management to stabilize her condition.  " Patient at this point is requiring multiple episodes of pain management in the IV form.    Patient will need a Med/Surg bed on MEDICAL service .  The need is secondary to severe cervical pain.    * Subluxation of C3-C4 cervical vertebrae, initial encounter- (present on admission)  Assessment & Plan  Patient comes into the hospital after a long standing history.  Patient initially started on January 31 when she had a ground-level fall and ended up at [facility].  She was in the hospital for about 5 days and then discharged to [facility].  She then went home from rehab and was home about 4 5 days when she fell again on January 17.  She came to the [facility] emergency room she was evaluated there is nothing acute going on so she was able to be discharged home with home health.  She was doing relatively well until about 3 days ago when the patient started to have severe neck pain  Patient comes into the emergency room is evaluated with imaging studies is found to have a C3-4 subluxation.  Extensive discussion held with the patient patient's daughter regarding current situation and at this point patient does not want an MRI as she is extremely claustrophobic.  She does not want any surgery.  At this point there is really no reason to get neurosurgery involved.  She is quite happy with trying to do a c-collar and having palliative care come and evaluate her.  At this point we will optimize her pain management.    Cervical central stenosis of spinal canal- (present on admission)  Assessment & Plan  [facility]  They told her there is not much they can do for her.  Optimize pain management  Patient does report numbness in the face and the neck    Chronic respiratory failure with hypoxia, on home oxygen therapy (HCC)- (present on admission)  Assessment & Plan  Titrate oxygen to keep oxygen saturations above 90%    HTN (hypertension)- (present on admission)  Assessment &  Plan  Optimize blood pressure management keep systolic blood pressure less than 140 diastolic under 90  Continue Norvasc 10 mg daily, Coreg 12.5 mg twice daily    Fibromyalgia affecting multiple sites- (present on admission)  Assessment & Plan  Pain management  Muscle relaxant    Peripheral neuropathy- (present on admission)  Assessment & Plan  Continue pain management    Stage 3a chronic kidney disease (HCC)- (present on admission)  Assessment & Plan  Avoid nephrotoxic medication  Gentle fluid resuscitation    Gait disorder- (present on admission)  Assessment & Plan  PT OT evaluation    Dyslipidemia- (present on admission)  Assessment & Plan  Low-fat low-cholesterol diet    DNR (do not resuscitate)- (present on admission)  Assessment & Plan  Patient wishes to be DO NOT RESUSCITATE DO NOT INTUBATE CODE STATUS this was discussed with her and her daughter at bedside.    Hypothyroid- (present on admission)  Assessment & Plan  Synthroid supplementation, levothyroxine 25 mcg daily  TSH most recent 0.550, T3 2.90        VTE prophylaxis: SCDs/TEDs

## 2024-01-30 NOTE — THERAPY
"Occupational Therapy   Initial Evaluation     Patient Name: Regine Bryant  Age:  89 y.o., Sex:  female  Medical Record #: [id number]  Today's Date: 1/30/2024     Precautions  Precautions: Spinal / Back Precautions   Comments: Hard c-collar, ok to wear PRN per Dr. Lemus    Assessment  Patient is 89 y.o. female presented 1/29/2024 with neck pain. Had previous fall with neck injury, was hospitalized, went to Rehab. She was discharged from rehab, was home with family assist and HH, able to walk with FWW for a short time and then again fell.  Imaging shows Enhancing anterior subluxation at C2-3 and C3-4. Per admitting MD, we will try a c-collar that will stabilize her neck, however current hospitalist MD advised that per Neurosurgery, we could use the collar PRN for comfort.  At this time pt is dependent with all mobility and ADl's due to severe neck pain.  She cannot tolerate bed mobility or simple transfers needed for simple toileting or hygiene.  She is not safe for home at this time as family cannot provide this kind of assist. She will need further inpt post acute therapy prior to home.  OT will follow while in house.      Plan    Occupational Therapy Initial Treatment Plan   Treatment Interventions: Self Care / Activities of Daily Living, Adaptive Equipment, Neuro Re-Education / Balance, Therapeutic Exercises, Therapeutic Activity  Treatment Frequency: 3 Times per Week  Duration: Until Therapy Goals Met    DC Equipment Recommendations: Unable to determine at this time  Discharge Recommendations: Recommend post-acute placement for additional occupational therapy services prior to discharge home     Subjective    \"Ouch, I can't.  It hurts.\"     Objective       01/30/24 1207   Prior Living Situation   Prior Services Skilled Home Health Services  (CNA to help with showers 1x/wk)   Housing / Facility [address] [facility]   Steps Into Home 3  (if she goes in the back door it is just a threshhold)   Steps In Home   (ramp) "   Bathroom Set up Walk In Shower;Shower Chair;Grab Bars   Equipment Owned 4-Wheel Walker;Tub / Shower Seat;Grab Bar(s) In Tub / Shower;Raised Toilet Seat Without Arms;Toilet Frame;Hand Held Shower   Lives with - Patient's Self Care Capacity Alone and Unable to Care For Self   Comments Supportive family- daughters have takenn turns staying with her 24/7 since her discharge home from Rehab- 1 stays all day and the other stays all night   Prior Level of ADL Function   Self Feeding Independent   Grooming / Hygiene Requires Assist   Bathing Requires Assist   Dressing Requires Assist   Toileting Requires Assist   Comments Pt has required 1-2 person assist with all self care tasks   Prior Level of IADL Function   Medication Management Requires Assist   Laundry Dependent   Kitchen Mobility Dependent   Finances Requires Assist   Home Management Dependent   Shopping Dependent   Prior Level Of Mobility Supervision With Device in Home   Driving / Transportation Relatives / Others Provide Transportation   Occupation (Pre-Hospital Vocational) Retired Due To Age   Leisure Interests Unable To Determine At This Time   History of Falls   History of Falls Yes   Date of Last Fall   (reason for this admit, several falls in the last couple months)   Precautions   Precautions Spinal / Back Precautions    Comments Hard c-collar, ok to wear PRN per Dr. Allan Xie   O2 (LPM) 2   O2 Delivery Device Silicone Nasal Cannula   Pain 0 - 10 Group   Location Neck;Head   Location Orientation Left   Description Aching;Constant   Therapist Pain Assessment During Activity;Prior to Activity;Post Activity;Nurse Notified  (significant pain with any movement')   Cognition    Cognition / Consciousness X   Level of Consciousness Alert   Comments [place], come delayes responses at times.  Agreeable to therapy, but fearful about mobilizing and tense in anticipation of pain   Active ROM Upper Body   Comments B shoulders limited signficantly by rotator cuff injury  (chronic)   Strength Upper Body   Upper Body Strength  X   Comments BUE limited in shoulders (2/2 limited ROM) and distally gen weakness BUE   Sensation Upper Body   Upper Extremity Sensation  X   Comments reports numbness(tingling in L hand) and tingling in middle finger on R which is longstanding   Coordination Upper Body   Coordination X   Comments limited by strength and ROM limitations   Balance Assessment   Sitting Balance (Static) Trace   Sitting Balance (Dynamic) Dependent   Weight Shift Sitting Poor   Comments EOB only, unable to attempt standing   Bed Mobility    Supine to Sit Total Assist   Sit to Supine Total Assist   Comments 2 person total assist required   ADL Assessment   Eating Maximal Assist   Grooming Maximal Assist   Upper Body Dressing Maximal Assist   Lower Body Dressing Total Assist   Toileting Total Assist  (purewik and brief)   How much help from another person does the patient currently need...   Putting on and taking off regular lower body clothing? 1   Bathing (including washing, rinsing, and drying)? 1   Toileting, which includes using a toilet, bedpan, or urinal? 1   Putting on and taking off regular upper body clothing? 2   Taking care of personal grooming such as brushing teeth? 2   Eating meals? 2   6 Clicks Daily Activity Score 9   Functional Mobility   Sit to Stand Unable to Participate   Bed, Chair, Wheelchair Transfer Unable to Participate   Toilet Transfers Unable to Participate   Mobility unable to do more than EOB   Visual Perception   Visual Perception  WDL   Activity Tolerance   Sitting Edge of Bed 5 min   Standing unable   Comments limited by pain   Patient / Family Goals   Patient / Family Goal #1 unable to state   Short Term Goals   Short Term Goal # 1 Pt will tolerate EOB sitting with [place] for 5 min for ADl's in 3 visits   Short Term Goal # 2 Pt will transfer to commode with modA in 5 visits   Short Term Goal # 3 Pt will tolerate Up to chair for 1 hour for ADl's in 5  visits   Short Term Goal # 4 Pt will feed self with setup in 3 visits   Short Term Goal # 5 Pt will dress LB with Armando in 5 visits   Education Group   Education Provided Role of Occupational Therapist;Activities of Daily Living   Role of Occupational Therapist Patient Response Patient;Family;Acceptance;Explanation;Verbal Demonstration   ADL Patient Response Patient;Family;Acceptance;Explanation;Verbal Demonstration   Additional Comments educ pt and dtr on role of therapy, importance of OOB and trying to keep moving, options for positioning without the c-collar if desired per MD

## 2024-01-30 NOTE — ASSESSMENT & PLAN NOTE
1/30: I had a long discussion with the patient and also with the patient's daughter.  I spend at least 15 minutes discussing further goals of care.  The patient is 89 years old.  The main goal for the patient is to go back home.  It seems the patient has been in and out of the hospital for the past month.  We went over CODE STATUS which she again confirmed and would like to be DNR/DNI.  However we went over further goals of care which included hospice/palliative care.  They are interested in discussing hospice care.  So I will place referral to hospice.

## 2024-01-30 NOTE — DISCHARGE PLANNING
1020  Received Choice Form at: 1007 am  Agency/Facility Name: [facility]  Sent Referral per Choice Form at: 1020 am    DPA received faxed choice form(s). DPA placed choice in Pt Media file.

## 2024-01-30 NOTE — DISCHARGE PLANNING
ER CM met with pt and dtr at bedside. AOX4 . Pleasant. She was at home fall 1.18.24 She has been seen at home by [facility] She was doing better till this past week when started increasing and worsening. She has FWW. BSC Shower chair at home. She was at Rehab earlier this month and tolerated it well. She  notes that pain has increased and escalated this past week. Oxygen at night 2lpm. Preferred O2. PCP Jadon [facility]  [facility] 1 story with 3 steps in. Family has been trying to manage pain at home. She was 2 person max assist in ER. Pain unable to be managed in ER per ERP .SNF choice form given for review. Discussed she is not a candidate for Rehab 3 hr but SNF appropriate Dtr agrees. PT OT eval and am will have choices for referrals  Care Transition Team Assessment    Information Source  Orientation Level: Oriented X4  Information Given By: Patient  Informant's Name: Regine  Who is responsible for making decisions for patient? : Patient              Interdisciplinary Discharge Planning  Primary Care Physician: Jadon GASPAR  Lives with - Patient's Self Care Capacity: Adult Children  Support Systems: Children  Housing / Facility: [address] [facility] ([address])  Do You Take your Prescribed Medications Regularly: Yes  Able to Return to Previous ADL's: Yes  Mobility Issues: Yes  Prior Services: Skilled Home Health Services ([facility])  Assistance Needed: Yes  Durable Medical Equipment: Walker, Home Oxygen, Other - Specify    Discharge Preparedness  What is your plan after discharge?: Skilled nursing facility  What are your discharge supports?: Child  Prior Functional Level: Ambulatory, Uses Walker, Needs Assist with Activities of Daily Living, Needs Assist with Medication Management    Functional Assesment  Prior Functional Level: Ambulatory, Uses Walker, Needs Assist with Activities of Daily Living, Needs Assist with Medication Management    Finances  Prescription Coverage: Yes                   Domestic  Abuse  Have you ever been the victim of abuse or violence?: No              Anticipated Discharge Information  Discharge Disposition: D/T to SNF with Medicare cert in anticipation of skilled care (03)

## 2024-01-30 NOTE — CARE PLAN
The patient is Stable - Low risk of patient condition declining or worsening    Shift Goals  Clinical Goals: Pt will be able to get up to commode this shift, tolerate pain with ambulation  Patient Goals: Comfort, pain mgt    Progress made toward(s) clinical / shift goals:   Aspen C colar neck brace placed , log rolled when turning. Assisted pt with getting up to the commode with assistance from staff. PRN pain meds given per mar, pt was able to tolerate pain with ambulation. Comfort measures provided- cold pack, warm blankets. Pt seen asleep with calm, unlabored respirations during rounds.    Patient is not progressing towards the following goals:  N/a

## 2024-01-30 NOTE — THERAPY
Physical Therapy   Initial Evaluation     Patient Name: Regine Bryant  Age:  89 y.o., Sex:  female  Medical Record #: [id number]  Today's Date: 1/30/2024     Precautions  Precautions: Spinal / Back Precautions   Comments: Per MD cruz for cervical collar prn    Assessment  Patient is 89 y.o. female with a diagnosis of anterior subluxation of C2-3 and C3-C4 cervical vertebrae; degenerative central canal narrowing C3-4. Pt was recently at [facility] rehab recovering from a fall sustained 12/31/23, was DC home and using a 4WW for mobility until fall on 1/17/24 which increased neck pain and mobility has declined since.  Today, pts cervical pain continues to limit activity tolerance, only able to sit EOB x 5 min, required modA to hold self upright in sitting and was unable to stand, pt crying in pain throughout session. Once pain is better controlled pt may be able to make some progress with PT, see below for goals and POC.   Plan    Physical Therapy Initial Treatment Plan   Treatment Plan : Bed Mobility, Gait Training, Neuro Re-Education / Balance, Therapeutic Activities, Therapeutic Exercise, Stair Training, Manual Therapy  Treatment Frequency: 4 Times per Week  Duration: Until Therapy Goals Met    DC Equipment Recommendations: Unable to determine at this time  Discharge Recommendations: Recommend post-acute placement for additional physical therapy services prior to discharge home        01/30/24 1206   Prior Living Situation   Housing / Facility [address] [facility]   Steps Into Home 3  (however pt can go in back door with just a threshold entry)   Steps In Home   (ramp)   Equipment Owned 4-Wheel Walker;Tub / Shower Seat;Bed Side Commode   Lives with - Patient's Self Care Capacity Alone and Unable to Care For Self   Comments supportive family providing 24/7 care   Prior Level of Functional Mobility   Bed Mobility Independent   Transfer Status Independent   Ambulation Independent   Assistive Devices Used 4-Wheel Walker    Comments Pt has difficulty ambulating pat week since fall resulting in increased neck pain   History of Falls   History of Falls Yes   Cognition    Level of Consciousness Alert   Comments Pt agreeable for PT, fearful of neck pain with any mobility- very apprehensive to mobilize   Active ROM Upper Body   Comments B shoulder AROm limited by B RC tear (chronic)   Active ROM Lower Body    Active ROM Lower Body  WDL   Strength Lower Body   Lower Body Strength  X   Gross Strength Generalized Weakness, Equal Bilaterally   Sensation Lower Body   Lower Extremity Sensation   WDL   Lower Body Muscle Tone   Lower Body Muscle Tone  WDL   Neurological Concerns   Comments pt is c/o some numbness L hand   Balance Assessment   Sitting Balance (Static) Trace   Sitting Balance (Dynamic) Dependent   Weight Shift Sitting Poor   Bed Mobility    Supine to Sit Total Assist   Sit to Supine Total Assist   Gait Analysis   Gait Level Of Assist Unable to Participate   Functional Mobility   Sit to Stand Unable to Participate   Bed, Chair, Wheelchair Transfer Unable to Participate   Activity Tolerance   Sitting Edge of Bed 5 min, pain limiting sitting tolerance   Short Term Goals    Short Term Goal # 1 Pt will be able to perform bed mobility and sup <> sit Armando in 6 visits.   Short Term Goal # 2 Pt will be able to perform sit <>Stand and transfer with FWW Armando in 6 vsits.   Short Term Goal # 3 Pt will be able to ambulate 25 ft with FWW Armando in 6 visits.

## 2024-01-30 NOTE — PROGRESS NOTES
Report received from ED nurse, Adia. Pt arrived via gurney on 2L, NC Pt SpO2 93%. Pt alert and oriented, x4. Assessment done. Pt denies any nausea/SOB. Pt is calm, w/unlabored breathing. Accompanied by daughter.   Fall precautions in place. Call light within reach. Welcome Guide provided. Pt verbalizes understanding of use of call light. Plan of care discussed with patient. Hourly rounding in progress.

## 2024-01-30 NOTE — DISCHARGE PLANNING
Case Management Discharge Planning    Admission Date: 1/29/2024  GMLOS:    ALOS: 0    6-Clicks ADL Score: 13  6-Clicks Mobility Score: 6  PT and/or OT Eval ordered: Yes  Post-acute Referrals Ordered: Yes  Post-acute Choice Obtained: Yes  Has referral(s) been sent to post-acute provider:  Yes      Anticipated Discharge Dispo: Discharge Disposition: D/T to SNF with Medicare cert in anticipation of skilled care (03)  [facility].     DME Needed: none    Action(s) Taken: Discussed with IDT and MD recommends placement in an SNF. LVM for [facility] at [facility].     Escalations Completed: none    Medically Clear: yes    Next Steps: follow up with [facility].     Barriers to Discharge: placement    Is the patient up for discharge tomorrow: yes

## 2024-01-30 NOTE — DISCHARGE PLANNING
Anticipated Discharge Disposition: SNF    Action: PASRR [id number]  ER CM spoke with daughter Yoni Via phone. Choice form for SNF done. [facility]. Sent to DPA.     Barriers to Discharge: Acceptance and auth pending    Plan: Floor cm will cont to follow

## 2024-01-30 NOTE — PROGRESS NOTES
Received bedside report from day shift RN at 19:15.   Assumed pt care. Pt seen AO4, O2 at 2L via NC. Pt resting in bed, daughter at bedside.  Pt reports pain, medicated per mar. VS taken prior.  Pt on . No nausea reported at this time.   POC and goals discussed. White board updated. Chart check complete.  Safety and fall precautions in place.   Bed locked and lowest position.  Instructed pt to use call light, verbalized understanding.   Call light kept within reach.  No other needs reported at this time.   Implementation of POC in progress.

## 2024-01-30 NOTE — DISCHARGE PLANNING
note:  Received a call from Dr. Tobar stating that daughters would like a hospice referral.     CM went in room and there was no family. CM called daughter Yoni and she confirmed that they want to bring pt home with hospice. They would like to care for pt at home.     Yoni gave a telephone consent for [facility]. Referral sent.   Choice faxed to DPA .

## 2024-01-30 NOTE — PROGRESS NOTES
"Hospital Medicine Daily Progress Note    Date of Service  1/30/2024    Chief Complaint  Regine Bryant is a 89 y.o. female admitted 1/29/2024 with neck pain.    Hospital Course  As per chart review:  \"89 y.o. female who presented 1/29/2024 with neck pain.  The neck pain at this point is so severe that even 2 people helping her did not allow them to take her to the bathroom and she kept collapsing.  The patient apparently collapsed on January 31 and ended up in the hospital for about 5 days and then went to rehab.  She was discharged from rehab and on 117 again fell.  She was seen in the emergency room at [facility] at that time was able to be discharged home but continues at this point over time to have such severe pain that she is not able to function at home.  After discussing this with the emergency room physician and case management we will try to get the patient to a skilled facility for rehab.  For now we will try a c-collar that will stabilize her neck.  Patient does not want any kind of surgical intervention and she is DO NOT RESUSCITATE DO NOT INTUBATE CODE STATUS.\"    Interval Problem Update  1/30: Patient seen at bedside this morning.  Patient lying in bed, with a collar in place.  She does not seem to be comfortable.  I discussed case with neurosurgery.  They believe this is most likely chronic in nature.  I also discussed case with the patient's daughter.  They would like to talk to the hospice team.  I will place referral to hospice.  In the meantime we will remove collar as per neurosurgery's recommendation and continue with Tylenol, muscle relaxant and gentle PT.    I have discussed this patient's plan of care and discharge plan at IDT rounds today with Case Management, Nursing, Nursing leadership, and other members of the IDT team.    Consultants/Specialty  neurosurgery    Code Status  DNAR/DNI    Disposition  The patient is not medically cleared for discharge to home or a post-acute " facility.      I have placed the appropriate orders for post-discharge needs.    Review of Systems  Review of Systems   Constitutional:  Positive for malaise/fatigue.   HENT:  Negative for hearing loss and nosebleeds.    Eyes:  Negative for blurred vision and double vision.   Respiratory:  Negative for cough and shortness of breath.    Cardiovascular:  Negative for chest pain and palpitations.   Gastrointestinal:  Negative for abdominal pain, heartburn and vomiting.   Genitourinary:  Negative for dysuria and urgency.   Musculoskeletal:  Positive for falls, joint pain and neck pain.   Skin:  Negative for itching and rash.   Neurological:  Negative for dizziness and headaches.   Psychiatric/Behavioral:  Negative for substance abuse. The patient is not nervous/anxious.    All other systems reviewed and are negative.       Physical Exam  Temp:  [36.6 °C (97.9 °F)-36.9 °C (98.4 °F)] 36.7 °C (98.1 °F)  Pulse:  [] 83  Resp:  [14-20] 18  BP: (126-170)/(48-86) 170/77  SpO2:  [91 %-96 %] 94 %    Physical Exam  Vitals and nursing note reviewed.   Constitutional:       General: She is not in acute distress.     Appearance: She is ill-appearing.   HENT:      Head: Normocephalic and atraumatic.      Right Ear: External ear normal.      Left Ear: External ear normal.      Mouth/Throat:      Pharynx: No oropharyngeal exudate or posterior oropharyngeal erythema.   Eyes:      General:         Right eye: No discharge.         Left eye: No discharge.   Cardiovascular:      Rate and Rhythm: Normal rate and regular rhythm.      Pulses: Normal pulses.      Heart sounds: No murmur heard.     No gallop.   Pulmonary:      Effort: Pulmonary effort is normal. No respiratory distress.      Breath sounds: Normal breath sounds. No wheezing or rhonchi.   Abdominal:      General: Bowel sounds are normal. There is no distension.      Palpations: Abdomen is soft.      Tenderness: There is no abdominal tenderness. There is no guarding.    Musculoskeletal:         General: Tenderness present.      Cervical back: Tenderness present.   Skin:     General: Skin is warm and dry.   Neurological:      Mental Status: She is alert and oriented to person, place, and time.   Psychiatric:         Mood and Affect: Mood normal.         Behavior: Behavior normal.         Fluids    Intake/Output Summary (Last 24 hours) at 1/30/2024 1330  Last data filed at 1/30/2024 1308  Gross per 24 hour   Intake 1000 ml   Output 400 ml   Net 600 ml       Laboratory  Recent Labs     01/29/24  1557 01/30/24  0232 01/30/24  0526   WBC 11.4* 7.1  --    RBC 3.83* 3.11*  --    HEMOGLOBIN 12.0 9.7* 10.0*   HEMATOCRIT 37.4 30.8* 32.0*   MCV 97.7 99.0*  --    MCH 31.3 31.2  --    MCHC 32.1* 31.5*  --    RDW 50.8* 50.4*  --    PLATELETCT 220 162*  --    MPV 10.0 10.0  --      Recent Labs     01/29/24  1557 01/30/24  0232   SODIUM 140 139   POTASSIUM 4.9 4.3   CHLORIDE 102 104   CO2 22 24   GLUCOSE 119* 129*   BUN 34* 38*   CREATININE 1.06 1.20   CALCIUM 10.9* 9.8                   Imaging  CT-CSPINE WITHOUT PLUS RECONS   Final Result      1.  No evidence of cervical spine fracture.      2.  Again seen prominent multilevel degenerative disc disease and facet degeneration.      3.  Enhancing anterior subluxation at C2-3 and C3-4.      4.  Degenerative disc disease and subluxation again results in a component of degenerative central canal narrowing at C3-4, similar to prior exam.      CT-HEAD W/O   Final Result      1.  No evidence of acute intracranial process.      2.  Cerebral atrophy as well as periventricular chronic small vessel ischemic change.              Assessment/Plan  * Subluxation of C3-C4 cervical vertebrae, initial encounter- (present on admission)  Assessment & Plan  1/30: I discussed case with neurosurgery, at this point they believe this is most likely chronic.  They recommend Tylenol, local heat/ice pack, muscle relaxant and gentle PT. they also recommend removing the  "collar.    Cervical central stenosis of spinal canal- (present on admission)  Assessment & Plan  [facility]  They told her there is not much they can do for her.  Optimize pain management    1/30: I discussed case with neurosurgery, at this point they believe this is most likely chronic.  They recommend Tylenol, local heat/ice pack, muscle relaxant and gentle PT.    Advance care planning- (present on admission)  Assessment & Plan  I had a long discussion with the patient and also with the patient's daughter.  I spend at least 15 minutes discussing further goals of care.  The patient is 89 years old.  The main goal for the patient is to go back home.  It seems the patient has been in and out of the hospital for the past month.  We went over CODE STATUS which she again confirmed and would like to be DNR/DNI.  However we went over further goals of care which included hospice/palliative care.  They are interested in discussing hospice care.  So I will place referral to hospice.    Fibromyalgia affecting multiple sites- (present on admission)  Assessment & Plan  Pain management  Muscle relaxant    Peripheral neuropathy- (present on admission)  Assessment & Plan  Continue pain management    Stage 3a chronic kidney disease (HCC)- (present on admission)  Assessment & Plan  1/30: Avoid nephrotoxic medication. Monitor.    Gait disorder- (present on admission)  Assessment & Plan  PT OT evaluation    Dyslipidemia- (present on admission)  Assessment & Plan  Low-fat low-cholesterol diet    Chronic respiratory failure with hypoxia, on home oxygen therapy (HCC)- (present on admission)  Assessment & Plan  Titrate oxygen to keep oxygen saturations above 90%    DNR (do not resuscitate)- (present on admission)  Assessment & Plan  As per previous hospitalist:  \"Patient wishes to be DO NOT RESUSCITATE DO NOT INTUBATE CODE STATUS this was discussed with her and her daughter at " "bedside.\"    Thrombocytopenia (HCC)- (present on admission)  Assessment & Plan  Seems to be chronic  monitor    Hypothyroid- (present on admission)  Assessment & Plan  1/30: Continue home levothyroxine 25 mcg daily      HTN (hypertension)- (present on admission)  Assessment & Plan  1/30: Continue Norvasc 10 mg daily, Coreg 12.5 mg twice daily, and hydralazine. PRN labetalol.         VTE prophylaxis:    heparin ppx      I have performed a physical exam and reviewed and updated ROS and Plan today (1/30/2024). In review of yesterday's note (1/29/2024), there are no changes except as documented above.      I spend at least 51 minutes providing care for this patient.  This included face-to-face interview, physical examination.  Review of lab work including CBC, hemoglobin, BMP.  Review of imaging studies including CT scan.  Consulting and discussing with neurosurgery.  Discussing with multidisciplinary team including case management, nursing staff and pharmacy.  Creating plan of care, reviewing orders.    Moreover. I had a long discussion with the patient and also with the patient's daughter.  I spend at least 15 minutes discussing further goals of care.  The patient is 89 years old.  The main goal for the patient is to go back home.  It seems the patient has been in and out of the hospital for the past month.  We went over CODE STATUS which she again confirmed and would like to be DNR/DNI.  However we went over further goals of care which included hospice/palliative care.  They are interested in discussing hospice care.  So I will place referral to hospice.  "

## 2024-01-31 PROBLEM — Z51.5 COMFORT MEASURES ONLY STATUS: Status: ACTIVE | Noted: 2024-01-01

## 2024-01-31 NOTE — DISCHARGE PLANNING
HTH/SCP chart review completed. Collaborated with ANDI Hill. In chart review and confirmed by CM patient discharge plan considerations are home with family and hospice care noting choice obtained by ANDI for [facility] (please see Cm note 1/30/2024 at 3:48PM). Given current discharge plan, [facility] will not actively be involved in discharge planning but remains available via voalte if [facility] assistance is needed in patient discharge planning. Thank you.

## 2024-01-31 NOTE — PROGRESS NOTES
"[facility] Medicine Daily Progress Note    Date of Service  1/31/2024    Chief Complaint  Regine Bryant is a 89 y.o. female admitted 1/29/2024 with neck pain.    Hospital Course  As per chart review:  \"89 y.o. female who presented 1/29/2024 with neck pain.  The neck pain at this point is so severe that even 2 people helping her did not allow them to take her to the bathroom and she kept collapsing.  The patient apparently collapsed on January 31 and ended up in the hospital for about 5 days and then went to rehab.  She was discharged from rehab and on 117 again fell.  She was seen in the emergency room at [facility] at that time was able to be discharged home but continues at this point over time to have such severe pain that she is not able to function at home.  After discussing this with the emergency room physician and case management we will try to get the patient to a skilled facility for rehab.  For now we will try a c-collar that will stabilize her neck.  Patient does not want any kind of surgical intervention and she is DO NOT RESUSCITATE DO NOT INTUBATE CODE STATUS.\"    Interval Problem Update  1/30: Patient seen at bedside this morning.  Patient lying in bed, with a collar in place.  She does not seem to be comfortable.  I discussed case with neurosurgery.  They believe this is most likely chronic in nature.  I also discussed case with the patient's daughter.  They would like to talk to the hospice team.  I will place referral to hospice.  In the meantime we will remove collar as per neurosurgery's recommendation and continue with Tylenol, muscle relaxant and gentle PT.    1/31: Patient still with significant pain.  The patient only thing that wants to do is to be comfortable.  I had a long conversation with the patient and the patient's daughter and the would like to to be made comfort care measures only.  Hospice team seems to have accepted the patient, however they would like the patient to " be a little bit more controlled before discharging on hospice.  I have made changes to her pain medication based on hospice recommendation.  I have started the patient on morphine 20 mg immediate release every 4 hours, plus 20 mg every 1 hours as needed for severe pain.  Pending hospice placement.    I have discussed this patient's plan of care and discharge plan at IDT rounds today with Case Management, Nursing, Nursing leadership, and other members of the IDT team.    Consultants/Specialty  neurosurgery    Code Status  Comfort Care/DNR    Disposition  Medically Cleared  I have placed the appropriate orders for post-discharge needs.    Review of Systems  Review of Systems   Constitutional:  Positive for malaise/fatigue.   HENT:  Negative for hearing loss and nosebleeds.    Eyes:  Negative for blurred vision and double vision.   Respiratory:  Negative for cough and shortness of breath.    Cardiovascular:  Negative for chest pain and palpitations.   Gastrointestinal:  Negative for abdominal pain, heartburn and vomiting.   Genitourinary:  Negative for dysuria and urgency.   Musculoskeletal:  Positive for falls, joint pain and neck pain.   Skin:  Negative for itching and rash.   Neurological:  Negative for dizziness and headaches.   Psychiatric/Behavioral:  Negative for substance abuse. The patient is not nervous/anxious.    All other systems reviewed and are negative.       Physical Exam  Temp:  [36.4 °C (97.5 °F)-36.7 °C (98.1 °F)] 36.7 °C (98 °F)  Pulse:  [73-88] 75  Resp:  [16-18] 16  BP: (122-142)/(61-80) 122/61  SpO2:  [91 %-95 %] 95 %    Physical Exam  Vitals and nursing note reviewed.   Constitutional:       General: She is not in acute distress.     Appearance: She is ill-appearing.   HENT:      Head: Normocephalic and atraumatic.      Right Ear: External ear normal.      Left Ear: External ear normal.      Mouth/Throat:      Pharynx: No oropharyngeal exudate or posterior oropharyngeal erythema.   Eyes:       General:         Right eye: No discharge.         Left eye: No discharge.   Cardiovascular:      Rate and Rhythm: Normal rate and regular rhythm.      Pulses: Normal pulses.      Heart sounds: No murmur heard.     No gallop.   Pulmonary:      Effort: Pulmonary effort is normal. No respiratory distress.      Breath sounds: Normal breath sounds. No wheezing or rhonchi.   Abdominal:      General: Bowel sounds are normal. There is no distension.      Palpations: Abdomen is soft.      Tenderness: There is no abdominal tenderness. There is no guarding.   Musculoskeletal:         General: Tenderness present.      Cervical back: Tenderness present.   Skin:     General: Skin is warm and dry.   Neurological:      Mental Status: She is alert and oriented to person, place, and time.   Psychiatric:         Mood and Affect: Mood normal.         Behavior: Behavior normal.         Fluids    Intake/Output Summary (Last 24 hours) at 1/31/2024 1403  Last data filed at 1/31/2024 0849  Gross per 24 hour   Intake 240 ml   Output 900 ml   Net -660 ml         Laboratory  Recent Labs     01/29/24  1557 01/30/24  0232 01/30/24  0526 01/31/24  0205   WBC 11.4* 7.1  --  8.1   RBC 3.83* 3.11*  --  3.51*   HEMOGLOBIN 12.0 9.7* 10.0* 10.9*   HEMATOCRIT 37.4 30.8* 32.0* 34.8*   MCV 97.7 99.0*  --  99.1*   MCH 31.3 31.2  --  31.1   MCHC 32.1* 31.5*  --  31.3*   RDW 50.8* 50.4*  --  52.5*   PLATELETCT 220 162*  --  183   MPV 10.0 10.0  --  9.6       Recent Labs     01/29/24  1557 01/30/24  0232 01/31/24  0205   SODIUM 140 139 139   POTASSIUM 4.9 4.3 4.2   CHLORIDE 102 104 105   CO2 22 24 22   GLUCOSE 119* 129* 106*   BUN 34* 38* 38*   CREATININE 1.06 1.20 1.13   CALCIUM 10.9* 9.8 9.9                     Imaging  CT-CSPINE WITHOUT PLUS RECONS   Final Result      1.  No evidence of cervical spine fracture.      2.  Again seen prominent multilevel degenerative disc disease and facet degeneration.      3.  Enhancing anterior subluxation at C2-3 and  C3-4.      4.  Degenerative disc disease and subluxation again results in a component of degenerative central canal narrowing at C3-4, similar to prior exam.      CT-HEAD W/O   Final Result      1.  No evidence of acute intracranial process.      2.  Cerebral atrophy as well as periventricular chronic small vessel ischemic change.              Assessment/Plan  * Comfort measures only status  Assessment & Plan  I had a long conversation with the patient and patient's daughter.  Hospice team has accepted the patient, however they would like the patient's pain to be a little bit more controlled so we have made changes to her pain medications as per hospice team recommendations.    Also the patient is now comfort care measures only.    Subluxation of C3-C4 cervical vertebrae, initial encounter- (present on admission)  Assessment & Plan  1/30: I discussed case with neurosurgery, at this point they believe this is most likely chronic.  They recommend Tylenol, local heat/ice pack, muscle relaxant and gentle PT. they also recommend removing the collar.    1/31: Hospice team has accepted the patient, we are still pending placement for hospice.  They are recommending change of pain medications.  As per the recommendation we have started the patient on morphine immediate release 20 mg every 4 hours with 20 mg every hour as needed's for severe pain also 20 mg.  We are still pending placement for hospice.    Cervical central stenosis of spinal canal- (present on admission)  Assessment & Plan  Status post PT OT and rehab at the [facility]hab hospital  They told her there is not much they can do for her.  Optimize pain management    1/30: I discussed case with neurosurgery, at this point they believe this is most likely chronic.  They recommend Tylenol, local heat/ice pack, muscle relaxant and gentle PT.    1/31: It seems patient has been accepted to hospice.  I discussed case with hospice team, they would like to increase the  "patient's dose of morphine schedule and as needed morphine.  As per hospice recommendation I have started patient on 20 mg morphine immediate release in 4 hours as well's 20 mg morphine immediate release 1 hour as needed for severe pain.  Pending placement with hospice.    Advance care planning- (present on admission)  Assessment & Plan  1/30: I had a long discussion with the patient and also with the patient's daughter.  I spend at least 15 minutes discussing further goals of care.  The patient is 89 years old.  The main goal for the patient is to go back home.  It seems the patient has been in and out of the hospital for the past month.  We went over CODE STATUS which she again confirmed and would like to be DNR/DNI.  However we went over further goals of care which included hospice/palliative care.  They are interested in discussing hospice care.  So I will place referral to hospice.    Fibromyalgia affecting multiple sites- (present on admission)  Assessment & Plan  Pain management  Muscle relaxant    Peripheral neuropathy- (present on admission)  Assessment & Plan  Continue pain management    Stage 3a chronic kidney disease (HCC)- (present on admission)  Assessment & Plan  1/30: Avoid nephrotoxic medication. Monitor.    Gait disorder- (present on admission)  Assessment & Plan  PT OT evaluation    Dyslipidemia- (present on admission)  Assessment & Plan  Low-fat low-cholesterol diet    Chronic respiratory failure with hypoxia, on home oxygen therapy (HCC)- (present on admission)  Assessment & Plan  Titrate oxygen to keep oxygen saturations above 90%    DNR (do not resuscitate)- (present on admission)  Assessment & Plan  As per previous hospitalist:  \"Patient wishes to be DO NOT RESUSCITATE DO NOT INTUBATE CODE STATUS this was discussed with her and her daughter at bedside.\"    Thrombocytopenia (HCC)- (present on admission)  Assessment & Plan  Seems to be chronic  monitor    Hypothyroid- (present on " admission)  Assessment & Plan  1/30: Continue home levothyroxine 25 mcg daily      HTN (hypertension)- (present on admission)  Assessment & Plan  1/30: Continue Norvasc 10 mg daily, Coreg 12.5 mg twice daily, and hydralazine. PRN labetalol.         VTE prophylaxis: Patient on comfort care measures only    I have performed a physical exam and reviewed and updated ROS and Plan today (1/31/2024). In review of yesterday's note (1/30/2024), there are no changes except as documented above.      I spend at least 52 minutes providing care for this patient.  This included face-to-face interview, physical examination.  Review of lab work including CBC, CMP.  Review of imaging studies including CT scan.  Consulting and discussing with hospice team. Discussing with multidisciplinary team including case management, nursing staff and pharmacy.  Creating plan of care, reviewing orders.

## 2024-01-31 NOTE — HOSPICE
Call placed to ronna Yoni.     Hospice discussion scheduled for Wed 1/31 at 10:00 in patients room.

## 2024-01-31 NOTE — CARE PLAN
The patient is Stable - Low risk of patient condition declining or worsening    Shift Goals  Clinical Goals: pain control <5/10 w/ current pain regimen  Patient Goals: pain control, comfort  Family Goals: comfort    Progress made toward(s) clinical / shift goals: PRN pain medications administered per MAR with relief per pt.     Patient is not progressing towards the following goals:

## 2024-01-31 NOTE — HOSPICE
[facility] Hospice referral/consult response    Is this patient accepted to [facility]?:  What hospice level of care?: Community  Approved by provider: Cesia Sparks APRN    Anticipated DC date: 2/1?  DC Barriers: Pain control  Additional Information:    Patient continues to be in pain 6/10. She has received Roxicodone 20 mg/24 hrs and Morphine 8 mg/24 hrs.   Family would like better pain control before returning home.   Hospice consents signed  DME to be delivered Thursday morning    RN and MD updated

## 2024-01-31 NOTE — CARE PLAN
The patient is Stable - Low risk of patient condition declining or worsening    Shift Goals  Clinical Goals: Keep pain to patients tolerable level of 6/10 with use of scheduled and PRN medications throughout shift.Fall free throughout shift.  Patient Goals: Sleep without pain waking her  Family Goals: comfort    Progress made toward(s) clinical / shift goals:  Pt remained free of falls with fall precaution protocols in place including room near nurses station, call light within reach, bed at lowest psition.    Patient is not progressing towards the following goals:

## 2024-01-31 NOTE — ASSESSMENT & PLAN NOTE
I had a long conversation with the patient and patient's daughter.  Hospice team has accepted the patient, however they would like the patient's pain to be a little bit more controlled so we have made changes to her pain medications as per hospice team recommendations.    Also the patient is now comfort care measures only.

## 2024-01-31 NOTE — THERAPY
Speech Language Therapy Contact Note    Patient Name: Regine Bryant  Age:  89 y.o., Sex:  female  Medical Record #: [id number]  Today's Date: 1/31/2024 01/31/24 1426   Treatment Variance   Reason For Missed Therapy Medical - Other (Please Comment)  (Pt now comfort care)   Interdisciplinary Plan of Care Collaboration   IDT Collaboration with  Nursing   Collaboration Comments Per EMR, patient has transitioned to comfort care. SLP will d/c services per protocol. Please re-consult if POC changes. Thank you.

## 2024-01-31 NOTE — PROGRESS NOTES
Report received from Alivia CLARKE, assumed care of pt. At 1915  POC and medications reviewed with pt. Pt verbalized understanding.   AOx4  Pt reports pain at 2/10 at this time. Denies SOB, or dizziness at this time. There is referral for hospice.  Safety measures in place.  Hourly rounding in place.     2125 Pt complains of 7/10 pain after jerking her head while falling asleep. Administering Oxycodone 5 for pain.

## 2024-01-31 NOTE — THERAPY
"Speech Language Pathology   Clinical Swallow Evaluation     Patient Name: Regine Bryant  AGE:  89 y.o., SEX:  female  Medical Record #: [id number]  Date of Service: 2024      History of Present Illness  89 y.o. female who presented 24 with severe neck pain.     PMHx chronic heart failure, hypertension, hyperlipidemia, thyroid disease, neck mass, stage IIIa CKD, peripheral neuropathy, fibromyalgia    CMHx: subluxation of C3-4 cervical vertebrae, cervical central stenosis of spinal canal    SLP hx: CSE done 24 with pt found to have a functional swallow.     CTH : 1.  No evidence of acute intracranial process.     2.  Cerebral atrophy as well as periventricular chronic small vessel ischemic change.    CT C-spine : 1.  No evidence of cervical spine fracture.     2.  Again seen prominent multilevel degenerative disc disease and facet degeneration.     3.  Enhancing anterior subluxation at C2-3 and C3-4.     4.  Degenerative disc disease and subluxation again results in a component of degenerative central canal narrowing at C3-4, similar to prior exam.      General Information:  Vitals  O2 (LPM): 2  O2 Delivery Device: Silicone Nasal Cannula  Level of Consciousness: Alert  Patient Behaviors:  (cooperative, moaning in pain)  Orientation: Self, , General place  Follows Directives: Yes    Prior Living Situation & Level of Function:  Prior Services: Skilled Home Health Services  Housing / Facility: [address]  Lives with - Patient's Self Care Capacity: Alone and Unable to Care For Self  Comments: per OT note, \"Supportive family- daughters have takenn turns staying with her  since her discharge home from Rehab- 1 stays all day and the other stays all night\"  Communication: WFL  Swallowing: WFL    Oral Mechanism Evaluation:  Dentition: Good   Facial Symmetry: Equal  Facial Sensation: Equal     Labial Observations: WFL   Lingual Observations: Midline, Xerostomia  Motor Speech: " "WFL    Laryngeal Function:  Secretion Management: Adequate  Voice Quality: WFL  Cough: Perceptually WNL    Subjective  Patient reported 10/10 L neck/head pain, holding the left side of her head, moaning in pain throughout the evaluation. She reports having difficulty suctioning liquid through a straw earlier, trouble chewing while the cervical collar was donned and \"sometimes\" feeling like food was getting stuck on the way down.      Assessment  Current Method of Nutrition: Oral diet (Regular solids, Thin liquids)  Positioning: Zaman's (60-90 degrees)  Bolus Administration: SLP, Patient  O2 (LPM): 2 O2 Delivery Device: Silicone Nasal Cannula  Factor(s) Affecting Performance: Other (see comments) (pain limiting her ability to focus)    Swallowing Trials:  Swallowing Trials  Ice: WFL  Thin Liquid (TN0): WFL  Pureed (PU4): WFL  Regular (RG7): WFL      Comments: Patient was able to self-feed bites of pudding with a spoon and bring cup/straw to mouth appropriately. Bolus acceptance and containment was intact. Mastication of regular solids (grapes, cracker) appeared timely and complete with no significant oral residue observed post swallow. Pt moaned in pain while chewing or opening her mouth to accept bolus from a utensil and had difficulty performing oral motor tasks involving mandibular excursion during oral mech exam. Pharyngeal swallow response appeared timely. No s/sx of airway invasion or pharyngeal inefficiency appreciated. Pt denied pain specific to swallowing but continued to endorse severe L sided neck pain. Discussed diet modification options with pt initially stating, \"whatever you think.\" Given pt appeared to be in the same amount of pain while self-feeding pudding as she did while chewing, will keep patient on a regular diet and let her self-select foods she feels she is able to chew.       Clinical Impressions  Functional oropharyngeal swallow with oral phase negatively impacted by neck/head pain. Diet " "modification may be indicated for comfort if pain gets worse or starts to impact her ability to chew safely. SLP will f/u to ensure diet tolerance.     Recommendations  Diet Consistency: Regular solids, Thin liquids  Instrumentation: None indicated at this time  Medication: Whole with liquid  Supervision: Assist with meal tray set up, Distant supervision - check on patient 2-3 times per meal  Positioning: Fully upright and midline during oral intake  Risk Management : Small bites/sips, Slow rate of intake  Oral Care: BID         SLP Treatment Plan  Treatment Plan: Dysphagia Treatment, Patient/Family/Caregiver Training  SLP Frequency: 2x Per Week  Estimated Duration: Until Therapy Goals Met      Anticipated Discharge Needs  Discharge Recommendations: Anticipate that the patient will have no further speech therapy needs after discharge from the hospital   Therapy Recommendations Upon DC: Not Indicated        Patient / Family Goals  Patient / Family Goal #1: \"It hurts so much.\"  Short Term Goals  Short Term Goal # 1: Patient will consume foods of choice with no s/sx of aspiration, dysphagia or difficulty chewing.      Magdalene Hayward MS,CCC-SLP   "

## 2024-02-01 NOTE — DISCHARGE PLANNING
Case Management Discharge Planning    Admission Date: 1/29/2024  GMLOS:    ALOS: 0    6-Clicks ADL Score: 9  6-Clicks Mobility Score: 6  PT and/or OT Eval ordered: Yes  Post-acute Referrals Ordered: No  Post-acute Choice Obtained: No  Has referral(s) been sent to post-acute provider:  No      Anticipated Discharge Dispo: Discharge Disposition: D/T to SNF with Medicare cert in anticipation of skilled care (03)    DME Needed: Yes    DME Ordered: Yes    Action(s) Taken: Updated Provider/Nurse on Discharge Plan    Pt discussed in 0815 rounds. Pt is cleared to go home on hospice.     LSW contacted pt's daughter, Yoni. Per Yoni, they are waiting on delivery of O2 and a hospital bed. Per hospice notes, delivery is set for this morning.     LSW contacted [facility]. Per [facility], DME was sent to [facility], and will follow up on a delivery time. Typically DME is delivered by noon. [facility] will request transport.     1115 -   LSW contacted pt's daughter Yoni to discuss transport. Yoni requested assistance in transporting pt home. Yoni requested 1500 so that both her and her sister are off work.  [facility] contacted LSW requesting LSW to set up transport for 1300. LSW reported pt's daughter requested 1500. Hospice acknowledged.   LSW completed [employer] request form for [facility] sosa at 1500.     Escalations Completed: None    Medically Clear: Yes    Next Steps: LSW to follow for discharge     Barriers to Discharge: DME    Is the patient up for discharge tomorrow: No

## 2024-02-01 NOTE — PROGRESS NOTES
Received report from night shift nurse. Assumed pt care at 0715. Pt is  resting comfortably in bed. Pt on 1.5NC. No signs of SOB/respiratory distress. Labs, VS, medications reviewed.  Fall precautions in place. Bed at lowest position. Call light and personal belongings within reach. Plan of care discussed, no further concerns at this time.

## 2024-02-01 NOTE — CARE PLAN
The patient is Stable - Low risk of patient condition declining or worsening    Shift Goals  Clinical Goals: Pt will remain without falls or injury during shift; Pt will report pain <4/10 after ordered interventions are administered; Pt will remain >90% on 1.5L, NC  Patient Goals: pain relief.  Family Goals: Meet with Hospice, plan care.    Progress made toward(s) clinical / shift goals:  Pt without falls or injury during shift; pt on 1.5 L, NC w/ SpO2 >92%; pain reported 1-3/10 after ordered interventions administered.    Patient is not progressing towards the following goals:

## 2024-02-01 NOTE — CARE PLAN
The patient is Stable - Low risk of patient condition declining or worsening    Shift Goals  Clinical Goals: Pt's pain will be tolerable at end of shift; pt's O2 sat will be WNL; pt will have comfort during shift.  Patient Goals: Pain relief.  Family Goals: na    Progress made toward(s) clinical / shift goals:      Pt's pain was tolerable w/ scheduled pain meds and cold pack; pt refused prn pain meds for breakthrough pain.     Patient is not progressing towards the following goals:

## 2024-02-01 NOTE — PROGRESS NOTES
4 Eyes Skin Assessment Completed by Mora RN and , Sari CLARKE.    Head WDL  Ears WDL  Nose WDL  Mouth WDL  Neck WDL  Breast/Chest Redness and Blanching  Shoulder Blades WDL  Spine Bruising  (R) Arm/Elbow/Hand Redness, Blanching, Bruising, and Swelling  (L) Arm/Elbow/Hand Redness, Blanching, Bruising, Scab, and Swelling  Abdomen Redness, Blanching, and Bruising, under breast and pannus  Groin Redness and Blanching  Scrotum/Coccyx/Buttocks Redness and Blanching  (R) Leg Bruising and Swelling  (L) Leg Bruising and Swelling  (R) Heel/Foot/Toe Redness, Blanching, and Swelling, Dry  (L) Heel/Foot/Toe Redness, Blanching, and Swelling, Dry          Devices In Places Blood Pressure Cuff, Pulse Ox, SCD's, and Nasal Cannula      Interventions In Place NC W/Ear Foams, InterDry, Heel Mepilex, Waffle Overlay, TAP System, Pillows, Q2 Turns, Barrier Cream, and Heels Loaded W/Pillows    Possible Skin Injury No    Pictures Uploaded Into Epic N/A  Wound Consult Placed N/A  RN Wound Prevention Protocol Ordered No

## 2024-02-01 NOTE — PROGRESS NOTES
Received report from night shift nurse. Assumed pt care at 0715. Pt is A&Ox4, resting comfortably in bed. Pt on 1.5L, NC. No signs of SOB/respiratory distress. Labs, VS, medications reviewed.  Fall precautions in place. Bed at lowest position. Call light and personal belongings within reach. Plan of care discussed, no further concerns at this time.

## 2024-02-01 NOTE — DISCHARGE SUMMARY
"Discharge Summary    CHIEF COMPLAINT ON ADMISSION  Chief Complaint   Patient presents with    Neck Pain     Pt was recently seen on 1/18 for multiple falls that led to neck pain and was cleared to go back home with follow up  Pt was at physical therapy today where they were concerned that the pt was still having pain in her neck and now into her left shoulder without having repeat imaging   Pt reports that she had 7/10 neck pain prior to EMS giving 324 ASA and 50mcg Fent, pt now states pain is 5/10 and is in a soft cervical collar from home        Reason for Admission  EMS     Admission Date  1/29/2024    CODE STATUS  Comfort Care/DNR    HPI & HOSPITAL COURSE  As per chart review:  \"89 y.o. female who presented 1/29/2024 with neck pain.  The neck pain at this point is so severe that even 2 people helping her did not allow them to take her to the bathroom and she kept collapsing.  The patient apparently collapsed on January 31 and ended up in the hospital for about 5 days and then went to rehab.  She was discharged from rehab and on 117 again fell.  She was seen in the emergency room at [facility] at that time was able to be discharged home but continues at this point over time to have such severe pain that she is not able to function at home.  After discussing this with the emergency room physician and case management we will try to get the patient to a skilled facility for rehab.  For now we will try a c-collar that will stabilize her neck.  Patient does not want any kind of surgical intervention and she is DO NOT RESUSCITATE DO NOT INTUBATE CODE STATUS.\"     Interval Problem Update  1/30: Patient seen at bedside this morning.  Patient lying in bed, with a collar in place.  She does not seem to be comfortable.  I discussed case with neurosurgery.  They believe this is most likely chronic in nature.  I also discussed case with the patient's daughter.  They would like to talk to the hospice team.  I will place " "referral to hospice.  In the meantime we will remove collar as per neurosurgery's recommendation and continue with Tylenol, muscle relaxant and gentle PT.     1/31: Patient still with significant pain.  The patient only thing that wants to do is to be comfortable.  I had a long conversation with the patient and the patient's daughter and the would like to to be made comfort care measures only.  Hospice team seems to have accepted the patient.  I have made changes to her pain medication based on hospice recommendation.  I have started the patient on morphine 20 mg immediate release every 4 hours, plus 20 mg every 1 hours as needed for severe pain.  Pending hospice placement.\"    2/1: Patient seen at bedside this morning.  Patient will be discharged home with hospice.  We appreciate further recommendations by case management.  Further treatment deferred to hospice team.        Therefore, she is discharged in guarded and stable condition to hospice.    The patient met 2-midnight criteria for an inpatient stay at the time of discharge.    Discharge Date  02/01/2024    FOLLOW UP ITEMS POST DISCHARGE  Patient to be discharged with hospice.    DISCHARGE DIAGNOSES  Principal Problem:    Comfort measures only status (POA: Unknown)  Active Problems:    Subluxation of C3-C4 cervical vertebrae, initial encounter (POA: Yes)    HTN (hypertension) (POA: Yes)      Overview: Chronic, stable on carvedilol 3.125 mg twice daily and       hydrochlorothiazide, previously she was on 25 mg of hydrochlorothiazide       daily however after her lab review she does have decreased kidney function       so we will cut this in half to 12.5 mg daily and she will monitor her home       blood pressures and let me know if they elevate above 140/90 and we can       readjust her medications.    Hypothyroid (POA: Yes)    Thrombocytopenia (HCC) (POA: Yes)    DNR (do not resuscitate) (POA: Yes)      Overview: D/w me 09/23/21 - needs POLST form signed. " Will do.    Chronic respiratory failure with hypoxia, on home oxygen therapy (HCC) (POA: Yes)    Dyslipidemia (POA: Yes)    Gait disorder (POA: Yes)      Overview: Uses cane.    Stage 3a chronic kidney disease (HCC) (POA: Yes)      Overview: Stable, continue current plan of care, 9/21 GFR 50, avoids NSAIDS          Peripheral neuropathy (POA: Yes)      Overview: griselda gets leg cramps for the past few years. She also feels a different       sensation under her toes like she has a sock between her toes and foot. It       is constant, she hasn't tried anything for it.    Fibromyalgia affecting multiple sites (POA: Yes)    Advance care planning (POA: Yes)    Cervical central stenosis of spinal canal (POA: Yes)  Resolved Problems:    * No resolved hospital problems. *      FOLLOW UP  Future Appointments   Date Time Provider Department Center   2/1/2024  1:00 PM Meenakshi Montiel R.N. [facility] None   2/2/2024 To Be Determined Meera Giles R.N. [facility] None   2/6/2024 To Be Determined Cyndi Robles C.N.A. [facility] None   2/6/2024 To Be Determined Meera Giles R.N. [facility] None   2/7/2024 To Be Determined Eloise Irma, OT [facility] None   2/9/2024 To Be Determined Meera Giles R.N. [facility] None   2/9/2024 To Be Determined Eloise Irma, OT [facility] None   2/14/2024 To Be Determined Eloise Irma, OT [facility] None   2/15/2024  3:40 PM KEVIN Hopkins M.D. [facility] None   2/16/2024 To Be Determined Eloise Solis, OT [facility] None   3/11/2024 11:00 AM MANAV Lu [facility]   4/18/2024  1:15 PM MANAV Giron [facility] None     [facility]  [address] [place] [address]  [phone]    Schedule an appointment as soon as possible for a visit       [facility], Emergency Dept  [address]  [place] [address]  [phone]    If symptoms worsen      MEDICATIONS ON DISCHARGE     Medication List        CONTINUE taking these medications        Instructions   betamethasone  dipropionate 0.05 % Oint  Commonly known as: Del-Beta   Apply 1 Application topically 2 times a day.  Dose: 1 Application     Home Care Oxygen   Inhale 2 L/min at bedtime. Oxygen dose range: 2 L/min  Respiratory route via: Nasal Cannula   Oxygen supplier: Preferred      Indications: bronchial spasm  Dose: 2 L/min     ipratropium 0.06 % Soln  Commonly known as: Atrovent   Administer 2 Sprays into affected nostril(S) as needed (For congestion).  Dose: 2 Spray     ketoconazole 2 % Crea  Commonly known as: Nizoral   Apply 1 Application topically 2 times a day.  Dose: 1 Application     levothyroxine 25 MCG Tabs  Commonly known as: Synthroid   Take 1 Tablet by mouth every morning on an empty stomach.  Dose: 25 mcg            STOP taking these medications      acetaminophen 500 MG Tabs  Commonly known as: Tylenol     amLODIPine 10 MG Tabs  Commonly known as: Norvasc     aspirin 81 MG Chew chewable tablet  Commonly known as: Asa     carvedilol 12.5 MG Tabs  Commonly known as: Coreg     Centrum Silver Tabs     cyclobenzaprine 10 mg Tabs  Commonly known as: Flexeril     docusate sodium 100 MG Caps  Commonly known as: Colace     furosemide 20 MG Tabs  Commonly known as: Lasix     hydrALAZINE 10 MG Tabs  Commonly known as: Apresoline     Magnesium 500 MG Tabs     methylPREDNISolone 4 MG Tbpk  Commonly known as: Medrol Dosepak     non-formulary med     oxyCODONE immediate-release 5 MG Tabs  Commonly known as: Roxicodone     traMADol 50 MG Tabs  Commonly known as: Ultram     vitamin B-12 1000 MCG Tabs     Vitamin C 1000 MG Tabs              Allergies  Allergies   Allergen Reactions    Atorvastatin Myalgia    Lactose Intolerance (Gi) [Lactose] Unspecified     gas       DIET  Orders Placed This Encounter   Procedures    Diet Order Diet: Regular     Standing Status:   Standing     Number of Occurrences:   1     Order Specific Question:   Diet:     Answer:   Regular [1]       ACTIVITY  As tolerated.  Weight bearing as  tolerated    CONSULTATIONS  Neurosurgery  Hospice    PROCEDURES      CT-CSPINE WITHOUT PLUS RECONS   Final Result      1.  No evidence of cervical spine fracture.      2.  Again seen prominent multilevel degenerative disc disease and facet degeneration.      3.  Enhancing anterior subluxation at C2-3 and C3-4.      4.  Degenerative disc disease and subluxation again results in a component of degenerative central canal narrowing at C3-4, similar to prior exam.      CT-HEAD W/O   Final Result      1.  No evidence of acute intracranial process.      2.  Cerebral atrophy as well as periventricular chronic small vessel ischemic change.              LABORATORY  Lab Results   Component Value Date    SODIUM 139 01/31/2024    POTASSIUM 4.2 01/31/2024    CHLORIDE 105 01/31/2024    CO2 22 01/31/2024    GLUCOSE 106 (H) 01/31/2024    BUN 38 (H) 01/31/2024    CREATININE 1.13 01/31/2024        Lab Results   Component Value Date    WBC 8.1 01/31/2024    HEMOGLOBIN 10.9 (L) 01/31/2024    HEMATOCRIT 34.8 (L) 01/31/2024    PLATELETCT 183 01/31/2024        Total time of the discharge process exceeds 31 minutes.

## 2024-02-01 NOTE — THERAPY
Occupational Therapy  Discharge      Patient Name: Regine Bryant  Age:  89 y.o., Sex:  female  Medical Record #: [id number]  Today's Date: 2/1/2024     Precautions  Precautions: Fall Risk, Spinal / Back Precautions   Comments: C-collar discontinued    Reason for Discharge From Therapy: (P) Discharge Secondary to No Likely Benefit       02/01/24 0868   Treatment Variance   Reason For Missed Therapy Non-Medical - Other (Please Comment)  (dicharge from therapy as pt now on comfort care)   Occupational Therapy Treatment Plan    Reason For Discharge Discharge Secondary to No Likely Benefit   Interdisciplinary Plan of Care Collaboration   IDT Collaboration with  Nursing   Collaboration Comments Pt has been put on comfort care, no longer appropriate for skilled therapy.  Plan is for discharge with hospice. Will discontinue therapy at this time.

## 2024-02-01 NOTE — DISCHARGE PLANNING
DC Transport Scheduled    Transport Company Scheduled:  [facility]    Scheduled Date: 2/1/2024  Scheduled Time: 1500    Destination: Home  [address] [facility] [id number]    Notified care team of scheduled transport via Voalte.     If there are any changes needed to the DC transportation scheduled, please contact [facility] at ext. [phone] between the hours of 2573-9279 Mon-Fri. If outside those hours, contact the ED Case Manager at ext. [phone].

## 2024-02-01 NOTE — PROGRESS NOTES
4 Eyes Skin Assessment Completed by VINCE Felder and VINCE Land.    Head WDL  Ears WDL  Nose WDL  Mouth WDL  Neck WDL  Breast/Chest Redness and Blanching  Shoulder Blades WDL  Spine Bruising  (R) Arm/Elbow/Hand Redness, Blanching, Bruising, and Swelling  (L) Arm/Elbow/Hand Redness, Blanching, Bruising, Scab, and Swelling  Abdomen Redness, Blanching, and Bruising under breast and pannus  Groin Redness and Blanching  Scrotum/Coccyx/Buttocks Redness and Blanching  (R) Leg Bruising and Swelling  (L) Leg Bruising and Swelling  (R) Heel/Foot/Toe Redness, Blanching, and Swelling dry  (L) Heel/Foot/Toe Redness, Blanching, and Swelling dry          Devices In Places Pulse Ox, SCD's, and Nasal Cannula      Interventions In Place Gray Ear Foams, NC W/Ear Foams, InterDry, Heel Mepilex, Sacral Mepilex, Waffle Overlay, TAP System, Q2 Turns, Barrier Cream, and Heels Loaded W/Pillows    Possible Skin Injury No    Pictures Uploaded Into Epic N/A  Wound Consult Placed N/A  RN Wound Prevention Protocol Ordered No

## 2024-02-01 NOTE — PROGRESS NOTES
Received report from Mora CLARKE. Pt resting in bed, awake. Pt A&O x4, on 1.5 lpm via NC. Pt denies pain at this time. Pt updated with POC which includes comfort care and q2 turns when tolerated. Call light within reach, bed in lowest position, bed alarm on, fall precautions in place, all needs met at this time.    - per AM nurse, pt refused use of cervical collar and MD aware.     - pulse ox on, assisted pt on her dinner tray w/ head upright position,     -pt has difficulty in turning more to her sides and refused to turn after skin checks due to her neck pain, buttocks relief ongoing w/o turning for this shift due to pt's refusal.

## 2024-02-01 NOTE — PROGRESS NOTES
Patient discussed with Irene Montiel RN. Patient admitted to community hospice, discharging home. 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   Meds ordered for home delivery

## 2024-02-09 NOTE — CASE COMMUNICATION
Quality Review for 2.1.24 GA OASIS performed on by FLORECITA Christy RN on 2.9.2024:    Edits completed by FLORECITA Christy RN:  1. Changed  E to yes per POC  2. Changed  C1, C3 to yes per last in person visit pt wears 02 per the home safety tab

## 2024-02-10 PROCEDURE — 665999 HH PPS REVENUE DEBIT

## 2024-02-10 PROCEDURE — 665998 HH PPS REVENUE CREDIT

## 2024-02-11 PROCEDURE — 665998 HH PPS REVENUE CREDIT

## 2024-02-11 PROCEDURE — 665999 HH PPS REVENUE DEBIT

## 2024-02-12 NOTE — CASE COMMUNICATION
I agree with changes.     Greta Fitch, PT, DPT    ----- Message -----  From: Evelyn Christy R.N.  Sent: 2/9/2024   6:57 AM PST  To: Greta Fitch PT      Quality Review for 2.1.24 DC OASIS performed on by FLORECITA Christy RN on 2.9.2024:    Edits completed by FLORECITA Christy RN:  1. Changed  E to yes per POC  2. Changed  C1, C3 to yes per last in person visit pt wears 02 per the home safety tab

## 2024-02-15 ENCOUNTER — APPOINTMENT (OUTPATIENT)
Dept: NEUROLOGY | Facility: MEDICAL CENTER | Age: 89
End: 2024-02-15
Attending: SPECIALIST
Payer: MEDICARE

## 2024-03-11 ENCOUNTER — APPOINTMENT (OUTPATIENT)
Dept: MEDICAL GROUP | Facility: IMAGING CENTER | Age: 89
End: 2024-03-11
Payer: COMMERCIAL